# Patient Record
Sex: MALE | Race: WHITE | Employment: OTHER | URBAN - METROPOLITAN AREA
[De-identification: names, ages, dates, MRNs, and addresses within clinical notes are randomized per-mention and may not be internally consistent; named-entity substitution may affect disease eponyms.]

---

## 2017-01-19 PROBLEM — L97.519 DIABETIC ULCER OF RIGHT FOOT ASSOCIATED WITH TYPE 2 DIABETES MELLITUS (HCC): Status: ACTIVE | Noted: 2017-01-19

## 2017-01-19 PROBLEM — E11.42 DIABETIC POLYNEUROPATHY ASSOCIATED WITH TYPE 2 DIABETES MELLITUS (HCC): Status: ACTIVE | Noted: 2017-01-19

## 2017-01-19 PROBLEM — E11.621 DIABETIC ULCER OF RIGHT FOOT ASSOCIATED WITH TYPE 2 DIABETES MELLITUS (HCC): Status: ACTIVE | Noted: 2017-01-19

## 2017-01-19 PROBLEM — M86.471 CHRONIC OSTEOMYELITIS OF RIGHT FOOT WITH DRAINING SINUS (HCC): Status: ACTIVE | Noted: 2017-01-19

## 2017-01-26 RX ORDER — MULTIVITAMIN WITH IRON
1 TABLET ORAL DAILY
COMMUNITY
End: 2021-11-03

## 2017-02-08 ENCOUNTER — APPOINTMENT (OUTPATIENT)
Dept: LAB | Facility: HOSPITAL | Age: 62
End: 2017-02-08
Payer: COMMERCIAL

## 2017-02-08 ENCOUNTER — LAB ENCOUNTER (OUTPATIENT)
Dept: LAB | Facility: HOSPITAL | Age: 62
End: 2017-02-08
Payer: COMMERCIAL

## 2017-02-08 DIAGNOSIS — E11.49 TYPE II OR UNSPECIFIED TYPE DIABETES MELLITUS WITH NEUROLOGICAL MANIFESTATIONS, UNCONTROLLED(250.62): ICD-10-CM

## 2017-02-08 DIAGNOSIS — H26.9 CATARACT: ICD-10-CM

## 2017-02-08 DIAGNOSIS — Z12.5 SCREENING FOR PROSTATE CANCER: ICD-10-CM

## 2017-02-08 DIAGNOSIS — I10 ESSENTIAL HYPERTENSION: ICD-10-CM

## 2017-02-08 DIAGNOSIS — E78.5 HYPERLIPIDEMIA WITH TARGET LDL LESS THAN 100: ICD-10-CM

## 2017-02-08 LAB
ALBUMIN SERPL-MCNC: 4 G/DL (ref 3.5–4.8)
ALP LIVER SERPL-CCNC: 50 U/L (ref 45–117)
ALT SERPL-CCNC: 19 U/L (ref 17–63)
AST SERPL-CCNC: 15 U/L (ref 15–41)
ATRIAL RATE: 62 BPM
BASOPHILS # BLD AUTO: 0.06 X10(3) UL (ref 0–0.1)
BASOPHILS NFR BLD AUTO: 0.7 %
BILIRUB SERPL-MCNC: 0.5 MG/DL (ref 0.1–2)
BUN BLD-MCNC: 34 MG/DL (ref 8–20)
CALCIUM BLD-MCNC: 8.7 MG/DL (ref 8.3–10.3)
CHLORIDE: 104 MMOL/L (ref 101–111)
CHOLEST SMN-MCNC: 210 MG/DL (ref ?–200)
CO2: 27 MMOL/L (ref 22–32)
CREAT BLD-MCNC: 1.84 MG/DL (ref 0.7–1.3)
CREAT UR-SCNC: 88.8 MG/DL
EOSINOPHIL # BLD AUTO: 0.1 X10(3) UL (ref 0–0.3)
EOSINOPHIL NFR BLD AUTO: 1.2 %
ERYTHROCYTE [DISTWIDTH] IN BLOOD BY AUTOMATED COUNT: 14.6 % (ref 11.5–16)
EST. AVERAGE GLUCOSE BLD GHB EST-MCNC: 229 MG/DL (ref 68–126)
GLUCOSE BLD-MCNC: 90 MG/DL (ref 70–99)
HBA1C MFR BLD HPLC: 9.6 % (ref ?–5.7)
HCT VFR BLD AUTO: 41.5 % (ref 37–53)
HDLC SERPL-MCNC: 36 MG/DL (ref 45–?)
HDLC SERPL: 5.83 {RATIO} (ref ?–4.97)
HGB BLD-MCNC: 13.5 G/DL (ref 13–17)
IMMATURE GRANULOCYTE COUNT: 0.03 X10(3) UL (ref 0–1)
IMMATURE GRANULOCYTE RATIO %: 0.4 %
LDLC SERPL CALC-MCNC: 131 MG/DL (ref ?–130)
LYMPHOCYTES # BLD AUTO: 2.42 X10(3) UL (ref 0.9–4)
LYMPHOCYTES NFR BLD AUTO: 30.1 %
M PROTEIN MFR SERPL ELPH: 8.1 G/DL (ref 6.1–8.3)
MCH RBC QN AUTO: 26.2 PG (ref 27–33.2)
MCHC RBC AUTO-ENTMCNC: 32.5 G/DL (ref 31–37)
MCV RBC AUTO: 80.4 FL (ref 80–99)
MICROALBUMIN UR-MCNC: 2.26 MG/DL
MICROALBUMIN/CREAT 24H UR-RTO: 25.5 UG/MG (ref ?–30)
MONOCYTES # BLD AUTO: 0.55 X10(3) UL (ref 0.1–0.6)
MONOCYTES NFR BLD AUTO: 6.8 %
NEUTROPHIL ABS PRELIM: 4.87 X10 (3) UL (ref 1.3–6.7)
NEUTROPHILS # BLD AUTO: 4.87 X10(3) UL (ref 1.3–6.7)
NEUTROPHILS NFR BLD AUTO: 60.8 %
NONHDLC SERPL-MCNC: 174 MG/DL (ref ?–130)
P AXIS: 75 DEGREES
P-R INTERVAL: 234 MS
PLATELET # BLD AUTO: 294 10(3)UL (ref 150–450)
POTASSIUM SERPL-SCNC: 3.5 MMOL/L (ref 3.6–5.1)
PSA SERPL-MCNC: 0.85 NG/ML (ref 0.01–4)
Q-T INTERVAL: 452 MS
QRS DURATION: 126 MS
QTC CALCULATION (BEZET): 458 MS
R AXIS: -34 DEGREES
RBC # BLD AUTO: 5.16 X10(6)UL (ref 4.3–5.7)
RED CELL DISTRIBUTION WIDTH-SD: 42.5 FL (ref 35.1–46.3)
SODIUM SERPL-SCNC: 140 MMOL/L (ref 136–144)
T AXIS: 43 DEGREES
TRIGLYCERIDES: 213 MG/DL (ref ?–150)
VENTRICULAR RATE: 62 BPM
VLDL: 43 MG/DL (ref 5–40)
WBC # BLD AUTO: 8 X10(3) UL (ref 4–13)

## 2017-02-08 PROCEDURE — 83036 HEMOGLOBIN GLYCOSYLATED A1C: CPT

## 2017-02-08 PROCEDURE — 93010 ELECTROCARDIOGRAM REPORT: CPT | Performed by: INTERNAL MEDICINE

## 2017-02-08 PROCEDURE — 82043 UR ALBUMIN QUANTITATIVE: CPT

## 2017-02-08 PROCEDURE — 82570 ASSAY OF URINE CREATININE: CPT

## 2017-02-08 PROCEDURE — 36415 COLL VENOUS BLD VENIPUNCTURE: CPT

## 2017-02-08 PROCEDURE — 80053 COMPREHEN METABOLIC PANEL: CPT

## 2017-02-08 PROCEDURE — 85025 COMPLETE CBC W/AUTO DIFF WBC: CPT

## 2017-02-08 PROCEDURE — 80061 LIPID PANEL: CPT

## 2017-02-08 PROCEDURE — 84153 ASSAY OF PSA TOTAL: CPT

## 2017-02-08 PROCEDURE — 93005 ELECTROCARDIOGRAM TRACING: CPT

## 2017-02-10 ENCOUNTER — ANESTHESIA EVENT (OUTPATIENT)
Dept: SURGERY | Facility: HOSPITAL | Age: 62
End: 2017-02-10
Payer: COMMERCIAL

## 2017-02-21 ENCOUNTER — SURGERY (OUTPATIENT)
Age: 62
End: 2017-02-21

## 2017-02-21 ENCOUNTER — ANESTHESIA (OUTPATIENT)
Dept: SURGERY | Facility: HOSPITAL | Age: 62
End: 2017-02-21
Payer: COMMERCIAL

## 2017-02-21 ENCOUNTER — HOSPITAL ENCOUNTER (OUTPATIENT)
Facility: HOSPITAL | Age: 62
Setting detail: HOSPITAL OUTPATIENT SURGERY
Discharge: HOME | End: 2017-02-21
Attending: OPHTHALMOLOGY | Admitting: OPHTHALMOLOGY
Payer: COMMERCIAL

## 2017-02-21 VITALS
RESPIRATION RATE: 18 BRPM | OXYGEN SATURATION: 96 % | SYSTOLIC BLOOD PRESSURE: 124 MMHG | TEMPERATURE: 98 F | BODY MASS INDEX: 33.32 KG/M2 | DIASTOLIC BLOOD PRESSURE: 85 MMHG | HEART RATE: 58 BPM | HEIGHT: 75 IN | WEIGHT: 268 LBS

## 2017-02-21 DIAGNOSIS — H26.9 CATARACT: Primary | ICD-10-CM

## 2017-02-21 LAB
GLUCOSE BLD-MCNC: 135 MG/DL (ref 65–99)
GLUCOSE BLD-MCNC: 156 MG/DL (ref 65–99)

## 2017-02-21 PROCEDURE — 08RJ3JZ REPLACEMENT OF RIGHT LENS WITH SYNTHETIC SUBSTITUTE, PERCUTANEOUS APPROACH: ICD-10-PCS | Performed by: OPHTHALMOLOGY

## 2017-02-21 PROCEDURE — 82962 GLUCOSE BLOOD TEST: CPT

## 2017-02-21 RX ORDER — SODIUM CHLORIDE, SODIUM LACTATE, POTASSIUM CHLORIDE, CALCIUM CHLORIDE 600; 310; 30; 20 MG/100ML; MG/100ML; MG/100ML; MG/100ML
INJECTION, SOLUTION INTRAVENOUS CONTINUOUS
Status: DISCONTINUED | OUTPATIENT
Start: 2017-02-21 | End: 2017-02-21

## 2017-02-21 RX ORDER — BALANCED SALT SOLUTION 6.4; .75; .48; .3; 3.9; 1.7 MG/ML; MG/ML; MG/ML; MG/ML; MG/ML; MG/ML
SOLUTION OPHTHALMIC AS NEEDED
Status: DISCONTINUED | OUTPATIENT
Start: 2017-02-21 | End: 2017-02-21 | Stop reason: HOSPADM

## 2017-02-21 RX ORDER — LIDOCAINE HYDROCHLORIDE 10 MG/ML
INJECTION, SOLUTION EPIDURAL; INFILTRATION; INTRACAUDAL; PERINEURAL AS NEEDED
Status: DISCONTINUED | OUTPATIENT
Start: 2017-02-21 | End: 2017-02-21 | Stop reason: HOSPADM

## 2017-02-21 RX ORDER — NALOXONE HYDROCHLORIDE 0.4 MG/ML
80 INJECTION, SOLUTION INTRAMUSCULAR; INTRAVENOUS; SUBCUTANEOUS AS NEEDED
Status: DISCONTINUED | OUTPATIENT
Start: 2017-02-21 | End: 2017-02-21

## 2017-02-21 RX ORDER — CYCLOPENTOLATE HYDROCHLORIDE 10 MG/ML
1 SOLUTION/ DROPS OPHTHALMIC SEE ADMIN INSTRUCTIONS
Status: DISCONTINUED | OUTPATIENT
Start: 2017-02-21 | End: 2017-02-21 | Stop reason: HOSPADM

## 2017-02-21 RX ORDER — PHENYLEPHRINE HCL 2.5 %
DROPS OPHTHALMIC (EYE)
Status: COMPLETED
Start: 2017-02-21 | End: 2017-02-21

## 2017-02-21 RX ORDER — TROPICAMIDE 10 MG/ML
1 SOLUTION/ DROPS OPHTHALMIC SEE ADMIN INSTRUCTIONS
Status: DISCONTINUED | OUTPATIENT
Start: 2017-02-21 | End: 2017-02-21 | Stop reason: HOSPADM

## 2017-02-21 RX ORDER — DEXTROSE MONOHYDRATE 25 G/50ML
50 INJECTION, SOLUTION INTRAVENOUS
Status: DISCONTINUED | OUTPATIENT
Start: 2017-02-21 | End: 2017-02-21

## 2017-02-21 RX ORDER — TETRACAINE HYDROCHLORIDE 5 MG/ML
1 SOLUTION OPHTHALMIC SEE ADMIN INSTRUCTIONS
Status: DISCONTINUED | OUTPATIENT
Start: 2017-02-21 | End: 2017-02-21 | Stop reason: HOSPADM

## 2017-02-21 RX ORDER — TETRACAINE HYDROCHLORIDE 5 MG/ML
SOLUTION OPHTHALMIC
Status: COMPLETED
Start: 2017-02-21 | End: 2017-02-21

## 2017-02-21 RX ORDER — CYCLOPENTOLATE HYDROCHLORIDE 10 MG/ML
SOLUTION/ DROPS OPHTHALMIC
Status: COMPLETED
Start: 2017-02-21 | End: 2017-02-21

## 2017-02-21 RX ORDER — PHENYLEPHRINE HCL 2.5 %
1 DROPS OPHTHALMIC (EYE) SEE ADMIN INSTRUCTIONS
Status: DISCONTINUED | OUTPATIENT
Start: 2017-02-21 | End: 2017-02-21 | Stop reason: HOSPADM

## 2017-02-21 RX ORDER — TROPICAMIDE 10 MG/ML
SOLUTION/ DROPS OPHTHALMIC
Status: COMPLETED
Start: 2017-02-21 | End: 2017-02-21

## 2017-02-21 RX ORDER — DEXTROSE MONOHYDRATE 25 G/50ML
50 INJECTION, SOLUTION INTRAVENOUS
Status: DISCONTINUED | OUTPATIENT
Start: 2017-02-21 | End: 2017-02-21 | Stop reason: HOSPADM

## 2017-02-21 RX ORDER — TETRACAINE HYDROCHLORIDE 5 MG/ML
SOLUTION OPHTHALMIC AS NEEDED
Status: DISCONTINUED | OUTPATIENT
Start: 2017-02-21 | End: 2017-02-21 | Stop reason: HOSPADM

## 2017-02-21 RX ORDER — HYDROMORPHONE HYDROCHLORIDE 1 MG/ML
0.4 INJECTION, SOLUTION INTRAMUSCULAR; INTRAVENOUS; SUBCUTANEOUS EVERY 5 MIN PRN
Status: DISCONTINUED | OUTPATIENT
Start: 2017-02-21 | End: 2017-02-21

## 2017-02-21 NOTE — OPERATIVE REPORT
OPERATIVE REPORT    PREOPERATIVE/POSTOPERATIVE DIAGNOSIS: COMBINED SENILE CATARACT FORMATION, RIGHT EYE, DENSE LENS, FLOPPY IRIS    OPERATION PERFORMED:   PHACOEMULSIFICATION WITH POSTERIOR CHAMBER       INTRAOCULAR LENS, RIGHT EYE    SURGEON:      ESTHER DIAS The same instrument was also used to perform hydroelineation. Next, the phacoemulsification unit hand piece was used with a second instrument to perform removal of the cataract.   All remaining cortical material as well as accessible lens epithelial cells

## 2017-02-21 NOTE — ANESTHESIA POSTPROCEDURE EVALUATION
[address] Patient Status:  Hospital Outpatient Surgery   Age/Gender 57year old male MRN [id number]   [facility] SURGERY Attending Mya Muir MD   Hosp Day # 0 PCP Thelma Tapia DO       Anesthesia Post-op Note    Pr

## 2017-02-21 NOTE — ANESTHESIA PREPROCEDURE EVALUATION
PRE-OP EVALUATION    Patient Name: Shelbi Mccauley    Pre-op Diagnosis: CATARACT    Procedure(s):  PHACOEMULSIFICATION OF CATARACT WITH PLACEMENT OF INTRAOCULAR LENS IMPLANT RIGHT EYE    Surgeon(s) and Role:     Deidre Watson MD - Primary    Pre-op vitals Rfl: 1   Insulin Syringe-Needle U-100 (BD INSULIN SYRINGE ULTRAFINE) 30G X 1/2\" 0.3 ML Does not apply Misc Uses 5 times daily Disp: 200 each Rfl: 2   Metoprolol Tartrate 100 MG Oral Tab Take 1 tablet (100 mg total) by mouth 2 (two) times daily.  Disp: 180 520 [facility] FOOT/TOE TENDON  11/9/09    Comment Performed by Aubrie Austin at [address] Left 12/22/2014    Comment Procedure: GASTROC RECESSION FOOT;  Surgeon: Yamileth Peters DPM;

## 2017-02-21 NOTE — BRIEF OP NOTE
[address] SURGERY  Brief Op Note     Cheral Room Location: OR   [facility] [id number] MRN [id number]   Admission Date 2/21/2017 Operation Date 2/21/2017   Attending Physician Dorcas Thomas MD Operating Physician Marquis Lewis MD       Pre-Operative Diagnosis: R

## 2017-02-23 RX ORDER — SODIUM CHLORIDE, SODIUM LACTATE, POTASSIUM CHLORIDE, CALCIUM CHLORIDE 600; 310; 30; 20 MG/100ML; MG/100ML; MG/100ML; MG/100ML
INJECTION, SOLUTION INTRAVENOUS CONTINUOUS
Status: CANCELLED | OUTPATIENT
Start: 2017-02-23

## 2017-02-24 ENCOUNTER — APPOINTMENT (OUTPATIENT)
Dept: CT IMAGING | Facility: HOSPITAL | Age: 62
End: 2017-02-24
Attending: EMERGENCY MEDICINE
Payer: COMMERCIAL

## 2017-02-24 ENCOUNTER — HOSPITAL ENCOUNTER (EMERGENCY)
Facility: HOSPITAL | Age: 62
Discharge: HOME | End: 2017-02-24
Attending: EMERGENCY MEDICINE
Payer: COMMERCIAL

## 2017-02-24 VITALS
OXYGEN SATURATION: 98 % | TEMPERATURE: 97 F | HEART RATE: 106 BPM | SYSTOLIC BLOOD PRESSURE: 151 MMHG | BODY MASS INDEX: 34.19 KG/M2 | WEIGHT: 275 LBS | RESPIRATION RATE: 18 BRPM | DIASTOLIC BLOOD PRESSURE: 97 MMHG | HEIGHT: 75 IN

## 2017-02-24 DIAGNOSIS — G40.909 SEIZURE DISORDER (HCC): Primary | ICD-10-CM

## 2017-02-24 DIAGNOSIS — E16.2 HYPOGLYCEMIA: ICD-10-CM

## 2017-02-24 DIAGNOSIS — S01.512A TONGUE LACERATION, INITIAL ENCOUNTER: ICD-10-CM

## 2017-02-24 LAB
ALBUMIN SERPL-MCNC: 3.6 G/DL (ref 3.5–4.8)
ALP LIVER SERPL-CCNC: 46 U/L (ref 45–117)
ALT SERPL-CCNC: 26 U/L (ref 17–63)
APTT PPP: 29.2 SECONDS (ref 25–34)
AST SERPL-CCNC: 22 U/L (ref 15–41)
ATRIAL RATE: 105 BPM
BASOPHILS # BLD AUTO: 0.05 X10(3) UL (ref 0–0.1)
BASOPHILS NFR BLD AUTO: 0.8 %
BILIRUB SERPL-MCNC: 0.3 MG/DL (ref 0.1–2)
BUN BLD-MCNC: 26 MG/DL (ref 8–20)
CALCIUM BLD-MCNC: 9.1 MG/DL (ref 8.3–10.3)
CHLORIDE: 101 MMOL/L (ref 101–111)
CO2: 26 MMOL/L (ref 22–32)
CREAT BLD-MCNC: 1.72 MG/DL (ref 0.7–1.3)
EOSINOPHIL # BLD AUTO: 0.12 X10(3) UL (ref 0–0.3)
EOSINOPHIL NFR BLD AUTO: 1.9 %
ERYTHROCYTE [DISTWIDTH] IN BLOOD BY AUTOMATED COUNT: 14.6 % (ref 11.5–16)
ETHYL ALCOHOL: <3 MG/DL (ref ?–3)
GLUCOSE BLD-MCNC: 108 MG/DL (ref 70–99)
GLUCOSE BLD-MCNC: 170 MG/DL (ref 65–99)
HCT VFR BLD AUTO: 39 % (ref 37–53)
HGB BLD-MCNC: 12.7 G/DL (ref 13–17)
IMMATURE GRANULOCYTE COUNT: 0.14 X10(3) UL (ref 0–1)
IMMATURE GRANULOCYTE RATIO %: 2.2 %
INR BLD: 1.19 (ref 0.89–1.12)
LYMPHOCYTES # BLD AUTO: 1.88 X10(3) UL (ref 0.9–4)
LYMPHOCYTES NFR BLD AUTO: 29 %
M PROTEIN MFR SERPL ELPH: 7.5 G/DL (ref 6.1–8.3)
MCH RBC QN AUTO: 26.5 PG (ref 27–33.2)
MCHC RBC AUTO-ENTMCNC: 32.6 G/DL (ref 31–37)
MCV RBC AUTO: 81.4 FL (ref 80–99)
MONOCYTES # BLD AUTO: 0.46 X10(3) UL (ref 0.1–0.6)
MONOCYTES NFR BLD AUTO: 7.1 %
NEUTROPHIL ABS PRELIM: 3.83 X10 (3) UL (ref 1.3–6.7)
NEUTROPHILS # BLD AUTO: 3.83 X10(3) UL (ref 1.3–6.7)
NEUTROPHILS NFR BLD AUTO: 59 %
P AXIS: 26 DEGREES
P-R INTERVAL: 196 MS
PLATELET # BLD AUTO: 227 10(3)UL (ref 150–450)
POTASSIUM SERPL-SCNC: 3.4 MMOL/L (ref 3.6–5.1)
PSA SERPL DL<=0.01 NG/ML-MCNC: 15.5 SECONDS (ref 12.3–14.8)
Q-T INTERVAL: 364 MS
QRS DURATION: 120 MS
QTC CALCULATION (BEZET): 481 MS
R AXIS: -55 DEGREES
RBC # BLD AUTO: 4.79 X10(6)UL (ref 4.3–5.7)
RED CELL DISTRIBUTION WIDTH-SD: 42.4 FL (ref 35.1–46.3)
SODIUM SERPL-SCNC: 139 MMOL/L (ref 136–144)
T AXIS: 46 DEGREES
TROPONIN: <0.046 NG/ML (ref ?–0.05)
VENTRICULAR RATE: 105 BPM
WBC # BLD AUTO: 6.5 X10(3) UL (ref 4–13)

## 2017-02-24 PROCEDURE — 85025 COMPLETE CBC W/AUTO DIFF WBC: CPT | Performed by: EMERGENCY MEDICINE

## 2017-02-24 PROCEDURE — 82962 GLUCOSE BLOOD TEST: CPT

## 2017-02-24 PROCEDURE — 93005 ELECTROCARDIOGRAM TRACING: CPT

## 2017-02-24 PROCEDURE — 80053 COMPREHEN METABOLIC PANEL: CPT | Performed by: EMERGENCY MEDICINE

## 2017-02-24 PROCEDURE — 84484 ASSAY OF TROPONIN QUANT: CPT | Performed by: EMERGENCY MEDICINE

## 2017-02-24 PROCEDURE — 96360 HYDRATION IV INFUSION INIT: CPT

## 2017-02-24 PROCEDURE — 85610 PROTHROMBIN TIME: CPT | Performed by: EMERGENCY MEDICINE

## 2017-02-24 PROCEDURE — 93010 ELECTROCARDIOGRAM REPORT: CPT

## 2017-02-24 PROCEDURE — 85730 THROMBOPLASTIN TIME PARTIAL: CPT | Performed by: EMERGENCY MEDICINE

## 2017-02-24 PROCEDURE — 80320 DRUG SCREEN QUANTALCOHOLS: CPT | Performed by: EMERGENCY MEDICINE

## 2017-02-24 PROCEDURE — 70450 CT HEAD/BRAIN W/O DYE: CPT

## 2017-02-24 PROCEDURE — 99284 EMERGENCY DEPT VISIT MOD MDM: CPT

## 2017-02-24 PROCEDURE — 99285 EMERGENCY DEPT VISIT HI MDM: CPT

## 2017-02-24 RX ORDER — AMOXICILLIN AND CLAVULANATE POTASSIUM 875; 125 MG/1; MG/1
1 TABLET, FILM COATED ORAL 2 TIMES DAILY
Qty: 10 TABLET | Refills: 0 | Status: SHIPPED | OUTPATIENT
Start: 2017-02-24 | End: 2017-03-01

## 2017-02-24 RX ORDER — SODIUM CHLORIDE 9 MG/ML
1000 INJECTION, SOLUTION INTRAVENOUS ONCE
Status: COMPLETED | OUTPATIENT
Start: 2017-02-24 | End: 2017-02-24

## 2017-02-24 NOTE — ED INITIAL ASSESSMENT (HPI)
Witnessed seizure by wife, 3 minutes long. Patient was post-ictal when EMS arrived. No medication in the field. Pt has had two seizures in the past. BS in field was 96. Pt did not fall or hit head, he was sitting in chair.

## 2017-03-21 ENCOUNTER — ANESTHESIA (OUTPATIENT)
Dept: SURGERY | Facility: HOSPITAL | Age: 62
End: 2017-03-21
Payer: COMMERCIAL

## 2017-03-21 ENCOUNTER — ANESTHESIA EVENT (OUTPATIENT)
Dept: SURGERY | Facility: HOSPITAL | Age: 62
End: 2017-03-21
Payer: COMMERCIAL

## 2017-03-21 ENCOUNTER — SURGERY (OUTPATIENT)
Age: 62
End: 2017-03-21

## 2017-03-21 ENCOUNTER — HOSPITAL ENCOUNTER (OUTPATIENT)
Facility: HOSPITAL | Age: 62
Setting detail: HOSPITAL OUTPATIENT SURGERY
Discharge: HOME | End: 2017-03-21
Attending: OPHTHALMOLOGY | Admitting: OPHTHALMOLOGY
Payer: COMMERCIAL

## 2017-03-21 VITALS
DIASTOLIC BLOOD PRESSURE: 74 MMHG | HEART RATE: 61 BPM | SYSTOLIC BLOOD PRESSURE: 112 MMHG | HEIGHT: 75 IN | BODY MASS INDEX: 37.3 KG/M2 | OXYGEN SATURATION: 99 % | TEMPERATURE: 98 F | WEIGHT: 300 LBS | RESPIRATION RATE: 16 BRPM

## 2017-03-21 PROBLEM — H25.812 COMBINED FORM OF AGE-RELATED CATARACT, LEFT EYE: Status: ACTIVE | Noted: 2017-03-21

## 2017-03-21 LAB
GLUCOSE BLD-MCNC: 141 MG/DL (ref 65–99)
GLUCOSE BLD-MCNC: 202 MG/DL (ref 65–99)

## 2017-03-21 PROCEDURE — 08RK3JZ REPLACEMENT OF LEFT LENS WITH SYNTHETIC SUBSTITUTE, PERCUTANEOUS APPROACH: ICD-10-PCS | Performed by: OPHTHALMOLOGY

## 2017-03-21 PROCEDURE — 82962 GLUCOSE BLOOD TEST: CPT

## 2017-03-21 RX ORDER — INSULIN ASPART 100 [IU]/ML
INJECTION, SOLUTION INTRAVENOUS; SUBCUTANEOUS ONCE
Status: DISCONTINUED | OUTPATIENT
Start: 2017-03-21 | End: 2017-03-21

## 2017-03-21 RX ORDER — CYCLOPENTOLATE HYDROCHLORIDE 10 MG/ML
SOLUTION/ DROPS OPHTHALMIC
Status: COMPLETED
Start: 2017-03-21 | End: 2017-03-21

## 2017-03-21 RX ORDER — DEXTROSE MONOHYDRATE 25 G/50ML
50 INJECTION, SOLUTION INTRAVENOUS
Status: DISCONTINUED | OUTPATIENT
Start: 2017-03-21 | End: 2017-03-21

## 2017-03-21 RX ORDER — CYCLOPENTOLATE HYDROCHLORIDE 10 MG/ML
1 SOLUTION/ DROPS OPHTHALMIC SEE ADMIN INSTRUCTIONS
Status: COMPLETED | OUTPATIENT
Start: 2017-03-21 | End: 2017-03-21

## 2017-03-21 RX ORDER — METOCLOPRAMIDE HYDROCHLORIDE 5 MG/ML
10 INJECTION INTRAMUSCULAR; INTRAVENOUS AS NEEDED
Status: DISCONTINUED | OUTPATIENT
Start: 2017-03-21 | End: 2017-03-21

## 2017-03-21 RX ORDER — TROPICAMIDE 10 MG/ML
SOLUTION/ DROPS OPHTHALMIC
Status: COMPLETED
Start: 2017-03-21 | End: 2017-03-21

## 2017-03-21 RX ORDER — LIDOCAINE HYDROCHLORIDE 10 MG/ML
INJECTION, SOLUTION EPIDURAL; INFILTRATION; INTRACAUDAL; PERINEURAL AS NEEDED
Status: DISCONTINUED | OUTPATIENT
Start: 2017-03-21 | End: 2017-03-21 | Stop reason: HOSPADM

## 2017-03-21 RX ORDER — ONDANSETRON 2 MG/ML
4 INJECTION INTRAMUSCULAR; INTRAVENOUS AS NEEDED
Status: DISCONTINUED | OUTPATIENT
Start: 2017-03-21 | End: 2017-03-21

## 2017-03-21 RX ORDER — BALANCED SALT SOLUTION 6.4; .75; .48; .3; 3.9; 1.7 MG/ML; MG/ML; MG/ML; MG/ML; MG/ML; MG/ML
SOLUTION OPHTHALMIC AS NEEDED
Status: DISCONTINUED | OUTPATIENT
Start: 2017-03-21 | End: 2017-03-21 | Stop reason: HOSPADM

## 2017-03-21 RX ORDER — PHENYLEPHRINE HCL 2.5 %
1 DROPS OPHTHALMIC (EYE) SEE ADMIN INSTRUCTIONS
Status: COMPLETED | OUTPATIENT
Start: 2017-03-21 | End: 2017-03-21

## 2017-03-21 RX ORDER — TETRACAINE HYDROCHLORIDE 5 MG/ML
SOLUTION OPHTHALMIC
Status: COMPLETED
Start: 2017-03-21 | End: 2017-03-21

## 2017-03-21 RX ORDER — TETRACAINE HYDROCHLORIDE 5 MG/ML
SOLUTION OPHTHALMIC AS NEEDED
Status: DISCONTINUED | OUTPATIENT
Start: 2017-03-21 | End: 2017-03-21 | Stop reason: HOSPADM

## 2017-03-21 RX ORDER — TETRACAINE HYDROCHLORIDE 5 MG/ML
1 SOLUTION OPHTHALMIC SEE ADMIN INSTRUCTIONS
Status: DISCONTINUED | OUTPATIENT
Start: 2017-03-21 | End: 2017-03-21 | Stop reason: HOSPADM

## 2017-03-21 RX ORDER — ACETAMINOPHEN 500 MG
500 TABLET ORAL ONCE AS NEEDED
Status: DISCONTINUED | OUTPATIENT
Start: 2017-03-21 | End: 2017-03-21

## 2017-03-21 RX ORDER — SODIUM CHLORIDE, SODIUM LACTATE, POTASSIUM CHLORIDE, CALCIUM CHLORIDE 600; 310; 30; 20 MG/100ML; MG/100ML; MG/100ML; MG/100ML
INJECTION, SOLUTION INTRAVENOUS CONTINUOUS
Status: DISCONTINUED | OUTPATIENT
Start: 2017-03-21 | End: 2017-03-21

## 2017-03-21 RX ORDER — NALOXONE HYDROCHLORIDE 0.4 MG/ML
80 INJECTION, SOLUTION INTRAMUSCULAR; INTRAVENOUS; SUBCUTANEOUS AS NEEDED
Status: DISCONTINUED | OUTPATIENT
Start: 2017-03-21 | End: 2017-03-21

## 2017-03-21 RX ORDER — PHENYLEPHRINE HCL 2.5 %
DROPS OPHTHALMIC (EYE)
Status: COMPLETED
Start: 2017-03-21 | End: 2017-03-21

## 2017-03-21 RX ORDER — HYDROMORPHONE HYDROCHLORIDE 1 MG/ML
0.4 INJECTION, SOLUTION INTRAMUSCULAR; INTRAVENOUS; SUBCUTANEOUS EVERY 5 MIN PRN
Status: DISCONTINUED | OUTPATIENT
Start: 2017-03-21 | End: 2017-03-21

## 2017-03-21 RX ORDER — TROPICAMIDE 10 MG/ML
1 SOLUTION/ DROPS OPHTHALMIC SEE ADMIN INSTRUCTIONS
Status: COMPLETED | OUTPATIENT
Start: 2017-03-21 | End: 2017-03-21

## 2017-03-21 NOTE — ANESTHESIA POSTPROCEDURE EVALUATION
[address] Patient Status:  Hospital Outpatient Surgery   Age/Gender 57year old male MRN [id number]   [facility] SURGERY Attending Mati Grady MD   Hosp Day # 0 PCP Grey Stinson DO       Anesthesia Post-op Note    Pr

## 2017-03-21 NOTE — OPERATIVE REPORT
OPERATIVE REPORT    PREOPERATIVE/POSTOPERATIVE DIAGNOSIS: COMBINED SENILE CATARACT FORMATION, LEFT EYE     OPERATION PERFORMED:   PHACOEMULSIFICATION WITH POSTERIOR CHAMBER       INTRAOCULAR LENS, LEFT EYE    SURGEON:      CHRIS Kirkland instrument to perform removal of the cataract. All remaining cortical material as well as accessible lens epithelial cells underneath the anterior capsule were removed with irrigation and aspiration using polish mode for the lens epithelial cells.   Next,

## 2017-03-21 NOTE — ANESTHESIA PREPROCEDURE EVALUATION
PRE-OP EVALUATION    Patient Name: Donaldo Pena    Pre-op Diagnosis: CATARACT     Procedure(s):  PHACOEMULSIFICATION OF CATARACT WITH PLACEMENT OF INTRAOCULAR LENS IMPLANT LEFT EYE    Surgeon(s) and Role:     Poppy Benavidez MD - Primary    Pre-op vitals prednisoLONE acetate 1 % Ophthalmic Suspension Start one drop,3 to 4 times a day to the eye having cataract surgery. Start after the surgery.  Disp: 5 mL Rfl: 1   ketorolac tromethamine 0.5 % Ophthalmic Solution Start after cataract surgery: use one drop 11/9/09    Comment Performed by Kong Lopez at [address] Left 12/22/2014    Comment Procedure: GASTROC RECESSION FOOT;  Surgeon: Brayan Conn DPM;  Location: [address] [place] [address]    LENGTH/SHORT LEG/A

## 2017-03-24 NOTE — H&P
[address] Patient Status:  Hospital Outpatient Surgery    1955 MRN [id number]   Location [address] [place] [address] Attending No att. providers found   Hosp Day # 0 PCP Roldan Shaw, Education:                 Number of children: 3             Occupational History  Occupation          Employer            Comment               Management consult*                         Social History Main Topics    Smoking Status: Never Smoker SYRINGE ULTRAFINE 30G X 1/2\" 1 ML Does not apply Misc    BD INSULIN SYRINGE ULTRAFINE 30G X 1/2\" 0.3 ML Does not apply Misc    Glucose Blood (JOSÉ ANTONIO CONTOUR NEXT TEST) In Vitro Strip Check blood sugars 4 times per day   FREESTYLE LANCETS Does not apply Mi

## 2017-05-11 PROCEDURE — 87070 CULTURE OTHR SPECIMN AEROBIC: CPT | Performed by: PODIATRIST

## 2017-05-11 PROCEDURE — 87205 SMEAR GRAM STAIN: CPT | Performed by: PODIATRIST

## 2017-05-11 PROCEDURE — 87147 CULTURE TYPE IMMUNOLOGIC: CPT | Performed by: PODIATRIST

## 2017-05-11 PROCEDURE — 87186 SC STD MICRODIL/AGAR DIL: CPT | Performed by: PODIATRIST

## 2017-05-12 ENCOUNTER — APPOINTMENT (OUTPATIENT)
Dept: MRI IMAGING | Facility: HOSPITAL | Age: 62
DRG: 629 | End: 2017-05-12
Attending: PODIATRIST
Payer: COMMERCIAL

## 2017-05-12 ENCOUNTER — APPOINTMENT (OUTPATIENT)
Dept: GENERAL RADIOLOGY | Facility: HOSPITAL | Age: 62
DRG: 629 | End: 2017-05-12
Attending: PODIATRIST
Payer: COMMERCIAL

## 2017-05-12 ENCOUNTER — HOSPITAL ENCOUNTER (INPATIENT)
Facility: HOSPITAL | Age: 62
LOS: 3 days | Discharge: HOME HEALTH | DRG: 629 | End: 2017-05-15
Attending: PODIATRIST | Admitting: PODIATRIST
Payer: COMMERCIAL

## 2017-05-12 ENCOUNTER — ANESTHESIA EVENT (OUTPATIENT)
Dept: SURGERY | Facility: HOSPITAL | Age: 62
DRG: 629 | End: 2017-05-12
Payer: COMMERCIAL

## 2017-05-12 DIAGNOSIS — E11.29 TYPE II OR UNSPECIFIED TYPE DIABETES MELLITUS WITH RENAL MANIFESTATIONS, UNCONTROLLED(250.42): ICD-10-CM

## 2017-05-12 DIAGNOSIS — E11.65 TYPE II OR UNSPECIFIED TYPE DIABETES MELLITUS WITH RENAL MANIFESTATIONS, UNCONTROLLED(250.42): ICD-10-CM

## 2017-05-12 DIAGNOSIS — E11.49 TYPE II OR UNSPECIFIED TYPE DIABETES MELLITUS WITH NEUROLOGICAL MANIFESTATIONS, UNCONTROLLED(250.62): Primary | ICD-10-CM

## 2017-05-12 PROCEDURE — 71010 XR CHEST AP PORTABLE  (CPT=71010): CPT | Performed by: PODIATRIST

## 2017-05-12 PROCEDURE — 82962 GLUCOSE BLOOD TEST: CPT

## 2017-05-12 PROCEDURE — 85652 RBC SED RATE AUTOMATED: CPT | Performed by: PODIATRIST

## 2017-05-12 PROCEDURE — 93005 ELECTROCARDIOGRAM TRACING: CPT

## 2017-05-12 PROCEDURE — 85025 COMPLETE CBC W/AUTO DIFF WBC: CPT | Performed by: PODIATRIST

## 2017-05-12 PROCEDURE — 83036 HEMOGLOBIN GLYCOSYLATED A1C: CPT | Performed by: HOSPITALIST

## 2017-05-12 PROCEDURE — 80053 COMPREHEN METABOLIC PANEL: CPT | Performed by: PODIATRIST

## 2017-05-12 PROCEDURE — 93010 ELECTROCARDIOGRAM REPORT: CPT | Performed by: INTERNAL MEDICINE

## 2017-05-12 PROCEDURE — 86140 C-REACTIVE PROTEIN: CPT | Performed by: PODIATRIST

## 2017-05-12 PROCEDURE — A9575 INJ GADOTERATE MEGLUMI 0.1ML: HCPCS

## 2017-05-12 PROCEDURE — 87040 BLOOD CULTURE FOR BACTERIA: CPT | Performed by: PODIATRIST

## 2017-05-12 PROCEDURE — 73720 MRI LWR EXTREMITY W/O&W/DYE: CPT | Performed by: PODIATRIST

## 2017-05-12 RX ORDER — ATORVASTATIN CALCIUM 20 MG/1
20 TABLET, FILM COATED ORAL NIGHTLY
Status: DISCONTINUED | OUTPATIENT
Start: 2017-05-12 | End: 2017-05-15

## 2017-05-12 RX ORDER — SODIUM CHLORIDE 9 MG/ML
INJECTION, SOLUTION INTRAVENOUS CONTINUOUS
Status: DISCONTINUED | OUTPATIENT
Start: 2017-05-12 | End: 2017-05-15

## 2017-05-12 RX ORDER — DEXTROSE MONOHYDRATE 25 G/50ML
50 INJECTION, SOLUTION INTRAVENOUS
Status: DISCONTINUED | OUTPATIENT
Start: 2017-05-12 | End: 2017-05-12

## 2017-05-12 RX ORDER — ONDANSETRON 2 MG/ML
4 INJECTION INTRAMUSCULAR; INTRAVENOUS EVERY 6 HOURS PRN
Status: DISCONTINUED | OUTPATIENT
Start: 2017-05-12 | End: 2017-05-15

## 2017-05-12 RX ORDER — HEPARIN SODIUM 5000 [USP'U]/ML
5000 INJECTION, SOLUTION INTRAVENOUS; SUBCUTANEOUS EVERY 12 HOURS
Status: ACTIVE | OUTPATIENT
Start: 2017-05-12 | End: 2017-05-13

## 2017-05-12 RX ORDER — METOPROLOL TARTRATE 100 MG/1
100 TABLET ORAL 2 TIMES DAILY
Status: DISCONTINUED | OUTPATIENT
Start: 2017-05-12 | End: 2017-05-15

## 2017-05-12 RX ORDER — FENOFIBRATE 134 MG/1
134 CAPSULE ORAL
Status: DISCONTINUED | OUTPATIENT
Start: 2017-05-12 | End: 2017-05-15

## 2017-05-12 RX ORDER — HYDROMORPHONE HYDROCHLORIDE 1 MG/ML
0.5 INJECTION, SOLUTION INTRAMUSCULAR; INTRAVENOUS; SUBCUTANEOUS
Status: DISCONTINUED | OUTPATIENT
Start: 2017-05-12 | End: 2017-05-15

## 2017-05-12 RX ORDER — LISINOPRIL 40 MG/1
40 TABLET ORAL DAILY
Status: DISCONTINUED | OUTPATIENT
Start: 2017-05-13 | End: 2017-05-15

## 2017-05-12 RX ORDER — DEXTROSE MONOHYDRATE 25 G/50ML
50 INJECTION, SOLUTION INTRAVENOUS
Status: DISCONTINUED | OUTPATIENT
Start: 2017-05-12 | End: 2017-05-15

## 2017-05-12 NOTE — PROGRESS NOTES
NURSING ADMISSION NOTE      Patient received from mds office, direct admit. Oriented to room. Safety precautions initiated. Bed in low position. Call light in reach.     L foot wound small amount of serosanguinous with some yellow drainage on 5th me

## 2017-05-12 NOTE — CONSULTS
[facility]      Endocrinology Consultation    Suyapa Coronado Patient Status:  Inpatient    1955 MRN [id number]   [facility] 3SW-A Attending Francisca Parks DPM   Hosp Day # 0 PCP Mayra Lockett DO     Reason for Consultation:  Un TIMES DAILY Disp: 200 each Rfl: 2 Taking   Atorvastatin Calcium 20 MG Oral Tab Take 1 tablet (20 mg total) by mouth daily. Disp: 30 tablet Rfl: 12 Taking   Multiple Vitamins Oral Tab Take 1 tablet by mouth daily.  Disp:  Rfl:  Taking   lisinopril 40 MG Oral INCISION EXTEN FOOT/TOE TENDON  11/9/09    Comment Performed by Allison Jerry at [address] Left 12/22/2014    Comment Procedure: GASTROC RECESSION FOOT;  Surgeon: Alisa Coleman DPM;  Location: [facility] SURGICAL C tablet, 8 tablet, Oral, Q15 Min PRN  •  insulin detemir (LEVEMIR) 100 UNIT/ML flextouch 40 Units, 40 Units, Subcutaneous, Nightly  •  Heparin Sodium (Porcine) 5000 UNIT/ML injection 5,000 Units, 5,000 Units, Subcutaneous, Q12H  •  insulin aspart (NOVOLOG) 65-99 mg/dL 156 (H) 170 (H) 202 (H) 141 (H) 303 (H)     Results for Valerie Rocha (MRN [id number]) as of 5/12/2017 14:30   Ref.  Range 2/8/2017 08:27 2/24/2017 15:24 5/12/2017 11:37   HEMOGLOBIN A1c Latest Ref Range: <5.7 % 9.6 (H)  9.9 (H)     Recent Lab

## 2017-05-12 NOTE — ANESTHESIA PREPROCEDURE EVALUATION
PRE-OP EVALUATION    Patient Name: Shelbi Mccauley    Pre-op Diagnosis: ACUTE OSTEOMYELITIS, SEPTIC ARTHRITIS, DIABETIC ULCER WITH NECROSIS OF BONE, ONCYOMYOSIS, CELLULITIS AND ABSCESS OF TOE      Procedure(s):  IRRIGATION AND DEBRIDEMENT 5 TH METATARSAL L Oral Q15 Min PRN   Or      dextrose injection 50 mL 50 mL Intravenous Q15 Min PRN   Or      glucose (DEX4) oral liquid 30 g 30 g Oral Q15 Min PRN   Or      Glucose-Vitamin C (DEX-4) 4-0.006 g chewable tab 8 tablet 8 tablet Oral Q15 Min PRN   [DISCONTINUED] unilateral, complicated ([address]     Cardiomyopathy ([facility]     Obesity     Hyperlipidemia with target LDL less than 100     Mild renal insufficiency     Hypertension     Peripheral neuropathy (HCC)     Non-healing wound of amputation stump (HCC)     Type II or Use: No       Drug Use: No     Available pre-op labs reviewed.     Lab Results  Component Value Date   WBC 6.6 05/12/2017   RBC 5.34 05/12/2017   HGB 13.3 05/12/2017   HCT 41.4 05/12/2017   MCV 77.5* 05/12/2017   MCH 24.9* 05/12/2017   MCHC 32.1 05/12/2017

## 2017-05-12 NOTE — H&P
[facility] hospitalist H+P    PCP;Tirso Rojas DO  CC admitted at request of Podiatry    HPI 56 yo male with hx of DM2, HTN, HL, hx of right foot surgery, renal insufficiency, neuropathy admitted for surgical procedure on infected left foot.  Currently no pa [place], [facility]     Family History   Problem Relation Age of Onset   • [other] [OTHER] Father    • Heart Disorder Father      CAD   • Diabetes Neg    • Thyroid Disorder Neg        Social History   Marital Status:   Spouse Name: Ursula White of [place] insulin glargine (LANTUS) 100 UNIT/ML Subcutaneous Solution Take 60 Units at bedtime Disp:  Rfl:    FREESTYLE LANCETS Does not apply Misc USE FOUR TIMES A DAY Disp: 100 Each Rfl: PRN   ASPIRIN 81 MG OR TABS 1 TABLET DAILY Disp:  Rfl:      ROS 10 systems possible podiatry procedure will decrease long acting insulin down to 40 Units for now  Sliding scale insulin ordered  Consult Endocrinology (patient was lost to follow up)    HTN, HL; medication ordered, hold diuretic for now due to elevated creatinine

## 2017-05-12 NOTE — PROGRESS NOTES
NEW ORDERS RECEIVED FORM PODIATRY SURGERY, SEE ORDERS. MRI FORM FAXED TO MRI, PT SIGNED CONSENT FOR SURG GONZÁLEZ AT 930AM, VERBALIZED UNDERSTANDING. DRESSING TO BLE CHANGED TO WET TO DRY PER ORDERS, PICTURES OF WOUNDS TAKEN IN CHART.  AFO & SURG SHOE IN PLACE T

## 2017-05-12 NOTE — CONSULTS
INFECTIOUS DISEASE CONSULTATION    Freddie Duran Patient Status:  Inpatient    1955 MRN [id number]   [facility] 3SW-A Attending Libra Galvan DPM   Hosp Day # 0 PCP Deniz Dixon, OTHR TARSAL/METATARSAL Right 12/22/2014    Comment Procedure: EXOSTECTOMY FOOT/TOE;   Surgeon: Judeth Dakins, DPM;  Location: [address] [place] [address]     Family History   Problem Relation Age of Onset   • [other] [OTHER] Father    • Heart Disorder Father Traumatic amputation of foot (complete) (partial), unilateral, complicated ([address]     Cardiomyopathy ([address]     Obesity     Hyperlipidemia with target LDL less than 100     Mild renal insufficiency     Hypertension     Peripheral neuropathy ([address]     Non-hea

## 2017-05-13 ENCOUNTER — APPOINTMENT (OUTPATIENT)
Dept: GENERAL RADIOLOGY | Facility: HOSPITAL | Age: 62
DRG: 629 | End: 2017-05-13
Attending: PODIATRIST
Payer: COMMERCIAL

## 2017-05-13 ENCOUNTER — ANESTHESIA (OUTPATIENT)
Dept: SURGERY | Facility: HOSPITAL | Age: 62
DRG: 629 | End: 2017-05-13
Payer: COMMERCIAL

## 2017-05-13 ENCOUNTER — SURGERY (OUTPATIENT)
Age: 62
End: 2017-05-13

## 2017-05-13 PROBLEM — E11.49 TYPE II OR UNSPECIFIED TYPE DIABETES MELLITUS WITH NEUROLOGICAL MANIFESTATIONS, UNCONTROLLED(250.62): Status: ACTIVE | Noted: 2017-05-13

## 2017-05-13 PROCEDURE — 87070 CULTURE OTHR SPECIMN AEROBIC: CPT | Performed by: PODIATRIST

## 2017-05-13 PROCEDURE — 87075 CULTR BACTERIA EXCEPT BLOOD: CPT | Performed by: PODIATRIST

## 2017-05-13 PROCEDURE — 82962 GLUCOSE BLOOD TEST: CPT

## 2017-05-13 PROCEDURE — 0QBR0ZZ EXCISION OF LEFT TOE PHALANX, OPEN APPROACH: ICD-10-PCS | Performed by: PODIATRIST

## 2017-05-13 PROCEDURE — 0QBP0ZZ EXCISION OF LEFT METATARSAL, OPEN APPROACH: ICD-10-PCS | Performed by: PODIATRIST

## 2017-05-13 PROCEDURE — 88304 TISSUE EXAM BY PATHOLOGIST: CPT | Performed by: PODIATRIST

## 2017-05-13 PROCEDURE — 80048 BASIC METABOLIC PNL TOTAL CA: CPT | Performed by: HOSPITALIST

## 2017-05-13 PROCEDURE — 73630 X-RAY EXAM OF FOOT: CPT | Performed by: PODIATRIST

## 2017-05-13 PROCEDURE — 0L8P0ZZ DIVISION OF LEFT LOWER LEG TENDON, OPEN APPROACH: ICD-10-PCS | Performed by: PODIATRIST

## 2017-05-13 PROCEDURE — 87205 SMEAR GRAM STAIN: CPT | Performed by: PODIATRIST

## 2017-05-13 PROCEDURE — 85025 COMPLETE CBC W/AUTO DIFF WBC: CPT | Performed by: HOSPITALIST

## 2017-05-13 RX ORDER — BUPIVACAINE HYDROCHLORIDE 5 MG/ML
INJECTION, SOLUTION EPIDURAL; INTRACAUDAL AS NEEDED
Status: DISCONTINUED | OUTPATIENT
Start: 2017-05-13 | End: 2017-05-13 | Stop reason: HOSPADM

## 2017-05-13 RX ORDER — HEPARIN SODIUM 5000 [USP'U]/ML
5000 INJECTION, SOLUTION INTRAVENOUS; SUBCUTANEOUS EVERY 12 HOURS
Status: DISCONTINUED | OUTPATIENT
Start: 2017-05-13 | End: 2017-05-15

## 2017-05-13 RX ORDER — FUROSEMIDE 40 MG/1
40 TABLET ORAL DAILY
Status: DISCONTINUED | OUTPATIENT
Start: 2017-05-13 | End: 2017-05-15

## 2017-05-13 RX ORDER — INSULIN ASPART 100 [IU]/ML
INJECTION, SOLUTION INTRAVENOUS; SUBCUTANEOUS ONCE
Status: COMPLETED | OUTPATIENT
Start: 2017-05-13 | End: 2017-05-13

## 2017-05-13 RX ORDER — NALOXONE HYDROCHLORIDE 0.4 MG/ML
80 INJECTION, SOLUTION INTRAMUSCULAR; INTRAVENOUS; SUBCUTANEOUS AS NEEDED
Status: DISCONTINUED | OUTPATIENT
Start: 2017-05-13 | End: 2017-05-13 | Stop reason: HOSPADM

## 2017-05-13 RX ORDER — LABETALOL HYDROCHLORIDE 5 MG/ML
5 INJECTION, SOLUTION INTRAVENOUS EVERY 5 MIN PRN
Status: DISCONTINUED | OUTPATIENT
Start: 2017-05-13 | End: 2017-05-13 | Stop reason: HOSPADM

## 2017-05-13 RX ORDER — DEXTROSE MONOHYDRATE 25 G/50ML
50 INJECTION, SOLUTION INTRAVENOUS
Status: DISCONTINUED | OUTPATIENT
Start: 2017-05-13 | End: 2017-05-13 | Stop reason: HOSPADM

## 2017-05-13 RX ORDER — HYDROMORPHONE HYDROCHLORIDE 1 MG/ML
0.4 INJECTION, SOLUTION INTRAMUSCULAR; INTRAVENOUS; SUBCUTANEOUS EVERY 5 MIN PRN
Status: DISCONTINUED | OUTPATIENT
Start: 2017-05-13 | End: 2017-05-13 | Stop reason: HOSPADM

## 2017-05-13 RX ORDER — ONDANSETRON 2 MG/ML
4 INJECTION INTRAMUSCULAR; INTRAVENOUS AS NEEDED
Status: DISCONTINUED | OUTPATIENT
Start: 2017-05-13 | End: 2017-05-13 | Stop reason: HOSPADM

## 2017-05-13 RX ORDER — INSULIN ASPART 100 [IU]/ML
INJECTION, SOLUTION INTRAVENOUS; SUBCUTANEOUS
Qty: 60 ML | Refills: 0 | Status: SHIPPED | OUTPATIENT
Start: 2017-05-13 | End: 2017-08-02

## 2017-05-13 RX ORDER — INSULIN ASPART 100 [IU]/ML
INJECTION, SOLUTION INTRAVENOUS; SUBCUTANEOUS
Status: COMPLETED
Start: 2017-05-13 | End: 2017-05-13

## 2017-05-13 RX ORDER — SODIUM CHLORIDE, SODIUM LACTATE, POTASSIUM CHLORIDE, CALCIUM CHLORIDE 600; 310; 30; 20 MG/100ML; MG/100ML; MG/100ML; MG/100ML
INJECTION, SOLUTION INTRAVENOUS CONTINUOUS
Status: DISCONTINUED | OUTPATIENT
Start: 2017-05-13 | End: 2017-05-15

## 2017-05-13 RX ORDER — BACITRACIN 50000 [USP'U]/1
INJECTION, POWDER, LYOPHILIZED, FOR SOLUTION INTRAMUSCULAR AS NEEDED
Status: DISCONTINUED | OUTPATIENT
Start: 2017-05-13 | End: 2017-05-13 | Stop reason: HOSPADM

## 2017-05-13 NOTE — PROGRESS NOTES
[facility] Hospitalist Progress Note     PCP: Benny Smith DO    CC:  Follow up    SUBJECTIVE:  Pt sitting up in bed, on laptop. Says pain controlled. No n/v/f/c.   No cp/sob    OBJECTIVE:  Temp:  [97.5 °F (36.4 °C)-98.7 °F (37.1 °C)] 97.7 °F (36.5 °C)  Pulse insulin aspart  1-40 Units Subcutaneous TID CC   • insulin aspart  1-30 Units Subcutaneous TID CC and HS   • vancomycin  1,500 mg Intravenous Q24H     • lactated ringers     • sodium chloride 83 mL/hr at 05/13/17 1129     glucose **OR** Glucose-Vitamin C *

## 2017-05-13 NOTE — PROGRESS NOTES
[facility]  Endocrinology Progress Note    Delano Mcelroy Patient Status:  Inpatient    1955 MRN [id number]   [facility] 3SW-A Attending Heather Sagastume DPM   Hosp Day # 1 PCP Lorna Arguelles,      Subjective:  Feels ok overall. - Continue novolog to 1:10 > 140   - Will follow and adjust     If pt is DC'd home later today, I recommend:  Levemir/ Lantus 50 units QHS  Novolog / Humalog 20 units TID with meals +   Correction Novolog / Humalog  Pre-meal sugar  Less than 80 - hold no

## 2017-05-13 NOTE — CM/SW NOTE
Pt off the floor for procedure. sw to follow up to complete assessment. sharon contacted Ernestina Donaldson at [facility] who states that the insurance is still pending and they will not have auth until Monday at the earliest. [facility] will coordinate home healthcare for pt.  Monday sharon

## 2017-05-13 NOTE — ADDENDUM NOTE
Addendum  created 05/13/17 1102 by Taniya Castillo MD    Modules edited: Orders, PRL Based Order Sets

## 2017-05-13 NOTE — ANESTHESIA POSTPROCEDURE EVALUATION
[address] Patient Status:  Inpatient   Age/Gender 57year old male MRN [id number]   [facility] SURGERY Attending Sola Cook, [address] Day # 1 PCP Jim Hogg DO Abdiel       Anesthesia Post-op Note    Procedure(s):

## 2017-05-13 NOTE — BRIEF OP NOTE
Pre-Operative Diagnosis: ACUTE OSTEOMYELITIS LEFT 5TH METATARSAL, SEPTIC ARTHRITIS LEFT 5TH MTP, DIABETIC ULCER WITH NECROSIS OF BONE, TIBIALIS ANTERIOR TENDON CONTRACTURE LEFT ANKLE     Post-Operative Diagnosis: SAME     Procedure Performed:   TIBIALIS

## 2017-05-13 NOTE — PLAN OF CARE
Patient received from PACU accompanied by spouse. Doing well. Denies any pain, decreased sensation to LLE. Per report, no anticoagulation or dressing changes to LLE. Will work with PT/OT tomorrow, NWB. Tolerating diet well. Will continue to monitor.

## 2017-05-13 NOTE — PAYOR COMM NOTE
Attending Physician: Den Hutchins DPM    5/12    DIRECT FOR OR    ADMITTED FOR SURGERY ON INFECTED LEFT FOOT        PREOPERATIVE DIAGNOSIS:     1.      Acute osteomyelitis, left fifth ray.   2.      Septic arthritis, left fifth metatarsophalangeal joint

## 2017-05-13 NOTE — PLAN OF CARE
PAIN - ADULT    • Verbalizes/displays adequate comfort level or patient's stated pain goal Progressing        Patient/Family Goals    • Patient/Family Short Term Goal Progressing        SAFETY ADULT - FALL    • Free from fall injury Progressing        MRI

## 2017-05-13 NOTE — OPERATIVE REPORT
[facility]    PATIENT'S NAME: Padma [place]   ATTENDING PHYSICIAN: Alba Puentes D.P.M. OPERATING PHYSICIAN: Alba Puentes D.P.M.    PATIENT ACCOUNT#:   [de-identified]    LOCATION:  [facility] #:   [id number]       DATE The incision was deepened down through the superficial fascia with care noted to protect the neurovascular structures. Blunt dissection was performed down to the tendon sheath.   The tendon sheath was then incised longitudinally revealing the tibialis ante the incision site in the area of the resected bone. The capsule was then reapproximated with 3-0 Vicryl, the superficial fascia was closed with 3-0 Vicryl, and the skin was closed with 3-0 nylon suture.   Betadine ointment and Johnathan's gauze was placed over

## 2017-05-14 PROCEDURE — 97166 OT EVAL MOD COMPLEX 45 MIN: CPT

## 2017-05-14 PROCEDURE — 97162 PT EVAL MOD COMPLEX 30 MIN: CPT

## 2017-05-14 PROCEDURE — 97535 SELF CARE MNGMENT TRAINING: CPT

## 2017-05-14 PROCEDURE — 82962 GLUCOSE BLOOD TEST: CPT

## 2017-05-14 PROCEDURE — 97116 GAIT TRAINING THERAPY: CPT

## 2017-05-14 NOTE — OCCUPATIONAL THERAPY NOTE
OCCUPATIONAL THERAPY EVALUATION - INPATIENT     Room Number: 384/384-A  Evaluation Date: 5/14/2017  Type of Evaluation: Initial  Presenting Problem: s/p irrigation and debridement of 5th metatarsal , left foot resections, bone biopsy and tibials anterior t [address] OF SKIN/TISSUE Right 12/22/2014    Comment Procedure: EXOSTECTOMY FOOT/TOE;   Surgeon: Jack Tate DPM;  Location: [address] [place] [address] TARSAL/METATARSAL Right 12/22/2014    Comment Procedure: EXO available for education.      RANGE OF MOTION AND STRENGTH ASSESSMENT  Upper extremity ROM is within functional limits     Upper extremity strength is within functional limits     COORDINATION  Gross Motor    WFL    Fine Motor    WFL      ADDITIONAL TESTS insight in to deficits. Patient End of Session: Up in chair;Needs met;Call light within reach;RN aware of session/findings; All patient questions and concerns addressed    ASSESSMENT     Patient is a 57year old male admitted 5/12/2017 and is s/p irrigat judgement/safety and while maintaining weight bearing status  Patient will transfer to toilet:  with supervision, with good judgement/safety and while maintaining weight bearing status    Additional Goals:  Pt will demonstrated good safety awareness during

## 2017-05-14 NOTE — PHYSICAL THERAPY NOTE
PHYSICAL THERAPY EVALUATION - INPATIENT     Room Number: 384/384-A  Evaluation Date: 5/14/2017  Type of Evaluation: Initial  Physician Order: PT Eval and Treat    Presenting Problem: s/p partial 5th metatarsal resection, 5th MTP resection, bone biopsy, Procedure: REPAIR  PERONEUS LONGUS TENDON;  Surgeon: Alisa Coleman DPM;  Location: [address] [place] [address] OF SKIN/TISSUE Right 12/22/2014    Comment Procedure: EXOSTECTOMY FOOT/TOE;   Surgeon: Alisa Coleman DPM;  Location: [facility] functional limits except for the following:   previous R forefoot amputation and L 5th met resection; ankle ROM/MMT not tested post-op     BALANCE  Static Sitting: Normal  Dynamic Sitting: Good  Static Standing: Poor +  Dynamic Standing: Poor    ADDITIONAL AFO), but fair stability and MOD A required at times to maintain balance, especially with turns and through doorways. Pt continues to report that he is fine and able to do it on his own.  [facility], but pt again impulsive and sits without UE assist, p training;Stoop training;Stair training;Transfer training  Rehab Potential : Good  Frequency (Obs): Daily  Number of Visits to Meet Established Goals: 3      CURRENT GOALS    Goal #1 Patient is able to demonstrate sit<>stand transfer: supervision     Goal #

## 2017-05-14 NOTE — CM/SW NOTE
05/14/17 1100   CM/SW Referral Data   Referral Source Physician   Reason for Referral Discharge planning   Informant Patient   Pertinent Medical Hx   Primary Care Physician Name dr gambino   Social History   Recreational Drug/Alcohol Use n   Major Change

## 2017-05-14 NOTE — PROGRESS NOTES
[facility] Podiatry, Foot and Ankle Surgery    POD#1 s/p partial 5th metatarsal resection, 5th MTP resection, bone biopsy, TA lengthening left foot/ankle  Dressing CDI left foot. Pain controlled. Denies n/v/f/c.    AFVSS  Sutures intact.   Incision clean, dry le

## 2017-05-14 NOTE — PROGRESS NOTES
[address] [place] [address]  TEL: [phone]  FAX: [phone]    Juliusgrecia Melia Patient Status:  Inpatient    1955 MRN [id number]   [facility] 3SW-A Attending Bro Montana, [address] Day # 2 PCP Tipmamie Arshad, Blood from Blood,peripheral       Blood Culture Result No Growth 2 Days      Blood Culture FREQ X 2 [[id number]] Collected: 05/12/17 1137     Order Status: Completed Lab Status: Preliminary result Updated: 05/14/17 1200     Specimen Information: Blood from are mild degenerative changes at the first MTP joint. Mild to moderate degenerative changes in the interphalangeal joints. Severe dorsal and moderate plantar calcaneal spurring.  Mild soft tissue swelling.      5/13/2017  CONCLUSION:  There has been interva region of bony abnormality of the fifth metatarsal. There is mild soft tissue edema along the dorsum of the foot. Microangiopathic changes are seen in the plantar musculature.  There is susceptibility artifact or a foreign body along the second and third t

## 2017-05-14 NOTE — PLAN OF CARE
PAIN - ADULT    • Verbalizes/displays adequate comfort level or patient's stated pain goal Progressing        Patient/Family Goals    • Patient/Family Short Term Goal Progressing        SAFETY ADULT - FALL    • Free from fall injury Progressing        Karen

## 2017-05-14 NOTE — PROGRESS NOTES
[facility]  Endocrinology Progress Note    Kimmy Lozano Patient Status:  Inpatient    1955 MRN [id number]   [facility] 3SW-A Attending Sola Cook DPM   Hosp Day # 2 PCP Gabriel Meadows DO     Subjective:  Pain is minimal in Group

## 2017-05-14 NOTE — PROGRESS NOTES
[facility] Hospitalist Progress Note     PCP: Wang Corral DO    CC:  Follow up    SUBJECTIVE:  Pt sitting up in chair. Says pain controlled. No n/v/f/c.   No cp/sob    OBJECTIVE:  Temp:  [97.6 °F (36.4 °C)-98.7 °F (37.1 °C)] 98 °F (36.7 °C)  Pulse:  [39-39 TID CC   • insulin aspart  1-30 Units Subcutaneous TID CC and HS     • lactated ringers     • sodium chloride 83 mL/hr at 05/13/17 1129     glucose **OR** Glucose-Vitamin C **OR** dextrose **OR** glucose **OR** Glucose-Vitamin C, HYDROmorphone HCl ELINOR, dominic

## 2017-05-15 VITALS
SYSTOLIC BLOOD PRESSURE: 122 MMHG | DIASTOLIC BLOOD PRESSURE: 64 MMHG | TEMPERATURE: 98 F | RESPIRATION RATE: 18 BRPM | OXYGEN SATURATION: 99 % | HEART RATE: 68 BPM

## 2017-05-15 PROCEDURE — 82962 GLUCOSE BLOOD TEST: CPT

## 2017-05-15 PROCEDURE — 97535 SELF CARE MNGMENT TRAINING: CPT

## 2017-05-15 PROCEDURE — 36569 INSJ PICC 5 YR+ W/O IMAGING: CPT

## 2017-05-15 PROCEDURE — 05H533Z INSERTION OF INFUSION DEVICE INTO RIGHT SUBCLAVIAN VEIN, PERCUTANEOUS APPROACH: ICD-10-PCS | Performed by: PODIATRIST

## 2017-05-15 PROCEDURE — 76937 US GUIDE VASCULAR ACCESS: CPT

## 2017-05-15 PROCEDURE — 97530 THERAPEUTIC ACTIVITIES: CPT

## 2017-05-15 PROCEDURE — B54MZZA ULTRASONOGRAPHY OF RIGHT UPPER EXTREMITY VEINS, GUIDANCE: ICD-10-PCS | Performed by: PODIATRIST

## 2017-05-15 RX ORDER — SODIUM CHLORIDE 0.9 % (FLUSH) 0.9 %
10 SYRINGE (ML) INJECTION EVERY 12 HOURS
Status: DISCONTINUED | OUTPATIENT
Start: 2017-05-15 | End: 2017-05-15

## 2017-05-15 NOTE — OCCUPATIONAL THERAPY NOTE
OCCUPATIONAL THERAPY TREATMENT NOTE - INPATIENT     Room Number: 384/384-A  Session: 1   Number of Visits to Meet Established Goals: 2    Presenting Problem: s/p irrigation and debridement of 5th metatarsal , left foot resections, bone biopsy and tibials a Surgeon: Dory Scales DPM;  Location: [address] [place] [address] TARSAL/METATARSAL Right 12/22/2014    Comment Procedure: EXOSTECTOMY FOOT/TOE;   Surgeon: Dory Scales DPM;  Location: [address]  \"I ma and that he dealt with his other leg being NWB in the past.    Patient End of Session: Up in chair;Needs met;Call light within reach;RN aware of session/findings; All patient questions and concerns addressed    ASSESSMENT   Pt met OT goals.   Pt is able to p

## 2017-05-15 NOTE — PROGRESS NOTES
[facility]  Endocrinology Progress Note    Rorosilas Hudson Patient Status:  Inpatient    1955 MRN [id number]   [facility] 3SW-A Attending Joselyn Singh, [address] Day # 3 PCP Edna White DO     Subjective:  Glucose is 171 thi daily with meals PLUS   Correction Novolog / Humalog   Pre-meal sugar   Less than 80 - hold novolog / humalog    - give base dose 20 units   141-160 - add 2 units   161-180 - add 3 units   181-200 - add 4 units   201-220 - add 5 units   221-240 - add

## 2017-05-15 NOTE — PLAN OF CARE
Patient/Family Goals    • Patient/Family Short Term Goal Completed          DISCHARGE PLANNING    • Discharge to home or other facility with appropriate resources Progressing        PAIN - ADULT    • Verbalizes/displays adequate comfort level or patient's

## 2017-05-15 NOTE — CM/SW NOTE
Informed by RN during discharge rounds that pt will have midline placed and can discharge today after 2nd dose of IVAB ( Ancef q 12hrs). Spoke with Gwen at [facility] [phone] re: above.   She notes that they will submit for auth to [facility] since final orde

## 2017-05-15 NOTE — PROGRESS NOTES
[facility]                INFECTIOUS DISEASE PROGRESS NOTE    Ruth Nicole Patient Status:  Inpatient    1955 MRN [id number]   [facility] 3SW-A Attending Natalya Rodarte, [address] Day # 3 PCP Juli Stephens DO     Antibio class=\"[id number]\" style=\"padding-right: 1em;\">>=0.5  Resistant        Trimethoprim/Sulfa <=10  Sensitive        Vancomycin <=0.5  Sensitive                  Tissue Aerobic Culture [[id number]] Collected: 05/13/17 1045      Order Status: Completed Lab Status eye     Type II or unspecified type diabetes mellitus with neurological manifestations, uncontrolled(250.62) ([facility])      ASSESSMENT/PLAN:  1.  Osteomyelitis left 5th ray/septic arthritis left 5th MTP  SP partial fifth ray amputation/5th toe base resection/ivan

## 2017-05-15 NOTE — PLAN OF CARE
DISCHARGE PLANNING    • Discharge to home or other facility with appropriate resources Progressing        PAIN - ADULT    • Verbalizes/displays adequate comfort level or patient's stated pain goal Progressing        Patient denies pain to surgical site.   D

## 2017-05-15 NOTE — CM/SW NOTE
05/15/17 1357   Discharge disposition   Discharged to: Home-Health   Name of Facillity/Home Care/Hospice Residential   HME provider ([facility] for IV abx and supplies)   Discharge transportation Private car

## 2017-05-15 NOTE — PROGRESS NOTES
[facility] Hospitalist Progress Note     PCP: Grey Stinson DO    CC:  Follow up    SUBJECTIVE:  Pt sitting up in chair. Minimal pain. No n/v/f/c. No cp/sob.  Awaiting midline    OBJECTIVE:  Temp:  [97.7 °F (36.5 °C)-98.6 °F (37 °C)] 97.8 °F (36.6 °C)  Pu Daily   • lisinopril  40 mg Oral Daily   • Metoprolol Tartrate  100 mg Oral BID   • insulin aspart  1-40 Units Subcutaneous TID CC   • insulin aspart  1-30 Units Subcutaneous TID CC and HS     • lactated ringers     • sodium chloride 83 mL/hr at 05/13/17 1

## 2017-05-15 NOTE — PHYSICAL THERAPY NOTE
PHYSICAL THERAPY TREATMENT NOTE - INPATIENT    Room Number: 384/384-A     Session:1   Number of Visits to Meet Established Goals: 2    Presenting Problem: S/p partial 5th metatarsal resection, 5th MTP resection, bone biopsy, TA lengthening left foot/ankle use crutches. \" Patient was participatory though kept on mentioning that he knows how to use crutches. Patient’s self-stated goal is to go home.     OBJECTIVE  Precautions:  (Right LE WBAT with Right AFO, Left NWB)    WEIGHT BEARING RESTRICTION  Weight B FIM definations    Skilled Therapy Provided: Patient was instructed in transfers & gait safety + educated in NWB on left LE + use of cam boot. Patient was independent with bed mobility & functional transfers with use of axillary crutches. Patient ambulated

## 2017-05-16 NOTE — DISCHARGE SUMMARY
[facility]  Discharge Summary    Freddie Duran Patient Status:  Inpatient    1955 MRN [id number]   [facility] 3SW-A Attending No att. providers found   Hosp Day # 3 PCP Deniz Dixon DO     Date of Admission: 2017    Date o neuropathy admitted for surgical procedure on infected left foot.      Hospital Course: Surgery on 5/13/17:  TIBIALIS ANTERIOR TENDON LENGTHENING LEFT ANKLE  PARTIAL 5TH METATARSAL RESECTION LEFT FOOT,   5TH DIGIT PHALANX BASE RESECTION LEFT FOOT,   BONE BI ULTRAFINE 30G X 1/2\" 0.3 ML Does not apply Misc  Historical, R-3, IVETTE    furosemide 40 MG Oral Tab  TAKE ONE TABLET BY MOUTH EVERY MORNING, Normal, Disp-30 tablet, R-0    Glucose Blood (JOSÉ ANTONIO CONTOUR NEXT TEST) In Vitro Strip  Check blood sugars 4 times p

## 2017-05-17 PROBLEM — Z47.89 AFTERCARE FOLLOWING SURGERY OF THE MUSCULOSKELETAL SYSTEM: Status: ACTIVE | Noted: 2017-05-17

## 2017-05-22 ENCOUNTER — LAB REQUISITION (OUTPATIENT)
Dept: LAB | Facility: HOSPITAL | Age: 62
End: 2017-05-22
Attending: INTERNAL MEDICINE
Payer: COMMERCIAL

## 2017-05-22 DIAGNOSIS — I10 ESSENTIAL (PRIMARY) HYPERTENSION: ICD-10-CM

## 2017-05-22 DIAGNOSIS — E11.9 TYPE 2 DIABETES MELLITUS WITHOUT COMPLICATIONS (HCC): ICD-10-CM

## 2017-05-22 DIAGNOSIS — Z48.89 ENCOUNTER FOR OTHER SPECIFIED SURGICAL AFTERCARE: ICD-10-CM

## 2017-05-22 PROCEDURE — 85025 COMPLETE CBC W/AUTO DIFF WBC: CPT | Performed by: INTERNAL MEDICINE

## 2017-05-22 PROCEDURE — 80048 BASIC METABOLIC PNL TOTAL CA: CPT | Performed by: INTERNAL MEDICINE

## 2017-05-30 PROBLEM — Z47.89 AFTERCARE FOLLOWING SURGERY OF THE MUSCULOSKELETAL SYSTEM: Status: ACTIVE | Noted: 2017-05-30

## 2017-05-30 PROBLEM — Z47.89 AFTERCARE FOLLOWING SURGERY OF THE MUSCULOSKELETAL SYSTEM: Status: RESOLVED | Noted: 2017-05-17 | Resolved: 2017-05-30

## 2017-05-31 ENCOUNTER — LAB REQUISITION (OUTPATIENT)
Dept: LAB | Facility: HOSPITAL | Age: 62
End: 2017-05-31
Attending: INTERNAL MEDICINE
Payer: COMMERCIAL

## 2017-05-31 DIAGNOSIS — Z79.2 LONG TERM CURRENT USE OF ANTIBIOTICS: ICD-10-CM

## 2017-05-31 DIAGNOSIS — E11.22 TYPE 2 DIABETES MELLITUS WITH DIABETIC CHRONIC KIDNEY DISEASE (HCC): ICD-10-CM

## 2017-05-31 DIAGNOSIS — M86.172 OTHER ACUTE OSTEOMYELITIS, LEFT ANKLE AND FOOT (HCC): ICD-10-CM

## 2017-05-31 DIAGNOSIS — E11.65 TYPE 2 DIABETES MELLITUS WITH HYPERGLYCEMIA (HCC): ICD-10-CM

## 2017-05-31 PROCEDURE — 80048 BASIC METABOLIC PNL TOTAL CA: CPT | Performed by: INTERNAL MEDICINE

## 2017-05-31 PROCEDURE — 85025 COMPLETE CBC W/AUTO DIFF WBC: CPT | Performed by: INTERNAL MEDICINE

## 2017-07-31 DIAGNOSIS — E11.29 TYPE II OR UNSPECIFIED TYPE DIABETES MELLITUS WITH RENAL MANIFESTATIONS, UNCONTROLLED(250.42): ICD-10-CM

## 2017-07-31 DIAGNOSIS — E11.65 TYPE II OR UNSPECIFIED TYPE DIABETES MELLITUS WITH RENAL MANIFESTATIONS, UNCONTROLLED(250.42): ICD-10-CM

## 2017-07-31 DIAGNOSIS — E11.49 TYPE II OR UNSPECIFIED TYPE DIABETES MELLITUS WITH NEUROLOGICAL MANIFESTATIONS, UNCONTROLLED(250.62): ICD-10-CM

## 2017-08-01 DIAGNOSIS — E11.49 TYPE II OR UNSPECIFIED TYPE DIABETES MELLITUS WITH NEUROLOGICAL MANIFESTATIONS, UNCONTROLLED(250.62): ICD-10-CM

## 2017-08-01 DIAGNOSIS — E11.29 TYPE II OR UNSPECIFIED TYPE DIABETES MELLITUS WITH RENAL MANIFESTATIONS, UNCONTROLLED(250.42): ICD-10-CM

## 2017-08-01 DIAGNOSIS — E11.65 TYPE II OR UNSPECIFIED TYPE DIABETES MELLITUS WITH RENAL MANIFESTATIONS, UNCONTROLLED(250.42): ICD-10-CM

## 2017-08-01 RX ORDER — INSULIN ASPART 100 [IU]/ML
INJECTION, SOLUTION INTRAVENOUS; SUBCUTANEOUS
Qty: 60 ML | Refills: 0
Start: 2017-08-01

## 2017-08-01 NOTE — TELEPHONE ENCOUNTER
From: Angelito Atkinson  Sent: 7/31/2017 9:48 PM CDT  Subject: Medication Renewal Request    Angelito Atkinson would like a refill of the following medications:  insulin aspart (NOVOLOG) 100 UNIT/ML Subcutaneous Solution [Néstor Toscano MD]  insulin glargine (LA

## 2017-08-01 NOTE — TELEPHONE ENCOUNTER
From: Noel Collet  Sent: 8/1/2017 4:45 PM CDT  Subject: Medication Renewal Request    Noel Collet would like a refill of the following medications:  insulin aspart (NOVOLOG) 100 UNIT/ML Subcutaneous Solution [Néstor Toscano MD]  insulin glargine (CARLOTA

## 2017-08-08 NOTE — TELEPHONE ENCOUNTER
Refills cannot be granted as we have not seen him back in the office. I met him in the hospital in May 2017, which was 3 months ago. He should have seen Roshan Elias in June. Then, he canceled with me in July. Please decline refills.     Thanks,  [facility]

## 2017-11-16 PROBLEM — Z47.89 AFTERCARE FOLLOWING SURGERY OF THE MUSCULOSKELETAL SYSTEM: Status: RESOLVED | Noted: 2017-05-30 | Resolved: 2017-11-16

## 2017-12-16 ENCOUNTER — HOSPITAL ENCOUNTER (INPATIENT)
Facility: HOSPITAL | Age: 62
LOS: 7 days | Discharge: HOME HEALTH | DRG: 617 | End: 2017-12-23
Attending: EMERGENCY MEDICINE | Admitting: INTERNAL MEDICINE
Payer: COMMERCIAL

## 2017-12-16 ENCOUNTER — APPOINTMENT (OUTPATIENT)
Dept: GENERAL RADIOLOGY | Facility: HOSPITAL | Age: 62
DRG: 617 | End: 2017-12-16
Attending: EMERGENCY MEDICINE
Payer: COMMERCIAL

## 2017-12-16 DIAGNOSIS — L02.619 CELLULITIS AND ABSCESS OF FOOT: ICD-10-CM

## 2017-12-16 DIAGNOSIS — L03.119 CELLULITIS AND ABSCESS OF FOOT: ICD-10-CM

## 2017-12-16 DIAGNOSIS — L03.119 CELLULITIS OF FOOT: Primary | ICD-10-CM

## 2017-12-16 PROBLEM — D64.9 ANEMIA: Status: ACTIVE | Noted: 2017-12-16

## 2017-12-16 PROBLEM — R73.9 HYPERGLYCEMIA: Status: ACTIVE | Noted: 2017-12-16

## 2017-12-16 PROBLEM — E87.1 HYPONATREMIA: Status: ACTIVE | Noted: 2017-12-16

## 2017-12-16 PROCEDURE — 87040 BLOOD CULTURE FOR BACTERIA: CPT | Performed by: EMERGENCY MEDICINE

## 2017-12-16 PROCEDURE — 87184 SC STD DISK METHOD PER PLATE: CPT | Performed by: EMERGENCY MEDICINE

## 2017-12-16 PROCEDURE — 87070 CULTURE OTHR SPECIMN AEROBIC: CPT | Performed by: EMERGENCY MEDICINE

## 2017-12-16 PROCEDURE — 87205 SMEAR GRAM STAIN: CPT | Performed by: EMERGENCY MEDICINE

## 2017-12-16 PROCEDURE — 73630 X-RAY EXAM OF FOOT: CPT | Performed by: EMERGENCY MEDICINE

## 2017-12-16 PROCEDURE — 80053 COMPREHEN METABOLIC PANEL: CPT | Performed by: EMERGENCY MEDICINE

## 2017-12-16 PROCEDURE — 87186 SC STD MICRODIL/AGAR DIL: CPT | Performed by: EMERGENCY MEDICINE

## 2017-12-16 PROCEDURE — 96375 TX/PRO/DX INJ NEW DRUG ADDON: CPT

## 2017-12-16 PROCEDURE — 99285 EMERGENCY DEPT VISIT HI MDM: CPT

## 2017-12-16 PROCEDURE — 36415 COLL VENOUS BLD VENIPUNCTURE: CPT

## 2017-12-16 PROCEDURE — 83036 HEMOGLOBIN GLYCOSYLATED A1C: CPT | Performed by: HOSPITALIST

## 2017-12-16 PROCEDURE — 87077 CULTURE AEROBIC IDENTIFY: CPT | Performed by: EMERGENCY MEDICINE

## 2017-12-16 PROCEDURE — 96365 THER/PROPH/DIAG IV INF INIT: CPT

## 2017-12-16 PROCEDURE — 85025 COMPLETE CBC W/AUTO DIFF WBC: CPT | Performed by: EMERGENCY MEDICINE

## 2017-12-16 PROCEDURE — 82962 GLUCOSE BLOOD TEST: CPT

## 2017-12-16 RX ORDER — METOPROLOL TARTRATE 100 MG/1
100 TABLET ORAL 2 TIMES DAILY
Status: DISCONTINUED | OUTPATIENT
Start: 2017-12-16 | End: 2017-12-23

## 2017-12-16 RX ORDER — ACETAMINOPHEN 325 MG/1
650 TABLET ORAL EVERY 6 HOURS PRN
Status: DISCONTINUED | OUTPATIENT
Start: 2017-12-16 | End: 2017-12-23

## 2017-12-16 RX ORDER — ASPIRIN 81 MG/1
81 TABLET ORAL
Status: DISCONTINUED | OUTPATIENT
Start: 2017-12-16 | End: 2017-12-23

## 2017-12-16 RX ORDER — FENOFIBRATE 134 MG/1
134 CAPSULE ORAL
Status: DISCONTINUED | OUTPATIENT
Start: 2017-12-17 | End: 2017-12-23

## 2017-12-16 RX ORDER — ONDANSETRON 2 MG/ML
4 INJECTION INTRAMUSCULAR; INTRAVENOUS EVERY 6 HOURS PRN
Status: DISCONTINUED | OUTPATIENT
Start: 2017-12-16 | End: 2017-12-23

## 2017-12-16 RX ORDER — FUROSEMIDE 40 MG/1
40 TABLET ORAL DAILY
Status: DISCONTINUED | OUTPATIENT
Start: 2017-12-16 | End: 2017-12-23

## 2017-12-16 RX ORDER — ATORVASTATIN CALCIUM 40 MG/1
40 TABLET, FILM COATED ORAL DAILY
Status: DISCONTINUED | OUTPATIENT
Start: 2017-12-16 | End: 2017-12-23

## 2017-12-16 RX ORDER — INSULIN ASPART 100 [IU]/ML
20 INJECTION, SOLUTION INTRAVENOUS; SUBCUTANEOUS
Status: DISCONTINUED | OUTPATIENT
Start: 2017-12-16 | End: 2017-12-18

## 2017-12-16 RX ORDER — DEXTROSE MONOHYDRATE 25 G/50ML
50 INJECTION, SOLUTION INTRAVENOUS
Status: DISCONTINUED | OUTPATIENT
Start: 2017-12-16 | End: 2017-12-23

## 2017-12-16 RX ORDER — HEPARIN SODIUM 5000 [USP'U]/ML
5000 INJECTION, SOLUTION INTRAVENOUS; SUBCUTANEOUS EVERY 8 HOURS SCHEDULED
Status: DISCONTINUED | OUTPATIENT
Start: 2017-12-16 | End: 2017-12-18

## 2017-12-16 RX ORDER — LISINOPRIL 40 MG/1
40 TABLET ORAL
Status: DISCONTINUED | OUTPATIENT
Start: 2017-12-16 | End: 2017-12-23

## 2017-12-16 NOTE — PROGRESS NOTES
[facility] Pharmacy Note: Antimicrobial Weight Dose Adjustment for: Vancocin (vancomycin)    Lacy Antonio is a 60year old male who has been prescribed Vancocin (vancomycin) 1500 mg IV x 1 dose in ED.   CrCl is CrCl cannot be calculated (Patient's most recent l

## 2017-12-16 NOTE — H&P
.  CC: Patient presents with:  Cellulitis (integumentary, infectious)       PCP: Gracie Bolanos DO    History of Present Illness: Patient is a 60year old male with PMH sig for DM, CKD, HTN/HL who presented with b/l LE infections.  He has h/o partial R foot FOOT/TOE TENDON      Comment: Performed by Courtney French at [address]  12/22/2014: LENGTH/SHORT LEG/ANKL TENDON,SINGLE Left      Comment: Procedure: REPAIR  PERONEUS LONGUS TENDON;                 Surgeon: Mika Fernandez, INSULIN SYRINGE ULTRAFINE) 30G X 1/2\" 0.3 ML Does not apply Misc Use as directed Disp: 200 each Rfl: 2   insulin aspart (NOVOLOG) 100 UNIT/ML Subcutaneous Solution 20 to 30 units three times daily with meals Disp: 60 mL Rfl: 1   Multiple Vitamins Oral Tab TROP in the last 72 hours.     Additional Diagnostics: personally reviewed EKG- none    CXR: image personally reviewed- none    Radiology: Xr Foot, Complete (min 3 Views), Left (cpt=73630)    Result Date: 12/16/2017  PROCEDURE:  XR FOOT, COMPLETE (MIN 3 VIE transcribed by Technologist)  The patient shares that he has an infection and pain to his right foot, he has diabetes, and a history of right foot surgery.     FINDINGS:   Sequelae of amputation at the right midfoot distal to the talus and the calcaneus is

## 2017-12-16 NOTE — PROGRESS NOTES
NURSING ADMISSION NOTE      Patient admitted via Cart  Oriented to room. Safety precautions initiated. Bed in low position. Call light in reach. Admission navigator completed. Pt updated on plan of care and verbalizes understanding.  Pt resting

## 2017-12-16 NOTE — ED INITIAL ASSESSMENT (HPI)
Bilateral foot wounds, feels that infection may be starting due to elevated blood sugars and increased pain

## 2017-12-16 NOTE — ED PROVIDER NOTES
Patient Seen in: [facility] Emergency Department    History   Patient presents with:  Cellulitis (integumentary, infectious)    Stated Complaint: concern regarding infection to both feet    HPI    Patient is a 79-year-old diabetic male history of os [place]  11/9/09: INCISION EXTEN FOOT/TOE TENDON      Comment: Performed by Vivian Allison at / [address]  12/22/2014: LENGTH/SHORT LEG/ANKL TENDON,SINGLE Left      Comment: Procedure: REPAIR  PERONEUS LONGUS TENDON wheezing, no rales, no rhonchi. CARDIOVASCULAR: Regular rate and rhythm. Normal S1S2. No S3S4 or murmur. ABDOMEN: Bowel sounds are present. Soft. nondistended, no pulsatile masses. nontender    MUSCULOSKELETAL: No calf tenderness.   Dorsalis and Posteri DIFFERENTIAL WITH PLATELET    Narrative: The following orders were created for panel order CBC WITH DIFFERENTIAL WITH PLATELET.   Procedure                               Abnormality         Status                     --------- metatarsal head is mildly osteopenic, however this is stable.     Dictated by: Dorcas Malik MD on 12/16/2017 at 11:52     Approved by: Dorcas Malik MD            Xr Foot, Complete (min 3 Views), Right (cpt=73630)    Result Date: 12/16/2017  [address] [place] [address] given Unasyn and vancomycin. Case discussed with [address] group hospitalist.  Workup and results were discussed with patient. Patient has no other questions, complaints or concerns. Patient will be admitted to the hospital for further workup.       A

## 2017-12-17 ENCOUNTER — APPOINTMENT (OUTPATIENT)
Dept: MRI IMAGING | Facility: HOSPITAL | Age: 62
DRG: 617 | End: 2017-12-17
Attending: PODIATRIST
Payer: COMMERCIAL

## 2017-12-17 ENCOUNTER — ANESTHESIA EVENT (OUTPATIENT)
Dept: SURGERY | Facility: HOSPITAL | Age: 62
DRG: 617 | End: 2017-12-17
Payer: COMMERCIAL

## 2017-12-17 PROCEDURE — 80048 BASIC METABOLIC PNL TOTAL CA: CPT | Performed by: HOSPITALIST

## 2017-12-17 PROCEDURE — 73721 MRI JNT OF LWR EXTRE W/O DYE: CPT | Performed by: PODIATRIST

## 2017-12-17 PROCEDURE — 73718 MRI LOWER EXTREMITY W/O DYE: CPT | Performed by: PODIATRIST

## 2017-12-17 PROCEDURE — 82962 GLUCOSE BLOOD TEST: CPT

## 2017-12-17 RX ORDER — POTASSIUM CHLORIDE 20 MEQ/1
40 TABLET, EXTENDED RELEASE ORAL ONCE
Status: COMPLETED | OUTPATIENT
Start: 2017-12-17 | End: 2017-12-17

## 2017-12-17 NOTE — PROGRESS NOTES
[facility]    Full consult dictated. A/P:  1. Infected DM ulcer left foot  2. Cellulitis left foot  3. 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   Uncontrolled DM neuropathy    - Plan for open 5th digit amp

## 2017-12-17 NOTE — PROGRESS NOTES
[facility] Hospitalist note    PCP: Gabriel Meadows DO    Chief Complaint:  F/u diabetic foot infections    SUBJECTIVE:  Just came back from MRI. No fevers, n/v, sob.      OBJECTIVE:  Temp:  [97.9 °F (36.6 °C)-98.9 °F (37.2 °C)] 97.9 °F (36.6 °C)  Pulse:  [69-10 134 mg Oral Daily with breakfast   • furosemide  40 mg Oral Daily   • insulin aspart  20 Units Subcutaneous TID CC   • insulin detemir  70 Units Subcutaneous Nightly   • lisinopril  40 mg Oral Daily   • Metoprolol Tartrate  100 mg Oral BID   • Heparin Sodi

## 2017-12-17 NOTE — PHYSICAL THERAPY NOTE
Attempted to see pt for PT Eval this PM, however, pt off floor for MRI. Additionally per Podiatry pt NWB at this time and scheduled for surgery tomorrow. Pt will require new orders after surgery. Will hold PT eval at this time until updated WB status.   RN

## 2017-12-17 NOTE — CONSULTS
[facility] dosing service    Initial Pharmacokinetic Consult for Vancomycin Dosing     [facility] Room is a 60year old male admitted on 12/16 who is being treated for cellulitis, r/o osteomyelitis.   Pharmacy has been asked to dose Vancomycin by Dr. Cheryl Navarrete and clusters N/A       Radiology:  X-ray rt foot: \"Possibility of osteomyelitis   involving the navicular bone is of consideration. This can also be due to avascular necrosis. MRI may be helpful for further evaluation. \"       Based on the above:    1.

## 2017-12-17 NOTE — DIETARY NOTE
Dietitian Short Note    RD received consult for non-healing wounds. RD ordered Pro Stat Max sugar free for pt 2 times per day which will provide 34 grams of protein.  Recommend adding Multivitamin with mineral, 200 mg Vitamin C, and 220 mg Zinc sulfate x 14

## 2017-12-17 NOTE — CONSULTS
[facility]    PATIENT'S NAME: Memory Kocher   ATTENDING PHYSICIAN: CHRIS Chery Reddish: Malina Sampson D.P.M.    PATIENT ACCOUNT#:   [de-identified]    LOCATION:  [address] [place] [address]  MEDICAL RECORD #:   [id number]       DATE OF BI diabetic neuropathy, type 2 diabetes that is uncontrolled, obesity. PAST SURGICAL HISTORY:  Reviewed in chart. MEDICATIONS:  Reviewed in chart. ALLERGIES:  Reviewed in chart. FAMILY HISTORY:  Reviewed in chart.     SOCIAL HISTORY:  The patient evaluation. ASSESSMENT:    1. Infected necrotic left fifth digit. 2.   Chronic ulcer of left fourth metatarsal head. 3.   Noninfected chronic ulceration of distal stump of right foot. 4.   Type 2 diabetes with peripheral neuropathy.     PLAN:  Too

## 2017-12-17 NOTE — CONSULTS
INFECTIOUS DISEASE CONSULT NOTE    Becki Hardy Patient Status:  Inpatient    1955 MRN [id number]   [facility] 5NW-A Attending Khloe Woody MD   [facility] Day # 1 PCP Indira Madsen • Type II or unspecified type diabetes mellitus without mention of complication, not stated as uncontrolled Dx at age 44s    Type 2 DM   • Visual impairment     glasses     Past Surgical History:  2016: CATARACT      Comment: bilateral  12/22/2014: Cindy Lemons Daily  •  atorvastatin (LIPITOR) tab 40 mg, 40 mg, Oral, Daily  •  fenofibrate micronized (LOFIBRA) cap 134 mg, 134 mg, Oral, Daily with breakfast  •  furosemide (LASIX) tab 40 mg, 40 mg, Oral, Daily  •  insulin aspart (NOVOLOG) 100 UNIT/ML vial 20 Units, stiffness. HEMATOLOGIC:  No anemia, bleeding or bruising. ALLERGIES:  No history of asthma, hives, eczema or rhinitis. Physical Exam:  Vital signs: Blood pressure 119/68, pulse 76, temperature 98.8 °F (37.1 °C), temperature source Oral, resp.  rate 16, seen by Dr. Sissy Herring in May 2017 and discharged and IV cefazolin. Now admitted with bilateral feet wounds with hyperglycemia and increased pain. States increased pain, swelling, drainage in L foot w/o new trauma, injury. No abx PTA.   On admission afebrile w

## 2017-12-18 ENCOUNTER — SURGERY (OUTPATIENT)
Age: 62
End: 2017-12-18

## 2017-12-18 ENCOUNTER — ANESTHESIA (OUTPATIENT)
Dept: SURGERY | Facility: HOSPITAL | Age: 62
DRG: 617 | End: 2017-12-18
Payer: COMMERCIAL

## 2017-12-18 PROCEDURE — 84520 ASSAY OF UREA NITROGEN: CPT | Performed by: INTERNAL MEDICINE

## 2017-12-18 PROCEDURE — 93010 ELECTROCARDIOGRAM REPORT: CPT | Performed by: INTERNAL MEDICINE

## 2017-12-18 PROCEDURE — 82962 GLUCOSE BLOOD TEST: CPT

## 2017-12-18 PROCEDURE — 93005 ELECTROCARDIOGRAM TRACING: CPT

## 2017-12-18 PROCEDURE — 0Y6Y0Z0 DETACHMENT AT LEFT 5TH TOE, COMPLETE, OPEN APPROACH: ICD-10-PCS | Performed by: PODIATRIST

## 2017-12-18 PROCEDURE — 84132 ASSAY OF SERUM POTASSIUM: CPT | Performed by: HOSPITALIST

## 2017-12-18 PROCEDURE — 88312 SPECIAL STAINS GROUP 1: CPT | Performed by: PODIATRIST

## 2017-12-18 PROCEDURE — 82565 ASSAY OF CREATININE: CPT | Performed by: INTERNAL MEDICINE

## 2017-12-18 PROCEDURE — 87205 SMEAR GRAM STAIN: CPT | Performed by: PODIATRIST

## 2017-12-18 PROCEDURE — 87070 CULTURE OTHR SPECIMN AEROBIC: CPT | Performed by: PODIATRIST

## 2017-12-18 PROCEDURE — 87176 TISSUE HOMOGENIZATION CULTR: CPT | Performed by: PODIATRIST

## 2017-12-18 PROCEDURE — 88305 TISSUE EXAM BY PATHOLOGIST: CPT | Performed by: PODIATRIST

## 2017-12-18 PROCEDURE — 87075 CULTR BACTERIA EXCEPT BLOOD: CPT | Performed by: PODIATRIST

## 2017-12-18 PROCEDURE — 80202 ASSAY OF VANCOMYCIN: CPT | Performed by: INTERNAL MEDICINE

## 2017-12-18 PROCEDURE — 87147 CULTURE TYPE IMMUNOLOGIC: CPT | Performed by: PODIATRIST

## 2017-12-18 PROCEDURE — 88311 DECALCIFY TISSUE: CPT | Performed by: PODIATRIST

## 2017-12-18 PROCEDURE — 87076 CULTURE ANAEROBE IDENT EACH: CPT | Performed by: PODIATRIST

## 2017-12-18 PROCEDURE — 87186 SC STD MICRODIL/AGAR DIL: CPT | Performed by: PODIATRIST

## 2017-12-18 PROCEDURE — 0QBP0ZZ EXCISION OF LEFT METATARSAL, OPEN APPROACH: ICD-10-PCS | Performed by: PODIATRIST

## 2017-12-18 PROCEDURE — 0QBL0ZZ EXCISION OF RIGHT TARSAL, OPEN APPROACH: ICD-10-PCS | Performed by: PODIATRIST

## 2017-12-18 RX ORDER — MEPERIDINE HYDROCHLORIDE 25 MG/ML
12.5 INJECTION INTRAMUSCULAR; INTRAVENOUS; SUBCUTANEOUS AS NEEDED
Status: DISCONTINUED | OUTPATIENT
Start: 2017-12-18 | End: 2017-12-18 | Stop reason: HOSPADM

## 2017-12-18 RX ORDER — METRONIDAZOLE 500 MG/100ML
500 INJECTION, SOLUTION INTRAVENOUS EVERY 8 HOURS
Status: DISCONTINUED | OUTPATIENT
Start: 2017-12-18 | End: 2017-12-22

## 2017-12-18 RX ORDER — HYDROMORPHONE HYDROCHLORIDE 1 MG/ML
0.4 INJECTION, SOLUTION INTRAMUSCULAR; INTRAVENOUS; SUBCUTANEOUS EVERY 5 MIN PRN
Status: DISCONTINUED | OUTPATIENT
Start: 2017-12-18 | End: 2017-12-18 | Stop reason: HOSPADM

## 2017-12-18 RX ORDER — HYDROCODONE BITARTRATE AND ACETAMINOPHEN 10; 325 MG/1; MG/1
1 TABLET ORAL AS NEEDED
Status: DISCONTINUED | OUTPATIENT
Start: 2017-12-18 | End: 2017-12-18 | Stop reason: HOSPADM

## 2017-12-18 RX ORDER — HEPARIN SODIUM 5000 [USP'U]/ML
5000 INJECTION, SOLUTION INTRAVENOUS; SUBCUTANEOUS EVERY 8 HOURS SCHEDULED
Status: DISCONTINUED | OUTPATIENT
Start: 2017-12-19 | End: 2017-12-23

## 2017-12-18 RX ORDER — HYDROCODONE BITARTRATE AND ACETAMINOPHEN 10; 325 MG/1; MG/1
2 TABLET ORAL AS NEEDED
Status: DISCONTINUED | OUTPATIENT
Start: 2017-12-18 | End: 2017-12-18 | Stop reason: HOSPADM

## 2017-12-18 RX ORDER — INSULIN ASPART 100 [IU]/ML
10 INJECTION, SOLUTION INTRAVENOUS; SUBCUTANEOUS
Status: DISCONTINUED | OUTPATIENT
Start: 2017-12-19 | End: 2017-12-20

## 2017-12-18 RX ORDER — ONDANSETRON 2 MG/ML
4 INJECTION INTRAMUSCULAR; INTRAVENOUS AS NEEDED
Status: DISCONTINUED | OUTPATIENT
Start: 2017-12-18 | End: 2017-12-18 | Stop reason: HOSPADM

## 2017-12-18 RX ORDER — SODIUM CHLORIDE, SODIUM LACTATE, POTASSIUM CHLORIDE, CALCIUM CHLORIDE 600; 310; 30; 20 MG/100ML; MG/100ML; MG/100ML; MG/100ML
INJECTION, SOLUTION INTRAVENOUS CONTINUOUS
Status: DISCONTINUED | OUTPATIENT
Start: 2017-12-18 | End: 2017-12-18 | Stop reason: HOSPADM

## 2017-12-18 RX ORDER — DEXTROSE MONOHYDRATE 25 G/50ML
50 INJECTION, SOLUTION INTRAVENOUS
Status: DISCONTINUED | OUTPATIENT
Start: 2017-12-18 | End: 2017-12-18 | Stop reason: HOSPADM

## 2017-12-18 RX ORDER — METOCLOPRAMIDE HYDROCHLORIDE 5 MG/ML
10 INJECTION INTRAMUSCULAR; INTRAVENOUS AS NEEDED
Status: DISCONTINUED | OUTPATIENT
Start: 2017-12-18 | End: 2017-12-18 | Stop reason: HOSPADM

## 2017-12-18 RX ORDER — NALOXONE HYDROCHLORIDE 0.4 MG/ML
80 INJECTION, SOLUTION INTRAMUSCULAR; INTRAVENOUS; SUBCUTANEOUS AS NEEDED
Status: DISCONTINUED | OUTPATIENT
Start: 2017-12-18 | End: 2017-12-18 | Stop reason: HOSPADM

## 2017-12-18 RX ORDER — MIDAZOLAM HYDROCHLORIDE 1 MG/ML
1 INJECTION INTRAMUSCULAR; INTRAVENOUS EVERY 5 MIN PRN
Status: DISCONTINUED | OUTPATIENT
Start: 2017-12-18 | End: 2017-12-18 | Stop reason: HOSPADM

## 2017-12-18 NOTE — PROGRESS NOTES
[facility]  TEL: [phone]  FAX: [phone]    Alexus Vanegas Patient Status:  Inpatient    1955 MRN [id number]   [facility] 5NW-A Attending Sal Arellano MD   Hosp Day # 2 PCP Arin Hensley oblique, and lateral views were obtained. COMPARISON:  [facility] , XR FOOT, COMPLETE (MIN 3 VIEWS), LEFT (CPT=73630), 5/13/2017, 12:20.   INDICATIONS:  concern regarding infection to both feet  PATIENT STATED HISTORY: (As transcribed by Technologist)  The pat this amputation site is noted. Previously noted navicular bone demonstrates interval volume loss. Cut  Moderate tibiotalar osteoarthritic changes are suggested. Moderate plantar and dorsal calcaneal osteophytes are noted.       CONCLUSION:  Sequelae of p Abscess is not excluded, with no significant surrounding edema at the would favor abscess.     Dictated by: Meera Martino MD on 12/17/2017 at 13:58     Approved by: Meera Martino MD            Mri Ankle, Right (pbf=[id number])    Result Date: 12/17/2017  PROCED may be chronically torn. The posterior talofibular ligament is heterogeneous, however is likely intact. CONCLUSION:   High T2 signal with low T1 signal centrally in the navicular bone that extends towards the cortex is noted.   There is no cortical dest

## 2017-12-18 NOTE — PROGRESS NOTES
76.  [facility] Hospitalist note    PCP: Beth Pleitez DO    Chief Complaint:  F/u diabetic foot infections    SUBJECTIVE:  Pt laying in bed, feels ok. Reports BG better today.      OBJECTIVE:  Temp:  [98.1 °F (36.7 °C)-98.9 °F (37.2 °C)] 98.1 °F (36.7 °C)  Pulse Units Subcutaneous Nightly   • aspirin  81 mg Oral Daily   • atorvastatin  40 mg Oral Daily   • fenofibrate micronized  134 mg Oral Daily with breakfast   • furosemide  40 mg Oral Daily   • insulin aspart  20 Units Subcutaneous TID CC   • lisinopril  40 mg Hospitalist  [phone]  12/18/2017  2:55 PM

## 2017-12-18 NOTE — PAYOR COMM NOTE
--------------  ADMISSION REVIEW     Payor: Severiano CALDERÓN/KELSEY  Subscriber #:  [id number]  Authorization Number: N/A    Admit date: 12/16/17  Admit time: 5       Admitting Physician: Shawna Hines MD  Attending Physician:  Teagan Arrington Neuropathy    • Type II or unspecified type diabetes mellitus without mention of complication, not stated as uncontrolled Dx at age 44s    Type 2 DM   • Visual impairment     glasses     Past Surgical History:  2016: CATARACT      Comment: bilateral  12/22 36.48 kg/m²[MH.2]     Physical Exam  GENERAL: No acute distress, well appearing and non-toxic, Alert and oriented X 3   HEENT: Normocephalic, atraumatic. Moist mucous membranes.   Pupils equal round reactive to light accommodation, extraocular motion is in components within normal limits   POCT GLUCOSE - Abnormal; Notable for the following:     POC Glucose 446 (*)     All other components within normal limits   CBC W/ DIFFERENTIAL - Abnormal; Notable for the following:     HGB 11.7 (*)     HCT 36.5 (*)     M Moderate plantar and dorsal calcaneal osteophytes are noted. CONCLUSION:  Sequelae of prior amputation with only the hindfoot remaining is again identified. Increased gas at the amputation site is noted.   There is marked interval sclerosis and volume l (integumentary, infectious)     PCP: Dillon Kraus DO    History of Present Illness: Patient is a 60year old male with PMH sig for[RJ.1] DM, CKD, HTN/HL who presented with b/l LE infections.  He has h/o partial R foot amputation and has had nonhealing pia total) by mouth 2 (two) times daily.  Disp: 180 tablet Rfl: 0   LISINOPRIL 40 MG Oral Tab TAKE ONE TABLET BY MOUTH ONE TIME DAILY  Disp: 90 tablet Rfl: 0   Glucose Blood (JOSÉ ANTONIO CONTOUR NEXT TEST) In Vitro Strip Check blood sugars 4 times per day Disp: 400 s 2.9*     No results for input(s): TROP in the last 72 hours. Additional Diagnostics: personally reviewed EKG-[RJ.1] none[RJ.2]  CXR: image personally reviewed-[RJ.1] none[RJ.2]  Radiology: Xr Foot,  CONCLUSION:   Postoperative changes as described above. DAY:  Ampicillin-Sulbactam Sodium (UNASYN) 3 g in sodium chloride 0.9 % 100 mL IVPB-minibag/add-vantage     Date Action Dose Route User    12/18/2017 0002 New Bag 3 g Intravenous Sherri Jarrett RN    12/17/2017 1809 New Bag 3 g Intravenous Selin Camargo 2108 Given 40 Units Subcutaneous (Left Upper Arm) Lamont Pagan RN      lisinopril (PRINIVIL,ZESTRIL) tab 40 mg     Date Action Dose Route User    12/18/2017 0931 Given 40 mg Oral Josiah Barry RN      Metoprolol Tartrate (LOPRESSOR) tab 100 mg

## 2017-12-18 NOTE — PAYOR COMM NOTE
--------------  CONTINUED STAY REVIEW    Payor: [address] [place] [address] STEPHANE/KELSEY  Subscriber #:  [id number]  Authorization Number: [id number]    Admit date: 12/16/17  Admit time: 5    Admitting Physician: Palmira Brady MD  Attending Physician:  Xander Hickman n.p.o. after breakfast.  We will hold anticoagulants. Consent will be obtained. We will plan on surgery for tomorrow evening, 12/18/17. I will continue to follow the patient.   Tyson Larose D.P.M.  12/17/2017 11:37:31      MEDICATIONS ADMINISTERED IN (Left Lower Abdomen) Radha Platt RN    12/17/2017 1813 Given 12 Units Subcutaneous (Right Upper Abdomen) Angus Bryan RN    12/17/2017 1444 Given 4 Units Subcutaneous (Left Upper Abdomen) Silvino Camargo, RN      insulin detemir (LEVEMIR) 100 UNIT/

## 2017-12-18 NOTE — CM/SW NOTE
SW received call from [place] at [facility] with request for clinical for anticipated IVABX upon discharge. Clinical sent via ECIN. QD completed. / to remain available for support and/or discharge planning.

## 2017-12-18 NOTE — PHYSICAL THERAPY NOTE
Order received via 6 clicks, chart reviewed. Pt to have L foot debridement this date. Noted to have previous R partial amputation and L with current orders for NWB. Will follow up on 12/19 to see pending new activity and weight bearing orders.   RN aware

## 2017-12-18 NOTE — CM/SW NOTE
JERRY notified by RN that patient concerned about having procedure without his insurance company being notified. JERRY spoke to UR who alerted pre-cert of patient's concerns.  Pre-cert sent copy of insurance acknowledgement of admission to JERRY who forwarded a copy

## 2017-12-18 NOTE — CM/SW NOTE
Patient has had home IV ABX twice before and is familiar with infusion protocol.       12/18/17 1400   CM/SW Referral Data   Referral Source    Reason for Referral Discharge planning   Informant Patient   Pertinent Medical Hx   Primary Care Phys

## 2017-12-18 NOTE — PLAN OF CARE
Dressing changed on RLE. Flushed with saline, neosporin applied, wrapped with 4x4 and kerlix. Will continue to monitor.

## 2017-12-19 PROCEDURE — 84520 ASSAY OF UREA NITROGEN: CPT | Performed by: INTERNAL MEDICINE

## 2017-12-19 PROCEDURE — 82565 ASSAY OF CREATININE: CPT | Performed by: INTERNAL MEDICINE

## 2017-12-19 PROCEDURE — 80202 ASSAY OF VANCOMYCIN: CPT | Performed by: INTERNAL MEDICINE

## 2017-12-19 PROCEDURE — 82962 GLUCOSE BLOOD TEST: CPT

## 2017-12-19 NOTE — PLAN OF CARE
DISCHARGE PLANNING    • Discharge to home or other facility with appropriate resources Progressing        PAIN - ADULT    • Verbalizes/displays adequate comfort level or patient's stated pain goal Progressing        Patient/Family Goals    • Patient/Family stump ([facility]     Type II or unspecified type diabetes mellitus with renal manifestations, uncontrolled(250.42) (HCC)     Obesity (BMI 30-39. 9)     Encounter for long-term (current) use of insulin (HCC)     Gastrocnemius recession and Peroneus longus tendon

## 2017-12-19 NOTE — PROGRESS NOTES
[facility]    POD# 1 s/p open 5th ray amputation left foot, debridement of ulcer b/l feet  Dressings CDI  Denies pain.     Denies n/v/f/c.    AFVSS  Palpable pedal pulses b/l  Sensation diminished b/l  Large open, clean, granular w

## 2017-12-19 NOTE — OPERATIVE REPORT
[facility]    PATIENT'S NAME: Memory Kocher   ATTENDING PHYSICIAN: Lisseth Olsen. Kristal Mayers M.D. OPERATING PHYSICIAN: Malina Sampson D.P.M.    PATIENT ACCOUNT#:   [de-identified]    LOCATION:  5NW-A [address] [facility]  MEDICAL RECORD #:   [id number]       DATE OF B debridement of the ulcer was performed down to good healthy bleeding tissue, 5 sq cm was debrided. Post debridement measurements of the wound were 2.5 x 2.5 x 0.5 cm. The wound central portion does probe to bone.   No gross purulence or necrotic tissue wa was done, all outer gloves and new gown sheets were utilized. Hemostasis to the left foot was obtained via electrocautery.   Post debridement measurements of the open wound on the left foot were approximately 8 cm in length by 5 cm in width by 2.5 cm in de

## 2017-12-19 NOTE — PHYSICAL THERAPY NOTE
Attempted x2 this date. Order received with recs to use CAM boot on R foot for transfers, otherwise NWB. Pt is NWB on LLE. Pt's spouse to bring in CAM boot either later this date or 12/20. Will f/u on 12/20.

## 2017-12-19 NOTE — PROGRESS NOTES
[facility]  TEL: [phone]  FAX: [phone]    Patel Marie Patient Status:  Inpatient    1955 MRN [id number]   [facility] 5NW-A Attending Ruth Briceño MD   Hosp Day # 3 PCP Sissy [place] transcribed by Technologist)  The patient shares that his infection is worse around his lateral left foot, and he had his fifth metatarsal removed, and he has diabetes.     FINDINGS:   Sequelae of resection of the distal 5th metatarsal bone along with the p noted.      CONCLUSION:  Sequelae of prior amputation with only the hindfoot remaining is again identified. Increased gas at the amputation site is noted. There is marked interval sclerosis and volume loss of the navicular bone.   Possibility of osteomyel (cpt=73721)    Result Date: 12/17/2017  PROCEDURE:  MRI ANKLE, RIGHT (CPT=73721)  COMPARISON:  [facility] , XR FOOT, COMPLETE (MIN 3 VIEWS), RIGHT (CPT=73630), 12/16/2017, 11:38.   INDICATIONS:  diabetic ulcer, osteomyelitis, cellulitis  TECHNIQUE:  Multiplanar the cortex is noted. There is no cortical destruction. There is minimal periosteal edema suggested. This may represent early osteomyelitis in the navicular bone.   High T2 along with minimal low T1 signal in the lateral cortex of the lateral malleolus wi

## 2017-12-19 NOTE — PAYOR COMM NOTE
--------------  CONTINUED STAY REVIEW    Payor: [address] [place] [address] STEPHANE/KELSEY  Subscriber #:  [id number]  Authorization Number: [id number]    Admit date: 12/16/17  Admit time: 5    Admitting Physician: Marthenia Goodell, MD  Attending Physician:  Makayla Hoffmann and full-thickness subcutaneous excisional debridement of the ulcer was performed down to good healthy bleeding tissue, 5 sq cm was debrided. Post debridement measurements of the wound were 2.5 x 2.5 x 0.5 cm.   The wound central portion does probe to bone units of bacitracin within it. Once this was done, all outer gloves and new gown sheets were utilized. Hemostasis to the left foot was obtained via electrocautery.   Post debridement measurements of the open wound on the left foot were approximately 8 cm Irrigation (Left Foot) Pilar Gutting, DPM      CefTRIAXone Sodium (ROCEPHIN) 2 g in sodium chloride 0.9 % 100 mL IVPB     Date Action Dose Route User    12/18/2017 1530 Given 2 g Intravenous Graciela Domenica, RN      dextrose 50% injection 50 mL     Date (FLAGYL) 5 mg/ml IVPB premix 500 mg     Date Action Dose Route User    12/19/2017 0621 New Bag 500 mg Intravenous Giovanny Chaney RN    12/18/2017 2210 New Bag 500 mg Intravenous Giovanny Chaney RN    12/18/2017 1608 New Bag 500 mg Intravenous Latonya Cotto

## 2017-12-19 NOTE — PLAN OF CARE
DISCHARGE PLANNING    • Discharge to home or other facility with appropriate resources Progressing        PAIN - ADULT    • Verbalizes/displays adequate comfort level or patient's stated pain goal Progressing        Patient/Family Goals    • Patient/Family type diabetes mellitus with renal manifestations, uncontrolled(250.42) ([facility])     Obesity (BMI 30-39. 9)     Encounter for long-term (current) use of insulin ([facility])     Gastrocnemius recession and Peroneus longus tendon lengthening, L lower extremity.  Sx 12/2

## 2017-12-19 NOTE — PROGRESS NOTES
[facility] dosing service    Follow-up Pharmacokinetic Consult for Vancomycin Dosing     Maru Barragan is a 60year old male who is being treated for Cellulitis.  Patient has bilateral LE non healing diabetic ulcers and a history of osteomyelitis per ph Aerobic Culture Result 2+ growth Morganella morganii (A) N/A   Aerobic Culture Result Staphylococcus aureus (A) N/A   Aerobic Smear No WBCs seen N/A   Aerobic Smear 1+ Gram Negative Rods N/A   Aerobic Smear 2+ Gram Positive Rods N/A   Aerobic Smear 4+ Gr

## 2017-12-19 NOTE — ANESTHESIA POSTPROCEDURE EVALUATION
[address] Patient Status:  Inpatient   Age/Gender 60year old male MRN [id number]   [facility] SURGERY Attending Hilary Glass MD   [address] Day # 2 PCP Grey Stinson,        Anesthesia Post-op Note    Procedure(s

## 2017-12-19 NOTE — BRIEF OP NOTE
Pre-Operative Diagnosis: Cellulitis and abscess of foot [L03.119, L02.619]     Post-Operative Diagnosis: Cellulitis and abscess of foot [L03.119, L02.619]     Procedure Performed:   Open left fifth digit amputation left foot  Excisional debridement of u

## 2017-12-20 PROCEDURE — 76937 US GUIDE VASCULAR ACCESS: CPT

## 2017-12-20 PROCEDURE — 82962 GLUCOSE BLOOD TEST: CPT

## 2017-12-20 PROCEDURE — 82565 ASSAY OF CREATININE: CPT | Performed by: INTERNAL MEDICINE

## 2017-12-20 PROCEDURE — 36569 INSJ PICC 5 YR+ W/O IMAGING: CPT

## 2017-12-20 PROCEDURE — 97162 PT EVAL MOD COMPLEX 30 MIN: CPT

## 2017-12-20 PROCEDURE — 02HV33Z INSERTION OF INFUSION DEVICE INTO SUPERIOR VENA CAVA, PERCUTANEOUS APPROACH: ICD-10-PCS | Performed by: INTERNAL MEDICINE

## 2017-12-20 PROCEDURE — 97530 THERAPEUTIC ACTIVITIES: CPT

## 2017-12-20 PROCEDURE — 97165 OT EVAL LOW COMPLEX 30 MIN: CPT

## 2017-12-20 PROCEDURE — 84520 ASSAY OF UREA NITROGEN: CPT | Performed by: INTERNAL MEDICINE

## 2017-12-20 RX ORDER — SODIUM CHLORIDE 0.9 % (FLUSH) 0.9 %
10 SYRINGE (ML) INJECTION AS NEEDED
Status: DISCONTINUED | OUTPATIENT
Start: 2017-12-20 | End: 2017-12-23

## 2017-12-20 RX ORDER — INSULIN ASPART 100 [IU]/ML
15 INJECTION, SOLUTION INTRAVENOUS; SUBCUTANEOUS
Status: DISCONTINUED | OUTPATIENT
Start: 2017-12-20 | End: 2017-12-23

## 2017-12-20 NOTE — PROGRESS NOTES
[facility]    POD# 2 s/p open 5th ray amputation left foot, debridement of ulcer b/l feet  Dressings CDI  PICC line placed this morning  Denies pain.     Denies n/v/f/c.    AFVSS  Palpable pedal pulses b/l  Sensation diminished b/l

## 2017-12-20 NOTE — PROGRESS NOTES
76.  [facility] Hospitalist note    PCP: Wang Corral DO    Chief Complaint:  F/u diabetic foot infections    SUBJECTIVE:  Pt laying in bed, shaving. Feels ok, no f/c.      OBJECTIVE:  Temp:  [97.7 °F (36.5 °C)-98 °F (36.7 °C)] 98 °F (36.7 °C)  Pulse:  [64-76] 7 TID CC   • Heparin Sodium (Porcine)  5,000 Units Subcutaneous [facility]   • vancomycin  1,500 mg Intravenous Q12H   • aspirin  81 mg Oral Daily   • atorvastatin  40 mg Oral Daily   • fenofibrate micronized  134 mg Oral Daily with breakfast   • furosemide  40

## 2017-12-20 NOTE — PROGRESS NOTES
[address] [facility]  TEL: [phone]  FAX: [phone]    Roro Hudson Patient Status:  Inpatient    1955 MRN [id number]   [facility] 5NW-A Attending Tati Chaudhary MD   Hosp Day # 4 PCP Nelson Neville lateral left foot, and he had his fifth metatarsal removed, and he has diabetes. FINDINGS:   Sequelae of resection of the distal 5th metatarsal bone along with the proximal aspect of the proximal phalanx of the 5th digit is again noted.   Interval healin again identified. Increased gas at the amputation site is noted. There is marked interval sclerosis and volume loss of the navicular bone. Possibility of osteomyelitis involving the navicular bone is of consideration.   This can also be due to avascular JOHANNA , XR FOOT, COMPLETE (MIN 3 VIEWS), RIGHT (CPT=73630), 12/16/2017, 11:38. INDICATIONS:  diabetic ulcer, osteomyelitis, cellulitis  TECHNIQUE:  Multiplanar imaging of the ankle is acquired including axial, sagittal and coronal imaging.  Proton density suggested. This may represent early osteomyelitis in the navicular bone. High T2 along with minimal low T1 signal in the lateral cortex of the lateral malleolus with significant skin thickening adjacent to this region is identified.   Possibility of very

## 2017-12-20 NOTE — PHYSICAL THERAPY NOTE
PHYSICAL THERAPY QUICK EVALUATION - INPATIENT    Room Number: 963/496-R  Evaluation Date: 12/20/2017  Presenting Problem: L foot cellulitis  Physician Order: PT Eval and Treat    Problem List  Principal Problem:    Cellulitis of foot  Active Problems: OTHR TARSAL/METATARSAL      Comment: Performed by Nereyda Mota at [address]  12/22/2014: PART REMV OTHR TARSAL/METATARSAL Right      Comment: Procedure: EXOSTECTOMY FOOT/TOE;   Surgeon:                Lucy Wiseman DPM; Degree of Impairment Score: 54.16%   Standardized Score (AM-PAC Scale): 40.78   CMS Modifier (G-Code): CK      FUNCTIONAL ABILITY STATUS  Gait Assessment  Gait Assistance: Not tested                   Skilled Therapy Provided: Pt received supine in bed, ag

## 2017-12-20 NOTE — PROGRESS NOTES
76.  [facility] Hospitalist note    PCP: Lev Workman DO    Chief Complaint:  F/u diabetic foot infections    SUBJECTIVE:  Pt laying in bed, feels ok. No f/c/pain. BG reviewed c pt.      OBJECTIVE:  Temp:  [97.5 °F (36.4 °C)-98.8 °F (37.1 °C)] 97.9 °F (36.6 °C) Nightly   • cefTRIAXone  2 g Intravenous Q24H   • metRONIDAZOLE  500 mg Intravenous Q8H   • insulin aspart  10 Units Subcutaneous TID CC   • Heparin Sodium (Porcine)  5,000 Units Subcutaneous Q8H [address]   • vancomycin  1,500 mg Intravenous Q12H   • aspirin  8 Fallon Ackerman MD  Carrillo Danni  [phone]  12/19/2017  10:32 PM

## 2017-12-20 NOTE — PROGRESS NOTES
Multidisciplinary Discharge Rounds held 12/20/2017. Treatment team members present today include , , Charge Nurse,  Nurse, RT, PT and Pharmacy caring for [employer].      Other care providers present:    Patient Active Probl

## 2017-12-21 ENCOUNTER — ANESTHESIA (OUTPATIENT)
Dept: SURGERY | Facility: HOSPITAL | Age: 62
End: 2017-12-21

## 2017-12-21 ENCOUNTER — SURGERY (OUTPATIENT)
Age: 62
End: 2017-12-21

## 2017-12-21 ENCOUNTER — ANESTHESIA EVENT (OUTPATIENT)
Dept: SURGERY | Facility: HOSPITAL | Age: 62
End: 2017-12-21

## 2017-12-21 PROCEDURE — 0QDP0ZZ EXTRACTION OF LEFT METATARSAL, OPEN APPROACH: ICD-10-PCS | Performed by: PODIATRIST

## 2017-12-21 PROCEDURE — 85049 AUTOMATED PLATELET COUNT: CPT | Performed by: INTERNAL MEDICINE

## 2017-12-21 PROCEDURE — 84520 ASSAY OF UREA NITROGEN: CPT | Performed by: INTERNAL MEDICINE

## 2017-12-21 PROCEDURE — 82565 ASSAY OF CREATININE: CPT | Performed by: INTERNAL MEDICINE

## 2017-12-21 PROCEDURE — 0QTP0ZZ RESECTION OF LEFT METATARSAL, OPEN APPROACH: ICD-10-PCS | Performed by: PODIATRIST

## 2017-12-21 PROCEDURE — 82962 GLUCOSE BLOOD TEST: CPT

## 2017-12-21 RX ORDER — NALOXONE HYDROCHLORIDE 0.4 MG/ML
80 INJECTION, SOLUTION INTRAMUSCULAR; INTRAVENOUS; SUBCUTANEOUS AS NEEDED
Status: DISCONTINUED | OUTPATIENT
Start: 2017-12-21 | End: 2017-12-21 | Stop reason: HOSPADM

## 2017-12-21 RX ORDER — DEXTROSE MONOHYDRATE 25 G/50ML
50 INJECTION, SOLUTION INTRAVENOUS
Status: DISCONTINUED | OUTPATIENT
Start: 2017-12-21 | End: 2017-12-21 | Stop reason: HOSPADM

## 2017-12-21 RX ORDER — SODIUM CHLORIDE, SODIUM LACTATE, POTASSIUM CHLORIDE, CALCIUM CHLORIDE 600; 310; 30; 20 MG/100ML; MG/100ML; MG/100ML; MG/100ML
INJECTION, SOLUTION INTRAVENOUS CONTINUOUS
Status: DISCONTINUED | OUTPATIENT
Start: 2017-12-21 | End: 2017-12-21 | Stop reason: HOSPADM

## 2017-12-21 RX ORDER — CEFAZOLIN SODIUM/WATER 2 G/20 ML
2 SYRINGE (ML) INTRAVENOUS EVERY 8 HOURS
Status: DISCONTINUED | OUTPATIENT
Start: 2017-12-21 | End: 2017-12-23

## 2017-12-21 RX ORDER — HYDROMORPHONE HYDROCHLORIDE 1 MG/ML
0.4 INJECTION, SOLUTION INTRAMUSCULAR; INTRAVENOUS; SUBCUTANEOUS EVERY 5 MIN PRN
Status: DISCONTINUED | OUTPATIENT
Start: 2017-12-21 | End: 2017-12-21 | Stop reason: HOSPADM

## 2017-12-21 RX ORDER — METOCLOPRAMIDE HYDROCHLORIDE 5 MG/ML
10 INJECTION INTRAMUSCULAR; INTRAVENOUS AS NEEDED
Status: DISCONTINUED | OUTPATIENT
Start: 2017-12-21 | End: 2017-12-21 | Stop reason: HOSPADM

## 2017-12-21 RX ORDER — ONDANSETRON 2 MG/ML
4 INJECTION INTRAMUSCULAR; INTRAVENOUS AS NEEDED
Status: DISCONTINUED | OUTPATIENT
Start: 2017-12-21 | End: 2017-12-21 | Stop reason: HOSPADM

## 2017-12-21 RX ORDER — BACITRACIN 50000 [USP'U]/1
INJECTION, POWDER, LYOPHILIZED, FOR SOLUTION INTRAMUSCULAR AS NEEDED
Status: DISCONTINUED | OUTPATIENT
Start: 2017-12-21 | End: 2017-12-21 | Stop reason: HOSPADM

## 2017-12-21 NOTE — ANESTHESIA PREPROCEDURE EVALUATION
PRE-OP EVALUATION    Patient Name: Cl Mcqueen    Pre-op Diagnosis: INPT    Procedure(s):  DEBRIDEMENT OF OPEN WOUND LEFT FOOT AND LEFT 4TH METATARSAL HEAD RESECTION, WOUND VAC APPLICATION    Surgeon(s) and Role:     Stephon Rojas DPM - Primary outpatient prescriptions have been marked as taking for the 12/21/17 encounter (Anesthesia Event) with Madi Call MD.    Allergies: Patient has no known allergies. Anesthesia Evaluation    Patient summary reviewed.     Anesthetic Complications Ken Jordan DPM;  Location: [address] [place] [address],[address]  12/22/2014: GASTROCNEMIUS RECESSION Left      Comment: Procedure: GASTROC RECESSION FOOT;  Surgeon:                Ken Jordan DPM;  [facility] [facility] 10/16/2017   CA 8.8 12/17/2017   CA 9.4 10/16/2017            Airway      Mallampati: II  Mouth opening: >3 FB  TM distance: > 6 cm  Neck ROM: full Cardiovascular    Cardiovascular exam normal.         Dental    No notable dental history.   Dental appliance

## 2017-12-21 NOTE — PROGRESS NOTES
Multidisciplinary Discharge Rounds held 12/21/2017. Treatment team members present today include , , Charge Nurse,  Nurse, RT, PT and Pharmacy caring for [employer].      Other care providers present:    Patient Active Probl

## 2017-12-21 NOTE — OCCUPATIONAL THERAPY NOTE
OCCUPATIONAL THERAPY QUICK EVALUATION - INPATIENT    Room Number: 434/541-B  Evaluation Date: 12/20/2017     Type of Evaluation: Quick Eval  Presenting Problem: Open fifth ray amputation, left foot, Plantar condylectomy, left fourth metatarsal, Resection o [place]  11/9/09: INCISION EXTEN FOOT/TOE TENDON      Comment: Performed by Paras Alvarez at [address]  12/22/2014: LENGTH/SHORT LEG/ANKL TENDON,SINGLE Left      Comment: Procedure: REPAIR  PERONEUS Abimbola Tracy deficits   Unwilling to follow therapy recommendations   Poor safety awareness     RANGE OF MOTION AND STRENGTH ASSESSMENT  Upper extremity ROM is within functional limits     Upper extremity strength is within functional limits     NEUROLOGICAL FINDINGS diabetic ulcer, right foot on 12/18/17. Complete medical history and occupational profile noted above. Functional outcome measures completed include AM-PAC.  In this OT evaluation patient presents with the following performance deficits: BADL/IADL dysfuncti

## 2017-12-21 NOTE — OCCUPATIONAL THERAPY NOTE
OCCUPATIONAL THERAPY QUICK EVALUATION - INPATIENT    Room Number: 059/237-U  Evaluation Date: 12/20/2017     Type of Evaluation: Quick Eval  Presenting Problem: Open fifth ray amputation, left foot, Plantar condylectomy, left fourth metatarsal, Resection o [address]  11/9/09: INCISION EXTEN FOOT/TOE TENDON      Comment: Performed by Maryjane Mcclelland at [address] [place] [address],[address]  12/22/2014: LENGTH/SHORT LEG/ANKL TENDON,SINGLE Left      Comment: Procedure: REPAIR  P into deficits   Poor safety awareness   Unwilling to follow therapy recommendations     RANGE OF MOTION AND STRENGTH ASSESSMENT  Upper extremity ROM is within functional limits     Upper extremity strength is within functional limits     NEUROLOGICAL FINDI AM-PAC. In this OT evaluation patient presents with the following performance deficits:  BADL/IADL dysfunction, decreased endurance, balance, strength, ROM and functional mobility.  These deficits impact the patient’s ability to participate in dressing, bat

## 2017-12-21 NOTE — PROGRESS NOTES
[facility]  TEL: [phone]  FAX: [phone]    Freddie Duran Patient Status:  Inpatient    1955 MRN [id number]   [facility] 5NW-A Attending Manny Ballesteros MD   Hosp Day # 5 PCP [facility]. abx adjusted. Awaiting final approval. D/w . Will follow.      Taye Cardona

## 2017-12-22 PROCEDURE — 97605 NEG PRS WND THER DME<=50SQCM: CPT

## 2017-12-22 PROCEDURE — 99215 OFFICE O/P EST HI 40 MIN: CPT

## 2017-12-22 PROCEDURE — 82962 GLUCOSE BLOOD TEST: CPT

## 2017-12-22 RX ORDER — CEFAZOLIN SODIUM/WATER 2 G/20 ML
2 SYRINGE (ML) INTRAVENOUS EVERY 8 HOURS
Qty: 42 ML | Refills: 0 | Status: SHIPPED | OUTPATIENT
Start: 2017-12-22 | End: 2018-02-02

## 2017-12-22 NOTE — OPERATIVE REPORT
[facility]    PATIENT'S NAME: Linda Leandro   ATTENDING PHYSICIAN: Jatinder Shah. Adwoa Barfield M.D. OPERATING PHYSICIAN: Vicki Oliva D.P.M.    PATIENT ACCOUNT#:   [de-identified]    LOCATION:  5NW-A [facility]  MEDICAL RECORD #:   [id number]       DATE OF B A large curette was then utilized, and the wound was debrided. The wound was then pulse lavage irrigated with bacitracin irrigation. Gown and gloves at this point were changed.   Post excisional debridement measurements of the wound were 6.5 cm in length

## 2017-12-22 NOTE — CM/SW NOTE
CM contacted Dr Cassy Vogel office and RN Belen Short will request Dr Josue Rose amend his note to include face to face discussion re DME. Pend response.

## 2017-12-22 NOTE — PROGRESS NOTES
76.  [facility] Hospitalist note    PCP: Lucy Navarro DO    Chief Complaint:  F/u diabetic foot infections    SUBJECTIVE:  Attempted to see pt earlier, was at procedure. Pt laying in bed, doing ok, says procedure went well.  BG ok.   135/70    OBJECTIVE:  Te Sodium (Porcine)  5,000 Units Subcutaneous Q8H [address]   • aspirin  81 mg Oral Daily   • atorvastatin  40 mg Oral Daily   • fenofibrate micronized  134 mg Oral Daily with breakfast   • furosemide  40 mg Oral Daily   • lisinopril  40 mg Oral Daily   • Metoprolo

## 2017-12-22 NOTE — PROGRESS NOTES
[address] [facility]  TEL: [phone]  FAX: [phone]    [facility] Patient Status:  Inpatient    1955 MRN [id number]   [facility] 5NW-A Attending Isabel Haas MD   Hosp Day # 6 PCP [facility] follow up with me in 2 weeks.      [address]

## 2017-12-22 NOTE — ANESTHESIA POSTPROCEDURE EVALUATION
[address] Patient Status:  Inpatient   Age/Gender 60year old male MRN [id number]   [facility] SURGERY Attending Giovanny Cat MD   [facility] Day # 5 PCP Thierry Ansari DO       Anesthesia Post-op Note    Procedure(s

## 2017-12-22 NOTE — BRIEF OP NOTE
Pre-Operative Diagnosis: Open complicated wound left foot, osteomyelitis left foot, diabetes     Post-Operative Diagnosis: Same     Procedure Performed:   DEBRIDEMENT OF OPEN WOUND LEFT FOOT (26 sq cm)  \LEFT 4TH METATARSAL HEAD RESECTION    Surgeon(s)

## 2017-12-22 NOTE — CM/SW NOTE
Wound care RN ha placed wound vac on patient. [facility] has delivered hospital bed and wheelchair to patient's home. CM notified [facility] of plan for d/c tomorrow. They will contact [facility] and arrange for start of IV ABX infusion with [facility] for tomorrow.

## 2017-12-22 NOTE — PROGRESS NOTES
Hospital wound vac applied to left foot. Dressings changed per wound nurse. Will continue to monitor.

## 2017-12-22 NOTE — ANESTHESIA PREPROCEDURE EVALUATION
PRE-OP EVALUATION    Patient Name: Emil Herron    Pre-op Diagnosis: Diabetic foot ulcer     Procedure(s):  DEBRIDEMENT OF OPEN WOUND LEFT FOOT AND LEFT 4TH METATARSAL HEAD RESECTION    Surgeon(s) and Role:     Shaunna Camacho DPM - Primary    Pre-op M20) CR tab 40 mEq 40 mEq Oral Once   [COMPLETED] Silver Nitrate-Pot Nitrate (ARZOL SILVER NITRATE) 29-29 % applicator 1 each 1 each Topical Once   [COMPLETED] Vancomycin HCl (VANCOCIN) 2,000 mg in sodium chloride 0.9 % 500 mL IVPB 2,000 mg Intravenous Onc starter dose. After 2 weeks, increase to 1.5 mg per week.  Disp: 2 pen Rfl: 0   FUROSEMIDE 40 MG Oral Tab TAKE 1 TABLET BY MOUTH EVERY MORNING Disp: 30 tablet Rfl: 1   insulin glargine (LANTUS) 100 UNIT/ML Subcutaneous Solution Take 70 Units at bedtime Disp Neuro/Psych    Negative neuro/psych ROS.                           Patient Active Problem List:     Traumatic amputation of foot (complete) (partial), unilateral, complicated ([address]     Cardiomyopathy ([facility] [address]     Obesity     Hyperlipidemia with target LDL l Performed by Aubrie Austin at [address]  12/22/2014: PART REMV OTHR TARSAL/METATARSAL Right      Comment: Procedure: EXOSTECTOMY FOOT/TOE;   Surgeon:                Yamileth Peters DPM;  Location: [facility] SURGICAL

## 2017-12-22 NOTE — HOME CARE LIAISON
DIAGNOSES AND PERTINENT MEDICAL HISTORY: DMII, NON HEALING ULCERS TO R FOOT, 5TH L TOE AMPUTATION 12/15, S/P I&D 12/21/17:  EXCISIONAL DEBRIDEMENT OF SOFT TISSUE, TENDON, AND BONE, LEFT FOOT (26 SQ CM.)  2. FOURTH METATARSAL HEAD RESECTION, LEFT FOOT.     Linette Farnsworth

## 2017-12-22 NOTE — PLAN OF CARE
DISCHARGE PLANNING    • Discharge to home or other facility with appropriate resources Progressing        Impaired Activities of Daily Living    • Achieve highest/safest level of independence in self care Progressing        Impaired Functional Mobility diabetes mellitus with renal manifestations, uncontrolled(250.42) ([facility])     Obesity (BMI 30-39. 9)     Encounter for long-term (current) use of insulin ([facility])     Gastrocnemius recession and Peroneus longus tendon lengthening, L lower extremity.  Sx 12/22/14

## 2017-12-22 NOTE — PAYOR COMM NOTE
--------------  CONTINUED STAY REVIEW    Payor: [address] [place] [address] STEPHANE/KELSEY  Subscriber #:  [id number]  Authorization Number: [id number]    Admit date: 12/16/17  Admit time: 5    Admitting Physician: Dong José MD  Attending Physician:  Catherine Singletary n/v/f/c.     AFVSS  Palpable pedal pulses b/l  Sensation diminished b/l  Large open, clean, granular wound left lateral foot. Exposed 4th metatarsal bone left foot. No purulence left foot.   Cellulitis and edema improving left foot  Open wound is granul Osteomyelitis, left foot. 3.       Diabetic neuropathy. POSTOPERATIVE DIAGNOSIS:    1. Open complicated wound, left foot. 2.       Osteomyelitis, left foot. 3.       Diabetic neuropathy. PROCEDURE PERFORMED:    1.        Excisional debridement

## 2017-12-22 NOTE — CM/SW NOTE
[facility] states they need documention of face-to-face discussion regarding patient's need for hospital bed and wheelchair. CM attempting to contact Dr Kylee Corcoran.

## 2017-12-22 NOTE — PROGRESS NOTES
[facility] Hospitalist note    PCP: Robin Rouse DO    Chief Complaint:  F/u diabetic foot infections    SUBJECTIVE:  Pt laying in bed, napping, awoke. No f/c. No pain. Feels tired. BG ok 170s. Wound vac ok.      OBJECTIVE:  Temp:  [97.7 °F (36.5 °C)-98.4 °F ( Oral Daily   • fenofibrate micronized  134 mg Oral Daily with breakfast   • furosemide  40 mg Oral Daily   • lisinopril  40 mg Oral Daily   • Metoprolol Tartrate  100 mg Oral BID   • Insulin Aspart Pen  4-20 Units Subcutaneous TID CC and HS       Normal Sa

## 2017-12-23 VITALS
SYSTOLIC BLOOD PRESSURE: 117 MMHG | OXYGEN SATURATION: 95 % | BODY MASS INDEX: 36.29 KG/M2 | TEMPERATURE: 98 F | HEART RATE: 61 BPM | RESPIRATION RATE: 18 BRPM | DIASTOLIC BLOOD PRESSURE: 73 MMHG | HEIGHT: 75 IN | WEIGHT: 291.88 LBS

## 2017-12-23 PROCEDURE — 82962 GLUCOSE BLOOD TEST: CPT

## 2017-12-23 NOTE — CONSULTS
[facility]  Report of Inpatient Wound Care Consultation    Lc Jennings Patient Status:  Inpatient    1955 MRN [id number]   [facility] 5NW-A Attending Natalee Warner MD   [facility] Day # 6 PCP Gracie Bolanos DO     [facility]  12/22/2014: LENGTH/SHORT LEG/ANKL TENDON,SINGLE Left      Comment: Procedure: REPAIR  PERONEUS LONGUS TENDON;                 Surgeon: Natalya Rodarte DPM;  Location: [address] [place] [address]  2009: OTHER SURGICAL HISTORY      Comment: and machine and supplies at bedside. I did instruct pt and staff nurse on use of home machine and how to transition to home machine tomorrow upon discharge. Pt to f/u with Dr. Tona Burns and home health for dressing changes M=W=F.        Thank you for this co

## 2017-12-23 NOTE — PROGRESS NOTES
NURSING DISCHARGE NOTE    Discharged Home via Wheelchair. Accompanied by Family member  Belongings Taken by patient/family. Pt given discharge instructions and prescription. PICC remained in place- longterm antibiotics. Home wound vac applied.  [facility]

## 2017-12-23 NOTE — DISCHARGE SUMMARY
[facility] Internal Medicine Discharge Summary   Patient ID:  Delano Mcelroy  [id number]  60year old  6/6/1955    Admit date: 12/16/2017    Discharge date and time: 12/23/2017     Attending Physician: Norma Reyes    Primary Care Physician: Lorna Arguelles, however.     Hospital Course:   B/l LE diabetic nonhealing ulcers  - MRI shows possible early OM in navicular bone and lateral malleolus on R, no OM although fluid collection seen on L.   - s/p open 5th digit amputation left foot and ulcer debridement b/l f U-100     * BD INSULIN SYRINGE ULTRAFINE 30G X 1/2\" 0.3 ML Misc  Generic drug:  Insulin Syringe-Needle U-100     * Insulin Syringe-Needle U-100 30G X 1/2\" 0.3 ML Misc  Commonly known as:  BD INSULIN SYRINGE ULTRAFINE  Use as directed     * Dulaglutide 1. JOHANNA , XR FOOT, COMPLETE (MIN 3 VIEWS), LEFT (CPT=73630), 5/13/2017, 12:20.   INDICATIONS:  concern regarding infection to both feet  PATIENT STATED HISTORY: (As transcribed by Technologist)  The patient shares that his infection is worse around his later navicular bone demonstrates interval volume loss. Cut  Moderate tibiotalar osteoarthritic changes are suggested. Moderate plantar and dorsal calcaneal osteophytes are noted.       CONCLUSION:  Sequelae of prior amputation with only the hindfoot remaining surrounding edema at the would favor abscess.     Dictated by: Mary Martel MD on 12/17/2017 at 13:58     Approved by: Mary Martel MD            Mri Ankle, Right (jkc=[id number])    Result Date: 12/17/2017  PROCEDURE:  MRI ANKLE, RIGHT (CPT=73721)  COMPARISON talofibular ligament is heterogeneous, however is likely intact. CONCLUSION:   High T2 signal with low T1 signal centrally in the navicular bone that extends towards the cortex is noted. There is no cortical destruction.   There is minimal periosteal ed

## 2017-12-27 ENCOUNTER — LAB REQUISITION (OUTPATIENT)
Dept: LAB | Facility: HOSPITAL | Age: 62
End: 2017-12-27
Payer: COMMERCIAL

## 2017-12-27 ENCOUNTER — LAB REQUISITION (OUTPATIENT)
Dept: LAB | Facility: HOSPITAL | Age: 62
End: 2017-12-27
Attending: INTERNAL MEDICINE
Payer: COMMERCIAL

## 2017-12-27 DIAGNOSIS — Z79.2 LONG TERM CURRENT USE OF ANTIBIOTICS: ICD-10-CM

## 2017-12-27 DIAGNOSIS — Z47.81 ENCOUNTER FOR ORTHOPEDIC AFTERCARE FOLLOWING SURGICAL AMPUTATION: ICD-10-CM

## 2017-12-27 DIAGNOSIS — L03.119 CELLULITIS OF EXTREMITY: ICD-10-CM

## 2017-12-27 PROCEDURE — 85025 COMPLETE CBC W/AUTO DIFF WBC: CPT | Performed by: INTERNAL MEDICINE

## 2017-12-27 PROCEDURE — 85652 RBC SED RATE AUTOMATED: CPT | Performed by: INTERNAL MEDICINE

## 2017-12-27 PROCEDURE — 86140 C-REACTIVE PROTEIN: CPT | Performed by: INTERNAL MEDICINE

## 2017-12-27 PROCEDURE — 80048 BASIC METABOLIC PNL TOTAL CA: CPT | Performed by: INTERNAL MEDICINE

## 2018-01-02 NOTE — PROGRESS NOTES
Bone and soft tissue, left foot fifth digit, excision:  -Gangrenous necrosis with extensive suppurative inflammation.  -Osteomyelitis.

## 2018-01-02 NOTE — PAYOR COMM NOTE
--------------  DISCHARGE REVIEW    Payor: [address] [place] [address] STEPHANE/KELSEY  Subscriber #:  [id number]  Authorization Number: [id number]    Admit date: 12/16/17  Admit time:  7505  Discharge Date: 12/23/2017  9:11 AM     Admitting Physician: Min Mcguire MD  Pr - MRI shows possible early OM in navicular bone and lateral malleolus on R, no OM although fluid collection seen on L.   - s/p open 5th digit amputation left foot and ulcer debridement b/l feet 12/18  -s/p DEBRIDEMENT OF OPEN WOUND LEFT FOOT   LEFT 4TH MET Generic drug:  Insulin Syringe-Needle U-100     * Insulin Syringe-Needle U-100 30G X 1/2\" 0.3 ML Misc  Commonly known as:  BD INSULIN SYRINGE ULTRAFINE  Use as directed     * Dulaglutide 1.5 MG/0.5ML Sopn  Commonly known as:  TRULICITY  Inject 1.5 mg into CONCLUSION:  Sequelae of prior amputation with only the hindfoot remaining is again identified. Increased gas at the amputation site is noted. There is marked interval sclerosis and volume loss of the navicular bone.   Possibility of osteomyelitis involvi 5NW-A [id number]   Kristopher Ford RN Registered Nurse Signed Nursing  Progress Notes Date of Service: 12/23/2017  8:22 AM         []Manual[]Template  []Copied                                                      NURSING DISCHARGE NOTE     Discharged [address]

## 2018-01-03 ENCOUNTER — LAB REQUISITION (OUTPATIENT)
Dept: LAB | Facility: HOSPITAL | Age: 63
End: 2018-01-03
Attending: INTERNAL MEDICINE
Payer: COMMERCIAL

## 2018-01-03 DIAGNOSIS — Z47.81 ENCOUNTER FOR ORTHOPEDIC AFTERCARE FOLLOWING SURGICAL AMPUTATION: ICD-10-CM

## 2018-01-03 LAB
BASOPHILS # BLD AUTO: 0.11 X10(3) UL (ref 0–0.1)
BASOPHILS NFR BLD AUTO: 1.7 %
BUN BLD-MCNC: 35 MG/DL (ref 8–20)
C-REACTIVE PROTEIN: 0.81 MG/DL (ref ?–1)
CALCIUM BLD-MCNC: 9.7 MG/DL (ref 8.3–10.3)
CHLORIDE: 101 MMOL/L (ref 101–111)
CO2: 30 MMOL/L (ref 22–32)
CREAT BLD-MCNC: 1.74 MG/DL (ref 0.7–1.3)
EOSINOPHIL # BLD AUTO: 0.16 X10(3) UL (ref 0–0.3)
EOSINOPHIL NFR BLD AUTO: 2.5 %
ERYTHROCYTE [DISTWIDTH] IN BLOOD BY AUTOMATED COUNT: 16.2 % (ref 11.5–16)
GLUCOSE BLD-MCNC: 139 MG/DL (ref 70–99)
HCT VFR BLD AUTO: 39 % (ref 37–53)
HGB BLD-MCNC: 12.5 G/DL (ref 13–17)
IMMATURE GRANULOCYTE COUNT: 0.03 X10(3) UL (ref 0–1)
IMMATURE GRANULOCYTE RATIO %: 0.5 %
LYMPHOCYTES # BLD AUTO: 1.96 X10(3) UL (ref 0.9–4)
LYMPHOCYTES NFR BLD AUTO: 30.1 %
MCH RBC QN AUTO: 25.1 PG (ref 27–33.2)
MCHC RBC AUTO-ENTMCNC: 32.1 G/DL (ref 31–37)
MCV RBC AUTO: 78.3 FL (ref 80–99)
MONOCYTES # BLD AUTO: 0.52 X10(3) UL (ref 0.1–0.6)
MONOCYTES NFR BLD AUTO: 8 %
NEUTROPHIL ABS PRELIM: 3.74 X10 (3) UL (ref 1.3–6.7)
NEUTROPHILS # BLD AUTO: 3.74 X10(3) UL (ref 1.3–6.7)
NEUTROPHILS NFR BLD AUTO: 57.2 %
PLATELET # BLD AUTO: 335 10(3)UL (ref 150–450)
POTASSIUM SERPL-SCNC: 4.1 MMOL/L (ref 3.6–5.1)
RBC # BLD AUTO: 4.98 X10(6)UL (ref 4.3–5.7)
RED CELL DISTRIBUTION WIDTH-SD: 45.9 FL (ref 35.1–46.3)
SED RATE-ML: 37 MM/HR (ref 0–12)
SODIUM SERPL-SCNC: 138 MMOL/L (ref 136–144)
WBC # BLD AUTO: 6.5 X10(3) UL (ref 4–13)

## 2018-01-03 PROCEDURE — 85025 COMPLETE CBC W/AUTO DIFF WBC: CPT | Performed by: INTERNAL MEDICINE

## 2018-01-03 PROCEDURE — 80048 BASIC METABOLIC PNL TOTAL CA: CPT | Performed by: INTERNAL MEDICINE

## 2018-01-03 PROCEDURE — 86140 C-REACTIVE PROTEIN: CPT | Performed by: INTERNAL MEDICINE

## 2018-01-03 PROCEDURE — 85652 RBC SED RATE AUTOMATED: CPT | Performed by: INTERNAL MEDICINE

## 2018-01-10 ENCOUNTER — LAB REQUISITION (OUTPATIENT)
Dept: LAB | Facility: HOSPITAL | Age: 63
End: 2018-01-10
Attending: INTERNAL MEDICINE
Payer: COMMERCIAL

## 2018-01-10 DIAGNOSIS — L97.414 NON-PRESSURE CHRONIC ULCER OF RIGHT HEEL AND MIDFOOT WITH NECROSIS OF BONE (HCC): ICD-10-CM

## 2018-01-10 DIAGNOSIS — Z47.81 ENCOUNTER FOR ORTHOPEDIC AFTERCARE FOLLOWING SURGICAL AMPUTATION: ICD-10-CM

## 2018-01-10 DIAGNOSIS — E11.22 TYPE 2 DIABETES MELLITUS WITH DIABETIC CHRONIC KIDNEY DISEASE (HCC): ICD-10-CM

## 2018-01-10 DIAGNOSIS — N18.9 CHRONIC KIDNEY DISEASE: ICD-10-CM

## 2018-01-10 LAB
BASOPHILS # BLD AUTO: 0.05 X10(3) UL (ref 0–0.1)
BASOPHILS NFR BLD AUTO: 1 %
BUN BLD-MCNC: 38 MG/DL (ref 8–20)
C-REACTIVE PROTEIN: 0.43 MG/DL (ref ?–1)
CALCIUM BLD-MCNC: 8.7 MG/DL (ref 8.3–10.3)
CHLORIDE: 104 MMOL/L (ref 101–111)
CO2: 28 MMOL/L (ref 22–32)
CREAT BLD-MCNC: 1.38 MG/DL (ref 0.7–1.3)
EOSINOPHIL # BLD AUTO: 0.17 X10(3) UL (ref 0–0.3)
EOSINOPHIL NFR BLD AUTO: 3.4 %
ERYTHROCYTE [DISTWIDTH] IN BLOOD BY AUTOMATED COUNT: 16.6 % (ref 11.5–16)
GLUCOSE BLD-MCNC: 120 MG/DL (ref 70–99)
HCT VFR BLD AUTO: 36.9 % (ref 37–53)
HGB BLD-MCNC: 11.4 G/DL (ref 13–17)
IMMATURE GRANULOCYTE COUNT: 0.02 X10(3) UL (ref 0–1)
IMMATURE GRANULOCYTE RATIO %: 0.4 %
LYMPHOCYTES # BLD AUTO: 1.47 X10(3) UL (ref 0.9–4)
LYMPHOCYTES NFR BLD AUTO: 29.5 %
MCH RBC QN AUTO: 25.1 PG (ref 27–33.2)
MCHC RBC AUTO-ENTMCNC: 30.9 G/DL (ref 31–37)
MCV RBC AUTO: 81.3 FL (ref 80–99)
MONOCYTES # BLD AUTO: 0.39 X10(3) UL (ref 0.1–0.6)
MONOCYTES NFR BLD AUTO: 7.8 %
NEUTROPHIL ABS PRELIM: 2.88 X10 (3) UL (ref 1.3–6.7)
NEUTROPHILS # BLD AUTO: 2.88 X10(3) UL (ref 1.3–6.7)
NEUTROPHILS NFR BLD AUTO: 57.9 %
PLATELET # BLD AUTO: 188 10(3)UL (ref 150–450)
POTASSIUM SERPL-SCNC: 3.8 MMOL/L (ref 3.6–5.1)
RBC # BLD AUTO: 4.54 X10(6)UL (ref 4.3–5.7)
RED CELL DISTRIBUTION WIDTH-SD: 48.6 FL (ref 35.1–46.3)
SED RATE-ML: 25 MM/HR (ref 0–12)
SODIUM SERPL-SCNC: 140 MMOL/L (ref 136–144)
WBC # BLD AUTO: 5 X10(3) UL (ref 4–13)

## 2018-01-10 PROCEDURE — 86140 C-REACTIVE PROTEIN: CPT | Performed by: INTERNAL MEDICINE

## 2018-01-10 PROCEDURE — 85652 RBC SED RATE AUTOMATED: CPT | Performed by: INTERNAL MEDICINE

## 2018-01-10 PROCEDURE — 85025 COMPLETE CBC W/AUTO DIFF WBC: CPT | Performed by: INTERNAL MEDICINE

## 2018-01-10 PROCEDURE — 80048 BASIC METABOLIC PNL TOTAL CA: CPT | Performed by: INTERNAL MEDICINE

## 2018-01-17 ENCOUNTER — LAB REQUISITION (OUTPATIENT)
Dept: LAB | Facility: HOSPITAL | Age: 63
End: 2018-01-17
Payer: COMMERCIAL

## 2018-01-17 DIAGNOSIS — L97.414 NON-PRESSURE CHRONIC ULCER OF RIGHT HEEL AND MIDFOOT WITH NECROSIS OF BONE (HCC): ICD-10-CM

## 2018-01-17 DIAGNOSIS — Z47.81 ENCOUNTER FOR ORTHOPEDIC AFTERCARE FOLLOWING SURGICAL AMPUTATION: ICD-10-CM

## 2018-01-17 DIAGNOSIS — E11.621 TYPE 2 DIABETES MELLITUS WITH FOOT ULCER (CODE) (HCC): ICD-10-CM

## 2018-01-17 LAB
BUN BLD-MCNC: 37 MG/DL (ref 8–20)
C-REACTIVE PROTEIN: 0.63 MG/DL (ref ?–1)
CALCIUM BLD-MCNC: 9.6 MG/DL (ref 8.3–10.3)
CHLORIDE: 102 MMOL/L (ref 101–111)
CO2: 27 MMOL/L (ref 22–32)
CREAT BLD-MCNC: 1.39 MG/DL (ref 0.7–1.3)
GLUCOSE BLD-MCNC: 147 MG/DL (ref 70–99)
POTASSIUM SERPL-SCNC: 3.9 MMOL/L (ref 3.6–5.1)
SODIUM SERPL-SCNC: 139 MMOL/L (ref 136–144)

## 2018-01-17 PROCEDURE — 80048 BASIC METABOLIC PNL TOTAL CA: CPT | Performed by: INTERNAL MEDICINE

## 2018-01-17 PROCEDURE — 86140 C-REACTIVE PROTEIN: CPT | Performed by: INTERNAL MEDICINE

## 2018-01-19 ENCOUNTER — LAB REQUISITION (OUTPATIENT)
Dept: LAB | Facility: HOSPITAL | Age: 63
End: 2018-01-19
Payer: COMMERCIAL

## 2018-01-19 DIAGNOSIS — E11.621 TYPE 2 DIABETES MELLITUS WITH FOOT ULCER (CODE) (HCC): ICD-10-CM

## 2018-01-19 DIAGNOSIS — Z47.81 ENCOUNTER FOR ORTHOPEDIC AFTERCARE FOLLOWING SURGICAL AMPUTATION: ICD-10-CM

## 2018-01-19 DIAGNOSIS — L97.414 NON-PRESSURE CHRONIC ULCER OF RIGHT HEEL AND MIDFOOT WITH NECROSIS OF BONE (HCC): ICD-10-CM

## 2018-01-19 LAB
BASOPHILS # BLD AUTO: 0.04 X10(3) UL (ref 0–0.1)
BASOPHILS NFR BLD AUTO: 0.7 %
EOSINOPHIL # BLD AUTO: 0.15 X10(3) UL (ref 0–0.3)
EOSINOPHIL NFR BLD AUTO: 2.7 %
ERYTHROCYTE [DISTWIDTH] IN BLOOD BY AUTOMATED COUNT: 16.4 % (ref 11.5–16)
HCT VFR BLD AUTO: 37.5 % (ref 37–53)
HGB BLD-MCNC: 12 G/DL (ref 13–17)
IMMATURE GRANULOCYTE COUNT: 0.02 X10(3) UL (ref 0–1)
IMMATURE GRANULOCYTE RATIO %: 0.4 %
LYMPHOCYTES # BLD AUTO: 1.48 X10(3) UL (ref 0.9–4)
LYMPHOCYTES NFR BLD AUTO: 26.8 %
MCH RBC QN AUTO: 25.8 PG (ref 27–33.2)
MCHC RBC AUTO-ENTMCNC: 32 G/DL (ref 31–37)
MCV RBC AUTO: 80.5 FL (ref 80–99)
MONOCYTES # BLD AUTO: 0.32 X10(3) UL (ref 0.1–0.6)
MONOCYTES NFR BLD AUTO: 5.8 %
NEUTROPHIL ABS PRELIM: 3.51 X10 (3) UL (ref 1.3–6.7)
NEUTROPHILS # BLD AUTO: 3.51 X10(3) UL (ref 1.3–6.7)
NEUTROPHILS NFR BLD AUTO: 63.6 %
PLATELET # BLD AUTO: 204 10(3)UL (ref 150–450)
RBC # BLD AUTO: 4.66 X10(6)UL (ref 4.3–5.7)
RED CELL DISTRIBUTION WIDTH-SD: 47.3 FL (ref 35.1–46.3)
WBC # BLD AUTO: 5.5 X10(3) UL (ref 4–13)

## 2018-01-19 PROCEDURE — 85025 COMPLETE CBC W/AUTO DIFF WBC: CPT | Performed by: INTERNAL MEDICINE

## 2018-01-22 ENCOUNTER — LAB REQUISITION (OUTPATIENT)
Dept: LAB | Facility: HOSPITAL | Age: 63
End: 2018-01-22
Attending: INTERNAL MEDICINE
Payer: COMMERCIAL

## 2018-01-22 DIAGNOSIS — Z47.81 ENCOUNTER FOR ORTHOPEDIC AFTERCARE FOLLOWING SURGICAL AMPUTATION: ICD-10-CM

## 2018-01-22 LAB
BASOPHILS # BLD AUTO: 0.07 X10(3) UL (ref 0–0.1)
BASOPHILS NFR BLD AUTO: 1.3 %
BUN BLD-MCNC: 38 MG/DL (ref 8–20)
C-REACTIVE PROTEIN: 0.7 MG/DL (ref ?–1)
CALCIUM BLD-MCNC: 8.6 MG/DL (ref 8.3–10.3)
CHLORIDE: 103 MMOL/L (ref 101–111)
CO2: 29 MMOL/L (ref 22–32)
CREAT BLD-MCNC: 1.47 MG/DL (ref 0.7–1.3)
EOSINOPHIL # BLD AUTO: 0.11 X10(3) UL (ref 0–0.3)
EOSINOPHIL NFR BLD AUTO: 2 %
ERYTHROCYTE [DISTWIDTH] IN BLOOD BY AUTOMATED COUNT: 16 % (ref 11.5–16)
GLUCOSE BLD-MCNC: 159 MG/DL (ref 70–99)
HCT VFR BLD AUTO: 34.2 % (ref 37–53)
HGB BLD-MCNC: 11.2 G/DL (ref 13–17)
IMMATURE GRANULOCYTE COUNT: 0.02 X10(3) UL (ref 0–1)
IMMATURE GRANULOCYTE RATIO %: 0.4 %
LYMPHOCYTES # BLD AUTO: 1.47 X10(3) UL (ref 0.9–4)
LYMPHOCYTES NFR BLD AUTO: 27.2 %
MCH RBC QN AUTO: 26.2 PG (ref 27–33.2)
MCHC RBC AUTO-ENTMCNC: 32.7 G/DL (ref 31–37)
MCV RBC AUTO: 79.9 FL (ref 80–99)
MONOCYTES # BLD AUTO: 0.47 X10(3) UL (ref 0.1–0.6)
MONOCYTES NFR BLD AUTO: 8.7 %
NEUTROPHIL ABS PRELIM: 3.26 X10 (3) UL (ref 1.3–6.7)
NEUTROPHILS # BLD AUTO: 3.26 X10(3) UL (ref 1.3–6.7)
NEUTROPHILS NFR BLD AUTO: 60.4 %
PLATELET # BLD AUTO: 189 10(3)UL (ref 150–450)
POTASSIUM SERPL-SCNC: 3.8 MMOL/L (ref 3.6–5.1)
RBC # BLD AUTO: 4.28 X10(6)UL (ref 4.3–5.7)
RED CELL DISTRIBUTION WIDTH-SD: 46.1 FL (ref 35.1–46.3)
SED RATE-ML: 15 MM/HR (ref 0–12)
SODIUM SERPL-SCNC: 140 MMOL/L (ref 136–144)
WBC # BLD AUTO: 5.4 X10(3) UL (ref 4–13)

## 2018-01-22 PROCEDURE — 85652 RBC SED RATE AUTOMATED: CPT | Performed by: INTERNAL MEDICINE

## 2018-01-22 PROCEDURE — 80048 BASIC METABOLIC PNL TOTAL CA: CPT | Performed by: INTERNAL MEDICINE

## 2018-01-22 PROCEDURE — 86140 C-REACTIVE PROTEIN: CPT | Performed by: INTERNAL MEDICINE

## 2018-01-22 PROCEDURE — 85025 COMPLETE CBC W/AUTO DIFF WBC: CPT | Performed by: INTERNAL MEDICINE

## 2018-01-25 PROBLEM — Z48.817 AFTERCARE FOLLOWING SURGERY OF THE SKIN OR SUBCUTANEOUS TISSUE: Status: ACTIVE | Noted: 2018-01-25

## 2018-01-29 ENCOUNTER — LAB REQUISITION (OUTPATIENT)
Dept: LAB | Facility: HOSPITAL | Age: 63
End: 2018-01-29
Attending: INTERNAL MEDICINE
Payer: COMMERCIAL

## 2018-01-29 DIAGNOSIS — R69 ILLNESS: ICD-10-CM

## 2018-01-29 LAB
BASOPHILS # BLD AUTO: 0.04 X10(3) UL (ref 0–0.1)
BASOPHILS NFR BLD AUTO: 0.8 %
BUN BLD-MCNC: 45 MG/DL (ref 8–20)
C-REACTIVE PROTEIN: 0.82 MG/DL (ref ?–1)
CALCIUM BLD-MCNC: 8.6 MG/DL (ref 8.3–10.3)
CHLORIDE: 102 MMOL/L (ref 101–111)
CO2: 26 MMOL/L (ref 22–32)
CREAT BLD-MCNC: 1.54 MG/DL (ref 0.7–1.3)
EOSINOPHIL # BLD AUTO: 0.11 X10(3) UL (ref 0–0.3)
EOSINOPHIL NFR BLD AUTO: 2.1 %
ERYTHROCYTE [DISTWIDTH] IN BLOOD BY AUTOMATED COUNT: 16.1 % (ref 11.5–16)
GLUCOSE BLD-MCNC: 169 MG/DL (ref 70–99)
HCT VFR BLD AUTO: 36.8 % (ref 37–53)
HGB BLD-MCNC: 11.6 G/DL (ref 13–17)
IMMATURE GRANULOCYTE COUNT: 0.02 X10(3) UL (ref 0–1)
IMMATURE GRANULOCYTE RATIO %: 0.4 %
LYMPHOCYTES # BLD AUTO: 1.24 X10(3) UL (ref 0.9–4)
LYMPHOCYTES NFR BLD AUTO: 23.8 %
MCH RBC QN AUTO: 25.4 PG (ref 27–33.2)
MCHC RBC AUTO-ENTMCNC: 31.5 G/DL (ref 31–37)
MCV RBC AUTO: 80.7 FL (ref 80–99)
MONOCYTES # BLD AUTO: 0.27 X10(3) UL (ref 0.1–0.6)
MONOCYTES NFR BLD AUTO: 5.2 %
NEUTROPHIL ABS PRELIM: 3.53 X10 (3) UL (ref 1.3–6.7)
NEUTROPHILS # BLD AUTO: 3.53 X10(3) UL (ref 1.3–6.7)
NEUTROPHILS NFR BLD AUTO: 67.7 %
PLATELET # BLD AUTO: 222 10(3)UL (ref 150–450)
POTASSIUM SERPL-SCNC: 3.5 MMOL/L (ref 3.6–5.1)
RBC # BLD AUTO: 4.56 X10(6)UL (ref 4.3–5.7)
RED CELL DISTRIBUTION WIDTH-SD: 47.6 FL (ref 35.1–46.3)
SED RATE-ML: 15 MM/HR (ref 0–12)
SODIUM SERPL-SCNC: 139 MMOL/L (ref 136–144)
WBC # BLD AUTO: 5.2 X10(3) UL (ref 4–13)

## 2018-01-29 PROCEDURE — 86140 C-REACTIVE PROTEIN: CPT | Performed by: INTERNAL MEDICINE

## 2018-01-29 PROCEDURE — 85652 RBC SED RATE AUTOMATED: CPT | Performed by: INTERNAL MEDICINE

## 2018-01-29 PROCEDURE — 80048 BASIC METABOLIC PNL TOTAL CA: CPT | Performed by: INTERNAL MEDICINE

## 2018-01-29 PROCEDURE — 85025 COMPLETE CBC W/AUTO DIFF WBC: CPT | Performed by: INTERNAL MEDICINE

## 2018-03-09 PROCEDURE — 82570 ASSAY OF URINE CREATININE: CPT | Performed by: INTERNAL MEDICINE

## 2018-03-09 PROCEDURE — 82043 UR ALBUMIN QUANTITATIVE: CPT | Performed by: INTERNAL MEDICINE

## 2018-04-23 ENCOUNTER — APPOINTMENT (OUTPATIENT)
Dept: WOUND CARE | Facility: HOSPITAL | Age: 63
End: 2018-04-23
Attending: NURSE PRACTITIONER
Payer: COMMERCIAL

## 2018-04-26 ENCOUNTER — OFFICE VISIT (OUTPATIENT)
Dept: WOUND CARE | Facility: HOSPITAL | Age: 63
End: 2018-04-26
Attending: NURSE PRACTITIONER
Payer: COMMERCIAL

## 2018-04-26 DIAGNOSIS — E11.621 DIABETIC FOOT ULCERS (HCC): Primary | ICD-10-CM

## 2018-04-26 DIAGNOSIS — E11.40 DIABETIC NEUROPATHY (HCC): ICD-10-CM

## 2018-04-26 DIAGNOSIS — M86.30 CHRONIC MULTIFOCAL OSTEOMYELITIS, UNSPECIFIED SITE (HCC): ICD-10-CM

## 2018-04-26 DIAGNOSIS — L97.509 DIABETIC FOOT ULCERS (HCC): Primary | ICD-10-CM

## 2018-04-26 PROCEDURE — 99215 OFFICE O/P EST HI 40 MIN: CPT

## 2018-04-26 PROCEDURE — 11042 DBRDMT SUBQ TIS 1ST 20SQCM/<: CPT

## 2018-05-03 ENCOUNTER — OFFICE VISIT (OUTPATIENT)
Dept: WOUND CARE | Facility: HOSPITAL | Age: 63
End: 2018-05-03
Attending: NURSE PRACTITIONER
Payer: COMMERCIAL

## 2018-05-03 DIAGNOSIS — E11.621 DIABETIC FOOT ULCERS (HCC): Primary | ICD-10-CM

## 2018-05-03 DIAGNOSIS — E11.40 DIABETIC NEUROPATHY (HCC): ICD-10-CM

## 2018-05-03 DIAGNOSIS — L97.509 DIABETIC FOOT ULCERS (HCC): Primary | ICD-10-CM

## 2018-05-03 PROCEDURE — 99214 OFFICE O/P EST MOD 30 MIN: CPT

## 2018-05-03 NOTE — PROGRESS NOTES
Subjective    Chief Complaint  This information was obtained from the patient  Patient is here for a wound care follow up. He denies any current pain.     Allergies  NKDA    HPI  This information was obtained from the patient  The following HPI elements wer Integumentary (Hair/Skin/Nails): Dryness, Calluses/Corns, Ulcers  Neurological: Loss of Protective Sensation (Pt stated has neuropathy), Numbness, Tingling    Patient denies complaints or symptoms related to:   Constitutional Symptoms (General Health):  Fat Wound #1 Left, Plantar Foot is a chronic Mckeon Grade 3 Diabetic Ulcer and has received a status of Not Healed. Subsequent wound encounter measurements are 0.3cm length x 0.4cm width x 0.4cm depth, with an area of 0.12 sq cm and a volume of 0.048 cubic cm. Calf Measurement 35 cm with left measurement of 41 cm  Ankle Measurement 10 cm with left measurement of 24 cm  Right Extremity: Edema is present  Compression Device In Use: Yes  Device Used Correctly: No  Calf Measurement 35 cm with right measurement of 37 Wound #1 (Diabetic Ulcer) is located on the left, plantar foot. A None debridement was performed by RAUL Hernandez. to remove devitalized tissue: callus. The following instrument(s) were used: blade.  No anesthetic was required due to loss of sens Kerlix  Paper tape  Change Dressing Daily and PRN    Compression Therapy:  Tubigrip - Size E    Off-Loading  Partial weight bearing - Minimal weight bearing, continue to use wheelchair  Pressure relief shoe / inserts / foams - Continue to use your current

## 2018-05-07 ENCOUNTER — OFFICE VISIT (OUTPATIENT)
Dept: WOUND CARE | Facility: HOSPITAL | Age: 63
End: 2018-05-07
Attending: NURSE PRACTITIONER
Payer: COMMERCIAL

## 2018-05-07 DIAGNOSIS — E11.40 DIABETIC NEUROPATHY (HCC): ICD-10-CM

## 2018-05-07 DIAGNOSIS — M86.30 CHRONIC MULTIFOCAL OSTEOMYELITIS, UNSPECIFIED SITE (HCC): Primary | ICD-10-CM

## 2018-05-07 PROCEDURE — 99213 OFFICE O/P EST LOW 20 MIN: CPT

## 2018-05-07 NOTE — PROGRESS NOTES
Subjective    Chief Complaint  This information was obtained from the patient  Patient is here for a wound care follow up for Left foot and right foot. He denies any current pain.     Allergies  NKDA    HPI  This information was obtained from the patient  T Cardiovascular (Central/Peripheral):  Lower extremity (leg) swelling (Bilateral LE edema minimal)  Musculoskeletal: Assistive Devices (wheelchair), Deformities (Right Chopart and left 5th ray amputations)  Integumentary (Hair/Skin/Nails): Dryness, Calluses/ Wound #1 Left, Plantar Foot is a chronic Mckeon Grade 3 Diabetic Ulcer and has received a status of Not Healed. Subsequent wound encounter measurements are 0.3cm length x 0.4cm width x 0.4cm depth, with an area of 0.12 sq cm and a volume of 0.048 cubic cm. Device Used Correctly: No   Calf Measurement 35 cm with left measurement of 41.4 cm   Ankle Measurement 10 cm with left measurement of 24 cm  Right Extremity: Edema is present   Compression Device In Use: Yes   Device Used Correctly: No   Calf Measurement Initial Anesthetic Order: Apply lidocaine to wound bed on all future wound center visits during preparation for physician exam if wound bed contains fibrin or eschar.   4% Topical Lidocaine    Wound Cleansing & Dressings  Cleanse with saline or wound cleans Discussed with patient we were unable to coordinate care with [place]. We will reach out to one of the orthotists when he returns from vacation and try again.  Pt would prefer this so the orthotist can work with our PT to review patient's foot without dressi

## 2018-05-14 ENCOUNTER — OFFICE VISIT (OUTPATIENT)
Dept: WOUND CARE | Facility: HOSPITAL | Age: 63
End: 2018-05-14
Attending: NURSE PRACTITIONER
Payer: COMMERCIAL

## 2018-05-14 DIAGNOSIS — E11.40 DIABETIC NEUROPATHY (HCC): ICD-10-CM

## 2018-05-14 DIAGNOSIS — M86.30 CHRONIC MULTIFOCAL OSTEOMYELITIS, UNSPECIFIED SITE (HCC): Primary | ICD-10-CM

## 2018-05-14 PROCEDURE — 99214 OFFICE O/P EST MOD 30 MIN: CPT

## 2018-05-14 NOTE — PROGRESS NOTES
Subjective    Chief Complaint  This information was obtained from the patient  Patient is here for a wound care follow up for Left foot and right foot. He denies any current pain.     Allergies  NKDA    HPI  This information was obtained from the patient  T 5/14/18: Pt returns for follow up. States he may not have used as much padding on foot this week and drainage to right foot has increased again. Pt will be traveling out of town tomorrow through Thursday for business but his wife will accompany him.     Com Loyd +2 pedal pulses. No LE pain or edema, Capillary refill < 3 seconds. Digits are warm. + hairgrowth on legs and feet. .     Integumentary (Hair, Skin)  Warm, dry, well perfused. Firm wound bed, no subcutaneous nodules, induration or crepitus.      Wound #1 The periwound skin exhibited: Edema, Callus, Moist, Maceration. The periwound skin did not exhibit: Erythema. The temperature of the periwound skin is WNL. Periwound skin does not exhibit signs or symptoms of infection.  Local Pulse is Normal.    Psychiatri Plan    Wound Orders:  Wound #1 Left, Plantar Foot     Topicals:  Initial Anesthetic Order: Apply lidocaine to wound bed on all future wound center visits during preparation for physician exam if wound bed contains fibrin or eschar.   4% Topical Lidocaine Osteomyelitis confirmed by: - 12/18/17: Bone Biopsy left foot 5th digit  History of osteo in right foot in 2010        If unable to coordinate with [facility] at next visit will discuss with jacinto and lena.  Pt understands we are waiting to coordinate footwe

## 2018-05-21 ENCOUNTER — OFFICE VISIT (OUTPATIENT)
Dept: WOUND CARE | Facility: HOSPITAL | Age: 63
End: 2018-05-21
Attending: NURSE PRACTITIONER
Payer: COMMERCIAL

## 2018-05-21 DIAGNOSIS — E11.40 DIABETIC NEUROPATHY (HCC): ICD-10-CM

## 2018-05-21 DIAGNOSIS — M86.30 CHRONIC MULTIFOCAL OSTEOMYELITIS, UNSPECIFIED SITE (HCC): Primary | ICD-10-CM

## 2018-05-21 PROCEDURE — 99214 OFFICE O/P EST MOD 30 MIN: CPT

## 2018-05-31 ENCOUNTER — OFFICE VISIT (OUTPATIENT)
Dept: WOUND CARE | Facility: HOSPITAL | Age: 63
End: 2018-05-31
Attending: NURSE PRACTITIONER
Payer: COMMERCIAL

## 2018-05-31 DIAGNOSIS — E11.40 DIABETIC NEUROPATHY (HCC): ICD-10-CM

## 2018-05-31 DIAGNOSIS — M86.30 CHRONIC MULTIFOCAL OSTEOMYELITIS, UNSPECIFIED SITE (HCC): Primary | ICD-10-CM

## 2018-05-31 PROCEDURE — 99213 OFFICE O/P EST LOW 20 MIN: CPT

## 2018-06-07 ENCOUNTER — OFFICE VISIT (OUTPATIENT)
Dept: WOUND CARE | Facility: HOSPITAL | Age: 63
End: 2018-06-07
Attending: NURSE PRACTITIONER
Payer: COMMERCIAL

## 2018-06-07 DIAGNOSIS — E11.621 DIABETES MELLITUS WITH ULCER OF FOOT (HCC): Primary | ICD-10-CM

## 2018-06-07 DIAGNOSIS — L97.509 DIABETES MELLITUS WITH ULCER OF FOOT (HCC): Primary | ICD-10-CM

## 2018-06-07 DIAGNOSIS — L97.518 NON-PRESSURE CHRONIC ULCER OF OTHER PART OF RIGHT FOOT WITH OTHER SPECIFIED SEVERITY (HCC): ICD-10-CM

## 2018-06-07 PROCEDURE — 99214 OFFICE O/P EST MOD 30 MIN: CPT

## 2018-06-07 PROCEDURE — 87070 CULTURE OTHR SPECIMN AEROBIC: CPT

## 2018-06-07 PROCEDURE — 87077 CULTURE AEROBIC IDENTIFY: CPT

## 2018-06-07 PROCEDURE — 87205 SMEAR GRAM STAIN: CPT

## 2018-06-07 PROCEDURE — 87186 SC STD MICRODIL/AGAR DIL: CPT

## 2018-06-11 NOTE — PROGRESS NOTES
Subjective    Chief Complaint  This information was obtained from the patient  Patient is here for a wound care follow up of bilateral foot wounds.  Traveled this week noticed more swelling on left and thinks wound is getting worse    Allergies  NKDA    HPI 5/14/18: Pt returns for follow up. States he may not have used as much padding on foot this week and drainage to right foot has increased again.  Pt will be traveling out of town tomorrow through Thursday for business but his wife will accompany him.     5/ BP Elevated, on antihypertensive meds. . Pulse RRR. RR within normal limits. Afebrile. Within Ideal body weight range. Alert, calm, well developed, in no apparent distress.  Height/Length: 75 in (190.5 cm), Weight: 264 lbs (120 kgs), BMI: 33, Temperature: 96 Wound #2 Right Foot is a chronic Mckeon Grade 3 Diabetic Ulcer and has received a status of Not Healed. Subsequent wound encounter measurements are 1.4cm length x 1.8cm width x 1.3cm depth, with an area of 2.52 sq cm and a volume of 3.276 cubic cm.  No tunn Temperature of Extremity: Warm   Erythema: No   Dependent Rubor: No   Hyperpigmentation: No   Lipodermatosclerosis: No      Additional Information  Left Posterior Tibial Pulse: Biphasic  Right Posterior Tibial Pulse: Biphasic  Left Dorsalis Pedis Pulse:  Bi Other: - Custom felted foam offloading piece applied and extra given for patient to use    Additional Orders: Follow-Up Appointments  Return Appointment in 1 week.    Other: - Podiatry tomorrow 6/7/18    Misc/Additional Orders  Increase dietary protein -

## 2018-06-14 ENCOUNTER — OFFICE VISIT (OUTPATIENT)
Dept: WOUND CARE | Facility: HOSPITAL | Age: 63
End: 2018-06-14
Attending: NURSE PRACTITIONER
Payer: COMMERCIAL

## 2018-06-14 DIAGNOSIS — L97.509 DIABETES MELLITUS WITH ULCER OF FOOT (HCC): Primary | ICD-10-CM

## 2018-06-14 DIAGNOSIS — E11.621 DIABETES MELLITUS WITH ULCER OF FOOT (HCC): Primary | ICD-10-CM

## 2018-06-14 DIAGNOSIS — L97.518 NON-PRESSURE CHRONIC ULCER OF OTHER PART OF RIGHT FOOT WITH OTHER SPECIFIED SEVERITY (HCC): ICD-10-CM

## 2018-06-14 PROCEDURE — 99214 OFFICE O/P EST MOD 30 MIN: CPT

## 2018-06-14 NOTE — PROGRESS NOTES
Subjective    Chief Complaint  This information was obtained from the patient  Patient is here for a wound care follow up. He denies any current pain to the wounds.     Allergies  NKDA    HPI  This information was obtained from the patient  The following HP 5/14/18: Pt returns for follow up. States he may not have used as much padding on foot this week and drainage to right foot has increased again.  Pt will be traveling out of town tomorrow through Thursday for business but his wife will accompany him.     5/ Gastrointestinal (GI): Nausea / Vomiting / Diarrhea (N/V/D), Constipation, Loss of Appetite, Difficulty Swallowing, Stomach/abdominal pain  Neurological: Memory Loss  Psychiatric: Anxiety, Depression    Additional Information  Medication reconciliation com The periwound skin color is normal. The periwound skin exhibited: Callus, Moist, Maceration. The temperature of the periwound skin is WNL. Periwound skin does not exhibit signs or symptoms of infection.  Local Pulse is Normal.    Wound #2 Right Foot is a ch (Encounter Diagnosis) E11.40 - Type 2 diabetes mellitus with diabetic neuropathy, unspecified  (Encounter Diagnosis) M86.30 - Chronic multifocal osteomyelitis, unspecified site  (Encounter Diagnosis) L97.528 - Non-pressure chronic ulcer of other part of le WAQAS walker boot right foot  New custom orthotics and shoes for left foot  Other: - Custom felted foam offloading piece applied and extra given for patient to use    Additional Orders: Follow-Up Appointments  Return Appointment in 1 week.  - Monday    Mi

## 2018-06-18 ENCOUNTER — OFFICE VISIT (OUTPATIENT)
Dept: WOUND CARE | Facility: HOSPITAL | Age: 63
End: 2018-06-18
Attending: NURSE PRACTITIONER
Payer: COMMERCIAL

## 2018-06-18 DIAGNOSIS — E11.621 DIABETES MELLITUS WITH ULCER OF FOOT (HCC): ICD-10-CM

## 2018-06-18 DIAGNOSIS — M86.30 CHRONIC MULTIFOCAL OSTEOMYELITIS, UNSPECIFIED SITE (HCC): ICD-10-CM

## 2018-06-18 DIAGNOSIS — L97.518 NON-PRESSURE CHRONIC ULCER OF OTHER PART OF RIGHT FOOT WITH OTHER SPECIFIED SEVERITY (HCC): Primary | ICD-10-CM

## 2018-06-18 DIAGNOSIS — L97.509 DIABETES MELLITUS WITH ULCER OF FOOT (HCC): ICD-10-CM

## 2018-06-18 PROCEDURE — 99214 OFFICE O/P EST MOD 30 MIN: CPT

## 2018-06-18 NOTE — PROGRESS NOTES
Subjective    Chief Complaint  This information was obtained from the patient  Patient is here for a wound care follow up.     Allergies  NKDA    HPI  This information was obtained from the patient  The following HPI elements were documented for the patient 5/14/18: Pt returns for follow up. States he may not have used as much padding on foot this week and drainage to right foot has increased again.  Pt will be traveling out of town tomorrow through Thursday for business but his wife will accompany him.     5/ Constitutional Symptoms (General Health):  Fatigue, Fever, Marked Weight Change, Chills  Eyes: Glasses / Contacts  Ear/Nose/Mouth/Throat: Hearing Loss / Aid  Respiratory: Cough, Shortness of Breath, Wheezing, Oxygen Use  Cardiovascular (Central/Peripheral): Wound #1 Left, Plantar Foot is a chronic Mckeon Grade 3 Diabetic Ulcer and has received a status of Bridged. Subsequent wound encounter measurements are 0.5cm length x 0.5cm width x 1cm depth, with an area of 0.25 sq cm and a volume of 0.25 cubic cm.  Bone Ankle Measurement 10 cm from heel with left measurement of 24.9 cm  Right Extremity: Edema is present   Compression Device In Use: No   Device Used Correctly: Yes   Compression Device Used: Tubigrip or Tubifast   Calf Measurement 35 cm from heel with right Pressure relief shoe / inserts / foams - Pt's insurance did not approve Sumi and Erick, starting over with obtaining off-loading shoes. Will fax order to [facility]. Additional Orders: Follow-Up Appointments  Return Appointment in 1 week.  - Monday

## 2018-06-25 ENCOUNTER — OFFICE VISIT (OUTPATIENT)
Dept: WOUND CARE | Facility: HOSPITAL | Age: 63
End: 2018-06-25
Attending: NURSE PRACTITIONER
Payer: COMMERCIAL

## 2018-06-25 DIAGNOSIS — L97.509 DIABETES MELLITUS WITH ULCER OF FOOT (HCC): ICD-10-CM

## 2018-06-25 DIAGNOSIS — L97.518 NON-PRESSURE CHRONIC ULCER OF OTHER PART OF RIGHT FOOT WITH OTHER SPECIFIED SEVERITY (HCC): Primary | ICD-10-CM

## 2018-06-25 DIAGNOSIS — E11.621 DIABETES MELLITUS WITH ULCER OF FOOT (HCC): ICD-10-CM

## 2018-06-25 PROCEDURE — 99214 OFFICE O/P EST MOD 30 MIN: CPT

## 2018-06-25 NOTE — PROGRESS NOTES
Subjective    Chief Complaint  This information was obtained from the patient  Patient is here for a wound care follow up for bilateral feet wounds.     Allergies  NKDA    HPI  This information was obtained from the patient  The following HPI elements were 5/14/18: Pt returns for follow up. States he may not have used as much padding on foot this week and drainage to right foot has increased again.  Pt will be traveling out of town tomorrow through Thursday for business but his wife will accompany him.     5/ Integumentary (Hair/Skin/Nails): Dryness, Calluses/Corns, Ulcers  Neurological: Loss of Protective Sensation (Pt stated has neuropathy), Numbness, Tingling    Patient denies complaints or symptoms related to:   Constitutional Symptoms (General Health):  Fat Wound #1 Left, Plantar Foot is a chronic Mckeon Grade 3 Diabetic Ulcer and has received a status of Bridged. Subsequent wound encounter measurements are 0.6cm length x 0.6cm width x 1cm depth, with an area of 0.36 sq cm and a volume of 0.36 cubic cm.  Bone Ankle Measurement 10 cm from heel with left measurement of 26 cm  Right Extremity: Edema is present   Compression Device In Use: No   Device Used Correctly: Yes   Compression Device Used: Tubigrip or Tubifast   Calf Measurement 35 cm from heel with right m Return Appointment in 1 week. - Monday  Other: - Dr. Del Cid Breeding within the next 2 weeks    Misc/Additional Orders  Increase dietary protein - Continue Premier protein    Care summary  Reviewed and evaluated labs.   Discussed the Plan of Care at bedside with pa

## 2018-07-02 ENCOUNTER — OFFICE VISIT (OUTPATIENT)
Dept: WOUND CARE | Facility: HOSPITAL | Age: 63
End: 2018-07-02
Attending: NURSE PRACTITIONER
Payer: COMMERCIAL

## 2018-07-02 DIAGNOSIS — L97.509 DIABETES MELLITUS WITH ULCER OF FOOT (HCC): ICD-10-CM

## 2018-07-02 DIAGNOSIS — E11.621 DIABETES MELLITUS WITH ULCER OF FOOT (HCC): ICD-10-CM

## 2018-07-02 DIAGNOSIS — L97.518 NON-PRESSURE CHRONIC ULCER OF OTHER PART OF RIGHT FOOT WITH OTHER SPECIFIED SEVERITY (HCC): Primary | ICD-10-CM

## 2018-07-02 PROCEDURE — 99214 OFFICE O/P EST MOD 30 MIN: CPT

## 2018-07-02 NOTE — PROGRESS NOTES
Subjective    Chief Complaint  This information was obtained from the patient  Patient is here for a wound care follow up for bilateral feet wounds.     Allergies  NKDA    HPI  This information was obtained from the patient  The following HPI elements were 5/14/18: Pt returns for follow up. States he may not have used as much padding on foot this week and drainage to right foot has increased again.  Pt will be traveling out of town tomorrow through Thursday for business but his wife will accompany him.     5/ Cardiovascular (Central/Peripheral):  Lower extremity (leg) swelling (Bilateral LE edema minimal)  Musculoskeletal: Deformities (Right Chopart and left 5th ray amputations), Assistive Devices (crutches/wheelchair)  Integumentary (Hair/Skin/Nails): Dryness, Wound #1 Left, Plantar Foot is a chronic Mckeon Grade 3 Diabetic Ulcer and has received a status of Bridged. Subsequent wound encounter measurements are 0.5cm length x 0.4cm width x 0.5cm depth, with an area of 0.2 sq cm and a volume of 0.1 cubic cm.  Bone Ankle Measurement 10 cm from heel with left measurement of 25.1 cm  Right Extremity: Edema is present   Compression Device In Use: No   Device Used Correctly: Yes   Compression Device Used: Tubigrip or Tubifast   Calf Measurement 35 cm from heel with right Misc/Additional Orders  Increase dietary protein - Continue Premier protein    Care summary  Reviewed and evaluated labs. Discussed the Plan of Care at bedside with patient.  The patient verbally acknowledges understanding of all instructions and all quest

## 2018-07-09 ENCOUNTER — OFFICE VISIT (OUTPATIENT)
Dept: WOUND CARE | Facility: HOSPITAL | Age: 63
End: 2018-07-09
Attending: NURSE PRACTITIONER
Payer: COMMERCIAL

## 2018-07-09 DIAGNOSIS — L97.518 NON-PRESSURE CHRONIC ULCER OF OTHER PART OF RIGHT FOOT WITH OTHER SPECIFIED SEVERITY (HCC): Primary | ICD-10-CM

## 2018-07-09 DIAGNOSIS — E11.621 DIABETES MELLITUS WITH ULCER OF FOOT (HCC): ICD-10-CM

## 2018-07-09 DIAGNOSIS — L97.509 DIABETES MELLITUS WITH ULCER OF FOOT (HCC): ICD-10-CM

## 2018-07-09 DIAGNOSIS — M86.30 CHRONIC MULTIFOCAL OSTEOMYELITIS, UNSPECIFIED SITE (HCC): ICD-10-CM

## 2018-07-09 PROCEDURE — 99214 OFFICE O/P EST MOD 30 MIN: CPT

## 2018-07-09 NOTE — PROGRESS NOTES
Subjective    Chief Complaint  This information was obtained from the patient  Patient is here for a wound care follow up for bilateral feet wounds. He denies any current pain or new concerns regarding the wounds.     Allergies  NKDA    HPI  This informatio 5/14/18: Pt returns for follow up. States he may not have used as much padding on foot this week and drainage to right foot has increased again.  Pt will be traveling out of town tomorrow through Thursday for business but his wife will accompany him.     5/ 7/9/18: Pt is here for follow up, states he has appointment with [facility] tomorrow for his orthotics. He also had an appointment with Dr. Cornell Krabbe the same day but had to cancel as they were overlapping. Pt states he will see Dr. Shi Membreno next week.     Complaint Loyd +2 pedal pulses. No LE pain or edema, Capillary refill < 3 seconds. Digits are warm. + hairgrowth on legs and feet. .     Neurological:  Insensate. Psychiatric:  Intact short and long term memory . Intact judgement and insight.  Oriented to time, romaine The periwound skin color is normal. The periwound skin exhibited: Edema, Callus, Dry/Scaly. The temperature of the periwound skin is WNL. Periwound skin does not exhibit signs or symptoms of infection.  Local Pulse is Normal.    Lower Extremity Assessment Kerlix  Medipore tape (no substitution)   Change Dressing Daily and PRN    Compression Therapy:  Tubigrip - Size E    Off-Loading  No Weight Bearing  Crutches  Wheelchair - when traveling  Pressure relief shoe / inserts / foams - [facility] apt 7/9/18    Addit

## 2018-07-16 ENCOUNTER — OFFICE VISIT (OUTPATIENT)
Dept: WOUND CARE | Facility: HOSPITAL | Age: 63
End: 2018-07-16
Attending: NURSE PRACTITIONER
Payer: COMMERCIAL

## 2018-07-16 DIAGNOSIS — E11.621 DIABETES MELLITUS WITH ULCER OF FOOT (HCC): ICD-10-CM

## 2018-07-16 DIAGNOSIS — L97.509 DIABETES MELLITUS WITH ULCER OF FOOT (HCC): ICD-10-CM

## 2018-07-16 DIAGNOSIS — L97.518 NON-PRESSURE CHRONIC ULCER OF OTHER PART OF RIGHT FOOT WITH OTHER SPECIFIED SEVERITY (HCC): Primary | ICD-10-CM

## 2018-07-16 PROCEDURE — 11042 DBRDMT SUBQ TIS 1ST 20SQCM/<: CPT

## 2018-07-16 NOTE — PROGRESS NOTES
Progress Note Details  Patient Name: Gurvinder Maria.   Patient Number: [id number]  Patient YOB: 1955  Date: 7/16/2018  Physician / Delroy Villalobos: Sherie Johnson 19: Gonzalez 43    Chief Complaint  This information was obtained from the pa he was not having any pain. Has been putting extra padding on feet, especially the right. 5/14/18: Pt returns for follow up. States he may not have used as much padding on foot this week and drainage to right foot has increased again.  Pt will be travel orthotics. He also had an appointment with Dr. Nayana Ayala the same day but had to cancel as they were overlapping. Pt states he will see Dr. Teresa Hunt next week. 7/16 Pt presents for b/l foot wounds with chronic OM.  Pt states he had been travelling and left f volume of 0.15 cubic cm. Bone and adipose are exposed. No tunneling has been noted. No sinus tract has been noted. Undermining has been noted at 12:00 and ends at 12:00 with a maximum distance of 0.7cm.  There is a moderate amount of serous drainage noted w 10 cm from heel      Additional Information  Left Posterior Tibial Pulse: Biphasic  Right Posterior Tibial Pulse: Biphasic  Left Dorsalis Pedis Pulse: Biphasic  Right Dorsalis Pedis Pulse: Biphasic        Assessment    Active Problems    ICD-10  (Encounter start of the procedure. A moderate amount of bleeding was controlled with pressure. The procedure was tolerated well with a pain level of 0 throughout and a pain level of 0 following the procedure.  Post Debridement Measurements: 1.5cm length x 1.7cm width Biphasic signals  Last A1C date and value: - 3/9/18: 6.2  Last albumin date and value: - 12/16/17: 2.9  Osteomyelitis confirmed by: - 12/18/17: Bone Biopsy left foot 5th digit  History of osteo in right foot in 2010          Electronic Signature(s)  Signed

## 2018-09-10 ENCOUNTER — APPOINTMENT (OUTPATIENT)
Dept: WOUND CARE | Facility: HOSPITAL | Age: 63
End: 2018-09-10
Attending: NURSE PRACTITIONER
Payer: COMMERCIAL

## 2018-09-24 ENCOUNTER — OFFICE VISIT (OUTPATIENT)
Dept: WOUND CARE | Facility: HOSPITAL | Age: 63
End: 2018-09-24
Attending: NURSE PRACTITIONER
Payer: COMMERCIAL

## 2018-09-24 DIAGNOSIS — L97.518 NON-PRESSURE CHRONIC ULCER OF OTHER PART OF RIGHT FOOT WITH OTHER SPECIFIED SEVERITY (HCC): Primary | ICD-10-CM

## 2018-09-24 DIAGNOSIS — E11.621 DIABETES MELLITUS WITH ULCER OF FOOT (HCC): ICD-10-CM

## 2018-09-24 DIAGNOSIS — L97.509 DIABETES MELLITUS WITH ULCER OF FOOT (HCC): ICD-10-CM

## 2018-09-24 PROCEDURE — 99214 OFFICE O/P EST MOD 30 MIN: CPT

## 2018-09-24 NOTE — PROGRESS NOTES
Subjective    Chief Complaint  This information was obtained from the patient  Patient is here for a wound care follow up for bilateral left and right feet wounds. He was last seen July 20th, states he has been traveling a lot for work.  He denies any curre Cancer - No History, Diabetes - No History, Heart Disease - Father, Hypertension - No History, Kidney Disease - No History, Lung Disease - No History, Mental Illness - No History, Stroke - No History, Thyroid Problems - No History, Tuberculosis - No Histor Cardiovascular (Central/Peripheral): Dyspnea on Exertion, Intermittent Claudication, Lower extremity (leg) resting pain, Orthopnea  Gastrointestinal (GI): Nausea / Vomiting / Diarrhea (N/V/D), Loss of Appetite, Difficulty Swallowing, Stomach/abdominal pain Wound #3 Right Foot is a chronic Mckeon Grade 3 Diabetic Ulcer and has received a status of Not Healed. Initial wound encounter measurements are 2cm length x 1.6cm width x 1cm depth, with an area of 3.2 sq cm and a volume of 3.2 cubic cm.  No tunneling has Dorsalis Pedis: Doppler  Left Extremity colors, hair growth, and conditions:   Extremity Color: WNL   Hair Growth on Extremity: Yes   Temperature of Extremity: Warm   Capillary Refill: > 3 seconds   Erythema: No   Dependent Rubor: No   Hyperpigmentation: N Wound #4 (Diabetic Ulcer) is located on the left foot. A None debridement was performed by RAUL Miller. to remove devitalized tissue: callus. The following instrument(s) were used: blade and forceps. A minimal amount of bleeding was controlled Pt was last seen 2 months ago for feet wounds. He states due to scheduling and then re-sizing he only got his final off-loading shoes 2 weeks ago. He saw Dr. Roxi Morris 1 month ago and does not have a follow up scheduled per pt.  Pt told Dr. Roxi Morris he wanted

## 2018-10-01 ENCOUNTER — OFFICE VISIT (OUTPATIENT)
Dept: WOUND CARE | Facility: HOSPITAL | Age: 63
End: 2018-10-01
Attending: NURSE PRACTITIONER
Payer: COMMERCIAL

## 2018-10-01 DIAGNOSIS — L97.518 NON-PRESSURE CHRONIC ULCER OF OTHER PART OF RIGHT FOOT WITH OTHER SPECIFIED SEVERITY (HCC): Primary | ICD-10-CM

## 2018-10-01 PROCEDURE — 11042 DBRDMT SUBQ TIS 1ST 20SQCM/<: CPT

## 2018-10-01 NOTE — PROGRESS NOTES
Subjective    Chief Complaint  This information was obtained from the patient  Patient is here for a wound care follow up for bilateral feet wounds. Patient stated no pain.      Allergies  NKDA    HPI  This information was obtained from the patient  The fol Cardiovascular (Central/Peripheral):  Lower extremity (leg) swelling (Minimal)  Musculoskeletal: Deformities (Right Chopart and left 5th ray amputations), Assistive Devices (AFO to right foot and Diabetic shoe left foot)  Integumentary (Hair/Skin/Nails):  Wound #3 Right Foot is a chronic Mckeon Grade 3 Diabetic Ulcer and has received a status of Not Healed. Initial wound encounter measurements are 2cm length x 1.9cm width x 1.1cm depth, with an area of 3.8 sq cm and a volume of 4.18 cubic cm.  No tunneling h Wound #3 (Diabetic Ulcer) is located on the right foot. A skin/subcutaneous tissue level excisional/surgical debridement with a total area debrided of 3.8 sq cm was performed by RAUL Zaidi.  Subcutaneous was removed along with devitalized tiss Discussed the Plan of Care at bedside with patient. The patient verbally acknowledges understanding of all instructions and all questions were answered. Workflow: Diabetes  Last A1C date and value: - 9/18: 7.3  Last albumin date and value: - 12/16/17: 2.

## 2018-10-08 ENCOUNTER — OFFICE VISIT (OUTPATIENT)
Dept: WOUND CARE | Facility: HOSPITAL | Age: 63
End: 2018-10-08
Attending: NURSE PRACTITIONER
Payer: COMMERCIAL

## 2018-10-08 DIAGNOSIS — E11.621 DIABETES MELLITUS WITH ULCER OF FOOT (HCC): ICD-10-CM

## 2018-10-08 DIAGNOSIS — L97.509 DIABETES MELLITUS WITH ULCER OF FOOT (HCC): ICD-10-CM

## 2018-10-08 DIAGNOSIS — L97.518 NON-PRESSURE CHRONIC ULCER OF OTHER PART OF RIGHT FOOT WITH OTHER SPECIFIED SEVERITY (HCC): Primary | ICD-10-CM

## 2018-10-08 PROCEDURE — 11042 DBRDMT SUBQ TIS 1ST 20SQCM/<: CPT

## 2018-10-08 NOTE — PROGRESS NOTES
Subjective    Chief Complaint  This information was obtained from the patient  Patient is here for follow for bilateral feet. The sorbact where the skin substitute was applied kept peeling off. They tried to keep applying tape to hold it place.     Allergie 10/8/18: Pt is here for follow up after 1st application of Graffix. Pt had traveled to [place] to see family and was sweating a lot due to [place] heat. He states with sweating had a hard time keeping the sorbact in place.  He did place zinc around the woun Normal respiratory effort, without use of accessory muscles. Cardiovascular:  Loyd +2 pedal pulses. No LE pain, minimal edema, Capillary refill < 3 seconds. Digits are warm. Toenails are thick and discolored. + hairgrowth on legs and feet. Tico Barraza      Psychia The periwound skin color is normal. The periwound skin exhibited: Callus, Moist, Maceration. The temperature of the periwound skin is WNL. Periwound skin does not exhibit signs or symptoms of infection.  Local Pulse is Normal.   General Notes:  Wound bed vi Wound Cleansing & Dressings  May shower with protection.   Cleanse with saline or wound cleanser  Betadine  Dry Gauze  Change Dressing Daily and PRN    Off-Loading  Partial weight bearing - Still stay off your feet as much as possible  Pressure relief shoe

## 2018-10-15 ENCOUNTER — OFFICE VISIT (OUTPATIENT)
Dept: WOUND CARE | Facility: HOSPITAL | Age: 63
End: 2018-10-15
Attending: NURSE PRACTITIONER
Payer: COMMERCIAL

## 2018-10-15 DIAGNOSIS — L97.509 DIABETES MELLITUS WITH ULCER OF FOOT (HCC): ICD-10-CM

## 2018-10-15 DIAGNOSIS — L97.518 NON-PRESSURE CHRONIC ULCER OF OTHER PART OF RIGHT FOOT WITH OTHER SPECIFIED SEVERITY (HCC): Primary | ICD-10-CM

## 2018-10-15 DIAGNOSIS — E11.621 DIABETES MELLITUS WITH ULCER OF FOOT (HCC): ICD-10-CM

## 2018-10-15 DIAGNOSIS — M86.30 CHRONIC MULTIFOCAL OSTEOMYELITIS, UNSPECIFIED SITE (HCC): ICD-10-CM

## 2018-10-15 PROCEDURE — 11042 DBRDMT SUBQ TIS 1ST 20SQCM/<: CPT

## 2018-10-15 NOTE — PROGRESS NOTES
Subjective    Chief Complaint  This information was obtained from the patient  Patient is here for follow for bilateral feet. Patient denies any pain.     Allergies  NKDA    HPI  This information was obtained from the patient  The following HPI elements wer 10/8/18: Pt is here for follow up after 1st application of Graffix. Pt had traveled to [place] to see family and was sweating a lot due to [place] heat. He states with sweating had a hard time keeping the sorbact in place.  He did place zinc around the woun BP Elevated, on antihypertensive meds. Follow up with PCP as not controlled. . Pulse RRR. RR within normal limits. Afebrile. Obesity BMI >30. Alert, calm, well developed, in no apparent distress.  Height/Length: 75 in (190.5 cm), Weight: 273 lbs (124.09 kgs Wound #4 Left Foot is a chronic Mckeon Grade 3 Diabetic Ulcer and has received a status of Not Healed. Subsequent wound encounter measurements are 0.5cm length x 0.5cm width x 0.2cm depth, with an area of 0.25 sq cm and a volume of 0.05 cubic cm.  No tunnel Kerlix  Paper tape  Change Dressing Daily and PRN - Change gauze and outer dressing    Off-Loading  Partial weight bearing - Still stay off your feet as much as possible  Wheelchair - While traveling use wheelchair to avoid any prolonged walking  Pressure Right foot wound without significant change this week after second application of Graffix. Pt admits to no toff-loading when he is at home. Discussed once again the importance of off-loading.  Even when pt gets up at night to use the bathroom he is to keep

## 2018-10-22 ENCOUNTER — OFFICE VISIT (OUTPATIENT)
Dept: WOUND CARE | Facility: HOSPITAL | Age: 63
End: 2018-10-22
Attending: NURSE PRACTITIONER
Payer: COMMERCIAL

## 2018-10-22 DIAGNOSIS — L97.518 NON-PRESSURE CHRONIC ULCER OF OTHER PART OF RIGHT FOOT WITH OTHER SPECIFIED SEVERITY (HCC): Primary | ICD-10-CM

## 2018-10-22 DIAGNOSIS — E11.621 DIABETES MELLITUS WITH ULCER OF FOOT (HCC): ICD-10-CM

## 2018-10-22 DIAGNOSIS — L97.509 DIABETES MELLITUS WITH ULCER OF FOOT (HCC): ICD-10-CM

## 2018-10-22 PROCEDURE — 99213 OFFICE O/P EST LOW 20 MIN: CPT

## 2018-10-22 NOTE — PROGRESS NOTES
Subjective    Chief Complaint  This information was obtained from the patient  Patient is here for follow up of bilateral feet wounds.     Allergies  NKDA    HPI  This information was obtained from the patient  The following HPI elements were documented for 10/8/18: Pt is here for follow up after 1st application of Graffix. Pt had traveled to [place] to see family and was sweating a lot due to [place] heat. He states with sweating had a hard time keeping the sorbact in place.  He did place zinc around the woun BP WNL. Pulse RRR. RR within normal limits. Afebrile. Obesity BMI >30. Alert, calm, well developed, in no apparent distress.  Height/Length: 75 in (190.5 cm), Weight: 273 lbs (124.09 kgs), BMI: 34.1, Temperature: 96.3 °F (35.72 °C), Pulse: 96 bpm, Respirato Wound #4 Left Foot is a chronic Mckeon Grade 3 Diabetic Ulcer and has received a status of Not Healed. Subsequent wound encounter measurements are 0.5cm length x 0.5cm width x 0.6cm depth, with an area of 0.25 sq cm and a volume of 0.15 cubic cm.  No tunnel Wheelchair - While traveling use wheelchair to avoid any prolonged walking  Pressure relief shoe / inserts / foams - Wear shoe at all times even when in the house    Follow-Up Appointments  Return Appointment in 1 week.      Additional Orders:    Misc/Addit

## 2018-10-26 DIAGNOSIS — L97.529 DIABETIC ULCER OF LEFT FOOT ASSOCIATED WITH DIABETES MELLITUS DUE TO UNDERLYING CONDITION, UNSPECIFIED PART OF FOOT, UNSPECIFIED ULCER STAGE (HCC): Primary | ICD-10-CM

## 2018-10-26 DIAGNOSIS — E08.621 DIABETIC ULCER OF LEFT FOOT ASSOCIATED WITH DIABETES MELLITUS DUE TO UNDERLYING CONDITION, UNSPECIFIED PART OF FOOT, UNSPECIFIED ULCER STAGE (HCC): Primary | ICD-10-CM

## 2018-10-29 ENCOUNTER — OFFICE VISIT (OUTPATIENT)
Dept: WOUND CARE | Facility: HOSPITAL | Age: 63
End: 2018-10-29
Attending: NURSE PRACTITIONER
Payer: COMMERCIAL

## 2018-10-29 DIAGNOSIS — E11.40 DIABETIC NEUROPATHY, TYPE II DIABETES MELLITUS (HCC): Primary | ICD-10-CM

## 2018-10-29 DIAGNOSIS — L97.518 NON-PRESSURE CHRONIC ULCER OF OTHER PART OF RIGHT FOOT WITH OTHER SPECIFIED SEVERITY (HCC): ICD-10-CM

## 2018-10-29 PROCEDURE — 87077 CULTURE AEROBIC IDENTIFY: CPT

## 2018-10-29 PROCEDURE — 97597 DBRDMT OPN WND 1ST 20 CM/<: CPT

## 2018-10-29 PROCEDURE — 87205 SMEAR GRAM STAIN: CPT

## 2018-10-29 PROCEDURE — 87070 CULTURE OTHR SPECIMN AEROBIC: CPT

## 2018-10-29 PROCEDURE — 87186 SC STD MICRODIL/AGAR DIL: CPT

## 2018-10-31 NOTE — PROGRESS NOTES
Subjective    Chief Complaint  This information was obtained from the patient  Patient is here for follow up of bilateral feet wounds.     Allergies  NKDA    HPI  This information was obtained from the patient  The following HPI elements were documented for 10/8/18: Pt is here for follow up after 1st application of Graffix. Pt had traveled to [place] to see family and was sweating a lot due to [place] heat. He states with sweating had a hard time keeping the sorbact in place.  He did place zinc around the woun BP Elevated, on antihypertensive meds. BP improved at end of visit but still high. Pt to f/u with PCP. . Pulse RRR. RR within normal limits. Afebrile. Obesity BMI >30. Alert, calm, well developed, in no apparent distress.  Height/Length: 75 in (190.5 cm), W Wound #4 Left Foot is a chronic Mckeon Grade 3 Diabetic Ulcer and has received a status of Not Healed. Subsequent wound encounter measurements are 0.4cm length x 0.3cm width x 0.2cm depth, with an area of 0.12 sq cm and a volume of 0.024 cubic cm.  Kalyn cruz Kerlix  Paper tape  Change Dressing Daily and PRN - Change gauze and outer dressing    Off-Loading  No Weight Bearing - No pressure on right foot  Crutches - Use crutches with your shoe  Wheelchair - While traveling use wheelchair to avoid any prolonged wa Wound measurements with minimal change however the undermining in the base of the right foot wound continues to improve and there is no palpable bone. Will check Culture today to assess for any bacterial overgrowth slowing progression of healing.  Pt did no

## 2018-11-05 ENCOUNTER — OFFICE VISIT (OUTPATIENT)
Dept: WOUND CARE | Facility: HOSPITAL | Age: 63
End: 2018-11-05
Attending: NURSE PRACTITIONER
Payer: COMMERCIAL

## 2018-11-05 DIAGNOSIS — E11.40 DIABETIC NEUROPATHY, TYPE II DIABETES MELLITUS (HCC): Primary | ICD-10-CM

## 2018-11-05 DIAGNOSIS — L97.518 NON-PRESSURE CHRONIC ULCER OF OTHER PART OF RIGHT FOOT WITH OTHER SPECIFIED SEVERITY (HCC): ICD-10-CM

## 2018-11-05 PROCEDURE — 99213 OFFICE O/P EST LOW 20 MIN: CPT

## 2018-11-05 NOTE — PROGRESS NOTES
Subjective    Chief Complaint  This information was obtained from the patient  Patient is here for follow up of bilateral feet wounds. His wife could not accompany him so TCC could be applied due to family emergency.      Allergies  NKDA    HPI  This inform 10/8/18: Pt is here for follow up after 1st application of Graffix. Pt had traveled to [place] to see family and was sweating a lot due to [place] heat. He states with sweating had a hard time keeping the sorbact in place.  He did place zinc around the woun BP Elevated, on antihypertensive meds. . Pulse RRR. RR within normal limits. Afebrile. Obesity BMI >30. Alert, calm, well developed, in no apparent distress. Height/Length: 75 in (190.5 cm), Weight: 273 lbs (124.09 kgs), BMI: 34.1, Temperature: 97.9 °F (36. Wound #4 Left Foot is a chronic Mckeon Grade 3 Diabetic Ulcer and has received a status of Not Healed. Subsequent wound encounter measurements are 0.4cm length x 0.5cm width x 0.4cm depth, with an area of 0.2 sq cm and a volume of 0.08 cubic cm.  No tunneli Callus paired with #15 blade. Minimal bleeding, controlled with pressure. Patient denied pain with procedure. Wound #4:  Callus paired with #15 blade. No bleeding. Patient denied pain with procedure. Applied Grafix 3x4 [trial 6th application].   Lot Perfusion assessed by doppler. - Biphasic signals  Perfusion assessed by palpation of pulses.    Last A1C date and value: - 9/18: 7.3  Last albumin date and value: - 12/16/17: 2.9  Osteomyelitis confirmed by: - MRI Right foot and bone biopsy 12/18/17 of lef

## 2018-11-12 ENCOUNTER — OCC HEALTH (OUTPATIENT)
Dept: OTHER | Facility: HOSPITAL | Age: 63
End: 2018-11-12
Attending: PREVENTIVE MEDICINE

## 2018-11-12 ENCOUNTER — OFFICE VISIT (OUTPATIENT)
Dept: WOUND CARE | Facility: HOSPITAL | Age: 63
End: 2018-11-12
Attending: NURSE PRACTITIONER
Payer: COMMERCIAL

## 2018-11-12 DIAGNOSIS — L97.518 NON-PRESSURE CHRONIC ULCER OF OTHER PART OF RIGHT FOOT WITH OTHER SPECIFIED SEVERITY (HCC): ICD-10-CM

## 2018-11-12 DIAGNOSIS — E11.40 DIABETIC NEUROPATHY, TYPE II DIABETES MELLITUS (HCC): Primary | ICD-10-CM

## 2018-11-12 DIAGNOSIS — Z77.21 EXPOSURE TO BLOOD OR BODY FLUID: Primary | ICD-10-CM

## 2018-11-12 PROCEDURE — 86803 HEPATITIS C AB TEST: CPT

## 2018-11-12 PROCEDURE — 99213 OFFICE O/P EST LOW 20 MIN: CPT

## 2018-11-12 PROCEDURE — 86701 HIV-1ANTIBODY: CPT

## 2018-11-12 PROCEDURE — 87340 HEPATITIS B SURFACE AG IA: CPT

## 2018-11-12 NOTE — PROGRESS NOTES
Subjective    Chief Complaint  This information was obtained from the patient  Patient is here for a wound care follow up. He denies any new wound concerns.      Allergies  NKDA    HPI  This information was obtained from the patient  The following HPI eleme 10/8/18: Pt is here for follow up after 1st application of Graffix. Pt had traveled to [place] to see family and was sweating a lot due to [place] heat. He states with sweating had a hard time keeping the sorbact in place.  He did place zinc around the woun Objective    Constitutional  BP elevated, on hypertensive meds, follow up with PCP. Pulse RRR. RR within normal limits. Afebrile. Obesity BMI >30. Alert, calm, well developed, in no apparent distress.  Height/Length: 75 in (190.5 cm), Weight: 273 lbs (124.0 Wound #4 Left Foot is a chronic Mckeon Grade 3 Diabetic Ulcer and has received a status of Not Healed. Subsequent wound encounter measurements are 0.1cm length x 0.1cm width x 0.4cm depth, with an area of 0.01 sq cm and a volume of 0.004 cubic cm.  Kalyn cruz (Encounter Diagnosis) L97.518 - Non-pressure chronic ulcer of other part of right foot with other specified severity        Procedures    Applied Passworks 3x4 [trial 7th application]. Lot no. [id number]  Expiration 27 April 2021  Procedure tolerated well by zach Last A1C date and value: - 9/18: 7.3  Last albumin date and value: - 12/16/17: 2.9  Osteomyelitis confirmed by: - MRI Right foot and bone biopsy 12/18/17 of left 5th toe  Treatment completed  Osteomyelitis suspected due to: - Left foot x-ray questionable f

## 2018-11-19 ENCOUNTER — OFFICE VISIT (OUTPATIENT)
Dept: WOUND CARE | Facility: HOSPITAL | Age: 63
End: 2018-11-19
Attending: NURSE PRACTITIONER
Payer: COMMERCIAL

## 2018-11-19 DIAGNOSIS — E11.621 DIABETES MELLITUS WITH ULCER OF FOOT (HCC): ICD-10-CM

## 2018-11-19 DIAGNOSIS — L97.509 DIABETES MELLITUS WITH ULCER OF FOOT (HCC): ICD-10-CM

## 2018-11-19 DIAGNOSIS — E11.40 DIABETIC NEUROPATHY, TYPE II DIABETES MELLITUS (HCC): Primary | ICD-10-CM

## 2018-11-19 DIAGNOSIS — L97.518 NON-PRESSURE CHRONIC ULCER OF OTHER PART OF RIGHT FOOT WITH OTHER SPECIFIED SEVERITY (HCC): ICD-10-CM

## 2018-11-19 PROCEDURE — 11042 DBRDMT SUBQ TIS 1ST 20SQCM/<: CPT

## 2018-11-19 NOTE — PROGRESS NOTES
Subjective    Chief Complaint  This information was obtained from the patient  Patient is here for a follow up visit for wounds to both feet.     Allergies  NKDA    HPI  This information was obtained from the patient  The following HPI elements were documen 10/8/18: Pt is here for follow up after 1st application of Graffix. Pt had traveled to [place] to see family and was sweating a lot due to [place] heat. He states with sweating had a hard time keeping the sorbact in place.  He did place zinc around the woun Neurological: Memory Loss  Psychiatric: Anxiety, Depression    Additional Information  Medication reconciliation completed at today's visit. : Yes        Objective    Constitutional  BP Elevated, on antihypertensive meds. . Pulse RRR.  RR within normal limit Wound #4 Left Foot is a chronic Mckeon Grade 3 Diabetic Ulcer and has received a status of Not Healed. Subsequent wound encounter measurements are 0.3cm length x 0.3cm width x 0.3cm depth, with an area of 0.09 sq cm and a volume of 0.027 cubic cm.  Kalyn cruz Change Dressing Daily and PRN - Change gauze and outer dressing    Off-Loading  No Weight Bearing - No pressure on right foot  Crutches - Use crutches with your shoe  Wheelchair - While traveling use wheelchair to avoid any prolonged walking  Pressure reli Pt returns for follow up. Despite no significant change in wound measurement to right foot there is visible improvement. There is increased granulation tissue to the sides of the wound bed. There is no probing to bone.  The tissue in the base of wound remai

## 2018-11-26 ENCOUNTER — OFFICE VISIT (OUTPATIENT)
Dept: WOUND CARE | Facility: HOSPITAL | Age: 63
End: 2018-11-26
Attending: NURSE PRACTITIONER
Payer: COMMERCIAL

## 2018-11-26 DIAGNOSIS — E11.40 DIABETIC NEUROPATHY, TYPE II DIABETES MELLITUS (HCC): Primary | ICD-10-CM

## 2018-11-26 DIAGNOSIS — L97.518 NON-PRESSURE CHRONIC ULCER OF OTHER PART OF RIGHT FOOT WITH OTHER SPECIFIED SEVERITY (HCC): ICD-10-CM

## 2018-11-26 DIAGNOSIS — L97.509 DIABETES MELLITUS WITH ULCER OF FOOT (HCC): ICD-10-CM

## 2018-11-26 DIAGNOSIS — E11.621 DIABETES MELLITUS WITH ULCER OF FOOT (HCC): ICD-10-CM

## 2018-11-26 PROCEDURE — 99213 OFFICE O/P EST LOW 20 MIN: CPT

## 2018-12-03 ENCOUNTER — OFFICE VISIT (OUTPATIENT)
Dept: WOUND CARE | Facility: HOSPITAL | Age: 63
End: 2018-12-03
Attending: NURSE PRACTITIONER
Payer: COMMERCIAL

## 2018-12-03 DIAGNOSIS — L97.518 NON-PRESSURE CHRONIC ULCER OF OTHER PART OF RIGHT FOOT WITH OTHER SPECIFIED SEVERITY (HCC): ICD-10-CM

## 2018-12-03 DIAGNOSIS — E11.40 DIABETIC NEUROPATHY, TYPE II DIABETES MELLITUS (HCC): Primary | ICD-10-CM

## 2018-12-03 PROCEDURE — 99213 OFFICE O/P EST LOW 20 MIN: CPT

## 2018-12-03 NOTE — PROGRESS NOTES
Subjective    Chief Complaint  This information was obtained from the patient  Patient is here for a follow up visit for wounds to both feet. Patients denies pain.     Allergies  NKDA    HPI  This information was obtained from the patient  The following HPI 10/8/18: Pt is here for follow up after 1st application of Graffix. Pt had traveled to [place] to see family and was sweating a lot due to [place] heat. He states with sweating had a hard time keeping the sorbact in place.  He did place zinc around the woun Gastrointestinal (GI): Nausea / Vomiting / Diarrhea (N/V/D), Loss of Appetite, Difficulty Swallowing, Stomach/abdominal pain  Neurological: Memory Loss  Psychiatric: Anxiety, Depression        Objective    Constitutional  BP elevated, on hypertensive meds Wound #4 Left Foot is a chronic Mckeon Grade 3 Diabetic Ulcer and has received a status of Not Healed. Subsequent wound encounter measurements are 0.1cm length x 0.2cm width x 0.2cm depth, with an area of 0.02 sq cm and a volume of 0.004 cubic cm.  Kalyn cruz Increase dietary protein  S/S of Infection  Non-adherence    Care summary  Reviewed and evaluated labs. - GFR 47: 1/29/18  Discussed the Plan of Care at bedside with patient.  The patient verbally acknowledges understanding of all instructions and all quest

## 2018-12-04 DIAGNOSIS — S91.332S PENETRATING FOOT WOUND, LEFT, SEQUELA: Primary | ICD-10-CM

## 2018-12-10 ENCOUNTER — OFFICE VISIT (OUTPATIENT)
Dept: WOUND CARE | Facility: HOSPITAL | Age: 63
End: 2018-12-10
Attending: NURSE PRACTITIONER
Payer: COMMERCIAL

## 2018-12-10 DIAGNOSIS — E11.621 DIABETES MELLITUS WITH ULCER OF FOOT (HCC): ICD-10-CM

## 2018-12-10 DIAGNOSIS — L97.509 DIABETES MELLITUS WITH ULCER OF FOOT (HCC): ICD-10-CM

## 2018-12-10 DIAGNOSIS — E11.40 TYPE 2 DIABETES MELLITUS WITH DIABETIC NEUROPATHY, WITHOUT LONG-TERM CURRENT USE OF INSULIN (HCC): Primary | ICD-10-CM

## 2018-12-10 DIAGNOSIS — L97.518 NON-PRESSURE CHRONIC ULCER OF OTHER PART OF RIGHT FOOT WITH OTHER SPECIFIED SEVERITY (HCC): ICD-10-CM

## 2018-12-10 PROCEDURE — 99213 OFFICE O/P EST LOW 20 MIN: CPT

## 2018-12-10 NOTE — PROGRESS NOTES
Subjective    Chief Complaint  This information was obtained from the patient  Patient is here for a follow up visit for wounds to both feet. Patients denies pain or new concerns.     Allergies  NKDA    HPI  This information was obtained from the patient  T 10/8/18: Pt is here for follow up after 1st application of Graffix. Pt had traveled to [place] to see family and was sweating a lot due to [place] heat. He states with sweating had a hard time keeping the sorbact in place.  He did place zinc around the woun Cardiovascular (Central/Peripheral): Dyspnea on Exertion, Intermittent Claudication, Lower extremity (leg) resting pain, Orthopnea  Gastrointestinal (GI): Nausea / Vomiting / Diarrhea (N/V/D), Loss of Appetite, Difficulty Swallowing, Stomach/abdominal pain The periwound skin exhibited: Edema, Callus, Dry/Scaly. The temperature of the periwound skin is Warm. Periwound skin does not exhibit signs or symptoms of infection.  Local Pulse is Normal.   General Notes:  Probing to bone    Wound #4 Left Foot is a chron May shower with protection.   Cleanse with saline or wound cleanser  Other skin sub: - Graffix  Collagen - Camille  Sorbact  Kerlix  Paper tape  Change Dressing Daily and PRN    Off-Loading  No Weight Bearing - No pressure on right foot  Crutches - Use crutc No signs and symptoms of osteomyelitis present. - 9/28: Right foot Xray negative        Pt was seen by Dr. Melissa Brain and right foot wound base debrided. Thick callus tissue was removed and now wound probes to bone.  Discussed with pt will apply Graffix again

## 2018-12-17 ENCOUNTER — OFFICE VISIT (OUTPATIENT)
Dept: WOUND CARE | Facility: HOSPITAL | Age: 63
End: 2018-12-17
Attending: NURSE PRACTITIONER
Payer: COMMERCIAL

## 2018-12-17 DIAGNOSIS — L97.518 NON-PRESSURE CHRONIC ULCER OF OTHER PART OF RIGHT FOOT WITH OTHER SPECIFIED SEVERITY (HCC): ICD-10-CM

## 2018-12-17 DIAGNOSIS — L97.509 DIABETES MELLITUS WITH ULCER OF FOOT (HCC): ICD-10-CM

## 2018-12-17 DIAGNOSIS — E11.621 DIABETES MELLITUS WITH ULCER OF FOOT (HCC): ICD-10-CM

## 2018-12-17 DIAGNOSIS — E11.40 TYPE 2 DIABETES MELLITUS WITH DIABETIC NEUROPATHY, WITHOUT LONG-TERM CURRENT USE OF INSULIN (HCC): Primary | ICD-10-CM

## 2018-12-17 PROCEDURE — 11042 DBRDMT SUBQ TIS 1ST 20SQCM/<: CPT

## 2018-12-17 NOTE — PROGRESS NOTES
Subjective    Chief Complaint  This information was obtained from the patient  Patient is here for a follow up visit for wounds to both feet. Patients stated that his right foot is tender. Patient right foot sock is wet from taking a shower.  However, no ne 10/8/18: Pt is here for follow up after 1st application of Graffix. Pt had traveled to [place] to see family and was sweating a lot due to [place] heat. He states with sweating had a hard time keeping the sorbact in place.  He did place zinc around the woun Patient denies complaints or symptoms related to:   Constitutional Symptoms (General Health):  Fatigue, Fever, Marked Weight Change, Chills  Eyes: Glasses / Contacts  Ear/Nose/Mouth/Throat: Hearing Loss / Aid  Respiratory: Cough, Shortness of Breath, [employer] Wound #3 Right Foot is a chronic Mckeon Grade 3 Diabetic Ulcer and has received a status of Not Healed. Subsequent wound encounter measurements are 2.1cm length x 1.5cm width x 1.5cm depth, with an area of 3.15 sq cm and a volume of 4.725 cubic cm.  Bone is Device Used Correctly: Yes   Compression Device Used:  Ace Wrap   Calf Measurement 40 cm from heel with right measurement of 39.2 cm   Ankle Measurement 11 cm from heel with right measurement of 24.5 cm   Foot Measurement 40 cm  Vascular Assessment:  Left E Cleanse with saline or wound cleanser  Dry Gauze  Change dressing every: - Change inner packing every 3 days and outer gauze as needed when moisture noted.     Off-Loading  Partial weight bearing - Still stay off your feet as much as possible  Wheelchair -

## 2018-12-24 ENCOUNTER — OFFICE VISIT (OUTPATIENT)
Dept: WOUND CARE | Facility: HOSPITAL | Age: 63
End: 2018-12-24
Attending: NURSE PRACTITIONER
Payer: COMMERCIAL

## 2018-12-24 DIAGNOSIS — E11.621 DIABETES MELLITUS WITH ULCER OF FOOT (HCC): ICD-10-CM

## 2018-12-24 DIAGNOSIS — L97.509 DIABETES MELLITUS WITH ULCER OF FOOT (HCC): ICD-10-CM

## 2018-12-24 DIAGNOSIS — L97.518 NON-PRESSURE CHRONIC ULCER OF OTHER PART OF RIGHT FOOT WITH OTHER SPECIFIED SEVERITY (HCC): ICD-10-CM

## 2018-12-24 DIAGNOSIS — E11.40 TYPE 2 DIABETES MELLITUS WITH DIABETIC NEUROPATHY, WITHOUT LONG-TERM CURRENT USE OF INSULIN (HCC): Primary | ICD-10-CM

## 2018-12-24 PROCEDURE — 11042 DBRDMT SUBQ TIS 1ST 20SQCM/<: CPT

## 2018-12-24 NOTE — PROGRESS NOTES
Subjective    Chief Complaint  This information was obtained from the patient  Patient is here for a follow up visit for wounds to both feet. Patient stated he had more discomfort this week after debridement and has been using crutches.      Allergies  NKDA 10/8/18: Pt is here for follow up after 1st application of Graffix. Pt had traveled to [place] to see family and was sweating a lot due to [place] heat. He states with sweating had a hard time keeping the sorbact in place.  He did place zinc around the woun Eyes: Glasses / Contacts  Ear/Nose/Mouth/Throat: Hearing Loss / Aid  Respiratory: Cough, Shortness of Breath, Wheezing, Oxygen Use  Cardiovascular (Central/Peripheral): Dyspnea on Exertion, Intermittent Claudication, Lower extremity (leg) resting pain, Ort The periwound skin exhibited: Edema, Callus, Dry/Scaly. The periwound skin did not exhibit: Erythema. The temperature of the periwound skin is Warm. Periwound skin does not exhibit signs or symptoms of infection.  Local Pulse is Normal.   General Notes:  un Wound #3 Right Foot     Wound Cleansing & Dressings  May shower with protection.   Cleanse with saline or wound cleanser  Other skin sub: - Graffix  Collagen - Camille  Sorbact  Kerlix  Paper tape  Change Dressing Daily and PRN    Off-Loading  No Weight Bear Perfusion assessed by JOHANNA/TBI - 11/5/18: Bedside L 1.21, R 1.22  Perfusion assessed by doppler. - Biphasic signals  Perfusion assessed by palpation of pulses.    Last A1C date and value: - 9/18: 7.3  Last albumin date and value: - 12/16/17: 2.9  Osteomyelit

## 2018-12-31 ENCOUNTER — OFFICE VISIT (OUTPATIENT)
Dept: WOUND CARE | Facility: HOSPITAL | Age: 63
End: 2018-12-31
Attending: NURSE PRACTITIONER
Payer: COMMERCIAL

## 2018-12-31 DIAGNOSIS — E11.40 TYPE 2 DIABETES MELLITUS WITH DIABETIC NEUROPATHY, WITHOUT LONG-TERM CURRENT USE OF INSULIN (HCC): Primary | ICD-10-CM

## 2018-12-31 DIAGNOSIS — L97.518 NON-PRESSURE CHRONIC ULCER OF OTHER PART OF RIGHT FOOT WITH OTHER SPECIFIED SEVERITY (HCC): ICD-10-CM

## 2018-12-31 PROCEDURE — 99213 OFFICE O/P EST LOW 20 MIN: CPT

## 2019-01-07 ENCOUNTER — OFFICE VISIT (OUTPATIENT)
Dept: WOUND CARE | Facility: HOSPITAL | Age: 64
End: 2019-01-07
Attending: NURSE PRACTITIONER
Payer: COMMERCIAL

## 2019-01-07 DIAGNOSIS — L97.518 NON-PRESSURE CHRONIC ULCER OF OTHER PART OF RIGHT FOOT WITH OTHER SPECIFIED SEVERITY (HCC): ICD-10-CM

## 2019-01-07 DIAGNOSIS — E11.40 TYPE 2 DIABETES MELLITUS WITH DIABETIC NEUROPATHY, WITHOUT LONG-TERM CURRENT USE OF INSULIN (HCC): Primary | ICD-10-CM

## 2019-01-07 PROCEDURE — 99213 OFFICE O/P EST LOW 20 MIN: CPT

## 2019-01-07 NOTE — PROGRESS NOTES
Subjective    Chief Complaint  This information was obtained from the patient  Patient is here for wound visit f/u. He complains of some soreness to the wounds.      Allergies  NKDA    HPI  This information was obtained from the patient  The following HPI e 10/8/18: Pt is here for follow up after 1st application of Graffix. Pt had traveled to [place] to see family and was sweating a lot due to [place] heat. He states with sweating had a hard time keeping the sorbact in place.  He did place zinc around the woun Musculoskeletal: Deformities (Right Chopart and left 5th ray amputations), Assistive Devices (AFO to right foot and Diabetic shoe left foot)  Integumentary (Hair/Skin/Nails): Dryness, Calluses/Corns, Ulcers  Neurological: Loss of Protective Sensation (Pt s Wound #3 Right Foot is a chronic Mckeon Grade 3 Diabetic Ulcer and has received a status of Not Healed. Subsequent wound encounter measurements are 0.7cm length x 0.7cm width x 0.9cm depth, with an area of 0.49 sq cm and a volume of 0.441 cubic cm.  No tunn Procedures  Right foot wound callus was trimmed with #15 blade. Minimal bleeding controlled with pressure. Patient tolerated well. General Notes:  Grafix 3x4 [trial 38UQ application]. Lot no. [id number]  Expiration 9 AUG 2021   Procedure tolerated well Wound to left foot remains closed. Discussed moisturizing with amlactin daily to help reduce callus. The right foot continues to heal very well. Graffix #13 applied. Pt to continue with off-loading with crutches.  Has developed some edema to dorsal ankle wh

## 2019-01-14 ENCOUNTER — APPOINTMENT (OUTPATIENT)
Dept: MRI IMAGING | Facility: HOSPITAL | Age: 64
DRG: 857 | End: 2019-01-14
Attending: INTERNAL MEDICINE
Payer: COMMERCIAL

## 2019-01-14 ENCOUNTER — APPOINTMENT (OUTPATIENT)
Dept: GENERAL RADIOLOGY | Facility: HOSPITAL | Age: 64
DRG: 857 | End: 2019-01-14
Attending: PODIATRIST
Payer: COMMERCIAL

## 2019-01-14 ENCOUNTER — OFFICE VISIT (OUTPATIENT)
Dept: WOUND CARE | Facility: HOSPITAL | Age: 64
End: 2019-01-14
Attending: NURSE PRACTITIONER
Payer: COMMERCIAL

## 2019-01-14 ENCOUNTER — HOSPITAL ENCOUNTER (INPATIENT)
Facility: HOSPITAL | Age: 64
LOS: 3 days | Discharge: HOME HEALTH | DRG: 857 | End: 2019-01-17
Attending: INTERNAL MEDICINE | Admitting: INTERNAL MEDICINE
Payer: COMMERCIAL

## 2019-01-14 DIAGNOSIS — E11.40 TYPE 2 DIABETES MELLITUS WITH DIABETIC NEUROPATHY, WITHOUT LONG-TERM CURRENT USE OF INSULIN (HCC): Primary | ICD-10-CM

## 2019-01-14 DIAGNOSIS — L97.518 NON-PRESSURE CHRONIC ULCER OF OTHER PART OF RIGHT FOOT WITH OTHER SPECIFIED SEVERITY (HCC): ICD-10-CM

## 2019-01-14 DIAGNOSIS — L03.90 CELLULITIS: ICD-10-CM

## 2019-01-14 PROBLEM — M86.9 OSTEOMYELITIS (HCC): Status: ACTIVE | Noted: 2019-01-14

## 2019-01-14 LAB
GLUCOSE BLD-MCNC: 165 MG/DL (ref 65–99)
GLUCOSE BLD-MCNC: 282 MG/DL (ref 65–99)
GLUCOSE BLD-MCNC: 303 MG/DL (ref 65–99)

## 2019-01-14 PROCEDURE — 87077 CULTURE AEROBIC IDENTIFY: CPT | Performed by: PODIATRIST

## 2019-01-14 PROCEDURE — 82962 GLUCOSE BLOOD TEST: CPT

## 2019-01-14 PROCEDURE — 87075 CULTR BACTERIA EXCEPT BLOOD: CPT | Performed by: PODIATRIST

## 2019-01-14 PROCEDURE — 87205 SMEAR GRAM STAIN: CPT | Performed by: PODIATRIST

## 2019-01-14 PROCEDURE — 87070 CULTURE OTHR SPECIMN AEROBIC: CPT | Performed by: PODIATRIST

## 2019-01-14 PROCEDURE — 73630 X-RAY EXAM OF FOOT: CPT | Performed by: PODIATRIST

## 2019-01-14 PROCEDURE — 99213 OFFICE O/P EST LOW 20 MIN: CPT

## 2019-01-14 PROCEDURE — 87040 BLOOD CULTURE FOR BACTERIA: CPT | Performed by: INTERNAL MEDICINE

## 2019-01-14 PROCEDURE — 73720 MRI LWR EXTREMITY W/O&W/DYE: CPT | Performed by: INTERNAL MEDICINE

## 2019-01-14 PROCEDURE — 87186 SC STD MICRODIL/AGAR DIL: CPT | Performed by: PODIATRIST

## 2019-01-14 RX ORDER — HYDROCODONE BITARTRATE AND ACETAMINOPHEN 5; 325 MG/1; MG/1
2 TABLET ORAL EVERY 4 HOURS PRN
Status: DISCONTINUED | OUTPATIENT
Start: 2019-01-14 | End: 2019-01-15

## 2019-01-14 RX ORDER — HYDROCODONE BITARTRATE AND ACETAMINOPHEN 5; 325 MG/1; MG/1
1 TABLET ORAL EVERY 4 HOURS PRN
Status: DISCONTINUED | OUTPATIENT
Start: 2019-01-14 | End: 2019-01-15

## 2019-01-14 RX ORDER — LISINOPRIL 40 MG/1
40 TABLET ORAL
Status: DISCONTINUED | OUTPATIENT
Start: 2019-01-15 | End: 2019-01-17

## 2019-01-14 RX ORDER — INSULIN ASPART 100 [IU]/ML
30 INJECTION, SOLUTION INTRAVENOUS; SUBCUTANEOUS
Status: DISCONTINUED | OUTPATIENT
Start: 2019-01-14 | End: 2019-01-14

## 2019-01-14 RX ORDER — FUROSEMIDE 40 MG/1
40 TABLET ORAL DAILY
Status: DISCONTINUED | OUTPATIENT
Start: 2019-01-15 | End: 2019-01-17

## 2019-01-14 RX ORDER — CEFAZOLIN SODIUM/WATER 2 G/20 ML
2 SYRINGE (ML) INTRAVENOUS EVERY 8 HOURS
Status: DISCONTINUED | OUTPATIENT
Start: 2019-01-14 | End: 2019-01-17

## 2019-01-14 RX ORDER — METOCLOPRAMIDE HYDROCHLORIDE 5 MG/ML
10 INJECTION INTRAMUSCULAR; INTRAVENOUS EVERY 8 HOURS PRN
Status: DISCONTINUED | OUTPATIENT
Start: 2019-01-14 | End: 2019-01-17

## 2019-01-14 RX ORDER — DEXTROSE MONOHYDRATE 25 G/50ML
50 INJECTION, SOLUTION INTRAVENOUS
Status: DISCONTINUED | OUTPATIENT
Start: 2019-01-14 | End: 2019-01-17

## 2019-01-14 RX ORDER — ONDANSETRON 2 MG/ML
4 INJECTION INTRAMUSCULAR; INTRAVENOUS EVERY 6 HOURS PRN
Status: DISCONTINUED | OUTPATIENT
Start: 2019-01-14 | End: 2019-01-17

## 2019-01-14 RX ORDER — ATORVASTATIN CALCIUM 40 MG/1
40 TABLET, FILM COATED ORAL DAILY
Status: DISCONTINUED | OUTPATIENT
Start: 2019-01-15 | End: 2019-01-17

## 2019-01-14 RX ORDER — FENOFIBRATE 134 MG/1
134 CAPSULE ORAL
Status: DISCONTINUED | OUTPATIENT
Start: 2019-01-15 | End: 2019-01-17

## 2019-01-14 RX ORDER — METOPROLOL TARTRATE 100 MG/1
100 TABLET ORAL
Status: DISCONTINUED | OUTPATIENT
Start: 2019-01-14 | End: 2019-01-17

## 2019-01-14 RX ORDER — ACETAMINOPHEN 325 MG/1
650 TABLET ORAL EVERY 4 HOURS PRN
Status: DISCONTINUED | OUTPATIENT
Start: 2019-01-14 | End: 2019-01-17

## 2019-01-14 NOTE — CM/SW NOTE
Call from [facility] from Dr. Xander Arrieta office [facility]). She states MD has indicated pt will need IV abx post discharge and she will need clinical information to get insurance authorization. Available info sent via [employer] at this time.   Per [facility] she believes pt

## 2019-01-14 NOTE — PROGRESS NOTES
NURSING ADMISSION NOTE      Patient admitted via wheelchair  Oriented to room. Safety precautions initiated. Bed in low position. Call light in reach.   Dr. Maira Wells made aware of the admission

## 2019-01-14 NOTE — PAYOR COMM NOTE
--------------  ADMISSION REVIEW     Payor: [address] [place] [address] STEPHANE/KELSEY  Subscriber #:  [id number]  Authorization Number: [id number]    Admit date: 1/14/19  Admit time: [address]       Admitting Physician: Felicity Klein DO  Attending Physician:  Felicity Klein DO DEBRIDEMENT Left 12/21/2017    Performed by Brant Evans DPM at [facility] MAIN OR   • Theresa Left 5/13/2017    Performed by Brant Evans DPM at [facility] MAIN OR   • Theresa Right 5/3/2014    Per 98%   General:  Alert, no distress, appears stated age. Head:  Normocephalic, without obvious abnormality, atraumatic. Eyes:  Sclera anicteric, No conjunctival pallor, EOMs intact.     Nose: Nares normal,  Mucosa normal    Throat: Lips, mucosa, and to (Right Upper Abdomen) Shireen Byers, RN          REVIEWER COMMENTS:     FOR REVIEW/APPROVAL OF INPT ADMISSION     PLEASE FAX ALL INPT DAYS AS AUTHORIZED ALONG W/NRD

## 2019-01-14 NOTE — H&P
[facility] Hospitalist History and Physical      No chief complaint on file.        PCP: Mayra Lockett DO      History of Present Illness: Patient is a 58year old male with PMH sig for DM2, CKD, HL, and HTN who presents to [facility] with infection of chronic foot wound at [facility] MAIN OR   • FOOT OSTEOTOMY Left 5/13/2017    Performed by Judeth Dakins, DPM at [facility] MAIN OR   • [address] [facility] [employer] Left 12/22/2014    Performed by Judeth Dakins, DPM at [address] [place] [address]   • OTHER SURGICAL HISTORY  2009    amputation R rhythm, S1, S2 normal, no murmur, rub or gallop appreciated   Abdomen:   Soft, non-tender. Bowel sounds normal. No masses,  No organomegaly. Non distended   Extremities: Extremities normal, atraumatic, no cyanosis or edema.  R foot s/p amputation wrapped in

## 2019-01-14 NOTE — PROGRESS NOTES
Subjective    Chief Complaint  This information was obtained from the patient  Patient is here for wound visit f/u. States foot is \"a little sore,\" pain 1-2/10. No other complaints.     Allergies  NKDA    HPI  This information was obtained from the patien 10/8/18: Pt is here for follow up after 1st application of Graffix. Pt had traveled to [place] to see family and was sweating a lot due to [place] heat. He states with sweating had a hard time keeping the sorbact in place.  He did place zinc around the woun Cardiovascular (Central/Peripheral):  Lower extremity (leg) swelling (Minimal)  Musculoskeletal: Deformities (Right Chopart and left 5th ray amputations), Assistive Devices (AFO to right foot and Diabetic shoe left foot)  Integumentary (Hair/Skin/Nails):  Wound #3 Right Foot is a chronic Mckeon Grade 3 Diabetic Ulcer and has received a status of Not Healed. Subsequent wound encounter measurements are 0.3cm length x 0.5cm width x 1.8cm depth, with an area of 0.15 sq cm and a volume of 0.27 cubic cm.  Josefina Archer (Encounter Diagnosis) E11.40 - Type 2 diabetes mellitus with diabetic neuropathy, unspecified  (Encounter Diagnosis) M86.30 - Chronic multifocal osteomyelitis, unspecified site  (Encounter Diagnosis) L97.528 - Non-pressure chronic ulcer of other part of le 11/21/18: Left foot x-ray questionable for osteolysis, MRI + chronic osteomyelitis, ID and Podiatry consulted both agree no osteomyelitis  No signs and symptoms of osteomyelitis present.  - 9/28: Right foot Xray negative        Wound to right foot now probi

## 2019-01-14 NOTE — CONSULTS
INFECTIOUS DISEASE CONSULTATION    Lacy Antonio Patient Status:  Inpatient    1955 MRN [id number]   [facility] 3SW-A Attending Huong Sosa, DO   Hosp Day # 0 PCP Carlos Cho, Performed by Liban Figueroa MD at [address]   • [place] Right 2/21/2017    Performed by Liban Figueroa MD at [address] [facility] Bilateral 12/18/2017    Performed by Brayan Conn DPM at [facility] MAIN OR   • FOOT OSTEOTOMY Radha Cabrera •  [START ON 1/15/2019] fenofibrate micronized (LOFIBRA) cap 134 mg, 134 mg, Oral, Daily with breakfast  •  [START ON 1/15/2019] furosemide (LASIX) tab 40 mg, 40 mg, Oral, Daily  •  [START ON 1/15/2019] lisinopril (PRINIVIL,ZESTRIL) tab 40 mg, 40 mg, Oral, Continuous Blood Gluc Transmit (DEXCOM G6 TRANSMITTER) Does not apply Misc Please provide 1 transmitter every 3 months Disp: 1 each Rfl: 3   Continuous Blood Gluc Sensor (DEXCOM G6 SENSOR) Does not apply Misc Change sensor every 10 days Disp: 3 each Rfl: 1 Collected:  10/29/2018 12:26 Status:  Final result Dx:  Diabetic neuropathy, type II diabetes. ..    Specimen Information: Foot, right; Other        AEROBIC CULTURE RESULT Mixture of organisms suggestive of normal skin jarett       1+ growth Staphylococcus au Debridement of open wound, 4th MT head resection, wound VAC application left foot- Sx 12/21/17  GLENN 03/21/18     Osteomyelitis ([facility] [address]      ASSESSMENT/PLAN:  1.  Right foot stump wound - sinus tract to bone  Presents with swelling, redness, boggines prox a

## 2019-01-15 ENCOUNTER — ANESTHESIA (OUTPATIENT)
Dept: SURGERY | Facility: HOSPITAL | Age: 64
DRG: 857 | End: 2019-01-15
Payer: COMMERCIAL

## 2019-01-15 ENCOUNTER — ANESTHESIA EVENT (OUTPATIENT)
Dept: SURGERY | Facility: HOSPITAL | Age: 64
DRG: 857 | End: 2019-01-15
Payer: COMMERCIAL

## 2019-01-15 LAB
ANION GAP SERPL CALC-SCNC: 7 MMOL/L (ref 0–18)
BASOPHILS # BLD AUTO: 0.04 X10(3) UL (ref 0–0.1)
BASOPHILS NFR BLD AUTO: 0.4 %
BUN BLD-MCNC: 25 MG/DL (ref 8–20)
BUN/CREAT SERPL: 16.3 (ref 10–20)
CALCIUM BLD-MCNC: 9 MG/DL (ref 8.3–10.3)
CHLORIDE SERPL-SCNC: 104 MMOL/L (ref 101–111)
CO2 SERPL-SCNC: 28 MMOL/L (ref 22–32)
CREAT BLD-MCNC: 1.53 MG/DL (ref 0.7–1.3)
EOSINOPHIL # BLD AUTO: 0.06 X10(3) UL (ref 0–0.3)
EOSINOPHIL NFR BLD AUTO: 0.7 %
ERYTHROCYTE [DISTWIDTH] IN BLOOD BY AUTOMATED COUNT: 16.3 % (ref 11.5–16)
GLUCOSE BLD-MCNC: 146 MG/DL (ref 65–99)
GLUCOSE BLD-MCNC: 181 MG/DL (ref 65–99)
GLUCOSE BLD-MCNC: 186 MG/DL (ref 70–99)
GLUCOSE BLD-MCNC: 187 MG/DL (ref 65–99)
GLUCOSE BLD-MCNC: 200 MG/DL (ref 65–99)
GLUCOSE BLD-MCNC: 227 MG/DL (ref 65–99)
HCT VFR BLD AUTO: 28.1 % (ref 37–53)
HGB BLD-MCNC: 8.8 G/DL (ref 13–17)
IMMATURE GRANULOCYTE COUNT: 0.04 X10(3) UL (ref 0–1)
IMMATURE GRANULOCYTE RATIO %: 0.4 %
LYMPHOCYTES # BLD AUTO: 1.18 X10(3) UL (ref 0.9–4)
LYMPHOCYTES NFR BLD AUTO: 12.9 %
MCH RBC QN AUTO: 24.3 PG (ref 27–33.2)
MCHC RBC AUTO-ENTMCNC: 31.3 G/DL (ref 31–37)
MCV RBC AUTO: 77.6 FL (ref 80–99)
MONOCYTES # BLD AUTO: 0.89 X10(3) UL (ref 0.1–1)
MONOCYTES NFR BLD AUTO: 9.7 %
NEUTROPHIL ABS PRELIM: 6.94 X10 (3) UL (ref 1.3–6.7)
NEUTROPHILS # BLD AUTO: 6.94 X10(3) UL (ref 1.3–6.7)
NEUTROPHILS NFR BLD AUTO: 75.9 %
OSMOLALITY SERPL CALC.SUM OF ELEC: 297 MOSM/KG (ref 275–295)
PLATELET # BLD AUTO: 397 10(3)UL (ref 150–450)
POTASSIUM SERPL-SCNC: 3.7 MMOL/L (ref 3.6–5.1)
RBC # BLD AUTO: 3.62 X10(6)UL (ref 4.3–5.7)
RED CELL DISTRIBUTION WIDTH-SD: 46.5 FL (ref 35.1–46.3)
SODIUM SERPL-SCNC: 139 MMOL/L (ref 136–144)
WBC # BLD AUTO: 9.2 X10(3) UL (ref 4–13)

## 2019-01-15 PROCEDURE — 82962 GLUCOSE BLOOD TEST: CPT

## 2019-01-15 PROCEDURE — 87186 SC STD MICRODIL/AGAR DIL: CPT | Performed by: ORTHOPAEDIC SURGERY

## 2019-01-15 PROCEDURE — 87075 CULTR BACTERIA EXCEPT BLOOD: CPT | Performed by: ORTHOPAEDIC SURGERY

## 2019-01-15 PROCEDURE — 87070 CULTURE OTHR SPECIMN AEROBIC: CPT | Performed by: ORTHOPAEDIC SURGERY

## 2019-01-15 PROCEDURE — 87205 SMEAR GRAM STAIN: CPT | Performed by: ORTHOPAEDIC SURGERY

## 2019-01-15 PROCEDURE — 0QBL0ZZ EXCISION OF RIGHT TARSAL, OPEN APPROACH: ICD-10-PCS | Performed by: ORTHOPAEDIC SURGERY

## 2019-01-15 PROCEDURE — 85025 COMPLETE CBC W/AUTO DIFF WBC: CPT | Performed by: INTERNAL MEDICINE

## 2019-01-15 PROCEDURE — 80048 BASIC METABOLIC PNL TOTAL CA: CPT | Performed by: INTERNAL MEDICINE

## 2019-01-15 PROCEDURE — 87077 CULTURE AEROBIC IDENTIFY: CPT | Performed by: ORTHOPAEDIC SURGERY

## 2019-01-15 PROCEDURE — 0J9Q3ZZ DRAINAGE OF RIGHT FOOT SUBCUTANEOUS TISSUE AND FASCIA, PERCUTANEOUS APPROACH: ICD-10-PCS | Performed by: ORTHOPAEDIC SURGERY

## 2019-01-15 RX ORDER — POTASSIUM CHLORIDE 20 MEQ/1
40 TABLET, EXTENDED RELEASE ORAL ONCE
Status: COMPLETED | OUTPATIENT
Start: 2019-01-15 | End: 2019-01-15

## 2019-01-15 RX ORDER — HYDROCODONE BITARTRATE AND ACETAMINOPHEN 5; 325 MG/1; MG/1
2 TABLET ORAL AS NEEDED
Status: DISCONTINUED | OUTPATIENT
Start: 2019-01-15 | End: 2019-01-15 | Stop reason: HOSPADM

## 2019-01-15 RX ORDER — HYDROMORPHONE HYDROCHLORIDE 1 MG/ML
0.4 INJECTION, SOLUTION INTRAMUSCULAR; INTRAVENOUS; SUBCUTANEOUS EVERY 5 MIN PRN
Status: DISCONTINUED | OUTPATIENT
Start: 2019-01-15 | End: 2019-01-15 | Stop reason: HOSPADM

## 2019-01-15 RX ORDER — SODIUM CHLORIDE 9 MG/ML
INJECTION, SOLUTION INTRAVENOUS CONTINUOUS
Status: DISCONTINUED | OUTPATIENT
Start: 2019-01-15 | End: 2019-01-17

## 2019-01-15 RX ORDER — NALOXONE HYDROCHLORIDE 0.4 MG/ML
80 INJECTION, SOLUTION INTRAMUSCULAR; INTRAVENOUS; SUBCUTANEOUS AS NEEDED
Status: DISCONTINUED | OUTPATIENT
Start: 2019-01-15 | End: 2019-01-15 | Stop reason: HOSPADM

## 2019-01-15 RX ORDER — DEXTROSE MONOHYDRATE 25 G/50ML
50 INJECTION, SOLUTION INTRAVENOUS
Status: DISCONTINUED | OUTPATIENT
Start: 2019-01-15 | End: 2019-01-15 | Stop reason: HOSPADM

## 2019-01-15 RX ORDER — SODIUM CHLORIDE, SODIUM LACTATE, POTASSIUM CHLORIDE, CALCIUM CHLORIDE 600; 310; 30; 20 MG/100ML; MG/100ML; MG/100ML; MG/100ML
INJECTION, SOLUTION INTRAVENOUS CONTINUOUS
Status: DISCONTINUED | OUTPATIENT
Start: 2019-01-15 | End: 2019-01-15 | Stop reason: HOSPADM

## 2019-01-15 RX ORDER — HYDROCODONE BITARTRATE AND ACETAMINOPHEN 5; 325 MG/1; MG/1
1 TABLET ORAL AS NEEDED
Status: DISCONTINUED | OUTPATIENT
Start: 2019-01-15 | End: 2019-01-15 | Stop reason: HOSPADM

## 2019-01-15 RX ORDER — ONDANSETRON 2 MG/ML
4 INJECTION INTRAMUSCULAR; INTRAVENOUS AS NEEDED
Status: DISCONTINUED | OUTPATIENT
Start: 2019-01-15 | End: 2019-01-15 | Stop reason: HOSPADM

## 2019-01-15 RX ORDER — HYDROCODONE BITARTRATE AND ACETAMINOPHEN 10; 325 MG/1; MG/1
1 TABLET ORAL EVERY 4 HOURS PRN
Status: DISCONTINUED | OUTPATIENT
Start: 2019-01-15 | End: 2019-01-17

## 2019-01-15 RX ORDER — HYDROCODONE BITARTRATE AND ACETAMINOPHEN 10; 325 MG/1; MG/1
2 TABLET ORAL EVERY 4 HOURS PRN
Status: DISCONTINUED | OUTPATIENT
Start: 2019-01-15 | End: 2019-01-17

## 2019-01-15 NOTE — CONSULTS
[facility]  Inpatient Wound Care Contact Note    Critical access hospital Patient Status:  Inpatient    1955 MRN [id number]   [facility] SURGERY Attending Robert Carbone, [address] Day # 1 PCP Louis Rivas DO     Attempted to see stephanie

## 2019-01-15 NOTE — ANESTHESIA PREPROCEDURE EVALUATION
PRE-OP EVALUATION    Patient Name: Harry Gregg    Pre-op Diagnosis: Cellulitis [T12.09]    Procedure(s):  INCISION AND DRAINAGE RIGHT FOOT STUMP    Surgeon(s) and Role:     Bradley Biggs MD - Primary    Pre-op vitals reviewed. Temp: 98.1 °F (36. [COMPLETED] Gadoterate Meglumine (DOTAREM) 10 MMOL/20ML injection 20 mL 20 mL Intravenous ONCE PRN       Outpatient Medications:    LISINOPRIL 40 MG Oral Tab TAKE ONE TABLET BY MOUTH DAILY Disp: 90 tablet Rfl: 0   Dulaglutide (TRULICITY) 1.5 SY/4.9US Sub Patient Active Problem List:     Traumatic amputation of foot (complete) (partial), unilateral, complicated ([facility]     Cardiomyopathy ([facility]     Obesity     Hyperlipidemia with target LDL less than 100     Mild renal insufficiency     Hypertension Ty Fabian DPM at [address] [facility]   • FOOT OSTEOTOMY Left 5/13/2017    Performed by Ty Fabian DPM at [facility] MAIN OR   • [address] [facility] [place] Left 12/22/2014    Performed by Ty Fabian DPM at [address] [place] [address]

## 2019-01-15 NOTE — PROGRESS NOTES
[facility]                INFECTIOUS DISEASE PROGRESS NOTE    Emil Herron Patient Status:  Inpatient    1955 MRN [id number]   [facility] 3SW-A Attending Lus Bloch, [address] [facility] Day # 1 PCP DO Naveed Ayers Result No Growth 1 Day N/A         Problem list reviewed:  Patient Active Problem List:     Traumatic amputation of foot (complete) (partial), unilateral, complicated ([address]     Cardiomyopathy ([facility] [address]     Obesity     Hyperlipidemia with target LDL less than 1

## 2019-01-15 NOTE — CM/SW NOTE
[facility] liaison did see pt re: home VN if IVAB needed at home. He declined, stating that he has done this previously and does not feel that [facility] is needed. RONEN updated Sun with [facility]/I [phone] re: above.   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

## 2019-01-15 NOTE — PROGRESS NOTES
[facility] Hospitalist Progress Note                                                                   [address] 63  6/6/1955    SUBJECTIVE: pt doing well. No cp, sob, n/v or dizziness. Problems:    Osteomyelitis ([facility]    #Chronic R foot wound infection  -sinus tract to bone  -blood and wound cultures pending  -ID and podiatry on consult  -MRI with abscess, plan I&D per podiatry  -iv abx with fefazolin    #DM2  -reviewed endo notes, seems

## 2019-01-15 NOTE — HOME CARE LIAISON
[facility]NL REC'D VERBAL ORDER TO SEE PTNT AND OFFER HH ON DC.   PTNT DECLINED STATING HE AND HIS WIFE ARE ABLE TO MANAGE THE WOUND CARE AND IVAB ON DC. HE STATES THAT THIS IS NOTHING NEW TO HIM AND HE WILL BE ABLE TO MANAGE THE IVAB JUST SILVINO Lu

## 2019-01-15 NOTE — ANESTHESIA POSTPROCEDURE EVALUATION
[address] Patient Status:  Inpatient   Age/Gender 58year old male MRN [id number]   [facility] SURGERY Attending Theora Master, [address] [facility] Day # 1 PCP Wang Corral DO       Anesthesia Post-op Note    Procedure(s):

## 2019-01-15 NOTE — CONSULTS
Patient seen at bedside. Sitting up in chair. Consult for chronic ulcer to right foot. Had ulcer for years. Currently followed in wound clinic. Patient states wound was almost closed, then recently opened back up.       Past Medical History:   Diagnosis D • REPAIR  PERONEUS LONGUS TENDON Left 12/22/2014    Performed by Kenny Ha DPM at [address] [facility]        No Known Allergies      No current facility-administered medications on file prior to encounter.    Current Outpatient Medications on FUROSEMIDE 40 MG Oral Tab TAKE 1 TABLET BY MOUTH EVERY MORNING Disp: 90 tablet Rfl: 0   SODIUM CHLORIDE 0.9 % Irrigation Solution IRRIGATE WITH 1,000 ML AS DIRECTED ONCE WITH DRESSING CHANGE Disp: 1000 mL Rfl: 11   FREESTYLE LANCETS Does not apply Misc U

## 2019-01-16 LAB
GLUCOSE BLD-MCNC: 112 MG/DL (ref 65–99)
GLUCOSE BLD-MCNC: 161 MG/DL (ref 65–99)
GLUCOSE BLD-MCNC: 197 MG/DL (ref 65–99)
GLUCOSE BLD-MCNC: 218 MG/DL (ref 65–99)
GLUCOSE BLD-MCNC: 84 MG/DL (ref 65–99)
POTASSIUM SERPL-SCNC: 4.3 MMOL/L (ref 3.6–5.1)

## 2019-01-16 PROCEDURE — 82962 GLUCOSE BLOOD TEST: CPT

## 2019-01-16 PROCEDURE — 36569 INSJ PICC 5 YR+ W/O IMAGING: CPT

## 2019-01-16 PROCEDURE — 76937 US GUIDE VASCULAR ACCESS: CPT

## 2019-01-16 PROCEDURE — 02HV33Z INSERTION OF INFUSION DEVICE INTO SUPERIOR VENA CAVA, PERCUTANEOUS APPROACH: ICD-10-PCS | Performed by: HOSPITALIST

## 2019-01-16 PROCEDURE — B548ZZA ULTRASONOGRAPHY OF SUPERIOR VENA CAVA, GUIDANCE: ICD-10-PCS | Performed by: HOSPITALIST

## 2019-01-16 PROCEDURE — 84132 ASSAY OF SERUM POTASSIUM: CPT | Performed by: INTERNAL MEDICINE

## 2019-01-16 RX ORDER — SODIUM CHLORIDE 0.9 % (FLUSH) 0.9 %
10 SYRINGE (ML) INJECTION AS NEEDED
Status: DISCONTINUED | OUTPATIENT
Start: 2019-01-16 | End: 2019-01-17

## 2019-01-16 NOTE — PROGRESS NOTES
[facility] Hospitalist Progress Note                                                                   [address] 63  6/6/1955    CC: follow up    SUBJECTIVE:    Pt sitting up in bed, just rec chloride       PRN: HYDROcodone-acetaminophen **OR** HYDROcodone-acetaminophen, glucose **OR** Glucose-Vitamin C **OR** dextrose **OR** glucose **OR** Glucose-Vitamin C, acetaminophen **OR** [DISCONTINUED] HYDROcodone-acetaminophen **OR** [DISCONTINUED] HY

## 2019-01-16 NOTE — PROGRESS NOTES
[facility]  Progress Note    Mary Lou Webster Patient Status:  Inpatient    1955 MRN [id number]   [facility] 3SW-A Attending Hardik Ortega DO   Hosp Day # 2 PCP Jefferson Wylie DO     SUBJECTIVE:  INTERVAL HISTORY: S/P  2  Proced

## 2019-01-16 NOTE — PROGRESS NOTES
[facility]                INFECTIOUS DISEASE PROGRESS NOTE    Mayela Horta Patient Status:  Inpatient    1955 MRN [id number]   [facility] 3SW-A Attending Katie Brar, [address] [facility] Day # 2 PCP Lucy Navarro, DO     Antibio Collection Time: 01/14/19 12:06 PM   Result Value Ref Range    Blood Culture Result No Growth 1 Day N/A       Aerobic Bacterial Culture Once [[id number]] (Abnormal) Collected: 01/14/19 1228   Order Status: Completed Lab Status: Preliminary result Updated: 0 MT head resection, wound VAC application left foot- Sx 12/21/17  GLENN 03/21/18     Osteomyelitis (HCC)      ASSESSMENT/PLAN:  1.  Right foot stump, chronic fistula tract  Abscess foot stump, osteomyelitis  SP I/D 1/15    cx from sinus tract - Staph aureus s

## 2019-01-16 NOTE — CONSULTS
[facility]    PATIENT'S NAME: Gurvinder Maria   ATTENDING PHYSICIAN: Juvencio Callahan. DO Franny   CONSULTING PHYSICIAN: Doris Celaya M.D.    PATIENT ACCOUNT#:   [de-identified]    LOCATION:  [address] [place] [address]  MEDICAL RECORD #:   [id number]       DATE OF BIRTH: Chopart amputation that looks like it tracks down to the fourth metatarsal.  No fluid is expressible from the joint. He has a pocket of fluid anteriorly and a pocket of fluid in the posteromedial aspect of his ankle with mild redness surrounding it.   He h

## 2019-01-16 NOTE — OPERATIVE REPORT
[facility]    PATIENT'S NAME: Juni Perez   ATTENDING PHYSICIAN: [place] Paola Silva DO   OPERATING PHYSICIAN: Kady Gifford M.D.    PATIENT ACCOUNT#:   [de-identified]    LOCATION:  [facility]  MEDICAL RECORD #:   [id number]       DATE OF BIRTH:

## 2019-01-16 NOTE — PLAN OF CARE
RECHECK AQ RESULTS AFTER  PT STATES\" I WANT TO TAKE ONLY 5 UNITS INS ,PT INFORMED WILL GET ORDER.  Chen Dasilva MD

## 2019-01-16 NOTE — PROGRESS NOTES
Patient back from PACU post INCISION AND DRAINAGE RIGHT FOOT STUMP. Patient awake, alert and oriented x 4. Right foot with ace wrap dressing clean, dry and intact.   Verbalized has some tenderness to surgical site but does not need to take pain medicine a

## 2019-01-16 NOTE — PAYOR COMM NOTE
--------------  CONTINUED STAY REVIEW    Payor: Joon CALDERÓN/KELSEY  Subscriber #:  [id number]  Authorization Number: [id number]    Admit date: 1/14/19  [facility]    Admitting Physician: Stone Crawford DO  Attending Physician:  Brandi Nuñez Chopart amputation. POSTOPERATIVE DIAGNOSIS:  Abscess and osteomyelitis, right distal Chopart amputation. PROCEDURE:  Debridement, irrigation in 2 separate spots of abscesses and debridement of bone.          1/16/19 -     ORTHO PROGRESS NOTE    INTERVAL updates, dc planning  ----per micro prelim is ox sensitive          MEDICATIONS ADMINISTERED IN LAST 1 DAY:  ceFAZolin sodium (ANCEF/KEFZOL) 2 GM/20ML premix IV syringe 2 g     Date Action Dose Route User    1/16/2019 0639 Given 2 g Intravenous Lukas Sherman

## 2019-01-16 NOTE — CM/SW NOTE
Call from Mathieu with [facility] that Dr. Jos Duarte has sent them final abx orders. Progress notes today indicate waiting cultures. Pt to get PICC line today. Updates sent to [facility] via [employer] at this time. Will send PICC line info when available.  At this time pt dec

## 2019-01-17 VITALS
RESPIRATION RATE: 20 BRPM | OXYGEN SATURATION: 95 % | DIASTOLIC BLOOD PRESSURE: 59 MMHG | TEMPERATURE: 98 F | SYSTOLIC BLOOD PRESSURE: 104 MMHG | WEIGHT: 274 LBS | BODY MASS INDEX: 34 KG/M2 | HEART RATE: 62 BPM

## 2019-01-17 LAB
BASOPHILS # BLD AUTO: 0.04 X10(3) UL (ref 0–0.1)
BASOPHILS NFR BLD AUTO: 0.6 %
EOSINOPHIL # BLD AUTO: 0.2 X10(3) UL (ref 0–0.3)
EOSINOPHIL NFR BLD AUTO: 3 %
ERYTHROCYTE [DISTWIDTH] IN BLOOD BY AUTOMATED COUNT: 16.7 % (ref 11.5–16)
GLUCOSE BLD-MCNC: 120 MG/DL (ref 65–99)
GLUCOSE BLD-MCNC: 143 MG/DL (ref 65–99)
HCT VFR BLD AUTO: 27.6 % (ref 37–53)
HGB BLD-MCNC: 8.5 G/DL (ref 13–17)
IMMATURE GRANULOCYTE COUNT: 0.03 X10(3) UL (ref 0–1)
IMMATURE GRANULOCYTE RATIO %: 0.5 %
LYMPHOCYTES # BLD AUTO: 1.29 X10(3) UL (ref 0.9–4)
LYMPHOCYTES NFR BLD AUTO: 19.6 %
MCH RBC QN AUTO: 24.4 PG (ref 27–33.2)
MCHC RBC AUTO-ENTMCNC: 30.8 G/DL (ref 31–37)
MCV RBC AUTO: 79.1 FL (ref 80–99)
MONOCYTES # BLD AUTO: 0.59 X10(3) UL (ref 0.1–1)
MONOCYTES NFR BLD AUTO: 9 %
NEUTROPHIL ABS PRELIM: 4.44 X10 (3) UL (ref 1.3–6.7)
NEUTROPHILS # BLD AUTO: 4.44 X10(3) UL (ref 1.3–6.7)
NEUTROPHILS NFR BLD AUTO: 67.3 %
PLATELET # BLD AUTO: 394 10(3)UL (ref 150–450)
RBC # BLD AUTO: 3.49 X10(6)UL (ref 4.3–5.7)
RED CELL DISTRIBUTION WIDTH-SD: 47.5 FL (ref 35.1–46.3)
WBC # BLD AUTO: 6.6 X10(3) UL (ref 4–13)

## 2019-01-17 PROCEDURE — 82962 GLUCOSE BLOOD TEST: CPT

## 2019-01-17 PROCEDURE — 85025 COMPLETE CBC W/AUTO DIFF WBC: CPT | Performed by: HOSPITALIST

## 2019-01-17 RX ORDER — POLYETHYLENE GLYCOL 3350 17 G/17G
17 POWDER, FOR SOLUTION ORAL DAILY PRN
Status: DISCONTINUED | OUTPATIENT
Start: 2019-01-17 | End: 2019-01-17

## 2019-01-17 RX ORDER — HYDROCODONE BITARTRATE AND ACETAMINOPHEN 10; 325 MG/1; MG/1
1-2 TABLET ORAL EVERY 4 HOURS PRN
Qty: 20 TABLET | Refills: 0 | Status: SHIPPED | OUTPATIENT
Start: 2019-01-17 | End: 2021-05-28 | Stop reason: ALTCHOICE

## 2019-01-17 NOTE — CM/SW NOTE
Call from [address] with [facility]- insurance approval obtained- will coordinate delivery of IVAB with pt to his home and with [facility].

## 2019-01-17 NOTE — PLAN OF CARE
NURSING DISCHARGE NOTE    Discharged Home via Wheelchair. Accompanied by Spouse and Support staff  Belongings Taken by patient/family. Reviewed all discharge instructions with patient and spouse, both verbalized understanding.  Script for Kassy Gale and

## 2019-01-17 NOTE — DISCHARGE SUMMARY
General Medicine Discharge Summary     Patient ID:  Donaldo Pena  58year old  [id number]  6/6/1955    Admit date: 1/14/2019    Discharge date and time: 1/17/19    Attending Physician: Tenzin Juarez, *     Primary Care Physician: Marshal Cartwright WORK    Radiology:  Xr Foot, Complete (min 3 Views), Right (cpt=73630)    Result Date: 1/14/2019  PROCEDURE:  XR FOOT, COMPLETE (MIN 3 VIEWS), RIGHT (CPT=73630)  TECHNIQUE:  AP, oblique, and lateral views were obtained.   COMPARISON:  EDWARD , XR FOOT, COMP hypointense signal within the central and anterior aspect of the talus. There is confluent T1 hypointense signal within the residual medial cuneiform. There are drainable fluid collections with peripheral enhancement within the hindfoot.   There is a curv were read back.    Dictated by: Sabas Guadalupe MD on 1/15/2019 at 9:27     Approved by: Sabas Guadalupe MD              Operative Procedures: Procedure(s) (LRB):  EXTREMITY LOWER IRRIGATION & DEBRIDEMENT (Right)     Disposition: alive    Patient Instru dependant. E11.65    SODIUM CHLORIDE 0.9 % Irrigation Solution  IRRIGATE WITH 1,000 ML AS DIRECTED ONCE WITH DRESSING CHANGE    FREESTYLE LANCETS Does not apply Misc  USE FOUR TIMES A DAY          Home Medication Changes:      Activity: as directed  Diet: a

## 2019-01-17 NOTE — PROGRESS NOTES
[facility] Hospitalist Progress Note                                                                   [place] 63  6/6/1955    CC: follow up    SUBJECTIVE:    Pt sitting up in bed, no cp/ 3350, Normal Saline Flush, HYDROcodone-acetaminophen **OR** HYDROcodone-acetaminophen, glucose **OR** Glucose-Vitamin C **OR** dextrose **OR** glucose **OR** Glucose-Vitamin C, acetaminophen **OR** [DISCONTINUED] HYDROcodone-acetaminophen **OR** [DISCONTIN

## 2019-01-17 NOTE — PAYOR COMM NOTE
--------------  CONTINUED STAY REVIEW    Payor: David CALDERÓN/KELSEY  Subscriber #:  [id number]  Authorization Number: [id number]    Admit date: 1/14/19  [facility]    Admitting Physician: Clarke Altamirano DO  Attending Physician:  Alessia Andino -iv abx with cefazolin--> d/w Dr. Mario Alberto Mcelroy- to d/c on IV ceftriaxone for 6 weeks  -SW on consult for assistance, appreciate        MEDICATIONS ADMINISTERED IN LAST 1 DAY:  ceFAZolin sodium (ANCEF/KEFZOL) 2 GM/20ML premix IV syringe 2 g     Date Action Dose R

## 2019-01-17 NOTE — HOME CARE LIAISON
MET WITH PTNT TO DISCUSS HOME HEALTH SERVICES AND COVERAGE CRITERIA. PTNT AGREEABLE TO Gerardo Soni. PTNT GIVEN RESIDENTIAL BROCHURE. RESIDENTIAL WITH PROVIDE SN ON DISCHARGE.     Thank you for this referral,   Brice Whitmore

## 2019-01-17 NOTE — PROGRESS NOTES
[facility]                INFECTIOUS DISEASE PROGRESS NOTE    Mayela Lynch Patient Status:  Inpatient    1955 MRN [id number]   [facility] 3SW-A Attending Juanjose House, [address] [facility] Day # 3 PCP DO Pari Silvaio Aerobic Smear No organisms seen N/A   3.  BLOOD CULTURE     Status: None (Preliminary result)    Collection Time: 01/14/19 12:06 PM   Result Value Ref Range    Blood Culture Result No Growth 2 Days N/A     Aerobic Bacterial Culture Once [[id number]] (Abnorm Hyponatremia     Anemia     Hyperglycemia     Cellulitis of foot     Debridement of open wound, 4th MT head resection, wound VAC application left foot- Sx 12/21/17  GLENN 03/21/18     Osteomyelitis (HCC)     Cellulitis      ASSESSMENT/PLAN:  1.  Right foot

## 2019-01-17 NOTE — CM/SW NOTE
Order received to coordinate outpt IVAB with [facility] [phone]  Update sent via [facility]. 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     ADDENDUM:   Spoke with - pt agrees with VN now for teaching

## 2019-01-18 NOTE — CM/SW NOTE
01/18/19 0800   Discharge disposition   Expected discharge disposition Home-Health   Name of Facillity/Home Care/Hospice Residential   HME provider [facility] AT [facility] for IV abx and supplies)   Discharge transportation Private car

## 2019-01-18 NOTE — PAYOR COMM NOTE
--------------  DISCHARGE REVIEW    Payor: Jed CALDERÓN/KELSEY  Subscriber #:  [id number]  Authorization Number: [id number]    Admit date: 1/14/19  Admit time:  1005  Discharge Date: 1/17/2019  5:00 PM     Admitting Physician: DO Mark Braswell

## 2019-01-21 ENCOUNTER — APPOINTMENT (OUTPATIENT)
Dept: WOUND CARE | Facility: HOSPITAL | Age: 64
End: 2019-01-21
Attending: NURSE PRACTITIONER
Payer: COMMERCIAL

## 2019-01-23 ENCOUNTER — LAB REQUISITION (OUTPATIENT)
Dept: LAB | Facility: HOSPITAL | Age: 64
End: 2019-01-23
Payer: COMMERCIAL

## 2019-01-23 DIAGNOSIS — L03.115 CELLULITIS OF RIGHT LOWER EXTREMITY: ICD-10-CM

## 2019-01-23 DIAGNOSIS — E11.69 TYPE 2 DIABETES MELLITUS WITH OTHER SPECIFIED COMPLICATION (HCC): ICD-10-CM

## 2019-01-23 DIAGNOSIS — M86.9 OSTEOMYELITIS (HCC): ICD-10-CM

## 2019-01-23 LAB
ALBUMIN SERPL-MCNC: 2.6 G/DL (ref 3.1–4.5)
ALBUMIN/GLOB SERPL: 0.4 {RATIO} (ref 1–2)
ALP LIVER SERPL-CCNC: 49 U/L (ref 45–117)
ALT SERPL-CCNC: 20 U/L (ref 17–63)
ANION GAP SERPL CALC-SCNC: 5 MMOL/L (ref 0–18)
AST SERPL-CCNC: 27 U/L (ref 15–41)
BASOPHILS # BLD AUTO: 0.06 X10(3) UL (ref 0–0.1)
BASOPHILS NFR BLD AUTO: 0.7 %
BILIRUB SERPL-MCNC: 0.2 MG/DL (ref 0.1–2)
BUN BLD-MCNC: 29 MG/DL (ref 8–20)
BUN/CREAT SERPL: 19.7 (ref 10–20)
CALCIUM BLD-MCNC: 9.1 MG/DL (ref 8.3–10.3)
CHLORIDE SERPL-SCNC: 101 MMOL/L (ref 101–111)
CO2 SERPL-SCNC: 31 MMOL/L (ref 22–32)
CREAT BLD-MCNC: 1.47 MG/DL (ref 0.7–1.3)
CRP SERPL-MCNC: 8.56 MG/DL (ref ?–1)
EOSINOPHIL # BLD AUTO: 0.13 X10(3) UL (ref 0–0.3)
EOSINOPHIL NFR BLD AUTO: 1.6 %
ERYTHROCYTE [DISTWIDTH] IN BLOOD BY AUTOMATED COUNT: 16.6 % (ref 11.5–16)
GLOBULIN PLAS-MCNC: 5.8 G/DL (ref 2.8–4.4)
GLUCOSE BLD-MCNC: 140 MG/DL (ref 70–99)
HCT VFR BLD AUTO: 32 % (ref 37–53)
HGB BLD-MCNC: 9.6 G/DL (ref 13–17)
IMMATURE GRANULOCYTE COUNT: 0.12 X10(3) UL (ref 0–1)
IMMATURE GRANULOCYTE RATIO %: 1.5 %
LYMPHOCYTES # BLD AUTO: 1.89 X10(3) UL (ref 0.9–4)
LYMPHOCYTES NFR BLD AUTO: 23.3 %
M PROTEIN MFR SERPL ELPH: 8.4 G/DL (ref 6.4–8.2)
MCH RBC QN AUTO: 23.4 PG (ref 27–33.2)
MCHC RBC AUTO-ENTMCNC: 30 G/DL (ref 31–37)
MCV RBC AUTO: 77.9 FL (ref 80–99)
MONOCYTES # BLD AUTO: 0.49 X10(3) UL (ref 0.1–1)
MONOCYTES NFR BLD AUTO: 6 %
NEUTROPHIL ABS PRELIM: 5.43 X10 (3) UL (ref 1.3–6.7)
NEUTROPHILS # BLD AUTO: 5.43 X10(3) UL (ref 1.3–6.7)
NEUTROPHILS NFR BLD AUTO: 66.9 %
OSMOLALITY SERPL CALC.SUM OF ELEC: 292 MOSM/KG (ref 275–295)
PLATELET # BLD AUTO: 514 10(3)UL (ref 150–450)
POTASSIUM SERPL-SCNC: 3.4 MMOL/L (ref 3.6–5.1)
RBC # BLD AUTO: 4.11 X10(6)UL (ref 4.3–5.7)
RED CELL DISTRIBUTION WIDTH-SD: 46.9 FL (ref 35.1–46.3)
SODIUM SERPL-SCNC: 137 MMOL/L (ref 136–144)
WBC # BLD AUTO: 8.1 X10(3) UL (ref 4–13)

## 2019-01-23 PROCEDURE — 80053 COMPREHEN METABOLIC PANEL: CPT | Performed by: ORTHOPAEDIC SURGERY

## 2019-01-23 PROCEDURE — 85025 COMPLETE CBC W/AUTO DIFF WBC: CPT | Performed by: ORTHOPAEDIC SURGERY

## 2019-01-23 PROCEDURE — 86140 C-REACTIVE PROTEIN: CPT | Performed by: ORTHOPAEDIC SURGERY

## 2019-01-30 ENCOUNTER — LAB REQUISITION (OUTPATIENT)
Dept: LAB | Facility: HOSPITAL | Age: 64
End: 2019-01-30
Payer: COMMERCIAL

## 2019-01-30 DIAGNOSIS — E11.69 TYPE 2 DIABETES MELLITUS WITH OTHER SPECIFIED COMPLICATION (HCC): ICD-10-CM

## 2019-01-30 DIAGNOSIS — Z47.81 ENCOUNTER FOR ORTHOPEDIC AFTERCARE FOLLOWING SURGICAL AMPUTATION: ICD-10-CM

## 2019-01-30 DIAGNOSIS — L03.115 CELLULITIS OF RIGHT LOWER EXTREMITY: ICD-10-CM

## 2019-01-30 DIAGNOSIS — I12.9 HYPERTENSIVE CHRONIC KIDNEY DISEASE WITH STAGE 1 THROUGH STAGE 4 CHRONIC KIDNEY DISEASE, OR UNSPECIFIED CHRONIC KIDNEY DISEASE: ICD-10-CM

## 2019-01-30 LAB
ALBUMIN SERPL-MCNC: 2.6 G/DL (ref 3.1–4.5)
ALBUMIN/GLOB SERPL: 0.5 {RATIO} (ref 1–2)
ALP LIVER SERPL-CCNC: 49 U/L (ref 45–117)
ALT SERPL-CCNC: 23 U/L (ref 17–63)
ANION GAP SERPL CALC-SCNC: 5 MMOL/L (ref 0–18)
AST SERPL-CCNC: 28 U/L (ref 15–41)
BASOPHILS # BLD AUTO: 0.06 X10(3) UL (ref 0–0.2)
BASOPHILS NFR BLD AUTO: 1 %
BILIRUB SERPL-MCNC: 0.2 MG/DL (ref 0.1–2)
BUN BLD-MCNC: 26 MG/DL (ref 8–20)
BUN/CREAT SERPL: 19.4 (ref 10–20)
CALCIUM BLD-MCNC: 8.3 MG/DL (ref 8.3–10.3)
CHLORIDE SERPL-SCNC: 107 MMOL/L (ref 101–111)
CO2 SERPL-SCNC: 28 MMOL/L (ref 22–32)
CREAT BLD-MCNC: 1.34 MG/DL (ref 0.7–1.3)
CRP SERPL-MCNC: 4.94 MG/DL (ref ?–1)
DEPRECATED RDW RBC AUTO: 48.5 FL (ref 35.1–46.3)
EOSINOPHIL # BLD AUTO: 0.1 X10(3) UL (ref 0–0.7)
EOSINOPHIL NFR BLD AUTO: 1.6 %
ERYTHROCYTE [DISTWIDTH] IN BLOOD BY AUTOMATED COUNT: 17.1 % (ref 11–15)
GLOBULIN PLAS-MCNC: 5 G/DL (ref 2.8–4.4)
GLUCOSE BLD-MCNC: 133 MG/DL (ref 70–99)
HCT VFR BLD AUTO: 26.7 % (ref 39–53)
HGB BLD-MCNC: 7.9 G/DL (ref 13–17.5)
IMM GRANULOCYTES # BLD AUTO: 0.05 X10(3) UL (ref 0–1)
IMM GRANULOCYTES NFR BLD: 0.8 %
LYMPHOCYTES # BLD AUTO: 1.47 X10(3) UL (ref 1–4)
LYMPHOCYTES NFR BLD AUTO: 24 %
M PROTEIN MFR SERPL ELPH: 7.6 G/DL (ref 6.4–8.2)
MCH RBC QN AUTO: 23.3 PG (ref 26–34)
MCHC RBC AUTO-ENTMCNC: 29.6 G/DL (ref 31–37)
MCV RBC AUTO: 78.8 FL (ref 80–100)
MONOCYTES # BLD AUTO: 0.5 X10(3) UL (ref 0.1–1)
MONOCYTES NFR BLD AUTO: 8.2 %
NEUTROPHILS # BLD AUTO: 3.95 X10 (3) UL (ref 1.5–7.7)
NEUTROPHILS # BLD AUTO: 3.95 X10(3) UL (ref 1.5–7.7)
NEUTROPHILS NFR BLD AUTO: 64.4 %
OSMOLALITY SERPL CALC.SUM OF ELEC: 297 MOSM/KG (ref 275–295)
PLATELET # BLD AUTO: 371 10(3)UL (ref 150–450)
POTASSIUM SERPL-SCNC: 3.6 MMOL/L (ref 3.6–5.1)
RBC # BLD AUTO: 3.39 X10(6)UL (ref 4.3–5.7)
SODIUM SERPL-SCNC: 140 MMOL/L (ref 136–144)
WBC # BLD AUTO: 6.1 X10(3) UL (ref 4–11)

## 2019-01-30 PROCEDURE — 80053 COMPREHEN METABOLIC PANEL: CPT | Performed by: ORTHOPAEDIC SURGERY

## 2019-01-30 PROCEDURE — 86140 C-REACTIVE PROTEIN: CPT | Performed by: ORTHOPAEDIC SURGERY

## 2019-01-30 PROCEDURE — 85025 COMPLETE CBC W/AUTO DIFF WBC: CPT | Performed by: ORTHOPAEDIC SURGERY

## 2019-02-06 ENCOUNTER — LAB REQUISITION (OUTPATIENT)
Dept: LAB | Facility: HOSPITAL | Age: 64
End: 2019-02-06
Payer: COMMERCIAL

## 2019-02-06 DIAGNOSIS — I12.9 HYPERTENSIVE CHRONIC KIDNEY DISEASE WITH STAGE 1 THROUGH STAGE 4 CHRONIC KIDNEY DISEASE, OR UNSPECIFIED CHRONIC KIDNEY DISEASE: ICD-10-CM

## 2019-02-06 DIAGNOSIS — L03.115 CELLULITIS OF RIGHT LOWER EXTREMITY: ICD-10-CM

## 2019-02-06 DIAGNOSIS — E11.69 TYPE 2 DIABETES MELLITUS WITH OTHER SPECIFIED COMPLICATION (HCC): ICD-10-CM

## 2019-02-06 DIAGNOSIS — Z47.81 ENCOUNTER FOR ORTHOPEDIC AFTERCARE FOLLOWING SURGICAL AMPUTATION: ICD-10-CM

## 2019-02-06 LAB
ALBUMIN SERPL-MCNC: 3 G/DL (ref 3.1–4.5)
ALBUMIN/GLOB SERPL: 0.6 {RATIO} (ref 1–2)
ALP LIVER SERPL-CCNC: 55 U/L (ref 45–117)
ALT SERPL-CCNC: 23 U/L (ref 17–63)
ANION GAP SERPL CALC-SCNC: 7 MMOL/L (ref 0–18)
AST SERPL-CCNC: 23 U/L (ref 15–41)
BASOPHILS # BLD AUTO: 0.08 X10(3) UL (ref 0–0.2)
BASOPHILS NFR BLD AUTO: 1.2 %
BILIRUB SERPL-MCNC: 0.2 MG/DL (ref 0.1–2)
BUN BLD-MCNC: 24 MG/DL (ref 8–20)
BUN/CREAT SERPL: 18.2 (ref 10–20)
CALCIUM BLD-MCNC: 9 MG/DL (ref 8.3–10.3)
CHLORIDE SERPL-SCNC: 106 MMOL/L (ref 101–111)
CO2 SERPL-SCNC: 27 MMOL/L (ref 22–32)
CREAT BLD-MCNC: 1.32 MG/DL (ref 0.7–1.3)
CRP SERPL-MCNC: 2.34 MG/DL (ref ?–1)
DEPRECATED RDW RBC AUTO: 49.8 FL (ref 35.1–46.3)
EOSINOPHIL # BLD AUTO: 0.14 X10(3) UL (ref 0–0.7)
EOSINOPHIL NFR BLD AUTO: 2.2 %
ERYTHROCYTE [DISTWIDTH] IN BLOOD BY AUTOMATED COUNT: 17.8 % (ref 11–15)
GLOBULIN PLAS-MCNC: 5.2 G/DL (ref 2.8–4.4)
GLUCOSE BLD-MCNC: 117 MG/DL (ref 70–99)
HCT VFR BLD AUTO: 32.8 % (ref 39–53)
HGB BLD-MCNC: 9.8 G/DL (ref 13–17.5)
IMM GRANULOCYTES # BLD AUTO: 0.04 X10(3) UL (ref 0–1)
IMM GRANULOCYTES NFR BLD: 0.6 %
LYMPHOCYTES # BLD AUTO: 1.87 X10(3) UL (ref 1–4)
LYMPHOCYTES NFR BLD AUTO: 29 %
M PROTEIN MFR SERPL ELPH: 8.2 G/DL (ref 6.4–8.2)
MCH RBC QN AUTO: 23.2 PG (ref 26–34)
MCHC RBC AUTO-ENTMCNC: 29.9 G/DL (ref 31–37)
MCV RBC AUTO: 77.7 FL (ref 80–100)
MONOCYTES # BLD AUTO: 0.51 X10(3) UL (ref 0.1–1)
MONOCYTES NFR BLD AUTO: 7.9 %
NEUTROPHILS # BLD AUTO: 3.81 X10 (3) UL (ref 1.5–7.7)
NEUTROPHILS # BLD AUTO: 3.81 X10(3) UL (ref 1.5–7.7)
NEUTROPHILS NFR BLD AUTO: 59.1 %
OSMOLALITY SERPL CALC.SUM OF ELEC: 295 MOSM/KG (ref 275–295)
PLATELET # BLD AUTO: 358 10(3)UL (ref 150–450)
POTASSIUM SERPL-SCNC: 3.5 MMOL/L (ref 3.6–5.1)
RBC # BLD AUTO: 4.22 X10(6)UL (ref 4.3–5.7)
SODIUM SERPL-SCNC: 140 MMOL/L (ref 136–144)
WBC # BLD AUTO: 6.5 X10(3) UL (ref 4–11)

## 2019-02-06 PROCEDURE — 80053 COMPREHEN METABOLIC PANEL: CPT | Performed by: ORTHOPAEDIC SURGERY

## 2019-02-06 PROCEDURE — 86140 C-REACTIVE PROTEIN: CPT | Performed by: ORTHOPAEDIC SURGERY

## 2019-02-06 PROCEDURE — 85025 COMPLETE CBC W/AUTO DIFF WBC: CPT | Performed by: ORTHOPAEDIC SURGERY

## 2019-02-13 ENCOUNTER — LAB REQUISITION (OUTPATIENT)
Dept: LAB | Facility: HOSPITAL | Age: 64
End: 2019-02-13
Payer: COMMERCIAL

## 2019-02-13 DIAGNOSIS — Z47.81 ENCOUNTER FOR ORTHOPEDIC AFTERCARE FOLLOWING SURGICAL AMPUTATION: ICD-10-CM

## 2019-02-13 DIAGNOSIS — M86.9 OSTEOMYELITIS (HCC): ICD-10-CM

## 2019-02-13 DIAGNOSIS — E11.69 TYPE 2 DIABETES MELLITUS WITH OTHER SPECIFIED COMPLICATION (HCC): ICD-10-CM

## 2019-02-13 LAB
ALBUMIN SERPL-MCNC: 3.3 G/DL (ref 3.4–5)
ALBUMIN/GLOB SERPL: 0.7 {RATIO} (ref 1–2)
ALP LIVER SERPL-CCNC: 52 U/L (ref 45–117)
ALT SERPL-CCNC: 24 U/L (ref 16–61)
ANION GAP SERPL CALC-SCNC: 10 MMOL/L (ref 0–18)
AST SERPL-CCNC: 23 U/L (ref 15–37)
BASOPHILS # BLD AUTO: 0.06 X10(3) UL (ref 0–0.2)
BASOPHILS NFR BLD AUTO: 1.1 %
BILIRUB SERPL-MCNC: 0.3 MG/DL (ref 0.1–2)
BUN BLD-MCNC: 36 MG/DL (ref 7–18)
BUN/CREAT SERPL: 24.2 (ref 10–20)
CALCIUM BLD-MCNC: 8.9 MG/DL (ref 8.5–10.1)
CHLORIDE SERPL-SCNC: 105 MMOL/L (ref 98–107)
CO2 SERPL-SCNC: 27 MMOL/L (ref 21–32)
CREAT BLD-MCNC: 1.49 MG/DL (ref 0.7–1.3)
CRP SERPL-MCNC: 0.84 MG/DL (ref ?–0.3)
DEPRECATED RDW RBC AUTO: 53.3 FL (ref 35.1–46.3)
EOSINOPHIL # BLD AUTO: 0.17 X10(3) UL (ref 0–0.7)
EOSINOPHIL NFR BLD AUTO: 3 %
ERYTHROCYTE [DISTWIDTH] IN BLOOD BY AUTOMATED COUNT: 18.9 % (ref 11–15)
GLOBULIN PLAS-MCNC: 4.5 G/DL (ref 2.8–4.4)
GLUCOSE BLD-MCNC: 131 MG/DL (ref 70–99)
HCT VFR BLD AUTO: 35.3 % (ref 39–53)
HGB BLD-MCNC: 10.6 G/DL (ref 13–17.5)
IMM GRANULOCYTES # BLD AUTO: 0.02 X10(3) UL (ref 0–1)
IMM GRANULOCYTES NFR BLD: 0.4 %
LYMPHOCYTES # BLD AUTO: 1.54 X10(3) UL (ref 1–4)
LYMPHOCYTES NFR BLD AUTO: 27.5 %
M PROTEIN MFR SERPL ELPH: 7.8 G/DL (ref 6.4–8.2)
MCH RBC QN AUTO: 23.5 PG (ref 26–34)
MCHC RBC AUTO-ENTMCNC: 30 G/DL (ref 31–37)
MCV RBC AUTO: 78.1 FL (ref 80–100)
MONOCYTES # BLD AUTO: 0.39 X10(3) UL (ref 0.1–1)
MONOCYTES NFR BLD AUTO: 7 %
NEUTROPHILS # BLD AUTO: 3.42 X10 (3) UL (ref 1.5–7.7)
NEUTROPHILS # BLD AUTO: 3.42 X10(3) UL (ref 1.5–7.7)
NEUTROPHILS NFR BLD AUTO: 61 %
OSMOLALITY SERPL CALC.SUM OF ELEC: 304 MOSM/KG (ref 275–295)
PLATELET # BLD AUTO: 295 10(3)UL (ref 150–450)
POTASSIUM SERPL-SCNC: 3.6 MMOL/L (ref 3.5–5.1)
RBC # BLD AUTO: 4.52 X10(6)UL (ref 4.3–5.7)
SODIUM SERPL-SCNC: 142 MMOL/L (ref 136–145)
WBC # BLD AUTO: 5.6 X10(3) UL (ref 4–11)

## 2019-02-13 PROCEDURE — 36415 COLL VENOUS BLD VENIPUNCTURE: CPT | Performed by: ORTHOPAEDIC SURGERY

## 2019-02-13 PROCEDURE — 86140 C-REACTIVE PROTEIN: CPT | Performed by: ORTHOPAEDIC SURGERY

## 2019-02-13 PROCEDURE — 85025 COMPLETE CBC W/AUTO DIFF WBC: CPT | Performed by: ORTHOPAEDIC SURGERY

## 2019-02-13 PROCEDURE — 80053 COMPREHEN METABOLIC PANEL: CPT | Performed by: ORTHOPAEDIC SURGERY

## 2019-02-15 ENCOUNTER — OFFICE VISIT (OUTPATIENT)
Dept: WOUND CARE | Facility: HOSPITAL | Age: 64
End: 2019-02-15
Attending: NURSE PRACTITIONER
Payer: COMMERCIAL

## 2019-02-15 DIAGNOSIS — L97.518 NON-PRESSURE CHRONIC ULCER OF OTHER PART OF RIGHT FOOT WITH OTHER SPECIFIED SEVERITY (HCC): ICD-10-CM

## 2019-02-15 DIAGNOSIS — E11.40 TYPE 2 DIABETES MELLITUS WITH DIABETIC NEUROPATHY, WITHOUT LONG-TERM CURRENT USE OF INSULIN (HCC): Primary | ICD-10-CM

## 2019-02-15 PROCEDURE — 99214 OFFICE O/P EST MOD 30 MIN: CPT

## 2019-02-15 NOTE — PROGRESS NOTES
Subjective    Chief Complaint  This information was obtained from the patient  Patient is here for an initial consult for his right plantar foot. He denies pain to the wound.     Allergies  NKDA    HPI  This information was obtained from the patient  The fo procedure was done by Dr. Wyatt Mckenzie. patient reports he has had this wound on and off for 10 years with various courses of abx. He states that specialists continue to tell him he needs an amputation and he does not want to do this.   Op report reviewed it brandon Edema  Musculoskeletal: Deformities (Right Chopart and left 5th ray amputations), Assistive Devices (AFO to right foot and Diabetic shoe left foot)  Integumentary (Hair/Skin/Nails): Dryness, Calluses/Corns, Ulcers  Neurological: Loss of Protective Sensatio breath sounds are clear and equal w/ no wheezes, rales or rhonchi. Cardiovascular:  Heart rhythm and rate regular, without murmur or gallop.  dp/pt palpable bilaterally, patient with pulsatile signals at the dp/pt/distal hallux on the left, distal later No undermining has been noted. There was no drainage noted. The patient reports a wound pain of level 0/10. The wound margin is flat and intact. Wound bed has % epithelialization. The periwound skin exhibited: Edema.  The periwound skin did not exhi Orders:  Wound #3 Right Foot     Wound Cleansing & Dressings  May shower with protection.   Cleanse with saline or wound cleanser  Other: - pack lightly with antimicrobial packing strip  Other: - ONCE YOU RECEIVE THE REGRANEX, APPLY THE REGRANEX TO THE CHILDREN'S [facility] AT Critical access hospital ([facility]) 2019 bun 24, creat 1.32, gfr 57  GFR 47: 1/29/18  Wound type: - DFU/CHRONIC OSTEO  Start antibiotics as prescribed: - on picc with dr Jiménez Silence:  CPR:  2-6-19 2.34  1-30-19 4.94  1-23-19 8.56    Workflow: Diabetes  Perfusion assessed by JOHANNA/TBI - 11/5/18: Bed of the microcirculation   2) discussed wound bed optimization with necrosis and callus removal, moist wound healing.   3) discussed pressure and friction and the importance of offloading   4) discussed nutrition and bg control  5) I discussed his chronic os

## 2019-02-18 ENCOUNTER — OFFICE VISIT (OUTPATIENT)
Dept: WOUND CARE | Facility: HOSPITAL | Age: 64
End: 2019-02-18
Attending: NURSE PRACTITIONER
Payer: COMMERCIAL

## 2019-02-18 DIAGNOSIS — L97.509 DIABETES MELLITUS WITH ULCER OF FOOT (HCC): ICD-10-CM

## 2019-02-18 DIAGNOSIS — E11.621 DIABETES MELLITUS WITH ULCER OF FOOT (HCC): ICD-10-CM

## 2019-02-18 DIAGNOSIS — E11.40 TYPE 2 DIABETES MELLITUS WITH DIABETIC NEUROPATHY, WITHOUT LONG-TERM CURRENT USE OF INSULIN (HCC): ICD-10-CM

## 2019-02-18 DIAGNOSIS — L97.518 NON-PRESSURE CHRONIC ULCER OF OTHER PART OF RIGHT FOOT WITH OTHER SPECIFIED SEVERITY (HCC): ICD-10-CM

## 2019-02-18 DIAGNOSIS — I42.9 CARDIOMYOPATHY, UNSPECIFIED TYPE (HCC): Primary | ICD-10-CM

## 2019-02-18 PROCEDURE — 99213 OFFICE O/P EST LOW 20 MIN: CPT

## 2019-02-18 NOTE — PROGRESS NOTES
Chief Complaint  This information was obtained from the patient  Patient is here for an HBOT consult. [facility] Patient states he has 1 more week of IV antibiotic therapy.     Allergies  NKDA    HPI  This i daily with meals. Total daily dose 105 units. 90 day supply. 1 refill. insulin glargine (LANTUS) 100 UNIT/ML Subcutaneous Solution  Inject 70 Units into the skin nightly.      FENOFIBRATE 145 MG Oral Tab  TAKE ONE TABLET BY MOUTH DAILY     furosemide 40 made to orthotics per pt and just got final pair 2 weeks ago. Per podiatry note pt is waiting to see if wounds will heal with new orthotics to avoid further surgery. Pt has been using betadine and gauze to both wounds daily.      HPI PRIOR TO FEBRUARY 2019 Osteomyelitis of right foot  Cataract of left eye  MSSA Infection  Gastrocnemius recession and Peroneus longus tendon lengthening, L lower extremity  Hyponatremia  Anemia    Surgical History  This information was obtained from the patient, chart  Patient h radiation?: No        Objective    Constitutional  Height/Length: 75 in (190.5 cm), Weight: 273 lbs (124.09 kgs), BMI: 34.1, Temperature: 97.9 °F (36.61 °C), Pulse: 79 bpm, Respiratory Rate: 18 breaths/min, Blood Pressure: 135/92 mmHg, Capillary Blood Gluc .    Brief History of Co-Morbidities That May Affect HBO Treatment:  Previously treated with[de-identified] No chemotherapy or medications which are contracindications to HBO  Pt. reports S/S of[de-identified] No acute infections  No past history of adriamycin, bleomycin, cisplatin OSTEOMYELITIS  HBO Treatment Protocol: 2.5 RODOLFO for 90 Minutes with a Ten Minute Air Break  Total # of Treatments[de-identified] 30  Nasal Spray each nostril 30 minutes prior to HBOT-PRN, difficulty clearing ears.: Yes  Antihistamine 30 minutes prior to HBOT - PRN, diff

## 2019-02-20 ENCOUNTER — LAB REQUISITION (OUTPATIENT)
Dept: LAB | Facility: HOSPITAL | Age: 64
End: 2019-02-20
Payer: COMMERCIAL

## 2019-02-20 DIAGNOSIS — E11.69 TYPE 2 DIABETES MELLITUS WITH OTHER SPECIFIED COMPLICATION (HCC): ICD-10-CM

## 2019-02-20 DIAGNOSIS — Z47.81 ENCOUNTER FOR ORTHOPEDIC AFTERCARE FOLLOWING SURGICAL AMPUTATION: ICD-10-CM

## 2019-02-20 DIAGNOSIS — I12.9 HYPERTENSIVE CHRONIC KIDNEY DISEASE WITH STAGE 1 THROUGH STAGE 4 CHRONIC KIDNEY DISEASE, OR UNSPECIFIED CHRONIC KIDNEY DISEASE: ICD-10-CM

## 2019-02-20 DIAGNOSIS — L03.115 CELLULITIS OF RIGHT LOWER EXTREMITY: ICD-10-CM

## 2019-02-20 LAB
ALBUMIN SERPL-MCNC: 3.5 G/DL (ref 3.4–5)
ALBUMIN/GLOB SERPL: 0.8 {RATIO} (ref 1–2)
ALP LIVER SERPL-CCNC: 52 U/L (ref 45–117)
ALT SERPL-CCNC: 24 U/L (ref 16–61)
ANION GAP SERPL CALC-SCNC: 6 MMOL/L (ref 0–18)
AST SERPL-CCNC: 22 U/L (ref 15–37)
BASOPHILS # BLD AUTO: 0.06 X10(3) UL (ref 0–0.2)
BASOPHILS NFR BLD AUTO: 1.1 %
BILIRUB SERPL-MCNC: 0.3 MG/DL (ref 0.1–2)
BUN BLD-MCNC: 33 MG/DL (ref 7–18)
BUN/CREAT SERPL: 23.7 (ref 10–20)
CALCIUM BLD-MCNC: 8.7 MG/DL (ref 8.5–10.1)
CHLORIDE SERPL-SCNC: 108 MMOL/L (ref 98–107)
CO2 SERPL-SCNC: 29 MMOL/L (ref 21–32)
CREAT BLD-MCNC: 1.39 MG/DL (ref 0.7–1.3)
CRP SERPL-MCNC: 0.47 MG/DL (ref ?–0.3)
DEPRECATED RDW RBC AUTO: 54.2 FL (ref 35.1–46.3)
EOSINOPHIL # BLD AUTO: 0.16 X10(3) UL (ref 0–0.7)
EOSINOPHIL NFR BLD AUTO: 3 %
ERYTHROCYTE [DISTWIDTH] IN BLOOD BY AUTOMATED COUNT: 18.9 % (ref 11–15)
GLOBULIN PLAS-MCNC: 4.3 G/DL (ref 2.8–4.4)
GLUCOSE BLD-MCNC: 151 MG/DL (ref 70–99)
HCT VFR BLD AUTO: 34.6 % (ref 39–53)
HGB BLD-MCNC: 10.3 G/DL (ref 13–17.5)
IMM GRANULOCYTES # BLD AUTO: 0.03 X10(3) UL (ref 0–1)
IMM GRANULOCYTES NFR BLD: 0.6 %
LYMPHOCYTES # BLD AUTO: 1.44 X10(3) UL (ref 1–4)
LYMPHOCYTES NFR BLD AUTO: 26.7 %
M PROTEIN MFR SERPL ELPH: 7.8 G/DL (ref 6.4–8.2)
MCH RBC QN AUTO: 23.5 PG (ref 26–34)
MCHC RBC AUTO-ENTMCNC: 29.8 G/DL (ref 31–37)
MCV RBC AUTO: 79 FL (ref 80–100)
MONOCYTES # BLD AUTO: 0.43 X10(3) UL (ref 0.1–1)
MONOCYTES NFR BLD AUTO: 8 %
NEUTROPHILS # BLD AUTO: 3.27 X10 (3) UL (ref 1.5–7.7)
NEUTROPHILS # BLD AUTO: 3.27 X10(3) UL (ref 1.5–7.7)
NEUTROPHILS NFR BLD AUTO: 60.6 %
OSMOLALITY SERPL CALC.SUM OF ELEC: 306 MOSM/KG (ref 275–295)
PLATELET # BLD AUTO: 253 10(3)UL (ref 150–450)
POTASSIUM SERPL-SCNC: 3.9 MMOL/L (ref 3.5–5.1)
RBC # BLD AUTO: 4.38 X10(6)UL (ref 4.3–5.7)
SODIUM SERPL-SCNC: 143 MMOL/L (ref 136–145)
WBC # BLD AUTO: 5.4 X10(3) UL (ref 4–11)

## 2019-02-20 PROCEDURE — 86140 C-REACTIVE PROTEIN: CPT | Performed by: ORTHOPAEDIC SURGERY

## 2019-02-20 PROCEDURE — 80053 COMPREHEN METABOLIC PANEL: CPT | Performed by: ORTHOPAEDIC SURGERY

## 2019-02-20 PROCEDURE — 85025 COMPLETE CBC W/AUTO DIFF WBC: CPT | Performed by: ORTHOPAEDIC SURGERY

## 2019-02-22 ENCOUNTER — OFFICE VISIT (OUTPATIENT)
Dept: WOUND CARE | Facility: HOSPITAL | Age: 64
End: 2019-02-22
Attending: NURSE PRACTITIONER
Payer: COMMERCIAL

## 2019-02-22 DIAGNOSIS — E11.621 DIABETES MELLITUS WITH ULCER OF FOOT (HCC): ICD-10-CM

## 2019-02-22 DIAGNOSIS — L97.518 NON-PRESSURE CHRONIC ULCER OF OTHER PART OF RIGHT FOOT WITH OTHER SPECIFIED SEVERITY (HCC): Primary | ICD-10-CM

## 2019-02-22 DIAGNOSIS — E11.40 TYPE 2 DIABETES MELLITUS WITH DIABETIC NEUROPATHY, WITHOUT LONG-TERM CURRENT USE OF INSULIN (HCC): ICD-10-CM

## 2019-02-22 DIAGNOSIS — L97.509 DIABETES MELLITUS WITH ULCER OF FOOT (HCC): ICD-10-CM

## 2019-02-22 PROCEDURE — 99214 OFFICE O/P EST MOD 30 MIN: CPT

## 2019-02-22 NOTE — PROGRESS NOTES
Subjective    Chief Complaint  This information was obtained from the patient  Patient is here for an wound care follow-up visit. Pt still taking IV abx.     Allergies  NKDA    HPI  This information was obtained from the patient  The following HPI elements amputation and he does not want to do this. Op report reviewed it does not appear any bone was removed at the time of surgery for pathology or sensitivities. February MRI was + for osteo.   see plan for discussion, no c/o pain and no s/s of acute infectio Constitutional Symptoms (General Health):  Fatigue, Fever, Marked Weight Change, Chills  Respiratory: Cough, Shortness of Breath, Wheezing  Cardiovascular (Central/Peripheral): Chest Pain, Dyspnea on Exertion, Intermittent Claudication, Lower extremity (l exhibit: Edema, Fluctuance, Friable, Erythema. The temperature of the periwound skin is Warm. Periwound skin does not exhibit signs or symptoms of infection. Local Pulse is Weak.     Wound #5 Right, Posterior Heel is a chronic Deep Tissue Pressure Injury Pe reveal a 100% pink wound base    Psychiatric:  Judgment and insight intact. Alert and oriented times 3. No evidence of depression, anxiety, or agitation. Calm, cooperative, and communicative. Appropriate interactions and affect.         Assessment    Active packets/day.  you can order online at [employer] or [address] [place] [address] or some [employer] carry it or can order it for you  AND  FOCUS ON THE FOLLOWING FOODS:  Protein: Meats, beans, eggs, milk and yogurt particularly [place] yogurt), tofu, soy nuts, soy protein products  Vit enhancement which may represent an abscess. 6. There is patchy T1 hypointense signal and T2 hyperintense signal within the medial and lateral malleoli which may represent reactive bone marrow edema with early osteomyelitis not entirely excluded.  Close cli

## 2019-03-01 ENCOUNTER — OFFICE VISIT (OUTPATIENT)
Dept: WOUND CARE | Facility: HOSPITAL | Age: 64
End: 2019-03-01
Attending: NURSE PRACTITIONER
Payer: COMMERCIAL

## 2019-03-01 DIAGNOSIS — L97.518 NON-PRESSURE CHRONIC ULCER OF OTHER PART OF RIGHT FOOT WITH OTHER SPECIFIED SEVERITY (HCC): Primary | ICD-10-CM

## 2019-03-01 DIAGNOSIS — E11.621 DIABETES MELLITUS WITH ULCER OF FOOT (HCC): ICD-10-CM

## 2019-03-01 DIAGNOSIS — L97.509 DIABETES MELLITUS WITH ULCER OF FOOT (HCC): ICD-10-CM

## 2019-03-01 DIAGNOSIS — E11.40 TYPE 2 DIABETES MELLITUS WITH DIABETIC NEUROPATHY, WITHOUT LONG-TERM CURRENT USE OF INSULIN (HCC): ICD-10-CM

## 2019-03-01 PROCEDURE — 11042 DBRDMT SUBQ TIS 1ST 20SQCM/<: CPT

## 2019-03-01 PROCEDURE — 97597 DBRDMT OPN WND 1ST 20 CM/<: CPT

## 2019-03-01 NOTE — PROGRESS NOTES
Subjective    Chief Complaint  This information was obtained from the patient  Patient is here for an wound care follow-up visit. Patients stated no pain on the wound.      Allergies  NKDA    HPI  This information was obtained from the patient  The followin 2-15-19 Patient was last seen in wound center in January. He was direct admitted and is status post right stump wound I&D. Patient's surgery was on 1/15/19 and the procedure was done by Dr. Garcia Mcqueen.   patient reports he has had this wound on and off for 10 y 3-1-19 patient returns today, there is callus noted on the plantar wound, we discussed getting his prosthetic evaluated as it is less than 3year old.  his hbo also got approved and he should be starting next week. his picc line was dc'd and he remains on o bp wnl for patient. Pulse Regular and wnl for patient. Martinez Cliche Respirations easy and unlabored. Temperature wnl. elevated BMI. Appearance neat and clean. Appears in no acute distress. Well nourished and well developed.  Height/Length: 75 in (190.5 cm), Weight: 273 Wound #5 Right, Posterior Heel is a chronic Deep Tissue Pressure Injury Persistent non-blanchable deep red, maroon or purple discoloration Pressure Ulcer and has received a status of Not Healed.  Initial wound encounter measurements are 2.3cm length x 2.6cm (Encounter Diagnosis) M86.471 - Chronic osteomyelitis with draining sinus, right ankle and foot  (Encounter Diagnosis) L89.610 - Pressure ulcer of right heel, unstageable  (Encounter Diagnosis) E11.621 - Type 2 diabetes mellitus with foot ulcer  (Encounter Other: - regranex, cover with adaptic and gauze  Change Dressing Daily and PRN    Off-Loading  No Weight Bearing - No pressure on right foot  Wheelchair - While traveling use wheelchair to avoid any prolonged walking  Pressure relief shoe / inserts / foams Start antibiotics as prescribed: - on picc with dr Era Ann:  CPR:  2-6-19 2.34  1-30-19 4.94  1-23-19 8.56    Workflow: Diabetes  Perfusion assessed by JOHANNA/TBI - 11/5/18: Bedside L 1.21, R 1.22  Perfusion assessed by doppler. - Biphasic signals  Perfusion a d/w patient and spouse that we need to have his offloading and prosthetic evaled to assure he is offloading is appropriate. he is off iv abx and now on po abx.           Electronic Signature(s)  Signed By: Date:  Joey Christopher FNBRUNA-C, CWABIMAEL-AP, CFCN, [facility]

## 2019-03-04 ENCOUNTER — APPOINTMENT (OUTPATIENT)
Dept: LAB | Facility: HOSPITAL | Age: 64
End: 2019-03-04
Attending: INTERNAL MEDICINE
Payer: COMMERCIAL

## 2019-03-04 ENCOUNTER — HOSPITAL ENCOUNTER (OUTPATIENT)
Dept: CV DIAGNOSTICS | Facility: HOSPITAL | Age: 64
Discharge: HOME | End: 2019-03-04
Attending: INTERNAL MEDICINE
Payer: COMMERCIAL

## 2019-03-04 ENCOUNTER — HOSPITAL ENCOUNTER (OUTPATIENT)
Dept: GENERAL RADIOLOGY | Facility: HOSPITAL | Age: 64
Discharge: HOME | End: 2019-03-04
Attending: INTERNAL MEDICINE
Payer: COMMERCIAL

## 2019-03-04 DIAGNOSIS — Z01.818 PREOP TESTING: ICD-10-CM

## 2019-03-04 LAB
ATRIAL RATE: 78 BPM
P-R INTERVAL: 216 MS
Q-T INTERVAL: 406 MS
QRS DURATION: 122 MS
QTC CALCULATION (BEZET): 459 MS
R AXIS: -32 DEGREES
T AXIS: 91 DEGREES
VENTRICULAR RATE: 77 BPM

## 2019-03-04 PROCEDURE — 93010 ELECTROCARDIOGRAM REPORT: CPT | Performed by: INTERNAL MEDICINE

## 2019-03-04 PROCEDURE — 93005 ELECTROCARDIOGRAM TRACING: CPT

## 2019-03-04 PROCEDURE — 93306 TTE W/DOPPLER COMPLETE: CPT | Performed by: INTERNAL MEDICINE

## 2019-03-04 PROCEDURE — 71046 X-RAY EXAM CHEST 2 VIEWS: CPT | Performed by: INTERNAL MEDICINE

## 2019-03-06 ENCOUNTER — OFFICE VISIT (OUTPATIENT)
Dept: WOUND CARE | Facility: HOSPITAL | Age: 64
End: 2019-03-06
Attending: INTERNAL MEDICINE
Payer: COMMERCIAL

## 2019-03-06 DIAGNOSIS — E11.621 DIABETES MELLITUS WITH ULCER OF FOOT (HCC): ICD-10-CM

## 2019-03-06 DIAGNOSIS — L97.509 DIABETES MELLITUS WITH ULCER OF FOOT (HCC): ICD-10-CM

## 2019-03-06 DIAGNOSIS — L97.518 NON-PRESSURE CHRONIC ULCER OF OTHER PART OF RIGHT FOOT WITH OTHER SPECIFIED SEVERITY (HCC): Primary | ICD-10-CM

## 2019-03-06 DIAGNOSIS — E11.40 TYPE 2 DIABETES MELLITUS WITH DIABETIC NEUROPATHY, WITHOUT LONG-TERM CURRENT USE OF INSULIN (HCC): ICD-10-CM

## 2019-03-06 PROCEDURE — 99212 OFFICE O/P EST SF 10 MIN: CPT

## 2019-03-07 ENCOUNTER — OFFICE VISIT (OUTPATIENT)
Dept: WOUND CARE | Facility: HOSPITAL | Age: 64
End: 2019-03-07
Attending: HOSPITALIST
Payer: COMMERCIAL

## 2019-03-07 DIAGNOSIS — E11.40 TYPE 2 DIABETES MELLITUS WITH DIABETIC NEUROPATHY, WITHOUT LONG-TERM CURRENT USE OF INSULIN (HCC): ICD-10-CM

## 2019-03-07 DIAGNOSIS — L97.518 NON-PRESSURE CHRONIC ULCER OF OTHER PART OF RIGHT FOOT WITH OTHER SPECIFIED SEVERITY (HCC): Primary | ICD-10-CM

## 2019-03-07 DIAGNOSIS — E11.621 DIABETES MELLITUS WITH ULCER OF FOOT (HCC): ICD-10-CM

## 2019-03-07 DIAGNOSIS — L97.509 DIABETES MELLITUS WITH ULCER OF FOOT (HCC): ICD-10-CM

## 2019-03-07 NOTE — PROGRESS NOTES
Header Image    CurrentOld Version  HBO Tech Details  Patient Name: Pascual Robles   Patient Number: [id number]  Patient YOB: 1955  Date: 3/7/2019  Physician / Isabell Grad: Harry Caballero, [address]: [id number] [facility] Treatment Course Details  Treat

## 2019-03-08 ENCOUNTER — OFFICE VISIT (OUTPATIENT)
Dept: WOUND CARE | Facility: HOSPITAL | Age: 64
End: 2019-03-08
Attending: INTERNAL MEDICINE
Payer: COMMERCIAL

## 2019-03-08 DIAGNOSIS — E11.40 TYPE 2 DIABETES MELLITUS WITH DIABETIC NEUROPATHY, WITHOUT LONG-TERM CURRENT USE OF INSULIN (HCC): ICD-10-CM

## 2019-03-08 DIAGNOSIS — L97.518 NON-PRESSURE CHRONIC ULCER OF OTHER PART OF RIGHT FOOT WITH OTHER SPECIFIED SEVERITY (HCC): Primary | ICD-10-CM

## 2019-03-08 PROCEDURE — 99213 OFFICE O/P EST LOW 20 MIN: CPT

## 2019-03-08 NOTE — PROGRESS NOTES
HBO Tech Details      Patient Name: Nilda Osborn    Patient Number: [id number]   Patient YOB: 1955      Date: 3/8/2019   Physician / Jessica [place]: Brittney Domínguez, [address]: [facility] Treatment Course Details          Treatment Co patient's wound:  Location: Bilateral feet  Quality: None  Duration: right foot since 2010, left foot December 2017  Context: DFU  Modifying Factors: Wound reopens as soon as he starts weight bearing again  Associated Signs and Symptoms: Custom shoes/inser Jarad Mckee MD:  57 YO CM referred to me for Hyperbaric oxygen therapy. He has a diagnosis of chronic osteomyelitis and DFU stage 4, failing conservative management.  He was recently admited to the hospital for celluliti (Minimal), Other (htn), Edema  Musculoskeletal: Deformities (Right Chopart and left 5th ray amputations), Assistive Devices (AFO to right foot and Diabetic shoe left foot)  Integumentary (Hair/Skin/Nails): Dryness, Calluses/Corns, Ulcers  Neurological: Los x 0.3cm depth, with an area of 0.15 sq cm and a volume of 0.045 cubic cm. No tunneling has been noted. No sinus tract has been noted. No undermining has been noted. There is a scant amount of serous drainage noted which has no odor.  The patient reports a w periwound skin moisture is normal. The periwound skin color is normal. The periwound skin exhibited: Edema. The temperature of the periwound skin is Warm. Periwound skin does not exhibit signs or symptoms of infection.  Local Pulse is Normal.   General Note Orders  Supplement with a daily multivitamin. Increase dietary protein - look for Atul by [employer] (These are essential branch chain amino acids that help with tissue building and wound healing) and take 2 packets/day.  you can order online at [employer] o of the hindfoot with a defect extending superiorly by 2 cm. 5. There is a 4.8 cm fluid collection along the posterior margin of the distal tibia surrounding the flexor hallucis longus tendon with peripheral enhancement which may represent an abscess.   6.

## 2019-03-11 ENCOUNTER — OFFICE VISIT (OUTPATIENT)
Dept: WOUND CARE | Facility: HOSPITAL | Age: 64
End: 2019-03-11
Attending: INTERNAL MEDICINE
Payer: COMMERCIAL

## 2019-03-11 DIAGNOSIS — L97.509 DIABETES MELLITUS WITH ULCER OF FOOT (HCC): ICD-10-CM

## 2019-03-11 DIAGNOSIS — L97.518 NON-PRESSURE CHRONIC ULCER OF OTHER PART OF RIGHT FOOT WITH OTHER SPECIFIED SEVERITY (HCC): Primary | ICD-10-CM

## 2019-03-11 DIAGNOSIS — E11.40 TYPE 2 DIABETES MELLITUS WITH DIABETIC NEUROPATHY, WITHOUT LONG-TERM CURRENT USE OF INSULIN (HCC): ICD-10-CM

## 2019-03-11 DIAGNOSIS — E11.621 DIABETES MELLITUS WITH ULCER OF FOOT (HCC): ICD-10-CM

## 2019-03-11 NOTE — PROGRESS NOTES
HBO Tech Details  Patient Name: Derek Latham   Patient Number: [id number]  Patient YOB: 1955  Date: 3/11/2019  Physician / Dimas Cap: Leonard Iraheta, [address] [id number] [facility] Treatment Course Details  Treatment Course Number: 1  Indication: D

## 2019-03-12 ENCOUNTER — OFFICE VISIT (OUTPATIENT)
Dept: WOUND CARE | Facility: HOSPITAL | Age: 64
End: 2019-03-12
Attending: HOSPITALIST
Payer: COMMERCIAL

## 2019-03-12 DIAGNOSIS — E11.40 TYPE 2 DIABETES MELLITUS WITH DIABETIC NEUROPATHY, WITHOUT LONG-TERM CURRENT USE OF INSULIN (HCC): ICD-10-CM

## 2019-03-12 DIAGNOSIS — L97.518 NON-PRESSURE CHRONIC ULCER OF OTHER PART OF RIGHT FOOT WITH OTHER SPECIFIED SEVERITY (HCC): Primary | ICD-10-CM

## 2019-03-12 PROCEDURE — 97597 DBRDMT OPN WND 1ST 20 CM/<: CPT

## 2019-03-12 NOTE — PROGRESS NOTES
Subjective    Chief Complaint  This information was obtained from the patient  Patient is here for an wound care follow-up visit. Patients stated no pain on the wound.      Allergies  NKDA    HPI  This information was obtained from the patient  The followin 2-15-19 Patient was last seen in wound center in January. He was direct admitted and is status post right stump wound I&D. Patient's surgery was on 1/15/19 and the procedure was done by Dr. Garcia Mcqueen.   patient reports he has had this wound on and off for 10 y 3-1-19 patient returns today, there is callus noted on the plantar wound, we discussed getting his prosthetic evaluated as it is less than 3year old.  his hbo also got approved and he should be starting next week. his picc line was dc'd and he remains on o Constitutional Symptoms (General Health):  Fatigue, Fever, Marked Weight Change, Chills  Respiratory: Cough, Shortness of Breath, Wheezing  Cardiovascular (Central/Peripheral): Chest Pain, Dyspnea on Exertion, Intermittent Claudication, Lower extremity (leg Wound #3 Right Foot is a chronic Mckeon Grade 3 Diabetic Ulcer and has received a status of Not Healed. Subsequent wound encounter measurements are 0.8cm length x 0.3cm width x 0.2cm depth, with an area of 0.24 sq cm and a volume of 0.048 cubic cm.  No tunn Wound #6 Right, Anterior Foot is a Mckeon Grade 1 Diabetic Ulcer and has received a status of Not Healed. Subsequent wound encounter measurements are 0.3cm length x 0.3cm width with no measurable depth, with an area of 0.09 sq cm .  No tunneling has been no (Encounter Diagnosis) L97.521 - Non-pressure chronic ulcer of other part of left foot limited to breakdown of skin  (Encounter Diagnosis) M86.471 - Chronic osteomyelitis with draining sinus, right ankle and foot  (Encounter Diagnosis) L89.610 - Pressure ul Medipore tape (no substitution)   Change dressing every other day and as needed. Wound #7 Left, Plantar Foot     Wound Cleansing & Dressings  Other: - just monitor daily  Change Dressing Daily and PRN    Additional Orders:     Follow-Up Appointments  Ret 2. There is a curvilinear fluid collection with peripheral enhancement along the anterior aspect of the navicular bone measuring up to 5 cm suspicious for an abscess. 3. Postoperative changes from amputation between the midfoot and hindfoot are noted.   4.

## 2019-03-13 ENCOUNTER — OFFICE VISIT (OUTPATIENT)
Dept: WOUND CARE | Facility: HOSPITAL | Age: 64
End: 2019-03-13
Attending: INTERNAL MEDICINE
Payer: COMMERCIAL

## 2019-03-13 DIAGNOSIS — E11.40 TYPE 2 DIABETES MELLITUS WITH DIABETIC NEUROPATHY, WITHOUT LONG-TERM CURRENT USE OF INSULIN (HCC): ICD-10-CM

## 2019-03-13 DIAGNOSIS — L97.518 NON-PRESSURE CHRONIC ULCER OF OTHER PART OF RIGHT FOOT WITH OTHER SPECIFIED SEVERITY (HCC): Primary | ICD-10-CM

## 2019-03-13 NOTE — PROGRESS NOTES
HBO Tech Details  Patient Name: Jose Antonio Palacio   Patient Number: [id number]  Patient YOB: 1955  Date: 3/12/2019  Physician / Weogufka Conquest: Jonathon Jimenez, [address]: [id number] [facility] Treatment Course Details  Treatment Course Number: 1  Indication: Diab

## 2019-03-14 ENCOUNTER — OFFICE VISIT (OUTPATIENT)
Dept: WOUND CARE | Facility: HOSPITAL | Age: 64
End: 2019-03-14
Attending: HOSPITALIST
Payer: COMMERCIAL

## 2019-03-14 DIAGNOSIS — E11.40 TYPE 2 DIABETES MELLITUS WITH DIABETIC NEUROPATHY, WITHOUT LONG-TERM CURRENT USE OF INSULIN (HCC): ICD-10-CM

## 2019-03-14 DIAGNOSIS — L97.518 NON-PRESSURE CHRONIC ULCER OF OTHER PART OF RIGHT FOOT WITH OTHER SPECIFIED SEVERITY (HCC): Primary | ICD-10-CM

## 2019-03-14 DIAGNOSIS — E11.621 DIABETES MELLITUS WITH ULCER OF FOOT (HCC): ICD-10-CM

## 2019-03-14 DIAGNOSIS — L97.509 DIABETES MELLITUS WITH ULCER OF FOOT (HCC): ICD-10-CM

## 2019-03-14 NOTE — PROGRESS NOTES
[facility] Tech Details  Patient Name: Praveena Kincaid   Patient Number: [id number]  Patient YOB: 1955  Date: 3/14/2019  Physician / Ayo Jaimes: Terra [address] 32: [id number] [facility] Treatment Course Details  Treatment Course Number: 1  Indication:

## 2019-03-15 ENCOUNTER — OFFICE VISIT (OUTPATIENT)
Dept: WOUND CARE | Facility: HOSPITAL | Age: 64
End: 2019-03-15
Attending: INTERNAL MEDICINE
Payer: COMMERCIAL

## 2019-03-15 DIAGNOSIS — L97.509 DIABETES MELLITUS WITH ULCER OF FOOT (HCC): ICD-10-CM

## 2019-03-15 DIAGNOSIS — E11.621 DIABETES MELLITUS WITH ULCER OF FOOT (HCC): ICD-10-CM

## 2019-03-15 DIAGNOSIS — L97.518 NON-PRESSURE CHRONIC ULCER OF OTHER PART OF RIGHT FOOT WITH OTHER SPECIFIED SEVERITY (HCC): Primary | ICD-10-CM

## 2019-03-15 DIAGNOSIS — E11.40 TYPE 2 DIABETES MELLITUS WITH DIABETIC NEUROPATHY, WITHOUT LONG-TERM CURRENT USE OF INSULIN (HCC): ICD-10-CM

## 2019-03-15 NOTE — PROGRESS NOTES
HBO Tech Details  Patient Name: Mary Lira   Patient Number: [id number]  Patient YOB: 1955  Date: 3/15/2019  Physician / Jama Romero: [address] [id number] [facility] Treatment Course Details  Treatment Course Number: 1  Indication: D

## 2019-03-18 ENCOUNTER — OFFICE VISIT (OUTPATIENT)
Dept: WOUND CARE | Facility: HOSPITAL | Age: 64
End: 2019-03-18
Attending: INTERNAL MEDICINE
Payer: COMMERCIAL

## 2019-03-18 DIAGNOSIS — L97.509 DIABETES MELLITUS WITH ULCER OF FOOT (HCC): ICD-10-CM

## 2019-03-18 DIAGNOSIS — E11.40 TYPE 2 DIABETES MELLITUS WITH DIABETIC NEUROPATHY, WITHOUT LONG-TERM CURRENT USE OF INSULIN (HCC): ICD-10-CM

## 2019-03-18 DIAGNOSIS — L97.518 NON-PRESSURE CHRONIC ULCER OF OTHER PART OF RIGHT FOOT WITH OTHER SPECIFIED SEVERITY (HCC): Primary | ICD-10-CM

## 2019-03-18 DIAGNOSIS — E11.621 DIABETES MELLITUS WITH ULCER OF FOOT (HCC): ICD-10-CM

## 2019-03-18 NOTE — PROGRESS NOTES
HBO Tech Details  Patient Name: Robert Freeman   Patient Number: [id number]  Patient YOB: 1955  Date: 3/18/2019  Physician / Mihir Course: Missy Slater, [address] [id number] [facility] Treatment Course Details  Treatment Course Number: 1  Indication: D

## 2019-03-19 ENCOUNTER — OFFICE VISIT (OUTPATIENT)
Dept: WOUND CARE | Facility: HOSPITAL | Age: 64
End: 2019-03-19
Attending: HOSPITALIST
Payer: COMMERCIAL

## 2019-03-19 DIAGNOSIS — E11.40 TYPE 2 DIABETES MELLITUS WITH DIABETIC NEUROPATHY, WITHOUT LONG-TERM CURRENT USE OF INSULIN (HCC): ICD-10-CM

## 2019-03-19 DIAGNOSIS — L97.518 NON-PRESSURE CHRONIC ULCER OF OTHER PART OF RIGHT FOOT WITH OTHER SPECIFIED SEVERITY (HCC): Primary | ICD-10-CM

## 2019-03-19 NOTE — PROGRESS NOTES
Header Image    CurrentOld Version  [facility] Tech Details  Patient Name: Evy Bliss   Patient Number: [id number]  Patient YOB: 1955  Date: 3/19/2019  Physician / Iraida Gardnereeters: Terra Rivera 32: [id number] [facility] Treatment Course Details

## 2019-03-20 ENCOUNTER — OFFICE VISIT (OUTPATIENT)
Dept: WOUND CARE | Facility: HOSPITAL | Age: 64
End: 2019-03-20
Attending: INTERNAL MEDICINE
Payer: COMMERCIAL

## 2019-03-20 NOTE — PROGRESS NOTES
HBO Tech Details      Patient Name: Robert Freeman    Patient Number: [id number]   Patient YOB: 1955      Date: 3/20/2019   Physician / Mihir Course: Missy Slater, [address] [place] [address]: [facility] Treatment Course Details          Treatment C

## 2019-03-21 ENCOUNTER — OFFICE VISIT (OUTPATIENT)
Dept: WOUND CARE | Facility: HOSPITAL | Age: 64
End: 2019-03-21
Attending: HOSPITALIST
Payer: COMMERCIAL

## 2019-03-21 DIAGNOSIS — E11.621 DIABETES MELLITUS WITH ULCER OF FOOT (HCC): ICD-10-CM

## 2019-03-21 DIAGNOSIS — L97.509 DIABETES MELLITUS WITH ULCER OF FOOT (HCC): ICD-10-CM

## 2019-03-21 DIAGNOSIS — E11.40 TYPE 2 DIABETES MELLITUS WITH DIABETIC NEUROPATHY, WITHOUT LONG-TERM CURRENT USE OF INSULIN (HCC): ICD-10-CM

## 2019-03-21 DIAGNOSIS — L97.518 NON-PRESSURE CHRONIC ULCER OF OTHER PART OF RIGHT FOOT WITH OTHER SPECIFIED SEVERITY (HCC): Primary | ICD-10-CM

## 2019-03-21 NOTE — PROGRESS NOTES
[facility] Tech Details      Patient Name: Mary Lira Patient Number: [id number]   Patient YOB: 1955      Date: 3/21/2019   Physician / Jama Romero:  Es Malik [facility] 51: [address] [facility]

## 2019-03-22 ENCOUNTER — APPOINTMENT (OUTPATIENT)
Dept: WOUND CARE | Facility: HOSPITAL | Age: 64
End: 2019-03-22
Payer: COMMERCIAL

## 2019-03-25 ENCOUNTER — OFFICE VISIT (OUTPATIENT)
Dept: WOUND CARE | Facility: HOSPITAL | Age: 64
End: 2019-03-25
Attending: INTERNAL MEDICINE
Payer: COMMERCIAL

## 2019-03-25 DIAGNOSIS — E11.40 TYPE 2 DIABETES MELLITUS WITH DIABETIC NEUROPATHY, WITHOUT LONG-TERM CURRENT USE OF INSULIN (HCC): ICD-10-CM

## 2019-03-25 DIAGNOSIS — L97.518 NON-PRESSURE CHRONIC ULCER OF OTHER PART OF RIGHT FOOT WITH OTHER SPECIFIED SEVERITY (HCC): Primary | ICD-10-CM

## 2019-03-25 NOTE — PROGRESS NOTES
HBO Tech Details  Patient Name: Khalida Caldwell   Patient Number: [id number]  Patient YOB: 1955  Date: 3/25/2019  Physician / Yanna Face: Lamberto Jacobsen, [address] [id number] [facility] Treatment Course Details  Treatment Course Number: 1  Total Treatme

## 2019-03-26 ENCOUNTER — OFFICE VISIT (OUTPATIENT)
Dept: WOUND CARE | Facility: HOSPITAL | Age: 64
End: 2019-03-26
Attending: HOSPITALIST
Payer: COMMERCIAL

## 2019-03-26 DIAGNOSIS — L97.518 NON-PRESSURE CHRONIC ULCER OF OTHER PART OF RIGHT FOOT WITH OTHER SPECIFIED SEVERITY (HCC): Primary | ICD-10-CM

## 2019-03-26 DIAGNOSIS — E11.40 TYPE 2 DIABETES MELLITUS WITH DIABETIC NEUROPATHY, WITHOUT LONG-TERM CURRENT USE OF INSULIN (HCC): ICD-10-CM

## 2019-03-26 PROCEDURE — 97597 DBRDMT OPN WND 1ST 20 CM/<: CPT

## 2019-03-26 NOTE — PROGRESS NOTES
HBO Tech Details  Patient Name: Catia Irwin   Patient Number: [id number]  Patient YOB: 1955  Date: 3/26/2019  Physician / April Short: Romi Mcnair, [address] [place] [address]: [id number] [facility] Treatment Course Details  Treatment Course Number: 1  Total Treatments foot December 2017  Context: DFU  Modifying Factors: Wound reopens as soon as he starts weight bearing again  Associated Signs and Symptoms: Custom shoes/inserts for both feet.  Left shoe has not been adjusted since 5th ray amp in December  PREVIOUS WOUND C oxygen therapy. He has a diagnosis of chronic osteomyelitis and DFU stage 4, failing conservative management. He was recently admited to the hospital for cellulitis and abscess of the surgical wound stump, which had I&D and irrigation done.  He was discharg the tegaderm the area became very macerated. we discussed stopping the regranex and trialing a foam dressing to decrease the dressing frequency change and attempt to get a more stable moist wound healing environment.     3-26-19 patient returns today, he wa Information  Medication reconciliation completed at today's visit. : Yes        Objective    Constitutional  bp wnl for patient. Pulse Regular and wnl for patient. Mary Spies Respirations easy and unlabored. Temperature wnl. elevated BMI. Appearance neat and clean. depth, with an area of 1.2 sq cm . No tunneling has been noted. No sinus tract has been noted. No undermining has been noted. There was no drainage noted. The patient reports a wound pain of level 0/10. The wound margin is flat and intact.  Wound bed has 76 agitation. Calm, cooperative, and communicative. Appropriate interactions and affect.         Assessment    Active Problems    ICD-10  (Encounter Diagnosis) L97.514 - Non-pressure chronic ulcer of other part of right foot with necrosis of bone  (Encounter D Measurements: 0cm length x 0cm width x 0cm depth; with an area of 0 sq cm        Plan    Wound Orders:  Wound #3 Right Foot     Wound Cleansing & Dressings  May shower with protection.   Cleanse with saline or wound cleanser  Other: - regranex, silicone con pinsky:  CPR:  2-6-19 2.34  1-30-19 4.94  1-23-19 8.56    Workflow: Diabetes  Perfusion assessed by JOHANNA/TBI - 11/5/18: Bedside L 1.21, R 1.22  Perfusion assessed by doppler. - Biphasic signals  Perfusion assessed by palpation of pulses.    Last A1C date and extremities, and ultimately salvage the limb     In reviewing the patient's medical record, there were other treatments tried which included moist wound healing, skin substitutes, iv abx.    I had a very marya discussion with the patient today that he is no

## 2019-03-27 ENCOUNTER — OFFICE VISIT (OUTPATIENT)
Dept: WOUND CARE | Facility: HOSPITAL | Age: 64
End: 2019-03-27
Attending: HOSPITALIST
Payer: COMMERCIAL

## 2019-03-27 DIAGNOSIS — L97.518 NON-PRESSURE CHRONIC ULCER OF OTHER PART OF RIGHT FOOT WITH OTHER SPECIFIED SEVERITY (HCC): Primary | ICD-10-CM

## 2019-03-27 DIAGNOSIS — E11.40 TYPE 2 DIABETES MELLITUS WITH DIABETIC NEUROPATHY, WITHOUT LONG-TERM CURRENT USE OF INSULIN (HCC): ICD-10-CM

## 2019-03-27 NOTE — PROGRESS NOTES
[facility] Tech Details      Patient Name: Tristan Jose    Patient Number: [id number]   Patient YOB: 1955      Date: 3/27/2019   Physician / Harpal Lockett: [address]: [address] [place] [address]

## 2019-03-28 ENCOUNTER — OFFICE VISIT (OUTPATIENT)
Dept: WOUND CARE | Facility: HOSPITAL | Age: 64
End: 2019-03-28
Attending: HOSPITALIST
Payer: COMMERCIAL

## 2019-03-28 DIAGNOSIS — L97.518 NON-PRESSURE CHRONIC ULCER OF OTHER PART OF RIGHT FOOT WITH OTHER SPECIFIED SEVERITY (HCC): Primary | ICD-10-CM

## 2019-03-28 DIAGNOSIS — E11.621 DIABETES MELLITUS WITH ULCER OF FOOT (HCC): ICD-10-CM

## 2019-03-28 DIAGNOSIS — E11.40 TYPE 2 DIABETES MELLITUS WITH DIABETIC NEUROPATHY, WITHOUT LONG-TERM CURRENT USE OF INSULIN (HCC): ICD-10-CM

## 2019-03-28 DIAGNOSIS — L97.509 DIABETES MELLITUS WITH ULCER OF FOOT (HCC): ICD-10-CM

## 2019-03-28 NOTE — PROGRESS NOTES
HBO Tech Details      Patient Name: Francheska Delarosa    Patient Number: [id number]   Patient YOB: 1955      Date: 3/28/2019   Physician / Tessa Hoyos: Rodger Ojeda 80: [facility] Treatment Course Details          Treatment Course

## 2019-03-29 ENCOUNTER — OFFICE VISIT (OUTPATIENT)
Dept: WOUND CARE | Facility: HOSPITAL | Age: 64
End: 2019-03-29
Attending: HOSPITALIST
Payer: COMMERCIAL

## 2019-03-29 DIAGNOSIS — L97.518 NON-PRESSURE CHRONIC ULCER OF OTHER PART OF RIGHT FOOT WITH OTHER SPECIFIED SEVERITY (HCC): Primary | ICD-10-CM

## 2019-03-29 DIAGNOSIS — E11.40 TYPE 2 DIABETES MELLITUS WITH DIABETIC NEUROPATHY, WITHOUT LONG-TERM CURRENT USE OF INSULIN (HCC): ICD-10-CM

## 2019-03-29 NOTE — PROGRESS NOTES
[facility] Tech Details  Patient Name: Fco Aguilasins   Patient Number: [id number]  Patient YOB: 1955  Date: 3/29/2019  Physician / Manohar Gone: Delia Arana [address]: [id number] [facility] Treatment Course Details  Treatment Course Number: 1  Total Treatments

## 2019-04-01 ENCOUNTER — OFFICE VISIT (OUTPATIENT)
Dept: WOUND CARE | Facility: HOSPITAL | Age: 64
End: 2019-04-01
Attending: INTERNAL MEDICINE
Payer: COMMERCIAL

## 2019-04-01 DIAGNOSIS — E11.40 TYPE 2 DIABETES MELLITUS WITH DIABETIC NEUROPATHY, WITHOUT LONG-TERM CURRENT USE OF INSULIN (HCC): ICD-10-CM

## 2019-04-01 DIAGNOSIS — L97.518 NON-PRESSURE CHRONIC ULCER OF OTHER PART OF RIGHT FOOT WITH OTHER SPECIFIED SEVERITY (HCC): Primary | ICD-10-CM

## 2019-04-01 NOTE — PROGRESS NOTES
[facility] Tech Details  Patient Name: Tavon Esqueda   Patient Number: [id number]  Patient YOB: 1955  Date: 4/1/2019  Physician / Giovana Rojaser: Carlos Perry, [address] [id number] [facility] Treatment Course Details  Treatment Course Number: 1  Total Treatmen

## 2019-04-02 ENCOUNTER — OFFICE VISIT (OUTPATIENT)
Dept: WOUND CARE | Facility: HOSPITAL | Age: 64
End: 2019-04-02
Attending: HOSPITALIST
Payer: COMMERCIAL

## 2019-04-02 DIAGNOSIS — L97.518 NON-PRESSURE CHRONIC ULCER OF OTHER PART OF RIGHT FOOT WITH OTHER SPECIFIED SEVERITY (HCC): Primary | ICD-10-CM

## 2019-04-02 DIAGNOSIS — E11.40 TYPE 2 DIABETES MELLITUS WITH DIABETIC NEUROPATHY, WITHOUT LONG-TERM CURRENT USE OF INSULIN (HCC): ICD-10-CM

## 2019-04-02 DIAGNOSIS — E11.621 DIABETES MELLITUS WITH ULCER OF FOOT (HCC): ICD-10-CM

## 2019-04-02 DIAGNOSIS — L97.509 DIABETES MELLITUS WITH ULCER OF FOOT (HCC): ICD-10-CM

## 2019-04-02 NOTE — PROGRESS NOTES
Header Image    CurrentOld Version  HBO Tech Details  Patient Name: Ny Doe   Patient Number: [id number]  Patient YOB: 1955  Date: 4/2/2019  Physician / Flori Mahmood: Rocio Bucio, [address] [place] [address] Drive: [address] [place] [address] Treatment Course Details  Treatmen

## 2019-04-03 ENCOUNTER — OFFICE VISIT (OUTPATIENT)
Dept: WOUND CARE | Facility: HOSPITAL | Age: 64
End: 2019-04-03
Attending: INTERNAL MEDICINE
Payer: COMMERCIAL

## 2019-04-03 DIAGNOSIS — E11.40 TYPE 2 DIABETES MELLITUS WITH DIABETIC NEUROPATHY, WITHOUT LONG-TERM CURRENT USE OF INSULIN (HCC): ICD-10-CM

## 2019-04-03 DIAGNOSIS — L97.518 NON-PRESSURE CHRONIC ULCER OF OTHER PART OF RIGHT FOOT WITH OTHER SPECIFIED SEVERITY (HCC): Primary | ICD-10-CM

## 2019-04-03 NOTE — PROGRESS NOTES
[facility] Tech Details  Patient Name: Estefania Eng   Patient Number: [id number]  Patient YOB: 1955  Date: 4/3/2019  Physician / Casie London: Rush Ortega, [address] [id number] [facility] Treatment Course Details  Treatment Course Number: 1  Total Treatmen

## 2019-04-04 ENCOUNTER — APPOINTMENT (OUTPATIENT)
Dept: WOUND CARE | Facility: HOSPITAL | Age: 64
End: 2019-04-04
Payer: COMMERCIAL

## 2019-04-05 ENCOUNTER — OFFICE VISIT (OUTPATIENT)
Dept: WOUND CARE | Facility: HOSPITAL | Age: 64
End: 2019-04-05
Attending: INTERNAL MEDICINE
Payer: COMMERCIAL

## 2019-04-05 DIAGNOSIS — L97.518 NON-PRESSURE CHRONIC ULCER OF OTHER PART OF RIGHT FOOT WITH OTHER SPECIFIED SEVERITY (HCC): Primary | ICD-10-CM

## 2019-04-05 DIAGNOSIS — E11.40 TYPE 2 DIABETES MELLITUS WITH DIABETIC NEUROPATHY, WITHOUT LONG-TERM CURRENT USE OF INSULIN (HCC): ICD-10-CM

## 2019-04-05 PROCEDURE — 97597 DBRDMT OPN WND 1ST 20 CM/<: CPT

## 2019-04-05 NOTE — PROGRESS NOTES
Subjective    Chief Complaint  This information was obtained from the patient  Patient is here for an wound care follow-up visit. Patients stated no pain on the wound.      Allergies  NKDA    HPI  This information was obtained from the patient  The followin he needs an amputation and he does not want to do this. Op report reviewed it does not appear any bone was removed at the time of surgery for pathology or sensitivities. February MRI was + for osteo.   see plan for discussion, no c/o pain and no s/s of ac limited callus but the wound appears very dry, will trial utilizing tegaderm. 3-12-19 patient returns today as he requested earlier appointment as his spouse and him saw a blood blister on the plantar aspect of the left foot.   he states he has an appoin complains of:   Cardiovascular (Central/Peripheral):  Lower extremity (leg) swelling (Minimal), Other (htn), Edema  Musculoskeletal: Deformities (Right Chopart and left 5th ray amputations), Assistive Devices (AFO to right foot and Diabetic shoe left foot) documentation. Wound #3 Right Foot is a chronic Mckeon Grade 3 Diabetic Ulcer and has received a status of Not Healed.  Subsequent wound encounter measurements are 1cm length x 0.8cm width x 0.3cm depth, with an area of 0.8 sq cm and a volume of 0.24 cu tract has been noted. No undermining has been noted. There was no drainage noted. The patient reports no wound pain due to the wound being insensate. Wound bed has % epithelialization. The periwound skin exhibited: Callus, Dry/Scaly.  The temperatur Right Foot     Wound Cleansing & Dressings  May shower with protection. Cleanse with saline or wound cleanser  Other: - COLOPLAST TRIAD HYDROPHILLIC PASTE  Change dressing BID    Off-Loading  No Weight Bearing - No pressure on right foot!!! ever!   Janette Jose Bedside L 1.21, R 1.22  Perfusion assessed by doppler. - Biphasic signals  Perfusion assessed by palpation of pulses.    Last A1C date and value: - Feb 2019 albumin 3.0/tp8.2  9/18: 7.3  Last albumin date and value: - Dec 2018 a1c 7.9  12/16/17: 2.9  Osteom

## 2019-04-05 NOTE — PROGRESS NOTES
HBO Tech Details  Patient Name: Lourdes Alvarado   Patient Number: [id number]  Patient YOB: 1955  Date: 4/5/2019  Physician / Neisha Wiggins: Kamila King, [address] [id number] [facility] Treatment Course Details  Treatment Course Number: 1  Total Treatmen

## 2019-04-08 ENCOUNTER — OFFICE VISIT (OUTPATIENT)
Dept: WOUND CARE | Facility: HOSPITAL | Age: 64
End: 2019-04-08
Attending: INTERNAL MEDICINE
Payer: COMMERCIAL

## 2019-04-08 DIAGNOSIS — E11.40 TYPE 2 DIABETES MELLITUS WITH DIABETIC NEUROPATHY, WITHOUT LONG-TERM CURRENT USE OF INSULIN (HCC): ICD-10-CM

## 2019-04-08 DIAGNOSIS — E11.621 DIABETES MELLITUS WITH ULCER OF FOOT (HCC): ICD-10-CM

## 2019-04-08 DIAGNOSIS — L97.518 NON-PRESSURE CHRONIC ULCER OF OTHER PART OF RIGHT FOOT WITH OTHER SPECIFIED SEVERITY (HCC): Primary | ICD-10-CM

## 2019-04-08 DIAGNOSIS — L97.509 DIABETES MELLITUS WITH ULCER OF FOOT (HCC): ICD-10-CM

## 2019-04-08 NOTE — PROGRESS NOTES
[facility] Tech Details  Patient Name: Francheska Delarosa   Patient Number: [id number]  Patient YOB: 1955  Date: 4/8/2019  Physician / Tessa Hoyos: Ernestina Camacho, [address] [address] [facility] Treatment Course Details  Treatment Course Number: 1  Total Treatmen

## 2019-04-09 ENCOUNTER — APPOINTMENT (OUTPATIENT)
Dept: WOUND CARE | Facility: HOSPITAL | Age: 64
End: 2019-04-09
Payer: COMMERCIAL

## 2019-04-10 ENCOUNTER — APPOINTMENT (OUTPATIENT)
Dept: WOUND CARE | Facility: HOSPITAL | Age: 64
End: 2019-04-10
Payer: COMMERCIAL

## 2019-04-11 ENCOUNTER — APPOINTMENT (OUTPATIENT)
Dept: WOUND CARE | Facility: HOSPITAL | Age: 64
End: 2019-04-11
Payer: COMMERCIAL

## 2019-04-12 ENCOUNTER — OFFICE VISIT (OUTPATIENT)
Dept: WOUND CARE | Facility: HOSPITAL | Age: 64
End: 2019-04-12
Attending: INTERNAL MEDICINE
Payer: COMMERCIAL

## 2019-04-12 DIAGNOSIS — E11.40 TYPE 2 DIABETES MELLITUS WITH DIABETIC NEUROPATHY, WITHOUT LONG-TERM CURRENT USE OF INSULIN (HCC): ICD-10-CM

## 2019-04-12 DIAGNOSIS — E11.621 DIABETES MELLITUS WITH ULCER OF FOOT (HCC): ICD-10-CM

## 2019-04-12 DIAGNOSIS — L97.509 DIABETES MELLITUS WITH ULCER OF FOOT (HCC): ICD-10-CM

## 2019-04-12 DIAGNOSIS — L97.518 NON-PRESSURE CHRONIC ULCER OF OTHER PART OF RIGHT FOOT WITH OTHER SPECIFIED SEVERITY (HCC): Primary | ICD-10-CM

## 2019-04-12 PROCEDURE — 15275 SKIN SUB GRAFT FACE/NK/HF/G: CPT

## 2019-04-12 NOTE — DIETARY NOTE
Nutrition Short Note    Dietitian consult received for diabetes education. Noted pt hemoglobin A1C 11. Pt declined education, states he is familiar with CHO counting and trys to stick to about 60 gms CHO per meal at home.   Pro-stat AWC BID ordered for pt, Statement Selected

## 2019-04-12 NOTE — PROGRESS NOTES
[facility] Tech Details  Patient Name: Estefania Eng   Patient Number: [id number]  Patient YOB: 1955  Date: 4/12/2019  Physician / Casie London: Rush Ortega, [address] [address] [facility] Treatment Course Details  Treatment Course Number: 1  Total Treatme again  Associated Signs and Symptoms: Custom shoes/inserts for both feet. Left shoe has not been adjusted since 5th ray amp in December  PREVIOUS WOUND CARE RECORDS:   9/24/18: Initial return consult for bilateral DFU's.  Pt has been followed by [facility] and  management. He was recently admited to the hospital for cellulitis and abscess of the surgical wound stump, which had I&D and irrigation done.  He was discharged home in  a stable condition on IV antibiotics based on the recommendation of Dr. Nell Connelly whom he foam dressing to decrease the dressing frequency change and attempt to get a more stable moist wound healing environment.     3-26-19 patient returns today, he was ill last week so he missed his wound visit with  me.  patient has significant callus build up left 5th ray amputations), Assistive Devices (AFO to right foot and Diabetic shoe left foot)  Integumentary (Hair/Skin/Nails): Dryness, Calluses/Corns, Ulcers  Neurological: Loss of Protective Sensation (Pt stated has neuropathy), Numbness, Tingling  Psych tunneling has been noted. No sinus tract has been noted. No undermining has been noted. There is a scant amount of sero-sanguineous drainage noted which has no odor. The patient reports a wound pain of level 0/10. The wound margin is well defined.  Wound be periwound skin exhibited: Callus, Dry/Scaly. The temperature of the periwound skin is Warm. Periwound skin does not exhibit signs or symptoms of infection. Local Pulse is Normal.    Psychiatric:  Judgment and insight intact. Alert and oriented times 3.  No w/biphasic or triphasic waveforms at the ankle of the affected leg  This patient has not received a course of Epifix treatment in the past year and has not had 2 or more treatments with Epifix fail.  Total treatment time with Epifix has not exceeded the max cereals  Zinc: Fortified cereals, red meats, seafood    S/S of Infection  Non-adherence    Care summary  Reviewed and evaluated labs. - Feb 2019 bun 24, creat 1.32, gfr 57  GFR 47: 1/29/18  Wound type: - DFU/CHRONIC OSTEO  Wound improving.  No s/s of infect

## 2019-04-15 ENCOUNTER — OFFICE VISIT (OUTPATIENT)
Dept: WOUND CARE | Facility: HOSPITAL | Age: 64
End: 2019-04-15
Attending: INTERNAL MEDICINE
Payer: COMMERCIAL

## 2019-04-15 DIAGNOSIS — E11.621 DIABETES MELLITUS WITH ULCER OF FOOT (HCC): ICD-10-CM

## 2019-04-15 DIAGNOSIS — E11.40 TYPE 2 DIABETES MELLITUS WITH DIABETIC NEUROPATHY, WITHOUT LONG-TERM CURRENT USE OF INSULIN (HCC): ICD-10-CM

## 2019-04-15 DIAGNOSIS — L97.509 DIABETES MELLITUS WITH ULCER OF FOOT (HCC): ICD-10-CM

## 2019-04-15 DIAGNOSIS — L97.518 NON-PRESSURE CHRONIC ULCER OF OTHER PART OF RIGHT FOOT WITH OTHER SPECIFIED SEVERITY (HCC): Primary | ICD-10-CM

## 2019-04-15 NOTE — PROGRESS NOTES
HBO Tech Details      Patient Name: Rosy Cortez    Patient Number: [id number]   Patient YOB: 1955      Date: 4/15/2019   Physician / Jose Nichols: Mick Gallegos, [address] [place] [address] Street: [facility] Treatment Course Details          Treatment C

## 2019-04-16 ENCOUNTER — OFFICE VISIT (OUTPATIENT)
Dept: WOUND CARE | Facility: HOSPITAL | Age: 64
End: 2019-04-16
Attending: INTERNAL MEDICINE
Payer: COMMERCIAL

## 2019-04-16 DIAGNOSIS — L97.518 NON-PRESSURE CHRONIC ULCER OF OTHER PART OF RIGHT FOOT WITH OTHER SPECIFIED SEVERITY (HCC): Primary | ICD-10-CM

## 2019-04-16 DIAGNOSIS — E11.40 TYPE 2 DIABETES MELLITUS WITH DIABETIC NEUROPATHY, WITHOUT LONG-TERM CURRENT USE OF INSULIN (HCC): ICD-10-CM

## 2019-04-16 NOTE — PROGRESS NOTES
Header Image    CurrentOld Version  HBO Tech Details  Patient Name: Robert Freeman   Patient Number: [id number]  Patient YOB: 1955  Date: 4/16/2019  Physician / Mihir Course: Arnoldo Bonds, [facility]: [address] [place] [address] Treatment Course Details  Treat

## 2019-04-17 ENCOUNTER — OFFICE VISIT (OUTPATIENT)
Dept: WOUND CARE | Facility: HOSPITAL | Age: 64
End: 2019-04-17
Attending: INTERNAL MEDICINE
Payer: COMMERCIAL

## 2019-04-17 DIAGNOSIS — L97.509 DIABETES MELLITUS WITH ULCER OF FOOT (HCC): ICD-10-CM

## 2019-04-17 DIAGNOSIS — L97.518 NON-PRESSURE CHRONIC ULCER OF OTHER PART OF RIGHT FOOT WITH OTHER SPECIFIED SEVERITY (HCC): Primary | ICD-10-CM

## 2019-04-17 DIAGNOSIS — E11.621 DIABETES MELLITUS WITH ULCER OF FOOT (HCC): ICD-10-CM

## 2019-04-17 DIAGNOSIS — E11.40 TYPE 2 DIABETES MELLITUS WITH DIABETIC NEUROPATHY, WITHOUT LONG-TERM CURRENT USE OF INSULIN (HCC): ICD-10-CM

## 2019-04-17 NOTE — PROGRESS NOTES
[facility] Tech Details  Patient Name: Nilda Osborn   Patient Number: [id number]  Patient YOB: 1955  Date: 4/17/2019  Physician / Jessica [place]: Brittney Domínguez, [address] [address] [facility] Treatment Course Details  Treatment Course Number: 1  Total Treatme

## 2019-04-18 ENCOUNTER — OFFICE VISIT (OUTPATIENT)
Dept: WOUND CARE | Facility: HOSPITAL | Age: 64
End: 2019-04-18
Attending: HOSPITALIST
Payer: COMMERCIAL

## 2019-04-18 DIAGNOSIS — L97.518 NON-PRESSURE CHRONIC ULCER OF OTHER PART OF RIGHT FOOT WITH OTHER SPECIFIED SEVERITY (HCC): Primary | ICD-10-CM

## 2019-04-18 DIAGNOSIS — E11.40 TYPE 2 DIABETES MELLITUS WITH DIABETIC NEUROPATHY, WITHOUT LONG-TERM CURRENT USE OF INSULIN (HCC): ICD-10-CM

## 2019-04-18 NOTE — PROGRESS NOTES
Header Image    CurrentOld Version  HBO Tech Details  Patient Name: Jose Antonio Palacio   Patient Number: [id number]  Patient YOB: 1955  Date: 4/18/2019  Physician / Plummer Conquest: Jonathon Jimenez, [address]: [id number] [facility] Treatment Course Details  Marjorie

## 2019-04-19 ENCOUNTER — OFFICE VISIT (OUTPATIENT)
Dept: WOUND CARE | Facility: HOSPITAL | Age: 64
End: 2019-04-19
Attending: INTERNAL MEDICINE
Payer: COMMERCIAL

## 2019-04-19 DIAGNOSIS — L97.518 NON-PRESSURE CHRONIC ULCER OF OTHER PART OF RIGHT FOOT WITH OTHER SPECIFIED SEVERITY (HCC): Primary | ICD-10-CM

## 2019-04-19 DIAGNOSIS — E11.40 TYPE 2 DIABETES MELLITUS WITH DIABETIC NEUROPATHY, WITHOUT LONG-TERM CURRENT USE OF INSULIN (HCC): ICD-10-CM

## 2019-04-19 DIAGNOSIS — E11.621 DIABETES MELLITUS WITH ULCER OF FOOT (HCC): ICD-10-CM

## 2019-04-19 DIAGNOSIS — L97.509 DIABETES MELLITUS WITH ULCER OF FOOT (HCC): ICD-10-CM

## 2019-04-19 PROCEDURE — 15275 SKIN SUB GRAFT FACE/NK/HF/G: CPT

## 2019-04-19 NOTE — PROGRESS NOTES
[facility] Tech Details  Patient Name: Kerwin Sultana   Patient Number: [id number]  Patient YOB: 1955  Date: 4/19/2019  Physician / Sushil España: Soto Bailey, [address] [address] [facility] Treatment Course Details  Treatment Course Number: 1  Total Treatme

## 2019-04-19 NOTE — PROGRESS NOTES
Subjective    Chief Complaint  This information was obtained from the patient  Patient is here for an wound care follow-up visit.  Patients states no new complaints or pain    Allergies  NKDA    HPI  This information was obtained from the patient  The follo him he needs an amputation and he does not want to do this. Op report reviewed it does not appear any bone was removed at the time of surgery for pathology or sensitivities. February MRI was + for osteo.   see plan for discussion, no c/o pain and no s/s o limited callus but the wound appears very dry, will trial utilizing tegaderm. 3-12-19 patient returns today as he requested earlier appointment as his spouse and him saw a blood blister on the plantar aspect of the left foot.   he states he has an appoin using the darco to get up at noc to use the bathroom. the drainage has slowed significantly since starting hbo which is a good sign for osteomyelitis being addressed. epicord placed today.     4-19-19 patient returns today, today is dive 27/30. outer dres unlabored. Temperature wnl. elevated BMI. Appearance neat and clean. Appears in no acute distress. Well nourished and well developed.  Height/Length: 75 in (190.5 cm), Weight: 273 lbs (124.09 kgs), BMI: 34.1, Temperature: 97.9 °F (36.61 °C), Pulse: 78 bpm, noted. The patient reports a wound pain of level 0/10. The wound margin is flat and intact. Wound bed has % eschar. The periwound skin texture is normal. The periwound skin exhibited: Dry/Scaly, Erythema.  The temperature of the periwound skin is WN performed by Bri Manzo NP. to remove devitalized tissue: biofilm and callus. The following instrument(s) were used: blade and scissors. No anesthetic was required due to loss of sensation.  A time out was not conducted prior to the start of the proced wheelchair to avoid any prolonged walking  Pressure relief shoe / inserts / foams - Wear shoe at all times even when in the house    HBOT  Indication:   - dfu/CHRONIC REFRACTORY OSTEOMYELITIS    Wound #5 Right, Posterior Heel     Wound Cleansing & Dressing value: - Dec 2018 a1c 7.9  12/16/17: 2.9  Osteomyelitis confirmed by: - Feb 2019 mri:  1. There is confluent and patchy T1 hypointense signal within portions of the navicular bone, calcaneus, and residual medial cuneiform bone suspicious for osteomyelitis. By: Date:  Megan STOVER, MARKEL-AP, CF, [address] [place] [address],[address] [facility], [address] [place] [address] 04/19/2019 17:08:53     4/19/2019 3:48:54 PM Version Signed By: Date:  Megan Willams 4/19/2019 3:49:06 PM    Entered By: Megan Willams on 04/19/2019 17:08:21

## 2019-04-22 ENCOUNTER — OFFICE VISIT (OUTPATIENT)
Dept: WOUND CARE | Facility: HOSPITAL | Age: 64
End: 2019-04-22
Attending: INTERNAL MEDICINE
Payer: COMMERCIAL

## 2019-04-22 DIAGNOSIS — L97.518 NON-PRESSURE CHRONIC ULCER OF OTHER PART OF RIGHT FOOT WITH OTHER SPECIFIED SEVERITY (HCC): Primary | ICD-10-CM

## 2019-04-22 DIAGNOSIS — E11.40 TYPE 2 DIABETES MELLITUS WITH DIABETIC NEUROPATHY, WITHOUT LONG-TERM CURRENT USE OF INSULIN (HCC): ICD-10-CM

## 2019-04-22 NOTE — PROGRESS NOTES
[facility] Tech Details      Patient Name: Estefania Eng    Patient Number: [id number]   Patient YOB: 1955      Date: 4/22/2019   Physician / Casie London: Rush Ortega, [address]: [facility] Treatment Course Details          Treatment C

## 2019-04-23 ENCOUNTER — OFFICE VISIT (OUTPATIENT)
Dept: WOUND CARE | Facility: HOSPITAL | Age: 64
End: 2019-04-23
Attending: INTERNAL MEDICINE
Payer: COMMERCIAL

## 2019-04-23 DIAGNOSIS — E11.40 TYPE 2 DIABETES MELLITUS WITH DIABETIC NEUROPATHY, WITHOUT LONG-TERM CURRENT USE OF INSULIN (HCC): ICD-10-CM

## 2019-04-23 DIAGNOSIS — L97.518 NON-PRESSURE CHRONIC ULCER OF OTHER PART OF RIGHT FOOT WITH OTHER SPECIFIED SEVERITY (HCC): Primary | ICD-10-CM

## 2019-04-23 NOTE — PROGRESS NOTES
HBO Tech Details      Patient Name: Jenny Sams    Patient Number: [id number]   Patient YOB: 1955      Date: 4/23/2019   Physician / Tahmina Friend: Aroldo Kamara [facility] [address]: [facility] Treatment Course Details          Treatment C

## 2019-04-24 ENCOUNTER — OFFICE VISIT (OUTPATIENT)
Dept: WOUND CARE | Facility: HOSPITAL | Age: 64
End: 2019-04-24
Attending: INTERNAL MEDICINE
Payer: COMMERCIAL

## 2019-04-24 DIAGNOSIS — E11.621 DIABETES MELLITUS WITH ULCER OF FOOT (HCC): ICD-10-CM

## 2019-04-24 DIAGNOSIS — L97.518 NON-PRESSURE CHRONIC ULCER OF OTHER PART OF RIGHT FOOT WITH OTHER SPECIFIED SEVERITY (HCC): Primary | ICD-10-CM

## 2019-04-24 DIAGNOSIS — L97.509 DIABETES MELLITUS WITH ULCER OF FOOT (HCC): ICD-10-CM

## 2019-04-24 DIAGNOSIS — E11.40 TYPE 2 DIABETES MELLITUS WITH DIABETIC NEUROPATHY, WITHOUT LONG-TERM CURRENT USE OF INSULIN (HCC): ICD-10-CM

## 2019-04-24 NOTE — PROGRESS NOTES
HBO Tech Details      Patient Name: Tristan Jose    Patient Number: [id number]   Patient YOB: 1955      Date: 4/24/2019   Physician / Harpal Lockett: Zhang Daniels, [address]: [facility] Treatment Course Details          Treatment C

## 2019-04-25 ENCOUNTER — OFFICE VISIT (OUTPATIENT)
Dept: WOUND CARE | Facility: HOSPITAL | Age: 64
End: 2019-04-25
Attending: HOSPITALIST
Payer: COMMERCIAL

## 2019-04-25 DIAGNOSIS — L97.518 NON-PRESSURE CHRONIC ULCER OF OTHER PART OF RIGHT FOOT WITH OTHER SPECIFIED SEVERITY (HCC): Primary | ICD-10-CM

## 2019-04-25 DIAGNOSIS — E11.40 TYPE 2 DIABETES MELLITUS WITH DIABETIC NEUROPATHY, WITHOUT LONG-TERM CURRENT USE OF INSULIN (HCC): ICD-10-CM

## 2019-04-25 NOTE — PROGRESS NOTES
[facility] Tech Details  Patient Name: Heather Gallardo   Patient Number: [id number]  Patient YOB: 1955  Date: 4/25/2019  Physician / Shu Flatness: [address]: [address] [facility] Treatment Course Details  Treatment Course Number: 1  Total Treatm

## 2019-04-26 ENCOUNTER — OFFICE VISIT (OUTPATIENT)
Dept: WOUND CARE | Facility: HOSPITAL | Age: 64
End: 2019-04-26
Attending: INTERNAL MEDICINE
Payer: COMMERCIAL

## 2019-04-26 DIAGNOSIS — E11.621 DIABETES MELLITUS WITH ULCER OF FOOT (HCC): ICD-10-CM

## 2019-04-26 DIAGNOSIS — L97.518 NON-PRESSURE CHRONIC ULCER OF OTHER PART OF RIGHT FOOT WITH OTHER SPECIFIED SEVERITY (HCC): Primary | ICD-10-CM

## 2019-04-26 DIAGNOSIS — E11.40 TYPE 2 DIABETES MELLITUS WITH DIABETIC NEUROPATHY, WITHOUT LONG-TERM CURRENT USE OF INSULIN (HCC): ICD-10-CM

## 2019-04-26 DIAGNOSIS — L97.509 DIABETES MELLITUS WITH ULCER OF FOOT (HCC): ICD-10-CM

## 2019-04-26 PROCEDURE — 97597 DBRDMT OPN WND 1ST 20 CM/<: CPT

## 2019-04-26 PROCEDURE — 15275 SKIN SUB GRAFT FACE/NK/HF/G: CPT

## 2019-04-26 PROCEDURE — 82962 GLUCOSE BLOOD TEST: CPT

## 2019-04-26 NOTE — PROGRESS NOTES
HBO Tech Details  Patient Name: Judah Duckworth   Patient Number: [id number]  Patient YOB: 1955  Date: 4/26/2019  Physician / Jeremiah Fragmin: Miguel Morrissey, [address] [address] [facility] Treatment Course Details  Treatment Course Number: 1  Total Treatme

## 2019-04-29 ENCOUNTER — OFFICE VISIT (OUTPATIENT)
Dept: WOUND CARE | Facility: HOSPITAL | Age: 64
End: 2019-04-29
Attending: INTERNAL MEDICINE
Payer: COMMERCIAL

## 2019-04-29 DIAGNOSIS — L97.509 DIABETES MELLITUS WITH ULCER OF FOOT (HCC): ICD-10-CM

## 2019-04-29 DIAGNOSIS — E11.40 TYPE 2 DIABETES MELLITUS WITH DIABETIC NEUROPATHY, WITHOUT LONG-TERM CURRENT USE OF INSULIN (HCC): ICD-10-CM

## 2019-04-29 DIAGNOSIS — E11.621 DIABETES MELLITUS WITH ULCER OF FOOT (HCC): ICD-10-CM

## 2019-04-29 DIAGNOSIS — L97.518 NON-PRESSURE CHRONIC ULCER OF OTHER PART OF RIGHT FOOT WITH OTHER SPECIFIED SEVERITY (HCC): Primary | ICD-10-CM

## 2019-04-30 ENCOUNTER — OFFICE VISIT (OUTPATIENT)
Dept: WOUND CARE | Facility: HOSPITAL | Age: 64
End: 2019-04-30
Attending: HOSPITALIST
Payer: COMMERCIAL

## 2019-04-30 DIAGNOSIS — E11.40 TYPE 2 DIABETES MELLITUS WITH DIABETIC NEUROPATHY, WITHOUT LONG-TERM CURRENT USE OF INSULIN (HCC): ICD-10-CM

## 2019-04-30 DIAGNOSIS — L97.518 NON-PRESSURE CHRONIC ULCER OF OTHER PART OF RIGHT FOOT WITH OTHER SPECIFIED SEVERITY (HCC): Primary | ICD-10-CM

## 2019-04-30 NOTE — PROGRESS NOTES
HBO Tech Details      Patient Name: Erasmo Ochoa    Patient Number: [id number]   Patient YOB: 1955      Date: 4/29/2019   Physician / Mary : Rell Rodriguez, [address] [place] [address] Street: [facility] Treatment Course Details          Treatment C

## 2019-05-01 ENCOUNTER — APPOINTMENT (OUTPATIENT)
Dept: WOUND CARE | Facility: HOSPITAL | Age: 64
End: 2019-05-01
Payer: COMMERCIAL

## 2019-05-01 NOTE — PROGRESS NOTES
[facility] Tech Details      Patient Name: Nilda Osborn    Patient Number: [id number]   Patient YOB: 1955      Date: 4/30/2019   Physician / Jessica [place]: Padmini Teran, [address] [address] [place]: Ronnie Ramos Treatment Course Details          Treatment

## 2019-05-03 ENCOUNTER — OFFICE VISIT (OUTPATIENT)
Dept: WOUND CARE | Facility: HOSPITAL | Age: 64
End: 2019-05-03
Attending: NURSE PRACTITIONER
Payer: COMMERCIAL

## 2019-05-03 DIAGNOSIS — E11.40 TYPE 2 DIABETES MELLITUS WITH DIABETIC NEUROPATHY, WITHOUT LONG-TERM CURRENT USE OF INSULIN (HCC): ICD-10-CM

## 2019-05-03 DIAGNOSIS — L97.518 NON-PRESSURE CHRONIC ULCER OF OTHER PART OF RIGHT FOOT WITH OTHER SPECIFIED SEVERITY (HCC): Primary | ICD-10-CM

## 2019-05-03 DIAGNOSIS — E11.621 DIABETES MELLITUS WITH ULCER OF FOOT (HCC): ICD-10-CM

## 2019-05-03 DIAGNOSIS — L97.509 DIABETES MELLITUS WITH ULCER OF FOOT (HCC): ICD-10-CM

## 2019-05-03 PROCEDURE — 82962 GLUCOSE BLOOD TEST: CPT

## 2019-05-03 PROCEDURE — 99214 OFFICE O/P EST MOD 30 MIN: CPT

## 2019-05-03 NOTE — PROGRESS NOTES
Subjective    Chief Complaint  This information was obtained from the patient  Patient is here for an wound care follow-up visit. Patients states no new complaints or pain.     Allergies  NKDA    HPI  This information was obtained from the patient  The foll him he needs an amputation and he does not want to do this. Op report reviewed it does not appear any bone was removed at the time of surgery for pathology or sensitivities. February MRI was + for osteo.   see plan for discussion, no c/o pain and no s/s o utilizing wheelchairs    4-12-19 patient returns today, callus is essentially the same however wound bed is more healthy and pink in color, he states he is using the darco to get up at noc to use the bathroom.   the drainage has slowed significantly since s Edema  Musculoskeletal: Deformities (Right Chopart and left 5th ray amputations), Assistive Devices (AFO to right foot and Diabetic shoe left foot)  Integumentary (Hair/Skin/Nails): Dryness, Calluses/Corns, Ulcers  Neurological: Loss of Protective Sensatio There is a moderate amount of sero-sanguineous drainage noted which has no odor. The patient reports a wound pain of level 0/10. The wound margin is well defined. The periwound skin exhibited: Callus, Dry/Scaly.  The periwound skin did not exhibit: Fluctu chronic ulcer of other part of right foot with necrosis of bone  (Encounter Diagnosis) L97.521 - Non-pressure chronic ulcer of other part of left foot limited to breakdown of skin  (Encounter Diagnosis) M86.471 - Chronic osteomyelitis with draining sinus, fortified dairy products, liver, fortified cereals  Zinc: Fortified cereals, red meats, seafood    S/S of Infection  Non-adherence    Care summary  Reviewed and evaluated labs.  - Feb 2019 bun 24, creat 1.32, gfr 57  GFR 47: 1/29/18  Wound type: - DFU/CHRON after he gets his inserts    Electronic Signature(s)  Signed By: Date:  Jenny STOVER, MARKEL-PERCY, CFCN, [facility] 05/03/2019 15:52:44       Entered By: Jenny Reyes on 05/03/2019 15:52:26

## 2019-05-10 ENCOUNTER — OFFICE VISIT (OUTPATIENT)
Dept: WOUND CARE | Facility: HOSPITAL | Age: 64
End: 2019-05-10
Attending: NURSE PRACTITIONER
Payer: COMMERCIAL

## 2019-05-10 DIAGNOSIS — L97.518 NON-PRESSURE CHRONIC ULCER OF OTHER PART OF RIGHT FOOT WITH OTHER SPECIFIED SEVERITY (HCC): Primary | ICD-10-CM

## 2019-05-10 DIAGNOSIS — E11.40 TYPE 2 DIABETES MELLITUS WITH DIABETIC NEUROPATHY, WITHOUT LONG-TERM CURRENT USE OF INSULIN (HCC): ICD-10-CM

## 2019-05-10 PROCEDURE — 82962 GLUCOSE BLOOD TEST: CPT

## 2019-05-10 PROCEDURE — 15275 SKIN SUB GRAFT FACE/NK/HF/G: CPT

## 2019-05-10 NOTE — PROGRESS NOTES
Subjective    Chief Complaint  This information was obtained from the patient  Patient is here for an wound care follow-up visit. Patients states no new complaints or pain.     Allergies  NKDA    HPI  This information was obtained from the patient  The foll him he needs an amputation and he does not want to do this. Op report reviewed it does not appear any bone was removed at the time of surgery for pathology or sensitivities. February MRI was + for osteo.   see plan for discussion, no c/o pain and no s/s o utilizing wheelchairs    4-12-19 patient returns today, callus is essentially the same however wound bed is more healthy and pink in color, he states he is using the darco to get up at noc to use the bathroom.   the drainage has slowed significantly since s c/o pain. General Notes:  Patient arrived wearing a new shoe and insert from [facility]. Follow up visit at [place] [address] in 2 weeks. #2 Theraskin this visit.  RachaelSand ForkRADHA    Review of Systems (ROS)  This information was obtained from the patient, caregiver and kasandra chopart type amp on the right. Capillary refill < 3 seconds. + hairgrowth on le. .     Musculoskeletal:  Gait independent with assistance of crutches. Integumentary (Hair, Skin)  see wound documentation.      Wound #3 Right Foot is a chronic Marty Obey and has received a status of Not Healed. No tunneling has been noted. No sinus tract has been noted. No undermining has been noted. There was no drainage noted. The patient reports no wound pain due to the wound being insensate. The wound margin is callus. with an area of 1.08 sq cm and a volume of 0.324 cubic cm; Wound #3 (Diabetic Ulcer) is located on the right foot. A skin substitute procedure was performed using Theraskin by Wing Steele NP with an application area of 6 sq cm.  Using sterile techniq on right foot!!! ever!   Wheelchair - While traveling use wheelchair to avoid any prolonged walking  Pressure relief shoe / inserts / foams - Wear shoe at all times even when in the house    Wound #5 Right, Posterior Heel     Wound Cleansing & Dressings  Ma JOHANNA/TBI - 11/5/18: Bedside L 1.21, R 1.22  Perfusion assessed by doppler. - Biphasic signals  Perfusion assessed by palpation of pulses.    Last sharp debridement date: - 5-10-19 prior to theraskin placement  Last A1C date and value: - Feb 2019 albumin 3.0/

## 2019-05-17 ENCOUNTER — OFFICE VISIT (OUTPATIENT)
Dept: WOUND CARE | Facility: HOSPITAL | Age: 64
End: 2019-05-17
Attending: NURSE PRACTITIONER
Payer: COMMERCIAL

## 2019-05-17 DIAGNOSIS — E11.40 TYPE 2 DIABETES MELLITUS WITH DIABETIC NEUROPATHY, WITHOUT LONG-TERM CURRENT USE OF INSULIN (HCC): ICD-10-CM

## 2019-05-17 DIAGNOSIS — E11.621 DIABETES MELLITUS WITH ULCER OF FOOT (HCC): ICD-10-CM

## 2019-05-17 DIAGNOSIS — L97.509 DIABETES MELLITUS WITH ULCER OF FOOT (HCC): ICD-10-CM

## 2019-05-17 DIAGNOSIS — L97.518 NON-PRESSURE CHRONIC ULCER OF OTHER PART OF RIGHT FOOT WITH OTHER SPECIFIED SEVERITY (HCC): Primary | ICD-10-CM

## 2019-05-17 PROCEDURE — 99214 OFFICE O/P EST MOD 30 MIN: CPT

## 2019-05-17 PROCEDURE — 82962 GLUCOSE BLOOD TEST: CPT

## 2019-05-17 NOTE — PROGRESS NOTES
Subjective    Chief Complaint  This information was obtained from the patient  Patient is here for a follow up of the LLE. Patient denies pain at the moment, but states he will get random spirts of pain.      Allergies  NKA    HPI  This information was obta Hyperpigmentation  Psychiatric: Nervousness / Tension    Patient denies complaints or symptoms related to:   Constitutional Symptoms (General Health):  Fatigue, Fever, Loss of Appetite, Marked Weight Change, Night Sweats, Chills  Respiratory: Cough, Shortne Yes  Compression Device Used: Tubigrip or Tubifast  Calf Measurement 33 cm from heel with left measurement of 31.7 cm  Ankle Measurement 9 cm from heel with left measurement of 19.5 cm  Right Extremity: Edema is not present  Compression Device In Use: No

## 2019-05-23 ENCOUNTER — APPOINTMENT (OUTPATIENT)
Dept: WOUND CARE | Facility: HOSPITAL | Age: 64
End: 2019-05-23
Attending: NURSE PRACTITIONER
Payer: COMMERCIAL

## 2019-05-23 DIAGNOSIS — L97.509 DIABETES MELLITUS WITH ULCER OF FOOT (HCC): ICD-10-CM

## 2019-05-23 DIAGNOSIS — E11.40 TYPE 2 DIABETES MELLITUS WITH DIABETIC NEUROPATHY, WITHOUT LONG-TERM CURRENT USE OF INSULIN (HCC): Primary | ICD-10-CM

## 2019-05-23 DIAGNOSIS — L97.518 NON-PRESSURE CHRONIC ULCER OF OTHER PART OF RIGHT FOOT WITH OTHER SPECIFIED SEVERITY (HCC): ICD-10-CM

## 2019-05-23 DIAGNOSIS — E11.621 DIABETES MELLITUS WITH ULCER OF FOOT (HCC): ICD-10-CM

## 2019-05-23 PROCEDURE — 15275 SKIN SUB GRAFT FACE/NK/HF/G: CPT

## 2019-05-23 PROCEDURE — 82962 GLUCOSE BLOOD TEST: CPT

## 2019-05-23 NOTE — PROGRESS NOTES
Subjective    Chief Complaint  This information was obtained from the patient  Patient is here for an wound care follow-up visit. Patients states no new complaints or pain.     Allergies  NKDA    HPI  This information was obtained from the patient  The foll him he needs an amputation and he does not want to do this. Op report reviewed it does not appear any bone was removed at the time of surgery for pathology or sensitivities. February MRI was + for osteo.   see plan for discussion, no c/o pain and no s/s o utilizing wheelchairs    4-12-19 patient returns today, callus is essentially the same however wound bed is more healthy and pink in color, he states he is using the darco to get up at noc to use the bathroom.   the drainage has slowed significantly since s c/o pain. 5-17-19 patient returns today, he did get his insert last week, he does have callus around the wound he states he is getting the shoe adjusted next week.  the theraskin is not adherent to wound base but is in the wound, this was left to continu significantly), Erythema, Malodor, Pain    Additional Information  Medication reconciliation completed at today's visit. : Yes        Objective    Constitutional  bp wnl for patient. Pulse Regular and wnl for patient. Ottie Clack Respirations easy and unlabored.  Temp of Not Healed. Subsequent wound encounter measurements are 0.5cm length x 0.4cm width x 0.1cm depth, with an area of 0.2 sq cm and a volume of 0.02 cubic cm. No tunneling has been noted. No sinus tract has been noted. No undermining has been noted.  There i Pressure ulcer of right heel, unstageable  (Encounter Diagnosis) E11.621 - Type 2 diabetes mellitus with foot ulcer  (Encounter Diagnosis) E11.40 - Type 2 diabetes mellitus with diabetic neuropathy, unspecified        Procedures    Wound #3  Wound #3 (Diab any prolonged walking  Pressure relief shoe / inserts / foams - Wear shoe at all times even when in the house  Other: - need to get shoe adjusted for better offloading    Wound #5 Right, Posterior Heel     Wound Cleansing & Dressings  May shower with prote Biphasic signals  Perfusion assessed by palpation of pulses.    Last sharp debridement date: - 5-10-19 prior to theraskin placement  Last A1C date and value: - Feb 2019 albumin 3.0/tp8.2  9/18: 7.3  Last albumin date and value: - Dec 2018 a1c 7.9  12/16/17:

## 2019-05-23 NOTE — PROGRESS NOTES
Subjective    Chief Complaint  This information was obtained from the patient  Patient is here for an wound care follow-up visit. Patients states no new complaints or pain.     Allergies  NKDA    HPI  This information was obtained from the patient  The foll him he needs an amputation and he does not want to do this. Op report reviewed it does not appear any bone was removed at the time of surgery for pathology or sensitivities. February MRI was + for osteo.   see plan for discussion, no c/o pain and no s/s o utilizing wheelchairs    4-12-19 patient returns today, callus is essentially the same however wound bed is more healthy and pink in color, he states he is using the darco to get up at noc to use the bathroom.   the drainage has slowed significantly since s c/o pain. 5-17-19 patient returns today, he did get his insert last week, he does have callus around the wound he states he is getting the shoe adjusted next week.  the theraskin is not adherent to wound base but is in the wound, this was left to continu Pulse: 80 bpm, Respiratory Rate: 16 breaths/min, Blood Pressure: 145/75 mmHg, Capillary Blood Glucose: 151 mg/dl. Cardiovascular:  dp/pt palpable right. right lower Extremity free of varicosities, + edema. patient with chopart type amp on the right.  Ca signs or symptoms of infection. Local Pulse is Normal.   General Notes:  nonintegrating TheraSkin in wound bed    Wound #7 Left, Plantar Foot is a Mckeon Grade 0 Diabetic Ulcer and has received a status of Not Healed.  Subsequent wound encounter measurement ever!  Wheelchair - While traveling use wheelchair to avoid any prolonged walking  Pressure relief shoe / inserts / foams - Wear shoe at all times even when in the house  Other: - need to get shoe adjusted for better offloading    Wound #5 Right, Posterior by doppler. - Biphasic signals  Perfusion assessed by palpation of pulses.    Last sharp debridement date: - 5-10-19 prior to theraskin placement  Last A1C date and value: - Feb 2019 albumin 3.0/tp8.2  9/18: 7.3  Last albumin date and value: - Dec 2018 a1c

## 2019-05-31 ENCOUNTER — OFFICE VISIT (OUTPATIENT)
Dept: WOUND CARE | Facility: HOSPITAL | Age: 64
End: 2019-05-31
Attending: NURSE PRACTITIONER
Payer: COMMERCIAL

## 2019-05-31 DIAGNOSIS — E11.40 TYPE 2 DIABETES MELLITUS WITH DIABETIC NEUROPATHY, WITHOUT LONG-TERM CURRENT USE OF INSULIN (HCC): Primary | ICD-10-CM

## 2019-05-31 DIAGNOSIS — L97.518 NON-PRESSURE CHRONIC ULCER OF OTHER PART OF RIGHT FOOT WITH OTHER SPECIFIED SEVERITY (HCC): ICD-10-CM

## 2019-05-31 PROCEDURE — 97597 DBRDMT OPN WND 1ST 20 CM/<: CPT

## 2019-05-31 PROCEDURE — 82962 GLUCOSE BLOOD TEST: CPT

## 2019-05-31 NOTE — PROGRESS NOTES
Subjective    Chief Complaint  This information was obtained from the patient  Patient is here for an wound care follow-up visit. Patients states he is concerned about infection.     Allergies  NKDA    HPI  This information was obtained from the patient  Th tell him he needs an amputation and he does not want to do this. Op report reviewed it does not appear any bone was removed at the time of surgery for pathology or sensitivities. February MRI was + for osteo.   see plan for discussion, no c/o pain and no utilizing wheelchairs    4-12-19 patient returns today, callus is essentially the same however wound bed is more healthy and pink in color, he states he is using the darco to get up at noc to use the bathroom.   the drainage has slowed significantly since s c/o pain. 5-17-19 patient returns today, he did get his insert last week, he does have callus around the wound he states he is getting the shoe adjusted next week.  the theraskin is not adherent to wound base but is in the wound, this was left to continu Breath, Wheezing  Cardiovascular (Central/Peripheral): Chest Pain, Dyspnea on Exertion, Intermittent Claudication, Lower extremity (leg) resting pain, Orthopnea  Gastrointestinal (GI): Difficulty Swallowing  Neurological: Memory Loss  Psychiatric: Anxiety, exhibit: Fluctuance, Friable, Erythema. The temperature of the periwound skin is Warm. Periwound skin does not exhibit signs or symptoms of infection.  Local Pulse is Normal.    Wound #5 Right, Posterior Heel is a chronic Unstageable Pressure Injury Obscure No tunneling has been noted. No sinus tract has been noted. No undermining has been noted. There is a small amount of serous drainage noted which has no odor. The patient reports a wound pain of level 0/10. The wound margin is undefined.  Wound bed has 1-25 Debridement Measurements: 1.4cm length x 1.1cm width x 0.3cm depth; with an area of 1.54 sq cm and a volume of 0.462 cubic cm;        Plan    Wound Orders:  Wound #3 Right Foot     Wound Cleansing & Dressings  May shower with protection.   Cleanse with sali baked potatoes, spinach, broccoli, cauliflower, [place] sprouts, cabbage  Vitamin A: Dark green, leafy vegetables, orange or yellow vegetables, cantaloupe, fortified dairy products, liver, fortified cereals  Zinc: Fortified cereals, red meats, seafood imaging followup is recommended. Follow-Up Appointments:  A follow-up appointment should be scheduled. I had a long discussion with patient the reasons that the theraskin was no longer present  including pressure/friction and mmp's.   I discussed

## 2019-06-14 ENCOUNTER — APPOINTMENT (OUTPATIENT)
Dept: WOUND CARE | Facility: HOSPITAL | Age: 64
End: 2019-06-14
Attending: NURSE PRACTITIONER

## 2019-06-19 ENCOUNTER — OFFICE VISIT (OUTPATIENT)
Dept: WOUND CARE | Age: 64
End: 2019-06-19
Attending: NURSE PRACTITIONER
Payer: COMMERCIAL

## 2019-06-19 DIAGNOSIS — L97.518 NON-PRESSURE CHRONIC ULCER OF OTHER PART OF RIGHT FOOT WITH OTHER SPECIFIED SEVERITY (HCC): ICD-10-CM

## 2019-06-19 DIAGNOSIS — E11.40 TYPE 2 DIABETES MELLITUS WITH DIABETIC NEUROPATHY, WITHOUT LONG-TERM CURRENT USE OF INSULIN (HCC): Primary | ICD-10-CM

## 2019-06-19 PROCEDURE — 15275 SKIN SUB GRAFT FACE/NK/HF/G: CPT

## 2019-06-19 NOTE — PROGRESS NOTES
Subjective    Chief Complaint  This information was obtained from the patient  Patient is here for an wound care follow-up visit. Denies pain.     Allergies  NKDA    HPI  This information was obtained from the patient  The following HPI elements were docume he does not want to do this. Op report reviewed it does not appear any bone was removed at the time of surgery for pathology or sensitivities. February MRI was + for osteo. see plan for discussion, no c/o pain and no s/s of acute infection noted. today. patient returns today, callus is essentially the same however wound bed is more healthy and pink in color, he states he is using the darco to get up at noc to use the bathroom.   the drainage has slowed significantly since starting hbo which is a good sign returns today, he did get his insert last week, he does have callus around the wound he states he is getting the shoe adjusted next week.  the theraskin is not adherent to wound base but is in the wound, this was left to continue to integrate, the patient j (Hair/Skin/Nails): Dryness, Calluses/Corns, Ulcers  Neurological: Loss of Protective Sensation (Pt stated has neuropathy), Numbness, Tingling  Psychiatric: Nervousness / Tension    Patient denies complaints or symptoms related to:   Constitutional Symptoms No undermining has been noted. There is a small amount of sero-sanguineous drainage noted which has no odor. The patient reports a wound pain of level 0/10. The wound margin is well defined. Wound bed has % pink, firm granulation.    The periwound ski of infection. Local Pulse is Normal.    Wound #8 Right, Medial Ankle is an acute Full Thickness Edema and has received an outcome of Resolved.  Subsequent wound encounter measurements are 0cm length x 0cm width with no measurable depth, with an area of 0 sq skin  (Encounter Diagnosis) M86.471 - Chronic osteomyelitis with draining sinus, right ankle and foot  (Encounter Diagnosis) L89.610 - Pressure ulcer of right heel, unstageable  (Encounter Diagnosis) E11.621 - Type 2 diabetes mellitus with foot ulcer  (Enc layer. bolstered with drawtex  Kerramax/Super absorbent  Medipore tape (no substitution)   Change dressing every: - weekly    Off-Loading  No Weight Bearing - No pressure on right foot!!!   Wheelchair - While traveling use wheelchair to avoid any prolonged 47: 1/29/18  Wound type: - DFU/CHRONIC OSTEO  Wound improving. No s/s of infection.    Start antibiotics as prescribed: - on picc with dr Burch Mems:  CPR:  2-6-19 2.34  1-30-19 4.94  1-23-19 8.56    Workflow: Diabetes  Perfusion assessed by JOHANNA/TBI - 11/5/18:

## 2019-06-26 ENCOUNTER — OFFICE VISIT (OUTPATIENT)
Dept: WOUND CARE | Age: 64
End: 2019-06-26
Attending: NURSE PRACTITIONER
Payer: COMMERCIAL

## 2019-06-26 DIAGNOSIS — E11.40 TYPE 2 DIABETES MELLITUS WITH DIABETIC NEUROPATHY, WITHOUT LONG-TERM CURRENT USE OF INSULIN (HCC): Primary | ICD-10-CM

## 2019-06-26 DIAGNOSIS — L97.518 NON-PRESSURE CHRONIC ULCER OF OTHER PART OF RIGHT FOOT WITH OTHER SPECIFIED SEVERITY (HCC): ICD-10-CM

## 2019-06-26 PROCEDURE — 99214 OFFICE O/P EST MOD 30 MIN: CPT

## 2019-06-26 NOTE — PROGRESS NOTES
Subjective    Chief Complaint  This information was obtained from the patient  Patient is here for an wound care follow-up visit. Denies pain.     Allergies  NKDA    HPI  This information was obtained from the patient  The following HPI elements were docume he does not want to do this. Op report reviewed it does not appear any bone was removed at the time of surgery for pathology or sensitivities. February MRI was + for osteo. see plan for discussion, no c/o pain and no s/s of acute infection noted. today. patient returns today, callus is essentially the same however wound bed is more healthy and pink in color, he states he is using the darco to get up at noc to use the bathroom.   the drainage has slowed significantly since starting hbo which is a good sign returns today, he did get his insert last week, he does have callus around the wound he states he is getting the shoe adjusted next week.  the theraskin is not adherent to wound base but is in the wound, this was left to continue to integrate, the patient j of Systems (ROS)  This information was obtained from the patient, caregiver and chart. Complaints and Symptoms  Patient complains of:   Cardiovascular (Central/Peripheral):  Lower extremity (leg) swelling (Minimal), Other (htn), Edema  Musculoskeletal: D Musculoskeletal:  Gait independent with assistance of crutches. Integumentary (Hair, Skin)  see wound documentation. Wound #3 Right Foot is a chronic Mckeon Grade 3 Diabetic Ulcer and has received a status of Not Healed.  Subsequent wound encoun depth, with an area of 0.08 sq cm . No tunneling has been noted. No sinus tract has been noted. No undermining has been noted. There was no drainage noted. The patient reports no wound pain due to the wound being insensate. The wound margin is callus.  [place] mellitus with diabetic neuropathy, unspecified        Plan    Wound Orders:  Wound #3 Right Foot     Wound Cleansing & Dressings  May shower with protection.   Cleanse with saline or wound cleanser  Cleanse with Vashe - soaked with prontosan  Collagen - end fortified dairy products, liver, fortified cereals  Zinc: Fortified cereals, red meats, seafood    S/S of Infection  Non-adherence    Care summary  Reviewed and evaluated labs.  - Feb 2019 bun 24, creat 1.32, gfr 57  GFR 47: 1/29/18  Wound type: - DFU/CHRON still building callus and the theraskin grafts are not present at week 1.  this could be due to mmp's or pressure. will utilize endoform to address the mmp's over the next week and revisit the offloading issue again.     Electronic Signature(s)  Signed By:

## 2019-07-03 ENCOUNTER — OFFICE VISIT (OUTPATIENT)
Dept: WOUND CARE | Age: 64
End: 2019-07-03
Attending: NURSE PRACTITIONER
Payer: MEDICARE

## 2019-07-03 DIAGNOSIS — L97.509 DIABETES MELLITUS WITH ULCER OF FOOT (HCC): ICD-10-CM

## 2019-07-03 DIAGNOSIS — E11.621 DIABETES MELLITUS WITH ULCER OF FOOT (HCC): ICD-10-CM

## 2019-07-03 DIAGNOSIS — E11.40 TYPE 2 DIABETES MELLITUS WITH DIABETIC NEUROPATHY, WITHOUT LONG-TERM CURRENT USE OF INSULIN (HCC): Primary | ICD-10-CM

## 2019-07-03 DIAGNOSIS — L97.518 NON-PRESSURE CHRONIC ULCER OF OTHER PART OF RIGHT FOOT WITH OTHER SPECIFIED SEVERITY (HCC): ICD-10-CM

## 2019-07-03 PROCEDURE — 97597 DBRDMT OPN WND 1ST 20 CM/<: CPT

## 2019-07-03 NOTE — PROGRESS NOTES
Subjective    Chief Complaint  This information was obtained from the patient  Patient is here for an wound care follow-up visit. Denies pain.     Allergies  NKDA    HPI  This information was obtained from the patient  The following HPI elements were docume 2-15-19 Patient was last seen in wound center in January. He was direct admitted and is status post right stump wound I&D. Patient's surgery was on 1/15/19 and the procedure was done by Dr. Amie Marrero.   patient reports he has had this wound on and off for 10 y 6-19-19 patient returns today, he is back from visiting with his grandkids in [place], surprisingly there is not significantly more callus that has built up over the last 2 weeks.   patient states he cannot get cast on today due to not being able to drive w Cardiovascular (Central/Peripheral): Chest Pain, Dyspnea on Exertion, Intermittent Claudication, Lower extremity (leg) resting pain, Orthopnea  Gastrointestinal (GI): Difficulty Swallowing  Neurological: Memory Loss  Psychiatric: Anxiety, Memory Loss, Depr The periwound skin moisture is normal. The periwound skin exhibited: Callus. The periwound skin did not exhibit: Fluctuance, Friable, Erythema. The temperature of the periwound skin is Warm. Periwound skin does not exhibit signs or symptoms of infection.  L Wound #9 Left Great Toe is a chronic Mckeon Grade 0 Diabetic Ulcer and has received a status of Not Healed. Subsequent wound encounter measurements are 0.6cm length x 0.7cm width with no measurable depth, with an area of 0.42 sq cm .  No tunneling has been Wound #3 (Diabetic Ulcer) is located on the right foot. A selective debridement with a total area debrided of 1.17 sq cm was performed by Svetlana Hernandez NP. to remove devitalized tissue: biofilm and callus. The following instrument(s) were used: blade.  Pa Increase dietary protein - look for Atul by [employer] (These are essential branch chain amino acids that help with tissue building and wound healing) and take 2 packets/day.  you can order online at [employer] or [address] [place] [address] or some [address] carry it or can orde 4. There is ulceration within the plantar aspect of the hindfoot with a defect extending superiorly by 2 cm.   5. There is a 4.8 cm fluid collection along the posterior margin of the distal tibia surrounding the flexor hallucis longus tendon with peripheral

## 2019-07-19 ENCOUNTER — APPOINTMENT (OUTPATIENT)
Dept: WOUND CARE | Facility: HOSPITAL | Age: 64
End: 2019-07-19
Attending: NURSE PRACTITIONER
Payer: MEDICARE

## 2019-07-22 ENCOUNTER — APPOINTMENT (OUTPATIENT)
Dept: WOUND CARE | Facility: HOSPITAL | Age: 64
End: 2019-07-22
Attending: NURSE PRACTITIONER
Payer: MEDICARE

## 2019-07-26 ENCOUNTER — APPOINTMENT (OUTPATIENT)
Dept: WOUND CARE | Facility: HOSPITAL | Age: 64
End: 2019-07-26
Attending: NURSE PRACTITIONER
Payer: MEDICARE

## 2019-07-29 ENCOUNTER — OFFICE VISIT (OUTPATIENT)
Dept: WOUND CARE | Facility: HOSPITAL | Age: 64
End: 2019-07-29
Attending: NURSE PRACTITIONER
Payer: MEDICARE

## 2019-07-29 DIAGNOSIS — L97.518 NON-PRESSURE CHRONIC ULCER OF OTHER PART OF RIGHT FOOT WITH OTHER SPECIFIED SEVERITY (HCC): ICD-10-CM

## 2019-07-29 DIAGNOSIS — E11.40 TYPE 2 DIABETES MELLITUS WITH DIABETIC NEUROPATHY, WITHOUT LONG-TERM CURRENT USE OF INSULIN (HCC): Primary | ICD-10-CM

## 2019-07-29 LAB — GLUCOSE BLD-MCNC: 128 MG/DL (ref 70–99)

## 2019-07-29 PROCEDURE — 82962 GLUCOSE BLOOD TEST: CPT

## 2019-07-29 PROCEDURE — 15275 SKIN SUB GRAFT FACE/NK/HF/G: CPT

## 2019-07-29 PROCEDURE — 29445 APPL RIGID TOT CNTC LEG CAST: CPT

## 2019-07-31 ENCOUNTER — OFFICE VISIT (OUTPATIENT)
Dept: WOUND CARE | Facility: HOSPITAL | Age: 64
End: 2019-07-31
Attending: NURSE PRACTITIONER
Payer: MEDICARE

## 2019-07-31 DIAGNOSIS — E11.40 TYPE 2 DIABETES MELLITUS WITH DIABETIC NEUROPATHY, WITHOUT LONG-TERM CURRENT USE OF INSULIN (HCC): Primary | ICD-10-CM

## 2019-07-31 DIAGNOSIS — L97.518 NON-PRESSURE CHRONIC ULCER OF OTHER PART OF RIGHT FOOT WITH OTHER SPECIFIED SEVERITY (HCC): ICD-10-CM

## 2019-07-31 LAB — GLUCOSE BLD-MCNC: 137 MG/DL (ref 70–99)

## 2019-07-31 PROCEDURE — 82962 GLUCOSE BLOOD TEST: CPT

## 2019-07-31 PROCEDURE — 29445 APPL RIGID TOT CNTC LEG CAST: CPT

## 2019-07-31 NOTE — PROGRESS NOTES
Chief Complaint  This information was obtained from the patient  Patient is here for an wound care follow-up visit. Denies pain.     Allergies  NKDA    HPI  This information was obtained from the patient  The following HPI elements were documented for the want to do this. Op report reviewed it does not appear any bone was removed at the time of surgery for pathology or sensitivities. February MRI was + for osteo. see plan for discussion, no c/o pain and no s/s of acute infection noted. today.     2-18-19: is not any s/s of infection noted. he still cannot go into a cast yet today although we did talk about a cast.    7-3-19 Patient returns today, patient does have callus build up over the last 2 weeks and the wound is exactly the same in area.   see plan for reconciliation completed at today's visit. : Yes          Objective    Constitutional  Height/Length: 75 in (190.5 cm), Weight: 273 lbs (124.09 kgs), BMI: 34.1, Temperature: 98.7 °F (37.06 °C), Pulse: 73 bpm, Respiratory Rate: 16 breaths/min, Blood Pressur encounter measurements are 0.7cm length x 0.7cm width with no measurable depth, with an area of 0.49 sq cm . No tunneling has been noted. No sinus tract has been noted. No undermining has been noted. There was no drainage noted.  The patient reports a wound volume of 0.78 cubic cm; Wound #3 (Diabetic Ulcer) is located on the right foot. A skin substitute procedure was performed using Theraskin by Dola Fothergill., MD with an application area of 6 sq cm.  Using sterile technique, the product was fenestra Greek yogurt), tofu, soy nuts, soy protein products  Vitamin C: Citrus fruits and juices, strawberries, tomatoes, tomato juice, peppers, baked potatoes, spinach, broccoli, cauliflower, [place] sprouts, cabbage  Vitamin A: Dark green, leafy vegetables, ora tendon with peripheral enhancement which may represent an abscess.   6. There is patchy T1 hypointense signal and T2 hyperintense signal within the medial and lateral malleoli which may represent reactive bone marrow edema with early osteomyelitis not entir

## 2019-08-02 ENCOUNTER — APPOINTMENT (OUTPATIENT)
Dept: WOUND CARE | Age: 64
End: 2019-08-02
Attending: NURSE PRACTITIONER
Payer: MEDICARE

## 2019-08-06 ENCOUNTER — OFFICE VISIT (OUTPATIENT)
Dept: WOUND CARE | Facility: HOSPITAL | Age: 64
End: 2019-08-06
Attending: NURSE PRACTITIONER
Payer: MEDICARE

## 2019-08-06 DIAGNOSIS — L97.518 NON-PRESSURE CHRONIC ULCER OF OTHER PART OF RIGHT FOOT WITH OTHER SPECIFIED SEVERITY (HCC): Primary | ICD-10-CM

## 2019-08-06 DIAGNOSIS — E11.40 TYPE 2 DIABETES MELLITUS WITH DIABETIC NEUROPATHY, WITHOUT LONG-TERM CURRENT USE OF INSULIN (HCC): ICD-10-CM

## 2019-08-06 LAB — GLUCOSE BLD-MCNC: 230 MG/DL (ref 70–99)

## 2019-08-06 PROCEDURE — 82962 GLUCOSE BLOOD TEST: CPT

## 2019-08-06 PROCEDURE — 97597 DBRDMT OPN WND 1ST 20 CM/<: CPT

## 2019-08-06 NOTE — PROGRESS NOTES
Subjective    Chief Complaint  This information was obtained from the patient  Pt here for a f/u to wound on right foot.  Pt denies any problems with the cast.    Allergies  NKDA    HPI  This information was obtained from the patient  The following HPI [place] amputation and he does not want to do this. Op report reviewed it does not appear any bone was removed at the time of surgery for pathology or sensitivities. February MRI was + for osteo.   see plan for discussion, no c/o pain and no s/s of acute infectio patient, caregiver and chart. Complaints and Symptoms  Patient complains of:  Cardiovascular (Central/Peripheral):  Lower extremity (leg) swelling (Minimal), Other (htn), Edema  Musculoskeletal: Deformities (Right Chopart and left 5th ray amputations), A + hairgrowth on le. .    Musculoskeletal:  Gait independent with assistance of cane . Integumentary (Hair, Skin)  see wound documentation. Wound #3 Right Foot is a chronic Mckeon Grade 3 Diabetic Ulcer and has received a status of Not Healed.  [facility] been noted. No undermining has been noted. There was no drainage noted. The patient reports a wound pain of level 0/10. The wound margin is well defined. Wound bed has % epithelialization. The periwound skin exhibited: Callus, Dry/Scaly.  The periwou TCC Application procedure was performed for the lower right extremity. The procedure was tolerated well with pain level of 0 throughout and a pain level of 0 following the procedure.     Wound #7  Wound #7 (Diabetic Ulcer) is located on the left, plantar fo traveling use wheelchair to avoid any prolonged walking  Pressure relief shoe / inserts / foams - Wear shoe at all times even when in the house  Darco shoe with peg insert - felted foam with darco (TCC unavailable in [place])  TCC - plan to place on 8-7 1.22  Perfusion assessed by doppler. - Biphasic signals  Perfusion assessed by palpation of pulses.   Last sharp debridement date: - 8-6-19  Last A1C date and value: - Feb 2019 albumin 3.0/tp8.2  9/18: 7.3  Last albumin date and value: - Dec 2018 a1c 7.9  1

## 2019-08-07 ENCOUNTER — OFFICE VISIT (OUTPATIENT)
Dept: WOUND CARE | Age: 64
End: 2019-08-07
Attending: NURSE PRACTITIONER
Payer: MEDICARE

## 2019-08-07 DIAGNOSIS — L97.518 NON-PRESSURE CHRONIC ULCER OF OTHER PART OF RIGHT FOOT WITH OTHER SPECIFIED SEVERITY (HCC): Primary | ICD-10-CM

## 2019-08-07 DIAGNOSIS — E11.40 TYPE 2 DIABETES MELLITUS WITH DIABETIC NEUROPATHY, WITHOUT LONG-TERM CURRENT USE OF INSULIN (HCC): ICD-10-CM

## 2019-08-07 PROCEDURE — 29445 APPL RIGID TOT CNTC LEG CAST: CPT

## 2019-08-09 ENCOUNTER — APPOINTMENT (OUTPATIENT)
Dept: WOUND CARE | Age: 64
End: 2019-08-09
Attending: NURSE PRACTITIONER
Payer: MEDICARE

## 2019-08-09 ENCOUNTER — APPOINTMENT (OUTPATIENT)
Dept: WOUND CARE | Facility: HOSPITAL | Age: 64
End: 2019-08-09
Attending: NURSE PRACTITIONER
Payer: MEDICARE

## 2019-08-14 ENCOUNTER — OFFICE VISIT (OUTPATIENT)
Dept: WOUND CARE | Age: 64
End: 2019-08-14
Attending: NURSE PRACTITIONER
Payer: MEDICARE

## 2019-08-14 DIAGNOSIS — E11.40 TYPE 2 DIABETES MELLITUS WITH DIABETIC NEUROPATHY, WITHOUT LONG-TERM CURRENT USE OF INSULIN (HCC): ICD-10-CM

## 2019-08-14 DIAGNOSIS — L97.518 NON-PRESSURE CHRONIC ULCER OF OTHER PART OF RIGHT FOOT WITH OTHER SPECIFIED SEVERITY (HCC): Primary | ICD-10-CM

## 2019-08-14 PROCEDURE — 29445 APPL RIGID TOT CNTC LEG CAST: CPT

## 2019-08-14 PROCEDURE — 15275 SKIN SUB GRAFT FACE/NK/HF/G: CPT

## 2019-08-14 NOTE — PROGRESS NOTES
Subjective    Chief Complaint  This information was obtained from the patient  Patient is here for a follow up visit for a right foot wound.     Allergies  NKDA    HPI  This information was obtained from the patient  The following HPI elements were document does not want to do this. Op report reviewed it does not appear any bone was removed at the time of surgery for pathology or sensitivities. February MRI was + for osteo. see plan for discussion, no c/o pain and no s/s of acute infection noted. today. the tcc and is seeing progress. the wound bed is more pink, there is less callus, minimal undermining and the wound is smaller. callus was removed today and theraskin was placed.  no s/s of infection, no c/o pain    General Notes:  2nd application of TheraS Respiratory Rate: 18 breaths/min, Blood Pressure: 146/99 mmHg, Capillary Blood Glucose: 151 mg/dl. Cardiovascular:  dp/pt palpable bilaterally. bilateral lower Extremities free of varicosities, + edema. patient with chopart type amp on the right.  5th d infection. Local Pulse is Normal.    Wound #9 Left Great Toe is a chronic Mckeon Grade 0 Diabetic Ulcer and has received an outcome of Resolved.  Subsequent wound encounter measurements are 0cm length x 0cm width with no measurable depth, with an area of 0 procedure was tolerated well with a loss of sensation throughout and a loss of sensation following the procedure. Post Debridement Measurements: 1.2cm length x 0.9cm width x 0.2cm depth; with an area of 1.08 sq cm and a volume of 0.216 cubic cm;     Wound # healing) and take 2 packets/day.  you can order online at [employer] or [address] [place], [place] [address] or some [employer] carry it or can order it for you  AND  FOCUS ON THE FOLLOWING FOODS:  Protein: Meats, beans, eggs, milk and yogurt particularly Greek yogurt), tofu, soy nuts, soy p hallucis longus tendon with peripheral enhancement which may represent an abscess.   6. There is patchy T1 hypointense signal and T2 hyperintense signal within the medial and lateral malleoli which may represent reactive bone marrow edema with early osteomy

## 2019-08-21 ENCOUNTER — APPOINTMENT (OUTPATIENT)
Dept: WOUND CARE | Age: 64
End: 2019-08-21
Attending: NURSE PRACTITIONER
Payer: MEDICARE

## 2019-08-21 DIAGNOSIS — E11.621 DIABETES MELLITUS WITH ULCER OF FOOT (HCC): ICD-10-CM

## 2019-08-21 DIAGNOSIS — L97.518 NON-PRESSURE CHRONIC ULCER OF OTHER PART OF RIGHT FOOT WITH OTHER SPECIFIED SEVERITY (HCC): Primary | ICD-10-CM

## 2019-08-21 DIAGNOSIS — E11.40 TYPE 2 DIABETES MELLITUS WITH DIABETIC NEUROPATHY, WITHOUT LONG-TERM CURRENT USE OF INSULIN (HCC): ICD-10-CM

## 2019-08-21 DIAGNOSIS — L97.509 DIABETES MELLITUS WITH ULCER OF FOOT (HCC): ICD-10-CM

## 2019-08-21 PROCEDURE — 29445 APPL RIGID TOT CNTC LEG CAST: CPT

## 2019-08-21 NOTE — PROGRESS NOTES
Subjective    Chief Complaint  This information was obtained from the patient  Patient is here for a follow up visit for a right foot wound.     Allergies  NKDA    HPI  This information was obtained from the patient  The following HPI elements were document does not want to do this. Op report reviewed it does not appear any bone was removed at the time of surgery for pathology or sensitivities. February MRI was + for osteo. see plan for discussion, no c/o pain and no s/s of acute infection noted. today. the tcc and is seeing progress. the wound bed is more pink, there is less callus, minimal undermining and the wound is smaller. callus was removed today and theraskin was placed.  no s/s of infection, no c/o pain    8-21-19 patient returns today, this is we BMI: 34.1, Temperature: 98.2 °F (36.78 °C), Pulse: 77 bpm, Respiratory Rate: 18 breaths/min, Blood Pressure: 117/73 mmHg, Capillary Blood Glucose: 142 mg/dl.    Vital Signs Notes: Patient reported blood sugar taken at 1:00    Cardiovascular:  dp/pt palpable Diagnosis) E11.40 - Type 2 diabetes mellitus with diabetic neuropathy, unspecified        Plan    Wound Orders:  Wound #3 Right Foot     Wound Cleansing & Dressings  May shower with protection.   Cleanse with saline or wound cleanser  Cleanse with Vashe - s signals  Perfusion assessed by palpation of pulses.    Last sharp debridement date: - 8-14-19  Last A1C date and value: - Feb 2019 albumin 3.0/tp8.2  9/18: 7.3  Last albumin date and value: - Dec 2018 a1c 7.9  12/16/17: 2.9  Osteomyelitis confirmed by: - Fe

## 2019-08-28 ENCOUNTER — OFFICE VISIT (OUTPATIENT)
Dept: WOUND CARE | Age: 64
End: 2019-08-28
Attending: NURSE PRACTITIONER
Payer: MEDICARE

## 2019-08-28 DIAGNOSIS — L97.518 NON-PRESSURE CHRONIC ULCER OF OTHER PART OF RIGHT FOOT WITH OTHER SPECIFIED SEVERITY (HCC): Primary | ICD-10-CM

## 2019-08-28 DIAGNOSIS — E11.40 TYPE 2 DIABETES MELLITUS WITH DIABETIC NEUROPATHY, WITHOUT LONG-TERM CURRENT USE OF INSULIN (HCC): ICD-10-CM

## 2019-08-28 PROCEDURE — 29445 APPL RIGID TOT CNTC LEG CAST: CPT

## 2019-08-28 PROCEDURE — 15275 SKIN SUB GRAFT FACE/NK/HF/G: CPT

## 2019-08-28 NOTE — PROGRESS NOTES
Subjective    Chief Complaint  This information was obtained from the patient  Patient is here for a follow up visit for a right foot wound.     Allergies  NKDA    HPI  This information was obtained from the patient  The following HPI elements were document 2-15-19 Patient was last seen in wound center in January. He was direct admitted and is status post right stump wound I&D. Patient's surgery was on 1/15/19 and the procedure was done by Dr. Juliette Mcdaniel.   patient reports he has had this wound on and off for 10 y 8-6-19 patient returns today, he was agreeable and had a tcc placed along with another theraskin 1 week ago today. He is tolerating the TCC without difficulty, there is not any s/s of infection, no c/o pain.   I did remove the staples from the theraskin to Cardiovascular (Central/Peripheral): Chest Pain, Dyspnea on Exertion, Intermittent Claudication, Lower extremity (leg) resting pain, Orthopnea  Gastrointestinal (GI): Difficulty Swallowing  Neurological: Memory Loss  Psychiatric: Anxiety, Memory Loss, Depr The periwound skin exhibited: Callus, Moist. The periwound skin did not exhibit: Fluctuance, Friable, Erythema. The temperature of the periwound skin is Warm. Periwound skin does not exhibit signs or symptoms of infection.  Local Pulse is Normal.    Psychia Wound #3 (Diabetic Ulcer) is located on the right foot. A skin substitute procedure was performed using Theraskin by Yakelin Duvall NP with an application area of 5.56 sq cm. Using sterile technique, the product was fenestrated.  2 sq cm of product was was Zinc: Fortified cereals, red meats, seafood    S/S of Infection  Non-adherence    Care summary  Reviewed and evaluated labs. - Feb 2019 bun 24, creat 1.32, gfr 57  GFR 47: 1/29/18  Wound type: - DFU/CHRONIC OSTEO  Wound improving. No s/s of infection.    St

## 2019-09-04 ENCOUNTER — OFFICE VISIT (OUTPATIENT)
Dept: WOUND CARE | Age: 64
End: 2019-09-04
Attending: NURSE PRACTITIONER
Payer: MEDICARE

## 2019-09-04 DIAGNOSIS — E11.40 TYPE 2 DIABETES MELLITUS WITH DIABETIC NEUROPATHY, WITHOUT LONG-TERM CURRENT USE OF INSULIN (HCC): ICD-10-CM

## 2019-09-04 DIAGNOSIS — L97.518 NON-PRESSURE CHRONIC ULCER OF OTHER PART OF RIGHT FOOT WITH OTHER SPECIFIED SEVERITY (HCC): Primary | ICD-10-CM

## 2019-09-04 PROCEDURE — 29445 APPL RIGID TOT CNTC LEG CAST: CPT

## 2019-09-04 NOTE — PROGRESS NOTES
Subjective    Chief Complaint  This information was obtained from the patient  Patient is here for a follow up visit for a right foot wound.     Allergies  NKDA    HPI  This information was obtained from the patient  The following HPI elements were document does not want to do this. Op report reviewed it does not appear any bone was removed at the time of surgery for pathology or sensitivities. February MRI was + for osteo. see plan for discussion, no c/o pain and no s/s of acute infection noted. today. the tcc and is seeing progress. the wound bed is more pink, there is less callus, minimal undermining and the wound is smaller. callus was removed today and theraskin was placed.  no s/s of infection, no c/o pain    8-21-19 patient returns today, this is we Orthopnea  Gastrointestinal (GI): Difficulty Swallowing  Neurological: Memory Loss  Psychiatric: Anxiety, Memory Loss, Depression  Prior Wound History: Drainage (slowed significantly), Erythema, Malodor, Pain    Additional Information  Medication reconcili signs or symptoms of infection. Local Pulse is Normal.   General Notes:  Wound bed covered with integrating TheraSkin-(measurements taken from 8-28-19)    Psychiatric:  Judgment and insight intact. Alert and oriented times 3.  No evidence of depression, anx FOLLOWING FOODS:  Protein: Meats, beans, eggs, milk and yogurt particularly [place] yogurt), tofu, soy nuts, soy protein products  Vitamin C: Citrus fruits and juices, strawberries, tomatoes, tomato juice, peppers, baked potatoes, spinach, broccoli, cauliflo signal within the medial and lateral malleoli which may represent reactive bone marrow edema with early osteomyelitis not entirely excluded. Close clinical and imaging followup is recommended.     Follow-Up Appointments:  A follow-up appointment should be s

## 2019-09-11 ENCOUNTER — OFFICE VISIT (OUTPATIENT)
Dept: WOUND CARE | Age: 64
End: 2019-09-11
Attending: NURSE PRACTITIONER
Payer: MEDICARE

## 2019-09-11 DIAGNOSIS — E11.40 TYPE 2 DIABETES MELLITUS WITH DIABETIC NEUROPATHY, WITHOUT LONG-TERM CURRENT USE OF INSULIN (HCC): ICD-10-CM

## 2019-09-11 DIAGNOSIS — L97.518 NON-PRESSURE CHRONIC ULCER OF OTHER PART OF RIGHT FOOT WITH OTHER SPECIFIED SEVERITY (HCC): Primary | ICD-10-CM

## 2019-09-11 PROCEDURE — 15275 SKIN SUB GRAFT FACE/NK/HF/G: CPT

## 2019-09-11 PROCEDURE — 29445 APPL RIGID TOT CNTC LEG CAST: CPT

## 2019-09-11 NOTE — PROGRESS NOTES
Subjective    Chief Complaint  This information was obtained from the patient  Patient is here for a follow up visit for a right foot wound.     Allergies  NKDA    HPI  This information was obtained from the patient  The following HPI elements were document does not want to do this. Op report reviewed it does not appear any bone was removed at the time of surgery for pathology or sensitivities. February MRI was + for osteo. see plan for discussion, no c/o pain and no s/s of acute infection noted. today. [facility] and is seeing progress. the wound bed is more pink, there is less callus, minimal undermining and the wound is smaller. callus was removed today and theraskin was placed.  no s/s of infection, no c/o pain    8-21-19 patient returns today, this is week 1 Marked Weight Change, Chills  Respiratory: Cough, Shortness of Breath, Wheezing  Cardiovascular (Central/Peripheral): Chest Pain, Dyspnea on Exertion, Intermittent Claudication, Lower extremity (leg) resting pain, Orthopnea  Gastrointestinal (GI): Difficul moisture is normal. The periwound skin exhibited: Callus. The periwound skin did not exhibit: Fluctuance, Friable, Erythema. The temperature of the periwound skin is Warm. Periwound skin does not exhibit signs or symptoms of infection.  Local Pulse is [employer] applied: versatel contact layer, kerramax. A time out was not conducted prior to the start of the procedure. The procedure was tolerated well with a pain level of 0 throughout and a pain level of 0 following the procedure.   General Notes:  Vick knutson evaluated labs. - Feb 2019 bun 24, creat 1.32, gfr 57  GFR 47: 1/29/18  Wound type: - DFU/CHRONIC OSTEO  Wound improving. No s/s of infection.  - 10% decrease since last graft  40% + decrease since TCC and theraskin combination  Start antibiotics as prescri LEONEL, [facility], [address] [place] [address],[address], [address] [place] [address] 09/11/2019 16:29:34       Entered By: Janett Sigala on 09/11/2019 16:29:21

## 2019-09-18 ENCOUNTER — OFFICE VISIT (OUTPATIENT)
Dept: WOUND CARE | Age: 64
End: 2019-09-18
Attending: NURSE PRACTITIONER
Payer: MEDICARE

## 2019-09-18 DIAGNOSIS — L97.518 NON-PRESSURE CHRONIC ULCER OF OTHER PART OF RIGHT FOOT WITH OTHER SPECIFIED SEVERITY (HCC): Primary | ICD-10-CM

## 2019-09-18 DIAGNOSIS — E11.40 TYPE 2 DIABETES MELLITUS WITH DIABETIC NEUROPATHY, WITHOUT LONG-TERM CURRENT USE OF INSULIN (HCC): ICD-10-CM

## 2019-09-18 PROCEDURE — 29445 APPL RIGID TOT CNTC LEG CAST: CPT

## 2019-09-18 NOTE — PROGRESS NOTES
Subjective    Chief Complaint  This information was obtained from the patient  Patient is here for a follow up visit for a right foot wound.     Allergies  NKDA    HPI  This information was obtained from the patient  The following HPI elements were document does not want to do this. Op report reviewed it does not appear any bone was removed at the time of surgery for pathology or sensitivities. February MRI was + for osteo. see plan for discussion, no c/o pain and no s/s of acute infection noted. today. [facility] and is seeing progress. the wound bed is more pink, there is less callus, minimal undermining and the wound is smaller. callus was removed today and theraskin was placed.  no s/s of infection, no c/o pain    8-21-19 patient returns today, this is week 1 Protective Sensation (Pt stated has neuropathy), Numbness, Tingling  Psychiatric: Nervousness / Tension    Patient denies complaints or symptoms related to:  Constitutional Symptoms (General Health):  Fatigue, Fever, Marked Weight Change, Chills  Respirator serous drainage noted which has no odor. The patient reports a wound pain of level 0/10. The wound margin is well defined. The periwound skin moisture is normal. The periwound skin did not exhibit: Callus, Fluctuance, Friable, Erythema.  The temperature of [employer] (These are essential branch chain amino acids that help with tissue building and wound healing) and take 2 packets/day.  you can order online at [address] [place] [address] or some [employer] carry it or can order it for you  AND  FOCUS ON THE FOLLOWING MARRY plantar aspect of the hindfoot with a defect extending superiorly by 2 cm.   5. There is a 4.8 cm fluid collection along the posterior margin of the distal tibia surrounding the flexor hallucis longus tendon with peripheral enhancement which may represent a

## 2019-09-25 ENCOUNTER — APPOINTMENT (OUTPATIENT)
Dept: WOUND CARE | Age: 64
End: 2019-09-25
Attending: NURSE PRACTITIONER
Payer: MEDICARE

## 2019-09-25 DIAGNOSIS — E11.40 TYPE 2 DIABETES MELLITUS WITH DIABETIC NEUROPATHY, WITHOUT LONG-TERM CURRENT USE OF INSULIN (HCC): ICD-10-CM

## 2019-09-25 DIAGNOSIS — L97.518 NON-PRESSURE CHRONIC ULCER OF OTHER PART OF RIGHT FOOT WITH OTHER SPECIFIED SEVERITY (HCC): Primary | ICD-10-CM

## 2019-09-25 PROCEDURE — 29445 APPL RIGID TOT CNTC LEG CAST: CPT

## 2019-09-25 NOTE — PROGRESS NOTES
Subjective    Chief Complaint  This information was obtained from the patient  Patient is here for a follow up visit for a right foot wound. Pt states no pain.     Allergies  NKDA    HPI  This information was obtained from the patient  The following HPI cara amputation and he does not want to do this. Op report reviewed it does not appear any bone was removed at the time of surgery for pathology or sensitivities. February MRI was + for osteo.   see plan for discussion, no c/o pain and no s/s of acute infectio continues to tolerate the tcc and is seeing progress. the wound bed is more pink, there is less callus, minimal undermining and the wound is smaller. callus was removed today and theraskin was placed.  no s/s of infection, no c/o pain    8-21-19 patient ret protectant etc.  patient returned to tcc today. Review of Systems (ROS)  This information was obtained from the patient, caregiver and chart. Complaints and Symptoms  Patient complains of:  Cardiovascular (Central/Peripheral):  Lower extremity (leg) s hygeine. Capillary refill < 3 seconds. + hairgrowth on le. .    Musculoskeletal:  Gait and station stable. Integumentary (Hair, Skin)  see wound documentation.     Wound #3 Right Foot is a chronic Mckeon Grade 3 Diabetic Ulcer and has received a status of Vashe - soaked with prontosan  Other skin sub: - theraskin 7 cm (from the 13 cm graft) stacked into wound 9-11-19  Other: - 1) ammonium lactate around the wound  2) betamethasone and antifungal powder on the entire foot  3) kerramax x2 to fold over the fro 3.0/tp8.2  9/18: 7.3  Last albumin date and value: - Dec 2018 a1c 7.9  12/16/17: 2.9  Osteomyelitis confirmed by: - Feb 2019 mri:  1.  There is confluent and patchy T1 hypointense signal within portions of the navicular bone, calcaneus, and residual medial

## 2019-10-02 ENCOUNTER — OFFICE VISIT (OUTPATIENT)
Dept: WOUND CARE | Age: 64
End: 2019-10-02
Attending: NURSE PRACTITIONER
Payer: MEDICARE

## 2019-10-02 DIAGNOSIS — L97.518 NON-PRESSURE CHRONIC ULCER OF OTHER PART OF RIGHT FOOT WITH OTHER SPECIFIED SEVERITY (HCC): Primary | ICD-10-CM

## 2019-10-02 DIAGNOSIS — E11.40 TYPE 2 DIABETES MELLITUS WITH DIABETIC NEUROPATHY, WITHOUT LONG-TERM CURRENT USE OF INSULIN (HCC): ICD-10-CM

## 2019-10-02 PROCEDURE — 29445 APPL RIGID TOT CNTC LEG CAST: CPT

## 2019-10-02 NOTE — PROGRESS NOTES
Subjective    Chief Complaint  This information was obtained from the patient  Patient is here for a follow up visit for a right foot wound. Pt states no pain.     Allergies  NKDA    HPI  This information was obtained from the patient  The following HPI cara amputation and he does not want to do this. Op report reviewed it does not appear any bone was removed at the time of surgery for pathology or sensitivities. February MRI was + for osteo.   see plan for discussion, no c/o pain and no s/s of acute infectio continues to tolerate the tcc and is seeing progress. the wound bed is more pink, there is less callus, minimal undermining and the wound is smaller. callus was removed today and theraskin was placed.  no s/s of infection, no c/o pain    8-21-19 patient ret protectant etc.  patient returned to [facility] today. 10-2-19 patient returns today, wound remains resolved.  we discussed options for transition to his shoe, he will order drysol as well offloading pads to protect the bottom of his foot on transition to the s varicosities, + edema. patient with chopart type amp on the right. 5th digit amp on the left, digits warm toenails discolored, adequate hygeine. Capillary refill < 3 seconds. + hairgrowth on le. .    Musculoskeletal:  Gait and station stable.     Amy Buff Right Foot    Wound Cleansing & Dressings  May shower with protection.   Other: - 1) urea over callussed area  2) betamethasone and antifungal powder on the entire foot  3) kerramax x2 to fold over the front and around the foot by the malleolous  Change hyacinth 3.0/tp8.2  9/18: 7.3  Last albumin date and value: - Dec 2018 a1c 7.9  12/16/17: 2.9  Osteomyelitis confirmed by: - Feb 2019 mri:  1.  There is confluent and patchy T1 hypointense signal within portions of the navicular bone, calcaneus, and residual medial

## 2019-10-09 ENCOUNTER — OFFICE VISIT (OUTPATIENT)
Dept: WOUND CARE | Age: 64
End: 2019-10-09
Attending: NURSE PRACTITIONER
Payer: MEDICARE

## 2019-10-09 DIAGNOSIS — L97.518 NON-PRESSURE CHRONIC ULCER OF OTHER PART OF RIGHT FOOT WITH OTHER SPECIFIED SEVERITY (HCC): Primary | ICD-10-CM

## 2019-10-09 DIAGNOSIS — E11.40 TYPE 2 DIABETES MELLITUS WITH DIABETIC NEUROPATHY, WITHOUT LONG-TERM CURRENT USE OF INSULIN (HCC): ICD-10-CM

## 2019-10-09 PROCEDURE — 99213 OFFICE O/P EST LOW 20 MIN: CPT

## 2019-10-09 NOTE — PROGRESS NOTES
Subjective    Chief Complaint  This information was obtained from the patient  Patient is here for a follow up visit for a right foot wound. Pt states no pain.     Allergies  NKDA    HPI  This information was obtained from the patient  The following HPI cara 2-15-19 Patient was last seen in wound center in January. He was direct admitted and is status post right stump wound I&D. Patient's surgery was on 1/15/19 and the procedure was done by Dr. Steven Byrnes.   patient reports he has had this wound on and off for 10 y 9-18-19 patient returns today,  his is week 1 as we placed theraskin last week, there is not any s/s of infection, no c/o pain, minimal drainage. the staples were removed, wound redressed.  TCC placed back on    9-25-19 patient returns today, it appears the Constitutional Symptoms (General Health):  Fatigue, Fever, Marked Weight Change, Chills  Respiratory: Cough, Shortness of Breath, Wheezing  Cardiovascular (Central/Peripheral): Chest Pain, Dyspnea on Exertion, Intermittent Claudication, Lower extremity (leg The periwound skin exhibited: Callus, Dry/Scaly. The periwound skin did not exhibit: Fluctuance, Friable, Erythema. The temperature of the periwound skin is Warm. Periwound skin does not exhibit signs or symptoms of infection.  Local Pulse is Normal.    Psy Protein: Meats, beans, eggs, milk and yogurt particularly [place] yogurt), tofu, soy nuts, soy protein products  Vitamin C: Citrus fruits and juices, strawberries, tomatoes, tomato juice, peppers, baked potatoes, spinach, broccoli, cauliflower, [employer] spro 6. There is patchy T1 hypointense signal and T2 hyperintense signal within the medial and lateral malleoli which may represent reactive bone marrow edema with early osteomyelitis not entirely excluded. Close clinical and imaging followup is recommended.

## 2019-10-16 ENCOUNTER — OFFICE VISIT (OUTPATIENT)
Dept: WOUND CARE | Age: 64
End: 2019-10-16
Attending: NURSE PRACTITIONER
Payer: MEDICARE

## 2019-10-16 DIAGNOSIS — E11.40 TYPE 2 DIABETES MELLITUS WITH DIABETIC NEUROPATHY, WITHOUT LONG-TERM CURRENT USE OF INSULIN (HCC): ICD-10-CM

## 2019-10-16 DIAGNOSIS — L97.518 NON-PRESSURE CHRONIC ULCER OF OTHER PART OF RIGHT FOOT WITH OTHER SPECIFIED SEVERITY (HCC): Primary | ICD-10-CM

## 2019-10-16 PROCEDURE — 99213 OFFICE O/P EST LOW 20 MIN: CPT

## 2019-10-16 NOTE — PROGRESS NOTES
Subjective    Chief Complaint  This information was obtained from the patient  Patient is here for a follow up visit for a right foot wound. Pt states no pain.     Allergies  NKDA    HPI  This information was obtained from the patient  The following HPI cara amputation and he does not want to do this. Op report reviewed it does not appear any bone was removed at the time of surgery for pathology or sensitivities. February MRI was + for osteo.   see plan for discussion, no c/o pain and no s/s of acute infectio challenge will be transitioning him to his shoe with prosthetic  without breaking down. patient has a business trip at the end of october.   we did discuss options including silicone socks, a hydrocolloid as a second skin, maybe 3m skin protectant etc.  pa Orthopnea  Gastrointestinal (GI): Difficulty Swallowing  Integumentary (Hair/Skin/Nails): Ulcers  Neurological: Memory Loss  Psychiatric: Anxiety, Memory Loss, Depression    Additional Information  Medication reconciliation completed at today's visit.  Gerri Gtz evidence of depression, anxiety, or agitation. Calm, cooperative, and communicative. Appropriate interactions and affect.       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 - Feb 2019 bun 24, creat 1.32, gfr 57  GFR 47: 1/29/18  Wound type: - DFU/CHRONIC OSTEO  Wound improving.  No s/s of infection. Sammy Quinteros healed remains resolved  Start antibiotics as 15:37:10       Entered By: Shannan Lowery on 10/16/2019 15:36:52

## 2019-10-16 NOTE — PROGRESS NOTES
Subjective    Chief Complaint  This information was obtained from the patient  Patient is here for a follow up visit for a right foot wound. Pt states no pain.     Allergies  NKDA    HPI  This information was obtained from the patient  The following HPI cara amputation and he does not want to do this. Op report reviewed it does not appear any bone was removed at the time of surgery for pathology or sensitivities. February MRI was + for osteo.   see plan for discussion, no c/o pain and no s/s of acute infectio challenge will be transitioning him to his shoe with prosthetic  without breaking down. patient has a business trip at the end of october.   we did discuss options including silicone socks, a hydrocolloid as a second skin, maybe 3m skin protectant etc.  pa Chills  Respiratory: Cough, Shortness of Breath, Wheezing  Cardiovascular (Central/Peripheral): Chest Pain, Dyspnea on Exertion, Intermittent Claudication, Lower extremity (leg) resting pain, Orthopnea  Gastrointestinal (GI): Difficulty Swallowing  Integum le..    Musculoskeletal:  Gait independent with assistance of cane . Integumentary (Hair, Skin)  see wound documentation. Psychiatric:  Judgment and insight intact. Alert and oriented times 3. No evidence of depression, anxiety, or agitation.  Calm, c cabbage  Vitamin A: Dark green, leafy vegetables, orange or yellow vegetables, cantaloupe, fortified dairy products, liver, fortified cereals  Zinc: Fortified cereals, red meats, seafood    S/S of Infection  Non-adherence    Care summary  Reviewed and eval Appointments:  A follow-up appointment should be scheduled. continue current poc with offloading and moisturization.  monitor area of discoloration under the callus for growth or changing    Electronic Signature(s)  Signed By: Date:  Dahlia Lincoln

## 2019-10-23 ENCOUNTER — OFFICE VISIT (OUTPATIENT)
Dept: WOUND CARE | Age: 64
End: 2019-10-23
Attending: NURSE PRACTITIONER
Payer: MEDICARE

## 2019-10-23 DIAGNOSIS — E11.40 TYPE 2 DIABETES MELLITUS WITH DIABETIC NEUROPATHY, WITHOUT LONG-TERM CURRENT USE OF INSULIN (HCC): ICD-10-CM

## 2019-10-23 DIAGNOSIS — L97.518 NON-PRESSURE CHRONIC ULCER OF OTHER PART OF RIGHT FOOT WITH OTHER SPECIFIED SEVERITY (HCC): Primary | ICD-10-CM

## 2019-10-23 PROCEDURE — 99213 OFFICE O/P EST LOW 20 MIN: CPT

## 2019-10-23 NOTE — PROGRESS NOTES
Subjective    Chief Complaint  This information was obtained from the patient  Patient is here for a follow up visit for a right foot wound.  He denies pain on the wound    Allergies  NKDA    HPI  This information was obtained from the patient  The followin needs an amputation and he does not want to do this. Op report reviewed it does not appear any bone was removed at the time of surgery for pathology or sensitivities. February MRI was + for osteo.  see plan for discussion, no c/o pain and no s/s of acute in challenge will be transitioning him to his shoe with prosthetic without breaking down. patient has a business trip at the end of october.  we did discuss options including silicone socks, a hydrocolloid as a second skin, maybe 3m skin protectant etc. mitchellen remains stable, will discharge him to f/u with podiatry. Review of Systems (ROS)  This information was obtained from the patient, caregiver and chart. Complaints and Symptoms  Patient complains of:   Cardiovascular (Central/Peripheral):  Lower extremi infection. Local Pulse is Normal.   General Notes:  dark callus area was able to be \"flaked\" off. under was two pinpoint areas with an epithelial bridge that are open or very tenuously resolved, no active drainage noted.     Constitutional  bp wnl for pat multiple times a day for any drainage.  if you notice drainage over the next week, call the wound center IMMEDIATELY    Off-Loading  Other: - offloading pad    Follow-Up Appointments  Return appointment in 4 weeks  Other: - look at these 2 podiatrists and r cuneiform bone suspicious for osteomyelitis. 2. There is a curvilinear fluid collection with peripheral enhancement along the anterior aspect of the navicular bone measuring up to 5 cm suspicious for an abscess.   3. Postoperative changes from amputation b

## 2019-11-06 ENCOUNTER — OFFICE VISIT (OUTPATIENT)
Dept: WOUND CARE | Age: 64
End: 2019-11-06
Attending: NURSE PRACTITIONER
Payer: MEDICARE

## 2019-11-06 ENCOUNTER — ORDER TRANSCRIPTION (OUTPATIENT)
Dept: WOUND CARE | Facility: HOSPITAL | Age: 64
End: 2019-11-06

## 2019-11-06 DIAGNOSIS — E11.40 TYPE 2 DIABETES MELLITUS WITH DIABETIC NEUROPATHY, WITHOUT LONG-TERM CURRENT USE OF INSULIN (HCC): ICD-10-CM

## 2019-11-06 DIAGNOSIS — L97.514 ULCER OF RIGHT FOOT, WITH NECROSIS OF BONE (HCC): Primary | ICD-10-CM

## 2019-11-06 DIAGNOSIS — L97.518 NON-PRESSURE CHRONIC ULCER OF OTHER PART OF RIGHT FOOT WITH OTHER SPECIFIED SEVERITY (HCC): Primary | ICD-10-CM

## 2019-11-06 DIAGNOSIS — E11.621 DM FOOT ULCER (HCC): ICD-10-CM

## 2019-11-06 DIAGNOSIS — L97.509 DM FOOT ULCER (HCC): ICD-10-CM

## 2019-11-06 PROCEDURE — 29445 APPL RIGID TOT CNTC LEG CAST: CPT

## 2019-11-06 NOTE — PROGRESS NOTES
Subjective    Chief Complaint  This information was obtained from the patient  Patient is here for a follow up visit for a right foot wound.  Patients stated re-open the wound couple days ago     Allergies  NKDA    HPI  This information was obtained from th specialists continue to tell him he needs an amputation and he does not want to do this. Op report reviewed it does not appear any bone was removed at the time of surgery for pathology or sensitivities. February MRI was + for osteo.  see plan for discussion there is not any s/s of infection and no c/o pain. we also discussed getting an appointment with the prosthetist to get a new prosthetic. Review of Systems (ROS)  This information was obtained from the patient, caregiver and chart.     Complaints and Sym Callus, Dry/Scaly. The periwound skin did not exhibit: Fluctuance, Friable, Erythema. The temperature of the periwound skin is Warm. Periwound skin does not exhibit signs or symptoms of infection.  Local Pulse is Normal.    Wound #10 Right, Lateral Ankle is Psychiatric:  Judgment and insight intact. Alert and oriented times 3. Patient is anxious, but cooperative with exam and answers questions appropriately.      Additional Information  BP (mmHg):: 147/86 retaken at 0745        Assessment    Active Problem foot    Follow-Up Appointments  Return appointment in 3 weeks - dixie almaraz  RN visit for assessment of wound(s). - Friday November 8  Friday November 15    Misc/Additional Orders  Supplement with a daily multivitamin.   Increase dietary protein - l cm suspicious for an abscess. 3. Postoperative changes from amputation between the midfoot and hindfoot are noted. 4. There is ulceration within the plantar aspect of the hindfoot with a defect extending superiorly by 2 cm.   5. There is a 4.8 cm fluid co

## 2019-11-08 ENCOUNTER — OFFICE VISIT (OUTPATIENT)
Dept: WOUND CARE | Age: 64
End: 2019-11-08
Attending: NURSE PRACTITIONER
Payer: MEDICARE

## 2019-11-08 DIAGNOSIS — L97.518 NON-PRESSURE CHRONIC ULCER OF OTHER PART OF RIGHT FOOT WITH OTHER SPECIFIED SEVERITY (HCC): Primary | ICD-10-CM

## 2019-11-08 DIAGNOSIS — E11.40 TYPE 2 DIABETES MELLITUS WITH DIABETIC NEUROPATHY, WITHOUT LONG-TERM CURRENT USE OF INSULIN (HCC): ICD-10-CM

## 2019-11-08 PROCEDURE — 29445 APPL RIGID TOT CNTC LEG CAST: CPT

## 2019-11-15 ENCOUNTER — OFFICE VISIT (OUTPATIENT)
Dept: WOUND CARE | Age: 64
End: 2019-11-15
Attending: NURSE PRACTITIONER
Payer: MEDICARE

## 2019-11-15 DIAGNOSIS — L97.518 NON-PRESSURE CHRONIC ULCER OF OTHER PART OF RIGHT FOOT WITH OTHER SPECIFIED SEVERITY (HCC): Primary | ICD-10-CM

## 2019-11-15 DIAGNOSIS — E11.40 TYPE 2 DIABETES MELLITUS WITH DIABETIC NEUROPATHY, WITHOUT LONG-TERM CURRENT USE OF INSULIN (HCC): ICD-10-CM

## 2019-11-15 PROCEDURE — 29445 APPL RIGID TOT CNTC LEG CAST: CPT

## 2019-11-20 ENCOUNTER — OFFICE VISIT (OUTPATIENT)
Dept: WOUND CARE | Age: 64
End: 2019-11-20
Attending: NURSE PRACTITIONER
Payer: MEDICARE

## 2019-11-20 DIAGNOSIS — L97.518 NON-PRESSURE CHRONIC ULCER OF OTHER PART OF RIGHT FOOT WITH OTHER SPECIFIED SEVERITY (HCC): Primary | ICD-10-CM

## 2019-11-20 DIAGNOSIS — E11.40 TYPE 2 DIABETES MELLITUS WITH DIABETIC NEUROPATHY, WITHOUT LONG-TERM CURRENT USE OF INSULIN (HCC): ICD-10-CM

## 2019-11-20 PROCEDURE — 29445 APPL RIGID TOT CNTC LEG CAST: CPT

## 2019-11-20 NOTE — PROGRESS NOTES
Subjective    Chief Complaint  This information was obtained from the patient  Patient is here for a follow up .  Patients denies pain on the wound    Allergies  NKDA    HPI  This information was obtained from the patient, caregiver  The following HPI eleme amputation and he does not want to do this. Op report reviewed it does not appear any bone was removed at the time of surgery for pathology or sensitivities. February MRI was + for osteo.  see plan for discussion, no c/o pain and no s/s of acute infection n pain. we also discussed getting an appointment with the prosthetist to get a new prosthetic.    11-20-19 patient returns today, he has been in a TCC since last visit, there is a small area that remains open on plantar foot, the lateral foot/ankle is resolv sinus tract has been noted. No undermining has been noted. There was no drainage noted. The patient reports a wound pain of level 0/10. The wound margin is well defined. Wound bed has % pink, firm granulation.    The periwound skin exhibited: Callus, assistance of cane . Integumentary (Hair, Skin)  see wound documentation. Psychiatric:  Judgment and insight intact. Alert and oriented times 3. No evidence of depression, anxiety, or agitation. Calm, cooperative, and communicative.  Appropriate int Foam  Change dressing every: - weekly    Off-Loading  TCC - please \"extend\" the forefoot with padding/rolled sock to create 'illusion' of a longer foot    Follow-Up Appointments  Return Appointment in 1 week.     Misc/Additional Orders  Supplement with a the navicular bone measuring up to 5 cm suspicious for an abscess. 3. Postoperative changes from amputation between the midfoot and hindfoot are noted.   4. There is ulceration within the plantar aspect of the hindfoot with a defect extending superiorly by

## 2019-11-26 ENCOUNTER — OFFICE VISIT (OUTPATIENT)
Dept: WOUND CARE | Facility: HOSPITAL | Age: 64
End: 2019-11-26
Attending: NURSE PRACTITIONER
Payer: MEDICARE

## 2019-11-26 DIAGNOSIS — E11.40 TYPE 2 DIABETES MELLITUS WITH DIABETIC NEUROPATHY, WITHOUT LONG-TERM CURRENT USE OF INSULIN (HCC): Primary | ICD-10-CM

## 2019-11-26 DIAGNOSIS — L97.514 ULCER OF RIGHT FOOT, WITH NECROSIS OF BONE (HCC): ICD-10-CM

## 2019-11-26 PROCEDURE — 29445 APPL RIGID TOT CNTC LEG CAST: CPT

## 2019-11-26 PROCEDURE — 82962 GLUCOSE BLOOD TEST: CPT

## 2019-11-26 NOTE — PROGRESS NOTES
Subjective    Chief Complaint  This information was obtained from the patient  Patient is here for a follow up . Patients states no new concerns or pain.     Allergies  NKDA    HPI  This information was obtained from the patient, caregiver  The following HP 11-6-19 RE-EVALUATION:  Patient returns today with spouse, last seen 2 weeks ago when we scheduled his appointment out for a last recheck to be in 4 weeks. patient did do his business travel and last week called clinic to say the area re-curred.   Today he Patient complains of:  Cardiovascular (Central/Peripheral):  Lower extremity (leg) swelling (Minimal), Other (htn), Edema  Musculoskeletal: Deformities (Right Chopart and left 5th ray amputations), Assistive Devices (AFO to right foot and Diabetic shoe left Wound #11 Right, Medial Heel is a Mckeon Grade 1 Diabetic Ulcer and has received a status of Not Healed. Initial wound encounter measurements are 0.7cm length x 0.2cm width x 0.1cm depth, with an area of 0.14 sq cm and a volume of 0.014 cubic cm.  No tunnel (Encounter Diagnosis) M86.471 - Chronic osteomyelitis with draining sinus, right ankle and foot  (Encounter Diagnosis) E11.621 - Type 2 diabetes mellitus with foot ulcer  (Encounter Diagnosis) E11.40 - Type 2 diabetes mellitus with diabetic neuropathy, uns Vitamin A: Dark green, leafy vegetables, orange or yellow vegetables, cantaloupe, fortified dairy products, liver, fortified cereals  Zinc: Fortified cereals, red meats, seafood    S/S of Infection  Non-adherence    Care summary  Reviewed and evaluated lab A follow-up appointment should be scheduled. [employer] faxed. patient is healed again.  plan is for tcc removal and offloading construction next wednesday and patient appontment next thursday and then he will return to clinic to TCC u

## 2019-12-04 ENCOUNTER — OFFICE VISIT (OUTPATIENT)
Dept: WOUND CARE | Age: 64
End: 2019-12-04
Attending: NURSE PRACTITIONER
Payer: MEDICARE

## 2019-12-04 DIAGNOSIS — E11.40 TYPE 2 DIABETES MELLITUS WITH DIABETIC NEUROPATHY, WITHOUT LONG-TERM CURRENT USE OF INSULIN (HCC): Primary | ICD-10-CM

## 2019-12-04 DIAGNOSIS — L97.514 ULCER OF RIGHT FOOT, WITH NECROSIS OF BONE (HCC): ICD-10-CM

## 2019-12-04 DIAGNOSIS — L97.518 NON-PRESSURE CHRONIC ULCER OF OTHER PART OF RIGHT FOOT WITH OTHER SPECIFIED SEVERITY (HCC): ICD-10-CM

## 2019-12-04 PROCEDURE — 99214 OFFICE O/P EST MOD 30 MIN: CPT

## 2019-12-04 NOTE — PROGRESS NOTES
Subjective    Chief Complaint  This information was obtained from the patient  Patient is here for a follow up for wounds to the right foot. Appt. with [facility] scheduled for today. Patient also states he walked over 15,000 steps over the last week. 11-6-19 RE-EVALUATION:  Patient returns today with spouse, last seen 2 weeks ago when we scheduled his appointment out for a last recheck to be in 4 weeks. patient did do his business travel and last week called clinic to say the area re-curred.   Today he 12-4-19 patient returns today, he did got to [facility] for the holiday and states he was walking about 3000 steps a day.   he has an appointment with jacinto and lena so we will take tcc off today, place a felted foam for protection and patient to come to wound The periwound skin moisture is normal. The periwound skin exhibited: Callus, Hemosiderosis. The periwound skin did not exhibit: Fluctuance, Friable, Erythema. The temperature of the periwound skin is Warm.  Periwound skin does not exhibit signs or symptoms Judgment and insight intact. Alert and oriented times 3. No evidence of depression, anxiety, or agitation. Calm, cooperative, and communicative. Appropriate interactions and affect.     Additional Information  BP (mmHg):: 132/84 retaken at 11:20am        As Vitamin C: Citrus fruits and juices, strawberries, tomatoes, tomato juice, peppers, baked potatoes, spinach, broccoli, cauliflower, [place] sprouts, cabbage  Vitamin A: Dark green, leafy vegetables, orange or yellow vegetables, cantaloupe, fortified dairy 6. There is patchy T1 hypointense signal and T2 hyperintense signal within the medial and lateral malleoli which may represent reactive bone marrow edema with early osteomyelitis not entirely excluded. Close clinical and imaging followup is recommended.

## 2019-12-05 ENCOUNTER — OFFICE VISIT (OUTPATIENT)
Dept: WOUND CARE | Facility: HOSPITAL | Age: 64
End: 2019-12-05
Attending: NURSE PRACTITIONER
Payer: MEDICARE

## 2019-12-05 DIAGNOSIS — E11.40 TYPE 2 DIABETES MELLITUS WITH DIABETIC NEUROPATHY, WITHOUT LONG-TERM CURRENT USE OF INSULIN (HCC): Primary | ICD-10-CM

## 2019-12-05 DIAGNOSIS — L97.514 ULCER OF RIGHT FOOT, WITH NECROSIS OF BONE (HCC): ICD-10-CM

## 2019-12-05 PROCEDURE — 82962 GLUCOSE BLOOD TEST: CPT

## 2019-12-05 PROCEDURE — 29445 APPL RIGID TOT CNTC LEG CAST: CPT

## 2019-12-11 ENCOUNTER — OFFICE VISIT (OUTPATIENT)
Dept: WOUND CARE | Age: 64
End: 2019-12-11
Attending: NURSE PRACTITIONER
Payer: MEDICARE

## 2019-12-11 DIAGNOSIS — L97.514 ULCER OF RIGHT FOOT, WITH NECROSIS OF BONE (HCC): ICD-10-CM

## 2019-12-11 DIAGNOSIS — E11.40 TYPE 2 DIABETES MELLITUS WITH DIABETIC NEUROPATHY, WITHOUT LONG-TERM CURRENT USE OF INSULIN (HCC): Primary | ICD-10-CM

## 2019-12-11 PROCEDURE — 99214 OFFICE O/P EST MOD 30 MIN: CPT

## 2019-12-11 NOTE — PROGRESS NOTES
Subjective    Chief Complaint  This information was obtained from the patient  Patient is here for a follow up for wounds to the right foot.  Pt states he fell 3 days due to the cast.     Allergies  NKDA    HPI  This information was obtained from the patien 11-6-19 RE-EVALUATION: Patient returns today with spouse, last seen 2 weeks ago when we scheduled his appointment out for a last recheck to be in 4 weeks. patient did do his business travel and last week called clinic to say the area re-curred.  Today he st 12-4-19 patient returns today, he did got to [facility] for the holiday and states he was walking about 3000 steps a day.  he has an appointment with jacinto and lena so we will take tcc off today, place a felted foam for protection and patient to come to wound c Gastrointestinal (GI): Difficulty Swallowing  Neurological: Memory Loss  Psychiatric: Anxiety, Memory Loss, Depression    Additional Information  Medication reconciliation completed at today's visit. : Yes        Objective    Wound Assessment(s)  Wound #3 Assessment    Active Problems    ICD-10  (Encounter Diagnosis) L97.514 - Non-pressure chronic ulcer of other part of right foot with necrosis of bone  (Encounter Diagnosis) M86.471 - Chronic osteomyelitis with draining sinus, right ankle and foot  (Encount Last A1C date and value: - November 2019 Feb 2019 albumin 3.0/tp8.2  9/18: 7.3  Last albumin date and value: - November 2019 7.7  Dec 2018 a1c 7.9  12/16/17: 2.9  Osteomyelitis confirmed by: - Feb 2019 mri:  1.  There is confluent and patchy T1 hypointense

## 2019-12-18 ENCOUNTER — OFFICE VISIT (OUTPATIENT)
Dept: WOUND CARE | Age: 64
End: 2019-12-18
Attending: NURSE PRACTITIONER
Payer: MEDICARE

## 2019-12-18 DIAGNOSIS — E11.40 TYPE 2 DIABETES MELLITUS WITH DIABETIC NEUROPATHY, WITHOUT LONG-TERM CURRENT USE OF INSULIN (HCC): Primary | ICD-10-CM

## 2019-12-18 DIAGNOSIS — L97.514 ULCER OF RIGHT FOOT, WITH NECROSIS OF BONE (HCC): ICD-10-CM

## 2019-12-18 PROCEDURE — 99213 OFFICE O/P EST LOW 20 MIN: CPT

## 2020-04-22 ENCOUNTER — OFFICE VISIT (OUTPATIENT)
Dept: WOUND CARE | Facility: HOSPITAL | Age: 65
End: 2020-04-22
Attending: NURSE PRACTITIONER
Payer: MEDICARE

## 2020-04-22 DIAGNOSIS — L97.514 ULCER OF RIGHT FOOT, WITH NECROSIS OF BONE (HCC): ICD-10-CM

## 2020-04-22 DIAGNOSIS — E11.40 TYPE 2 DIABETES MELLITUS WITH DIABETIC NEUROPATHY, WITHOUT LONG-TERM CURRENT USE OF INSULIN (HCC): Primary | ICD-10-CM

## 2020-04-22 PROCEDURE — 97597 DBRDMT OPN WND 1ST 20 CM/<: CPT

## 2020-04-22 PROCEDURE — 82962 GLUCOSE BLOOD TEST: CPT

## 2020-04-22 PROCEDURE — 99214 OFFICE O/P EST MOD 30 MIN: CPT

## 2020-04-22 NOTE — PROGRESS NOTES
Subjective    Chief Complaint  This information was obtained from the patient  Patient is here for an initial consult. He presents with a diabetic wound on his left foot that he states opened a couple of days ago.     Allergies  NKDA    HPI  This informatio a callus with a fissure noted in the center. the left plantar forefoot has significant callus build up with a dark spot under the callus noted.    patient states that he has rolls of felted foam and he has been utilizing the felted foam and his orthotic/sh foot)  Integumentary (Hair/Skin/Nails): Dryness, Calluses/Corns, Ulcers  Neurological: Loss of Protective Sensation (Pt stated has neuropathy), Numbness, Tingling  Psychiatric: Nervousness / Tension    Patient denies complaints or symptoms related to:  Con periwound skin moisture is normal. The periwound skin color is normal. The temperature of the periwound skin is WNL. Periwound skin does not exhibit signs or symptoms of infection. Local Pulse is Doppler.     Wound #13 Right, Plantar Foot is a chronic W[place] mg/dl.    Physical Exam  Constitutional  bp wnl for patient. Pulse Regular and wnl for patient. Mirna Pencil Respirations easy and unlabored. Temperature wnl. Obese. Appearance neat and clean. Appears in no acute distress. Well nourished and well developed.     Cardiov Debridement Measurements: 0.6cm length x 0.1cm width x 0.1cm depth; with an area of 0.06 sq cm and a volume of 0.006 cubic cm;        Plan    Wound Orders:  Wound #12 Right Heel    Wound Cleansing & Dressings  Silver Foam  Medipore tape (no substitution) and value: - November 2019 Feb 2019 albumin 3.0/tp8.2  9/18: 7.3  Last albumin date and value: - November 2019 7.7  Dec 2018 a1c 7.9  12/16/17: 2.9  Osteomyelitis confirmed by: - Feb 2019 mri:  1.  There is confluent and patchy T1 hypointense signal within

## 2020-04-28 ENCOUNTER — APPOINTMENT (OUTPATIENT)
Dept: WOUND CARE | Facility: HOSPITAL | Age: 65
End: 2020-04-28
Attending: NURSE PRACTITIONER
Payer: MEDICARE

## 2020-04-29 ENCOUNTER — OFFICE VISIT (OUTPATIENT)
Dept: WOUND CARE | Facility: HOSPITAL | Age: 65
End: 2020-04-29
Attending: NURSE PRACTITIONER
Payer: MEDICARE

## 2020-04-29 DIAGNOSIS — E11.40 TYPE 2 DIABETES MELLITUS WITH DIABETIC NEUROPATHY, WITHOUT LONG-TERM CURRENT USE OF INSULIN (HCC): Primary | ICD-10-CM

## 2020-04-29 DIAGNOSIS — L97.514 ULCER OF RIGHT FOOT, WITH NECROSIS OF BONE (HCC): ICD-10-CM

## 2020-04-29 DIAGNOSIS — L97.518 NON-PRESSURE CHRONIC ULCER OF OTHER PART OF RIGHT FOOT WITH OTHER SPECIFIED SEVERITY (HCC): ICD-10-CM

## 2020-04-29 PROCEDURE — 97597 DBRDMT OPN WND 1ST 20 CM/<: CPT

## 2020-04-29 PROCEDURE — 82962 GLUCOSE BLOOD TEST: CPT

## 2020-04-29 NOTE — PROGRESS NOTES
Subjective    Chief Complaint  This information was obtained from the patient  Patient is here for a follow up.  He voices no new concerns for wound    Allergies  NKDA    HPI  This information was obtained from the patient, caregiver  The following HPI [place] forefoot has significant callus build up with a dark spot under the callus noted. patient states that he has rolls of felted foam and he has been utilizing the felted foam and his orthotic/shoe.  today there is not any s/s of infection, no c/o pain.     4 Subsequent wound encounter measurements are 0cm length x 0cm width with no measurable depth, with an area of 0 sq cm . No tunneling has been noted. No sinus tract has been noted. No undermining has been noted. There was no drainage noted.  The patient repor exhibited: Callus, Dry/Scaly. The temperature of the periwound skin is Warm. Periwound skin does not exhibit signs or symptoms of infection. Local Pulse is Weak.     Vitals  Height/Length: 75 in (190.5 cm), Weight: 278 lbs (126.36 kgs), BMI: 34.7, Temperatu was required due to loss of sensation. A time out was not conducted prior to the start of the procedure. A minimal amount of bleeding was controlled with n/a.  The procedure was tolerated well with a pain level of 0 throughout and a pain level of 0 followin navicular bone, calcaneus, and residual medial cuneiform bone suspicious for osteomyelitis.   2. There is a curvilinear fluid collection with peripheral enhancement along the anterior aspect of the navicular bone measuring up to 5 cm suspicious for an absce

## 2020-05-06 ENCOUNTER — OFFICE VISIT (OUTPATIENT)
Dept: WOUND CARE | Facility: HOSPITAL | Age: 65
End: 2020-05-06
Attending: NURSE PRACTITIONER
Payer: MEDICARE

## 2020-05-06 DIAGNOSIS — L97.518 NON-PRESSURE CHRONIC ULCER OF OTHER PART OF RIGHT FOOT WITH OTHER SPECIFIED SEVERITY (HCC): ICD-10-CM

## 2020-05-06 DIAGNOSIS — E11.40 TYPE 2 DIABETES MELLITUS WITH DIABETIC NEUROPATHY, WITHOUT LONG-TERM CURRENT USE OF INSULIN (HCC): Primary | ICD-10-CM

## 2020-05-06 DIAGNOSIS — L97.514 ULCER OF RIGHT FOOT, WITH NECROSIS OF BONE (HCC): ICD-10-CM

## 2020-05-06 PROCEDURE — 82962 GLUCOSE BLOOD TEST: CPT

## 2020-05-06 PROCEDURE — 99213 OFFICE O/P EST LOW 20 MIN: CPT

## 2020-05-06 NOTE — PROGRESS NOTES
Subjective    Chief Complaint  This information was obtained from the patient  Patient is here for a follow up. He denies pain or new wound concerns.     Allergies  NKDA    HPI  This information was obtained from the patient, caregiver  The following HPI el forefoot has significant callus build up with a dark spot under the callus noted. patient states that he has rolls of felted foam and he has been utilizing the felted foam and his orthotic/shoe.  today there is not any s/s of infection, no c/o pain.     4 Yes        Objective    Wound Assessment(s)  Wound #12 Right Heel is an acute Mckeon Grade 1 Diabetic Ulcer and has received a status of Not Healed.  Subsequent wound encounter measurements are 0.1cm length x 0.1cm width with no measurable depth, with an ar sero-sanguineous drainage noted which has no odor. The patient reports no wound pain due to the wound being insensate. The wound margin is callus. Wound bed has % pink, firm granulation.   The periwound skin color is normal. The periwound skin exhibit Cleansing & Dressings:  May shower with protection. Silver Foam  Change Dressing Every Other Day and As Needed. Wound #13 Right, Plantar Foot    Wound Cleansing & Dressings:  May shower with protection.   Cleanse with saline or wound cleanser  Camille Co There is ulceration within the plantar aspect of the hindfoot with a defect extending superiorly by 2 cm.   5. There is a 4.8 cm fluid collection along the posterior margin of the distal tibia surrounding the flexor hallucis longus tendon with peripheral en

## 2020-05-12 ENCOUNTER — OFFICE VISIT (OUTPATIENT)
Dept: WOUND CARE | Facility: HOSPITAL | Age: 65
End: 2020-05-12
Attending: NURSE PRACTITIONER
Payer: MEDICARE

## 2020-05-12 DIAGNOSIS — E11.40 TYPE 2 DIABETES MELLITUS WITH DIABETIC NEUROPATHY, WITHOUT LONG-TERM CURRENT USE OF INSULIN (HCC): Primary | ICD-10-CM

## 2020-05-12 DIAGNOSIS — L97.518 NON-PRESSURE CHRONIC ULCER OF OTHER PART OF RIGHT FOOT WITH OTHER SPECIFIED SEVERITY (HCC): ICD-10-CM

## 2020-05-12 DIAGNOSIS — L97.514 ULCER OF RIGHT FOOT, WITH NECROSIS OF BONE (HCC): ICD-10-CM

## 2020-05-12 PROCEDURE — 97597 DBRDMT OPN WND 1ST 20 CM/<: CPT

## 2020-05-12 PROCEDURE — 82962 GLUCOSE BLOOD TEST: CPT

## 2020-05-12 NOTE — PROGRESS NOTES
Subjective    Chief Complaint  This information was obtained from the patient  Patient is here for a follow up. He denies pain or new wound concerns.     Allergies  NKDA    HPI  This information was obtained from the patient, caregiver  The following HPI el forefoot has significant callus build up with a dark spot under the callus noted. patient states that he has rolls of felted foam and he has been utilizing the felted foam and his orthotic/shoe.  today there is not any s/s of infection, no c/o pain.     4 Claudication, Lower extremity (leg) resting pain, Orthopnea  Gastrointestinal (GI): Difficulty Swallowing  Neurological: Memory Loss  Psychiatric: Anxiety, Memory Loss, Depression    Additional Information  Medication reconciliation completed at today's vi is a Mckeon Grade 1 Diabetic Ulcer and has received a status of Not Healed. Subsequent wound encounter measurements are 0.1cm length x 0.2cm width x 0.2cm depth, with an area of 0.02 sq cm and a volume of 0.004 cubic cm. No tunneling has been noted.  No sin ulcer of other part of right foot with fat layer exposed  (Encounter Diagnosis) L97.522 - Non-pressure chronic ulcer of other part of left foot with fat layer exposed  (Encounter Diagnosis) E11.621 - Type 2 diabetes mellitus with foot ulcer  (Encounter Jamia Cleansing & Dressings:  May shower with protection. Cleanse with saline or wound cleanser  Camille Collagen  Xeroform gauze  Dry Gauze  Change Dressing Every Other Day and As Needed.     Off-Loading:  TCC - plan to place on the left foot in 2 weeks-schedule collection along the posterior margin of the distal tibia surrounding the flexor hallucis longus tendon with peripheral enhancement which may represent an abscess.   6. There is patchy T1 hypointense signal and T2 hyperintense signal within the medial and l

## 2020-05-26 ENCOUNTER — OFFICE VISIT (OUTPATIENT)
Dept: WOUND CARE | Facility: HOSPITAL | Age: 65
End: 2020-05-26
Attending: NURSE PRACTITIONER
Payer: MEDICARE

## 2020-05-26 DIAGNOSIS — L97.518 NON-PRESSURE CHRONIC ULCER OF OTHER PART OF RIGHT FOOT WITH OTHER SPECIFIED SEVERITY (HCC): ICD-10-CM

## 2020-05-26 DIAGNOSIS — E11.40 TYPE 2 DIABETES MELLITUS WITH DIABETIC NEUROPATHY, WITHOUT LONG-TERM CURRENT USE OF INSULIN (HCC): Primary | ICD-10-CM

## 2020-05-26 DIAGNOSIS — L97.514 ULCER OF RIGHT FOOT, WITH NECROSIS OF BONE (HCC): ICD-10-CM

## 2020-05-26 PROCEDURE — 82962 GLUCOSE BLOOD TEST: CPT

## 2020-05-26 PROCEDURE — 15275 SKIN SUB GRAFT FACE/NK/HF/G: CPT

## 2020-05-26 NOTE — PROGRESS NOTES
Subjective    Chief Complaint  This information was obtained from the patient  Patient is here for a follow up. Pt states no new concerns or pain.     Allergies  NKDA    HPI  This information was obtained from the patient, caregiver  The following HPI eleme forefoot has significant callus build up with a dark spot under the callus noted. patient states that he has rolls of felted foam and he has been utilizing the felted foam and his orthotic/shoe.  today there is not any s/s of infection, no c/o pain.     H Breath, Wheezing  Cardiovascular (Central/Peripheral): Chest Pain, Dyspnea on Exertion, Intermittent Claudication, Lower extremity (leg) resting pain, Orthopnea  Gastrointestinal (GI): Difficulty Swallowing  Neurological: Memory Loss  Psychiatric: Anxiety, Not Healed. Subsequent wound encounter measurements are 0.3cm length x 0.2cm width with no measurable depth, with an area of 0.06 sq cm . No tunneling has been noted. No sinus tract has been noted. No undermining has been noted.  There was no drainage noted foot with fat layer exposed  (Encounter Diagnosis) L97.522 - Non-pressure chronic ulcer of other part of left foot with fat layer exposed  (Encounter Diagnosis) E11.621 - Type 2 diabetes mellitus with foot ulcer  (Encounter Diagnosis) Z79.4 - Long term (cu weekly    Wound #13 Right, Plantar Foot    Wound Cleansing & Dressings:  May shower with protection.   Cleanse with saline or wound cleanser  Epifix - 14mm disc stacked/secured with silicone contact layer, kerramax, medipore    !! AT RN VISIT CHANGE THE KER posterior margin of the distal tibia surrounding the flexor hallucis longus tendon with peripheral enhancement which may represent an abscess.   6. There is patchy T1 hypointense signal and T2 hyperintense signal within the medial and lateral malleoli which

## 2020-05-28 ENCOUNTER — OFFICE VISIT (OUTPATIENT)
Dept: WOUND CARE | Facility: HOSPITAL | Age: 65
End: 2020-05-28
Attending: NURSE PRACTITIONER
Payer: MEDICARE

## 2020-05-28 DIAGNOSIS — E11.40 TYPE 2 DIABETES MELLITUS WITH DIABETIC NEUROPATHY, WITHOUT LONG-TERM CURRENT USE OF INSULIN (HCC): Primary | ICD-10-CM

## 2020-05-28 PROCEDURE — 29445 APPL RIGID TOT CNTC LEG CAST: CPT

## 2020-05-28 PROCEDURE — 82962 GLUCOSE BLOOD TEST: CPT

## 2020-06-01 PROCEDURE — 29445 APPL RIGID TOT CNTC LEG CAST: CPT

## 2020-06-01 PROCEDURE — 15275 SKIN SUB GRAFT FACE/NK/HF/G: CPT

## 2020-06-02 ENCOUNTER — OFFICE VISIT (OUTPATIENT)
Dept: WOUND CARE | Facility: HOSPITAL | Age: 65
End: 2020-06-02
Attending: NURSE PRACTITIONER
Payer: MEDICARE

## 2020-06-02 DIAGNOSIS — L97.518 NON-PRESSURE CHRONIC ULCER OF OTHER PART OF RIGHT FOOT WITH OTHER SPECIFIED SEVERITY (HCC): ICD-10-CM

## 2020-06-02 DIAGNOSIS — E11.40 TYPE 2 DIABETES MELLITUS WITH DIABETIC NEUROPATHY, WITHOUT LONG-TERM CURRENT USE OF INSULIN (HCC): Primary | ICD-10-CM

## 2020-06-02 DIAGNOSIS — L97.514 ULCER OF RIGHT FOOT, WITH NECROSIS OF BONE (HCC): ICD-10-CM

## 2020-06-02 PROCEDURE — 82962 GLUCOSE BLOOD TEST: CPT

## 2020-06-02 PROCEDURE — 29445 APPL RIGID TOT CNTC LEG CAST: CPT

## 2020-06-02 NOTE — PROGRESS NOTES
Subjective    Chief Complaint  This information was obtained from the patient  Patient is here for a follow up.  Pt arrives with intact TCC and voices no concerns    Allergies  NKDA    HPI  This information was obtained from the patient, caregiver  The foll left plantar forefoot has significant callus build up with a dark spot under the callus noted.    patient states that he has rolls of felted foam and he has been utilizing the felted foam and his orthotic/shoe.  today there is not any s/s of infection, no c (GI): Difficulty Swallowing  Neurological: Memory Loss  Psychiatric: Anxiety, Memory Loss, Depression    Additional Information  Medication reconciliation completed at today's visit. : Yes        Objective    Wound Assessment(s)  Wound #12 Right Heel is a has been noted. No undermining has been noted. There was no drainage noted. The patient reports no wound pain due to the wound being insensate. The wound margin is callus. Wound bed has % epithelialization.   The periwound skin color is normal. The pe with foot ulcer  (Encounter Diagnosis) Z79.4 - Long term (current) use of insulin        Procedures    Wound #13  Wound #13 (Diabetic Ulcer) is located on the right, plantar foot. A TCC Application procedure was performed.  The procedure was tolerated well of the navicular bone measuring up to 5 cm suspicious for an abscess. 3. Postoperative changes from amputation between the midfoot and hindfoot are noted.   4. There is ulceration within the plantar aspect of the hindfoot with a defect extending superiorly

## 2020-06-09 ENCOUNTER — OFFICE VISIT (OUTPATIENT)
Dept: WOUND CARE | Facility: HOSPITAL | Age: 65
End: 2020-06-09
Attending: NURSE PRACTITIONER
Payer: MEDICARE

## 2020-06-09 DIAGNOSIS — E11.621 TYPE 2 DIABETES MELLITUS WITH FOOT ULCER, UNSPECIFIED WHETHER LONG TERM INSULIN USE (HCC): Primary | ICD-10-CM

## 2020-06-09 DIAGNOSIS — L97.509 TYPE 2 DIABETES MELLITUS WITH FOOT ULCER, UNSPECIFIED WHETHER LONG TERM INSULIN USE (HCC): Primary | ICD-10-CM

## 2020-06-09 DIAGNOSIS — L97.514 ULCER OF RIGHT FOOT, WITH NECROSIS OF BONE (HCC): ICD-10-CM

## 2020-06-09 DIAGNOSIS — E11.40 TYPE 2 DIABETES MELLITUS WITH DIABETIC NEUROPATHY, WITHOUT LONG-TERM CURRENT USE OF INSULIN (HCC): ICD-10-CM

## 2020-06-09 DIAGNOSIS — L97.518 NON-PRESSURE CHRONIC ULCER OF OTHER PART OF RIGHT FOOT WITH OTHER SPECIFIED SEVERITY (HCC): ICD-10-CM

## 2020-06-09 PROCEDURE — 82962 GLUCOSE BLOOD TEST: CPT

## 2020-06-09 PROCEDURE — 29445 APPL RIGID TOT CNTC LEG CAST: CPT

## 2020-06-09 NOTE — PROGRESS NOTES
Subjective    Chief Complaint  This information was obtained from the patient  Patient is here for a follow up denies any pain at this time.     Allergies  NKDA    HPI  This information was obtained from the patient, caregiver  The following HPI elements we has significant callus build up with a dark spot under the callus noted. patient states that he has rolls of felted foam and he has been utilizing the felted foam and his orthotic/shoe.  today there is not any s/s of infection, no c/o pain.     HPI PRIOR Change, Night Sweats, Chills  Respiratory: Cough, Shortness of Breath, Wheezing  Cardiovascular (Central/Peripheral): Chest Pain, Dyspnea on Exertion, Intermittent Claudication, Lower extremity (leg) resting pain, Orthopnea  Gastrointestinal (GI): Difficul Ulcer and has received an outcome of Resolved. Subsequent wound encounter measurements are 0cm length x 0cm width with no measurable depth, with an area of 0 sq cm . No tunneling has been noted. No sinus tract has been noted. No undermining has been noted. of other part of right foot with fat layer exposed  (Encounter Diagnosis) L97.522 - Non-pressure chronic ulcer of other part of left foot with fat layer exposed  (Encounter Diagnosis) E11.621 - Type 2 diabetes mellitus with foot ulcer  (Encounter Diagnosis may represent an abscess. 6. There is patchy T1 hypointense signal and T2 hyperintense signal within the medial and lateral malleoli which may represent reactive bone marrow edema with early osteomyelitis not entirely excluded.  Close clinical and imaging

## 2020-06-09 NOTE — PROGRESS NOTES
Discussed patient's trend of elevated bs over last month of treatment. He reports he saw his endocrinologist about 3 weeks ago and states it is because the appointments have been early in the morning and he eats before he comes to clinic.  Will continue to

## 2020-06-16 ENCOUNTER — OFFICE VISIT (OUTPATIENT)
Dept: WOUND CARE | Facility: HOSPITAL | Age: 65
End: 2020-06-16
Attending: NURSE PRACTITIONER
Payer: MEDICARE

## 2020-06-16 DIAGNOSIS — L97.509 TYPE 2 DIABETES MELLITUS WITH FOOT ULCER, UNSPECIFIED WHETHER LONG TERM INSULIN USE (HCC): Primary | ICD-10-CM

## 2020-06-16 DIAGNOSIS — L97.514 ULCER OF RIGHT FOOT, WITH NECROSIS OF BONE (HCC): ICD-10-CM

## 2020-06-16 DIAGNOSIS — E11.621 TYPE 2 DIABETES MELLITUS WITH FOOT ULCER, UNSPECIFIED WHETHER LONG TERM INSULIN USE (HCC): Primary | ICD-10-CM

## 2020-06-16 DIAGNOSIS — L97.518 NON-PRESSURE CHRONIC ULCER OF OTHER PART OF RIGHT FOOT WITH OTHER SPECIFIED SEVERITY (HCC): ICD-10-CM

## 2020-06-16 DIAGNOSIS — E11.40 TYPE 2 DIABETES MELLITUS WITH DIABETIC NEUROPATHY, WITHOUT LONG-TERM CURRENT USE OF INSULIN (HCC): ICD-10-CM

## 2020-06-16 PROCEDURE — 82962 GLUCOSE BLOOD TEST: CPT

## 2020-06-16 PROCEDURE — 99214 OFFICE O/P EST MOD 30 MIN: CPT

## 2020-06-16 NOTE — PROGRESS NOTES
Subjective    Chief Complaint  This information was obtained from the patient  Patient is here for a follow up denies any pain at this time. Wants left foot to be seen again.     Allergies  NKDA    HPI  This information was obtained from the patient, [facility]. the left plantar forefoot has significant callus build up with a dark spot under the callus noted.    patient states that he has rolls of felted foam and he has been utilizing the felted foam and his orthotic/shoe.  today there is not any s/s of in amputations), Assistive Devices (AFO to right foot and Diabetic shoe left foot)  Integumentary (Hair/Skin/Nails): Dryness, Calluses/Corns, Ulcers  Neurological: Loss of Protective Sensation (Pt stated has neuropathy), Numbness, Tingling  Psychiatric: Nervo easy and unlabored. Temperature wnl. Obese. Appearance neat and clean. Appears in no acute distress. Well nourished and well developed. Cardiovascular:  dp/pt palpable bilaterally. Extremities free of varicosities, + edema. Capillary refill < 3 seconds. pulses. Osteomyelitis confirmed by: - Feb 2019 mri:  1. There is confluent and patchy T1 hypointense signal within portions of the navicular bone, calcaneus, and residual medial cuneiform bone suspicious for osteomyelitis.   2. There is a curvilinear fluid

## 2020-06-30 ENCOUNTER — APPOINTMENT (OUTPATIENT)
Dept: WOUND CARE | Facility: HOSPITAL | Age: 65
End: 2020-06-30
Attending: NURSE PRACTITIONER
Payer: MEDICARE

## 2020-08-14 ENCOUNTER — OFFICE VISIT (OUTPATIENT)
Dept: WOUND CARE | Facility: HOSPITAL | Age: 65
End: 2020-08-14
Attending: NURSE PRACTITIONER
Payer: MEDICARE

## 2020-08-14 DIAGNOSIS — E11.621 TYPE 2 DIABETES MELLITUS WITH FOOT ULCER, UNSPECIFIED WHETHER LONG TERM INSULIN USE (HCC): Primary | ICD-10-CM

## 2020-08-14 DIAGNOSIS — L97.512 NON-PRESSURE CHRONIC ULCER OF OTHER PART OF RIGHT FOOT WITH FAT LAYER EXPOSED (HCC): ICD-10-CM

## 2020-08-14 DIAGNOSIS — L97.509 TYPE 2 DIABETES MELLITUS WITH FOOT ULCER, UNSPECIFIED WHETHER LONG TERM INSULIN USE (HCC): Primary | ICD-10-CM

## 2020-08-14 LAB — GLUCOSE BLD-MCNC: 261 MG/DL (ref 70–99)

## 2020-08-14 PROCEDURE — 11042 DBRDMT SUBQ TIS 1ST 20SQCM/<: CPT

## 2020-08-14 PROCEDURE — 99214 OFFICE O/P EST MOD 30 MIN: CPT

## 2020-08-14 PROCEDURE — 82962 GLUCOSE BLOOD TEST: CPT

## 2020-08-14 NOTE — PROGRESS NOTES
Subjective    Chief Complaint  This information was obtained from the patient  Patient is here for an initial consult. He presents with his wound re-opened a couple of days ago. He is unsure of how it opened.  He did see John in June and got a ne 8-14-20 INItial (reeval):  Patient is a 77 yo male well known to wound clinic and myself from previous admissions. He was last discharged/seen in June 2020 and he was to have his shoe/insert adjusted and f/u with podiatry on a regular basis.   Patient has This information was obtained from the patient, chart  Patient has a medical history of:  Cardiomyopathy  Hyperlipidemia  Hypertension  Renal insufficiency  Peripheral neuropathy  Type II Diabetes  Chronic Osteomyelitis of right foot  Cataract of left eye metoprolol tartrate - oral 100 mg tablet  Lantus Solostar U-100 Insulin - subcutaneous 100 unit/mL (3 mL) insulin pen  Novolog Flexpen U-100 Insulin - subcutaneous 100 unit/mL (3 mL) insulin pen  fenofibrate - oral 150 mg capsule  furosemide - oral 40 mg t Gait and station stable. Integumentary (Hair, Skin)  see wound documentation. Psychiatric:  Judgment and insight intact. Alert and oriented times 3. No evidence of depression, anxiety, or agitation. Calm, cooperative, and communicative.  Appropriate i Increase protein into diet  Start or continue taking Atul  S/S of Infection  Non-adherence    Care summary  Reviewed and evaluated labs.  - November 2019 Feb 2019 bun 24, creat 1.32, gfr 57  GFR 47: 1/29/18  Wound type: - DFU/CHRONIC OSTEO  Perfusion asse

## 2020-08-17 ENCOUNTER — APPOINTMENT (OUTPATIENT)
Dept: WOUND CARE | Facility: HOSPITAL | Age: 65
End: 2020-08-17
Attending: NURSE PRACTITIONER
Payer: MEDICARE

## 2020-08-19 ENCOUNTER — OFFICE VISIT (OUTPATIENT)
Dept: WOUND CARE | Facility: HOSPITAL | Age: 65
End: 2020-08-19
Attending: NURSE PRACTITIONER
Payer: MEDICARE

## 2020-08-19 DIAGNOSIS — E11.621 TYPE 2 DIABETES MELLITUS WITH FOOT ULCER (HCC): ICD-10-CM

## 2020-08-19 DIAGNOSIS — L97.509 TYPE 2 DIABETES MELLITUS WITH FOOT ULCER (HCC): ICD-10-CM

## 2020-08-19 DIAGNOSIS — Z79.4 LONG TERM (CURRENT) USE OF INSULIN (HCC): ICD-10-CM

## 2020-08-19 DIAGNOSIS — L97.512 NON-PRESSURE CHRONIC ULCER OF OTHER PART OF RIGHT FOOT WITH FAT LAYER EXPOSED (HCC): Primary | ICD-10-CM

## 2020-08-19 PROCEDURE — 99213 OFFICE O/P EST LOW 20 MIN: CPT

## 2020-08-19 NOTE — PROGRESS NOTES
Subjective    Chief Complaint  This information was obtained from the patient  Patient is here for a follow up.  Patients denies any pain on the wound and no issues to complain    Allergies  NKDA    HPI  This information was obtained from the patient, careg today, the plan was for TCC today however instead we discussed him going to [facility] to get the right insert adjusted to decrease the callus build up.  will plan for TCC next prn. the left foot looks great without any sort of offloading pad being used.   no s/s has been noted. No undermining has been noted. There is a scant amount of sero-sanguineous drainage noted which has no odor. The patient reports a wound pain of level 0/10. The wound margin is undefined. Wound bed has % bright red, firm granulation. protection. Other: - 1) skin protectant around the wound and on the side to prevent tape stripping  2) triad paste on the wound  3) offloading pad  Change Dressing Every Other Day and As Needed.     Off-Loading:  Other: - custom shoe with insert/afo plan f

## 2020-08-21 ENCOUNTER — APPOINTMENT (OUTPATIENT)
Dept: WOUND CARE | Facility: HOSPITAL | Age: 65
End: 2020-08-21
Attending: NURSE PRACTITIONER
Payer: MEDICARE

## 2020-08-26 ENCOUNTER — OFFICE VISIT (OUTPATIENT)
Dept: WOUND CARE | Facility: HOSPITAL | Age: 65
End: 2020-08-26
Attending: NURSE PRACTITIONER
Payer: MEDICARE

## 2020-08-26 DIAGNOSIS — L97.509 TYPE 2 DIABETES MELLITUS WITH FOOT ULCER (HCC): ICD-10-CM

## 2020-08-26 DIAGNOSIS — E11.621 TYPE 2 DIABETES MELLITUS WITH FOOT ULCER (HCC): ICD-10-CM

## 2020-08-26 DIAGNOSIS — Z79.4 LONG TERM (CURRENT) USE OF INSULIN (HCC): ICD-10-CM

## 2020-08-26 DIAGNOSIS — L97.512 NON-PRESSURE CHRONIC ULCER OF OTHER PART OF RIGHT FOOT WITH FAT LAYER EXPOSED (HCC): Primary | ICD-10-CM

## 2020-08-26 PROCEDURE — 99213 OFFICE O/P EST LOW 20 MIN: CPT

## 2020-08-26 NOTE — PROGRESS NOTES
Subjective    Chief Complaint  This information was obtained from the patient  Patient is here for a follow up.  Patient states he does not want a cast anymore because he had his shoe adjusted yesterday and he would like to see \"how this goes\" with his ne we discussed the importance of this for prevention and monitoring so this does not continue to happen. no acute s/s of infection.     8-19-20 patient returns today, the plan was for TCC today however instead we discussed him going to [facility] to get the right in Mckeon Grade 1 Diabetic Ulcer and has received a status of Not Healed. Subsequent wound encounter measurements are 0.1cm length x 0.1cm width with no measurable depth, with an area of 0.01 sq cm . No tunneling has been noted. No sinus tract has been noted. interactions and affect.     Additional Information  BP (mmHg):: Manual check: 144/86        Assessment    Active Problems    ICD-10  (Encounter Diagnosis) L97.512 - Non-pressure chronic ulcer of other part of right foot with fat layer exposed  (Encounter D

## 2020-08-28 ENCOUNTER — APPOINTMENT (OUTPATIENT)
Dept: WOUND CARE | Facility: HOSPITAL | Age: 65
End: 2020-08-28
Attending: NURSE PRACTITIONER
Payer: MEDICARE

## 2020-09-02 ENCOUNTER — OFFICE VISIT (OUTPATIENT)
Dept: WOUND CARE | Facility: HOSPITAL | Age: 65
End: 2020-09-02
Attending: NURSE PRACTITIONER
Payer: MEDICARE

## 2020-09-02 DIAGNOSIS — L97.509 TYPE 2 DIABETES MELLITUS WITH FOOT ULCER (HCC): ICD-10-CM

## 2020-09-02 DIAGNOSIS — E11.621 TYPE 2 DIABETES MELLITUS WITH FOOT ULCER, UNSPECIFIED WHETHER LONG TERM INSULIN USE (HCC): Primary | ICD-10-CM

## 2020-09-02 DIAGNOSIS — L97.512 NON-PRESSURE CHRONIC ULCER OF OTHER PART OF RIGHT FOOT WITH FAT LAYER EXPOSED (HCC): ICD-10-CM

## 2020-09-02 DIAGNOSIS — L97.509 TYPE 2 DIABETES MELLITUS WITH FOOT ULCER, UNSPECIFIED WHETHER LONG TERM INSULIN USE (HCC): Primary | ICD-10-CM

## 2020-09-02 DIAGNOSIS — Z79.4 LONG TERM (CURRENT) USE OF INSULIN (HCC): ICD-10-CM

## 2020-09-02 DIAGNOSIS — E11.621 TYPE 2 DIABETES MELLITUS WITH FOOT ULCER (HCC): ICD-10-CM

## 2020-09-02 LAB — GLUCOSE BLD-MCNC: 290 MG/DL (ref 70–99)

## 2020-09-02 PROCEDURE — 82962 GLUCOSE BLOOD TEST: CPT

## 2020-09-02 PROCEDURE — 99213 OFFICE O/P EST LOW 20 MIN: CPT

## 2020-09-02 NOTE — PROGRESS NOTES
Subjective    Chief Complaint  This information was obtained from the patient  Patient is here for a follow up visit for a right foot wound.     Allergies  NKDA    HPI  This information was obtained from the patient, caregiver  The following HPI elements we 8-19-20 patient returns today, the plan was for TCC today however instead we discussed him going to [facility] to get the right insert adjusted to decrease the callus build up.  will plan for TCC next prn. the left foot looks great without any sort of offloading Cardiovascular (Central/Peripheral): Chest Pain, Dyspnea on Exertion, Intermittent Claudication, Lower extremity (leg) resting pain, Orthopnea  Gastrointestinal (GI): Difficulty Swallowing  Neurological: Memory Loss  Psychiatric: Anxiety, Memory Loss, Depr Musculoskeletal:  Gait and station stable. Integumentary (Hair, Skin)  see wound documentation. Psychiatric:  Judgment and insight intact. Alert and oriented times 3. No evidence of depression, anxiety, or agitation.  Calm, cooperative, and communicat Brianna Herman, FLAVIO, LEONEL, EUGENE, WCC, [facility] 09/02/2020 09:19:18       Entered By: Brionna STOVER, FLAVIO, LEONEL, EUGENE, WCC, [facility] on 09/02/2020 09:19:03

## 2020-09-09 ENCOUNTER — OFFICE VISIT (OUTPATIENT)
Dept: WOUND CARE | Facility: HOSPITAL | Age: 65
End: 2020-09-09
Attending: NURSE PRACTITIONER
Payer: MEDICARE

## 2020-09-09 DIAGNOSIS — Z79.4 LONG TERM (CURRENT) USE OF INSULIN (HCC): ICD-10-CM

## 2020-09-09 DIAGNOSIS — L97.509 TYPE 2 DIABETES MELLITUS WITH FOOT ULCER, UNSPECIFIED WHETHER LONG TERM INSULIN USE (HCC): Primary | ICD-10-CM

## 2020-09-09 DIAGNOSIS — E11.621 TYPE 2 DIABETES MELLITUS WITH FOOT ULCER, UNSPECIFIED WHETHER LONG TERM INSULIN USE (HCC): Primary | ICD-10-CM

## 2020-09-09 DIAGNOSIS — L97.512 NON-PRESSURE CHRONIC ULCER OF OTHER PART OF RIGHT FOOT WITH FAT LAYER EXPOSED (HCC): ICD-10-CM

## 2020-09-09 LAB — GLUCOSE BLD-MCNC: 124 MG/DL (ref 70–99)

## 2020-09-09 PROCEDURE — 99213 OFFICE O/P EST LOW 20 MIN: CPT

## 2020-09-09 PROCEDURE — 82962 GLUCOSE BLOOD TEST: CPT

## 2020-09-09 NOTE — PROGRESS NOTES
Subjective    Chief Complaint  This information was obtained from the patient  Patient is here for a follow up visit for a right foot wound.  Patients stated no pain    Allergies  NKDA    HPI  This information was obtained from the patient, caregiver  The f 8-19-20 patient returns today, the plan was for TCC today however instead we discussed him going to [facility] to get the right insert adjusted to decrease the callus build up.  will plan for TCC next prn. 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 Pulse Regular and wnl for patient. Quentin Angles Respirations easy and unlabored. Temperature wnl. Obese. Appearance neat and clean. Appears in no acute distress. Well nourished and well developed.     Cardiovascular:

## 2020-10-12 ENCOUNTER — APPOINTMENT (OUTPATIENT)
Dept: WOUND CARE | Facility: HOSPITAL | Age: 65
End: 2020-10-12
Attending: PODIATRIST
Payer: MEDICARE

## 2020-10-19 ENCOUNTER — OFFICE VISIT (OUTPATIENT)
Dept: WOUND CARE | Facility: HOSPITAL | Age: 65
End: 2020-10-19
Attending: PODIATRIST
Payer: MEDICARE

## 2020-10-19 DIAGNOSIS — L97.512 NON-PRESSURE CHRONIC ULCER OF OTHER PART OF RIGHT FOOT WITH FAT LAYER EXPOSED (HCC): ICD-10-CM

## 2020-10-19 DIAGNOSIS — L97.509 TYPE 2 DIABETES MELLITUS WITH FOOT ULCER, UNSPECIFIED WHETHER LONG TERM INSULIN USE (HCC): Primary | ICD-10-CM

## 2020-10-19 DIAGNOSIS — E11.621 TYPE 2 DIABETES MELLITUS WITH FOOT ULCER, UNSPECIFIED WHETHER LONG TERM INSULIN USE (HCC): Primary | ICD-10-CM

## 2020-10-19 DIAGNOSIS — E11.621 TYPE 2 DIABETES MELLITUS WITH FOOT ULCER (HCC): ICD-10-CM

## 2020-10-19 DIAGNOSIS — L97.509 TYPE 2 DIABETES MELLITUS WITH FOOT ULCER (HCC): ICD-10-CM

## 2020-10-19 PROCEDURE — 11042 DBRDMT SUBQ TIS 1ST 20SQCM/<: CPT

## 2020-10-19 PROCEDURE — 99214 OFFICE O/P EST MOD 30 MIN: CPT

## 2020-10-19 PROCEDURE — 82962 GLUCOSE BLOOD TEST: CPT

## 2020-10-19 PROCEDURE — 29445 APPL RIGID TOT CNTC LEG CAST: CPT

## 2020-10-20 NOTE — PROGRESS NOTES
Progress Note Details  Patient Name: Shanda Osbonre.   Patient Number: [id number]  Patient YOB: 1955  Date: 10/19/2020  Physician / Jeremiah Fragmin: Sherie He 19: Julia Layman    Chief Complaint  This information was obtained from the p states that his inserts and afo were adjusted however he continued to utilize the offloading pad on both feet, we discussed not wearing offloading pad on the left. he states that recently he noted the callus was starting to drain.   again he has significant to see dr. Pravin Juares (or other podiatrist) in 2 mo.  he should also make an appointment for 6 months to get his prosthetic and inserts reassessed. patient dc'd from clinic to f/u as needed. General Notes:  Pt placed in TCC.  Dinesh BSNRN    Review of Sys (DR/EC)        Objective    Wound Assessment(s)  Wound #16 Right, Plantar Foot is a chronic Mckeon Grade 0 Diabetic Ulcer and has received a status of Not Healed.  Initial wound encounter measurements are 0cm length x 0cm width with no measurable depth, wit Ulcer) is located on the right, plantar foot.  A skin/subcutaneous tissue level excisional/surgical debridement with a total area debrided of 0.49 sq cm was performed by Nava Ferrell DPM. Subcutaneous was removed along with devitalized tissue: biofilm, call

## 2020-10-22 ENCOUNTER — OFFICE VISIT (OUTPATIENT)
Dept: WOUND CARE | Facility: HOSPITAL | Age: 65
End: 2020-10-22
Attending: PODIATRIST
Payer: MEDICARE

## 2020-10-22 DIAGNOSIS — E11.621 TYPE 2 DIABETES MELLITUS WITH FOOT ULCER (HCC): ICD-10-CM

## 2020-10-22 DIAGNOSIS — L97.509 TYPE 2 DIABETES MELLITUS WITH FOOT ULCER (HCC): ICD-10-CM

## 2020-10-22 DIAGNOSIS — L97.512 NON-PRESSURE CHRONIC ULCER OF OTHER PART OF RIGHT FOOT WITH FAT LAYER EXPOSED (HCC): Primary | ICD-10-CM

## 2020-10-22 DIAGNOSIS — E08.43 DIABETES MELLITUS DUE TO UNDERLYING CONDITION WITH DIABETIC AUTONOMIC (POLY)NEUROPATHY (HCC): ICD-10-CM

## 2020-10-22 PROCEDURE — 82962 GLUCOSE BLOOD TEST: CPT

## 2020-10-22 PROCEDURE — 29445 APPL RIGID TOT CNTC LEG CAST: CPT

## 2020-10-26 ENCOUNTER — OFFICE VISIT (OUTPATIENT)
Dept: WOUND CARE | Facility: HOSPITAL | Age: 65
End: 2020-10-26
Attending: PODIATRIST
Payer: MEDICARE

## 2020-10-26 DIAGNOSIS — E11.621 TYPE 2 DIABETES MELLITUS WITH FOOT ULCER (HCC): ICD-10-CM

## 2020-10-26 DIAGNOSIS — L97.509 TYPE 2 DIABETES MELLITUS WITH FOOT ULCER (HCC): ICD-10-CM

## 2020-10-26 DIAGNOSIS — E08.43 DIABETES MELLITUS DUE TO UNDERLYING CONDITION WITH DIABETIC AUTONOMIC (POLY)NEUROPATHY (HCC): ICD-10-CM

## 2020-10-26 DIAGNOSIS — L97.512 NON-PRESSURE CHRONIC ULCER OF OTHER PART OF RIGHT FOOT WITH FAT LAYER EXPOSED (HCC): Primary | ICD-10-CM

## 2020-10-26 PROCEDURE — 29445 APPL RIGID TOT CNTC LEG CAST: CPT

## 2020-11-02 ENCOUNTER — OFFICE VISIT (OUTPATIENT)
Dept: WOUND CARE | Facility: HOSPITAL | Age: 65
End: 2020-11-02
Attending: PODIATRIST
Payer: MEDICARE

## 2020-11-02 DIAGNOSIS — L97.509 TYPE 2 DIABETES MELLITUS WITH FOOT ULCER (HCC): ICD-10-CM

## 2020-11-02 DIAGNOSIS — L97.509 TYPE 2 DIABETES MELLITUS WITH FOOT ULCER, WITH LONG-TERM CURRENT USE OF INSULIN (HCC): ICD-10-CM

## 2020-11-02 DIAGNOSIS — E11.621 TYPE 2 DIABETES MELLITUS WITH FOOT ULCER (HCC): ICD-10-CM

## 2020-11-02 DIAGNOSIS — E11.621 TYPE 2 DIABETES MELLITUS WITH FOOT ULCER, WITH LONG-TERM CURRENT USE OF INSULIN (HCC): ICD-10-CM

## 2020-11-02 DIAGNOSIS — Z79.4 TYPE 2 DIABETES MELLITUS WITH FOOT ULCER, WITH LONG-TERM CURRENT USE OF INSULIN (HCC): ICD-10-CM

## 2020-11-02 DIAGNOSIS — L97.512 NON-PRESSURE CHRONIC ULCER OF OTHER PART OF RIGHT FOOT WITH FAT LAYER EXPOSED (HCC): Primary | ICD-10-CM

## 2020-11-02 PROCEDURE — 11042 DBRDMT SUBQ TIS 1ST 20SQCM/<: CPT

## 2020-11-02 NOTE — PROGRESS NOTES
Progress Note Details  Patient Name: Shannan Salas   Patient Number: [id number]  Patient YOB: 1955  Date: 11/2/2020  Physician / Jose Nichols: Sherie Coon 19: Vonnie Smalls    Chief Complaint  This information was obtained from the pa however he continued to utilize the offloading pad on both feet, we discussed not wearing offloading pad on the left. he states that recently he noted the callus was starting to drain. again he has significant callus build up with a fissure at the base.  h mo.  he should also make an appointment for 6 months to get his prosthetic and inserts reassessed. patient dc'd from clinic to f/u as needed. Review of Systems (ROS)  This information was obtained from the patient, caregiver and chart.     Complaints and (190.5 cm), Weight: 278 lbs (126.36 kgs), BMI: 34.7, Temperature: 97.7 °F (36.5 °C), Pulse: 73 bpm, Respiratory Rate: 18 breaths/min, Blood Pressure: 136/83 mmHg.   Vital Signs Notes: Patient took his blood sugars this morning - 132    Lower Extremity Asses procedure was tolerated well with pain level of 0 throughout and a pain level of 0 following the procedure. Plan    Wound Orders:  Wound #16 Right, Plantar Foot    Topicals:  No anesthetic needed. Insensate.     Wound Cleansing & Dressings:  Camille C

## 2020-11-09 ENCOUNTER — OFFICE VISIT (OUTPATIENT)
Dept: WOUND CARE | Facility: HOSPITAL | Age: 65
End: 2020-11-09
Attending: PODIATRIST
Payer: MEDICARE

## 2020-11-09 DIAGNOSIS — E11.621 TYPE 2 DIABETES MELLITUS WITH FOOT ULCER (HCC): ICD-10-CM

## 2020-11-09 DIAGNOSIS — E08.43 DIABETES MELLITUS DUE TO UNDERLYING CONDITION WITH DIABETIC AUTONOMIC (POLY)NEUROPATHY (HCC): ICD-10-CM

## 2020-11-09 DIAGNOSIS — L97.512 NON-PRESSURE CHRONIC ULCER OF OTHER PART OF RIGHT FOOT WITH FAT LAYER EXPOSED (HCC): Primary | ICD-10-CM

## 2020-11-09 DIAGNOSIS — L97.509 TYPE 2 DIABETES MELLITUS WITH FOOT ULCER (HCC): ICD-10-CM

## 2020-11-09 PROCEDURE — 99213 OFFICE O/P EST LOW 20 MIN: CPT

## 2020-11-16 ENCOUNTER — APPOINTMENT (OUTPATIENT)
Dept: WOUND CARE | Facility: HOSPITAL | Age: 65
End: 2020-11-16
Payer: MEDICARE

## 2020-11-25 ENCOUNTER — OFFICE VISIT (OUTPATIENT)
Dept: PODIATRY CLINIC | Facility: CLINIC | Age: 65
End: 2020-11-25
Payer: MEDICARE

## 2020-11-25 DIAGNOSIS — E11.42 DIABETIC POLYNEUROPATHY ASSOCIATED WITH TYPE 2 DIABETES MELLITUS (HCC): ICD-10-CM

## 2020-11-25 DIAGNOSIS — T87.89 NON-HEALING WOUND OF AMPUTATION STUMP (HCC): ICD-10-CM

## 2020-11-25 PROCEDURE — 99203 OFFICE O/P NEW LOW 30 MIN: CPT | Performed by: PODIATRIST

## 2020-11-25 NOTE — PROGRESS NOTES
Mateo Laureano is a 76year old male.  Patient presents with:  Diabetic Ulcer: Right heel - he was in wound clinic and it was healed but after 1 week it opened up again - has a little bleeding, no pain - this AM his HU=255 - last [id number]=9.7 from 11/19/19 an ML Does not apply Misc USE AS DIRECTED. 5 TIMES A  each 3   • HYDROcodone-acetaminophen  MG Oral Tab Take 1-2 tablets by mouth every 4 (four) hours as needed.  20 tablet 0   • Continuous Blood Gluc Transmit (DEXCOM G6 TRANSMITTER) Does not appl MAIN OR   • EXTREMITY LOWER IRRIGATION & DEBRIDEMENT Left 5/13/2017    Performed by Pilar Mckenzie DPM at [address]   [facility]    Performed by Pilar Mckenzie DPM at [address]  the right stump was evaluated he has hyperkeratosis. I did review his former x-rays as well he has no signs of bone infection.   There is hyperkeratotic tissue that measures about 0.2 to 0.3 cm in depth and he has a small ulceration measuring only 0.4 cm i

## 2020-12-02 ENCOUNTER — OFFICE VISIT (OUTPATIENT)
Dept: WOUND CARE | Facility: HOSPITAL | Age: 65
End: 2020-12-02
Attending: NURSE PRACTITIONER
Payer: MEDICARE

## 2020-12-02 DIAGNOSIS — E08.43 DIABETES MELLITUS DUE TO UNDERLYING CONDITION WITH DIABETIC AUTONOMIC (POLY)NEUROPATHY (HCC): ICD-10-CM

## 2020-12-02 DIAGNOSIS — L97.512 NON-PRESSURE CHRONIC ULCER OF OTHER PART OF RIGHT FOOT WITH FAT LAYER EXPOSED (HCC): Primary | ICD-10-CM

## 2020-12-02 DIAGNOSIS — L97.509 TYPE 2 DIABETES MELLITUS WITH FOOT ULCER (HCC): ICD-10-CM

## 2020-12-02 DIAGNOSIS — E11.621 TYPE 2 DIABETES MELLITUS WITH FOOT ULCER (HCC): ICD-10-CM

## 2020-12-02 PROCEDURE — 97597 DBRDMT OPN WND 1ST 20 CM/<: CPT

## 2020-12-02 PROCEDURE — 99214 OFFICE O/P EST MOD 30 MIN: CPT

## 2020-12-02 NOTE — PROGRESS NOTES
Subjective    Chief Complaint  This information was obtained from the patient  Patient is here for a follow up appt.  Patient states no new concerns or pain    Allergies  NKDA    HPI  This information was obtained from the patient, caregiver  The following 12-2-20 patient returns today, he saw dr. Wes Hawkins here in wound center and was discharged in november 2020, he followed up with dr. Willy Damon and when dr. Willy Damon saw him and trimmed the callus he identified that the ulceration was still present.  I d/w patient Wound #16 Right, Plantar Foot is a chronic Mckeon Grade 0 Diabetic Ulcer and has received a status of Not Healed.  Subsequent wound encounter measurements are 0.1cm length x 0.1cm width x 0.3cm depth, with an area of 0.01 sq cm and a volume of 0.003 cubic c Right Extremity: Edema is not present  Compression Device In Use: No  Calf Measurement 34 cm from heel with right measurement of 41.2 cm  Ankle Measurement 11 cm from heel with right measurement of 26.2 cm          Assessment    Active Problems    ICD-10 Xeroform gauze  Other: - bandaid  Change Dressing Daily and/or PRN    Follow-Up Appointments:  Return Appointment in 1 week.     Misc/Additional Orders:  Supplement with a daily multivitamin  Increase protein into diet  Start or continue taking Tyrone Oreilly

## 2020-12-10 ENCOUNTER — OFFICE VISIT (OUTPATIENT)
Dept: WOUND CARE | Facility: HOSPITAL | Age: 65
End: 2020-12-10
Attending: NURSE PRACTITIONER
Payer: MEDICARE

## 2020-12-10 DIAGNOSIS — E11.621 TYPE 2 DIABETES MELLITUS WITH FOOT ULCER (HCC): ICD-10-CM

## 2020-12-10 DIAGNOSIS — L97.509 TYPE 2 DIABETES MELLITUS WITH FOOT ULCER (HCC): ICD-10-CM

## 2020-12-10 DIAGNOSIS — L97.512 NON-PRESSURE CHRONIC ULCER OF OTHER PART OF RIGHT FOOT WITH FAT LAYER EXPOSED (HCC): Primary | ICD-10-CM

## 2020-12-10 DIAGNOSIS — E08.43 DIABETES MELLITUS DUE TO UNDERLYING CONDITION WITH DIABETIC AUTONOMIC (POLY)NEUROPATHY (HCC): ICD-10-CM

## 2020-12-10 PROCEDURE — 97597 DBRDMT OPN WND 1ST 20 CM/<: CPT

## 2020-12-10 NOTE — PROGRESS NOTES
Subjective    Chief Complaint  This information was obtained from the patient  Patient is here for a wound care follow up. He denies any pain or new wound concerns.     Allergies  NKDA    HPI  This information was obtained from the patient, caregiver  The f 12-2-20 patient returns today, he saw dr. Kaylie Serrano here in wound center and was discharged in november 2020, he followed up with dr. Easton Tobin and when dr. Easton Tobin saw him and trimmed the callus he identified that the ulceration was still present.  I d/w patient Psychiatric: Nervousness / Tension    Patient denies complaints or symptoms related to:  Constitutional Symptoms (General Health):  Fatigue, Fever, Loss of Appetite, Marked Weight Change, Night Sweats, Chills  Respiratory: Cough, Shortness of Breath, [place] bp wnl for patient. Pulse Regular and wnl for patient. Jaylan Delude Respirations easy and unlabored. Temperature wnl. elevated BMI. Appearance neat and clean. Appears in no acute distress. Well nourished and well developed. Cardiovascular:  dp/pt palpable right.  ri Wound Orders:  Wound #16 Right, Plantar Foot    Off-Loading:  TCC - plan for tcc on wednesday  Other: - felted foam    make an appointment with jacinto and lena    Care summary  Reviewed and evaluated labs.  - dec 2020-patient reminded to get labs done as

## 2020-12-16 ENCOUNTER — OFFICE VISIT (OUTPATIENT)
Dept: WOUND CARE | Facility: HOSPITAL | Age: 65
End: 2020-12-16
Attending: NURSE PRACTITIONER
Payer: MEDICARE

## 2020-12-16 DIAGNOSIS — E11.621 TYPE 2 DIABETES MELLITUS WITH FOOT ULCER (HCC): ICD-10-CM

## 2020-12-16 DIAGNOSIS — E08.43 DIABETES MELLITUS DUE TO UNDERLYING CONDITION WITH DIABETIC AUTONOMIC (POLY)NEUROPATHY (HCC): ICD-10-CM

## 2020-12-16 DIAGNOSIS — L97.509 TYPE 2 DIABETES MELLITUS WITH FOOT ULCER (HCC): ICD-10-CM

## 2020-12-16 DIAGNOSIS — L97.512 NON-PRESSURE CHRONIC ULCER OF OTHER PART OF RIGHT FOOT WITH FAT LAYER EXPOSED (HCC): Primary | ICD-10-CM

## 2020-12-16 PROCEDURE — 15275 SKIN SUB GRAFT FACE/NK/HF/G: CPT

## 2020-12-16 NOTE — PROGRESS NOTES
Subjective    Chief Complaint  This information was obtained from the patient  Patient is here for a wound care follow up. He denies any pain or new wound concerns.     Allergies  NKDA    HPI  This information was obtained from the patient, caregiver  The f 12-2-20 patient returns today, he saw dr. Christiano Chavarria here in wound center and was discharged in november 2020, he followed up with dr. Pravin Juares and when dr. Pravin Juares saw him and trimmed the callus he identified that the ulceration was still present.  I d/w patient Review of Systems (ROS)  This information was obtained from the patient, caregiver and chart. Complaints and Symptoms  Patient complains of:  Cardiovascular (Central/Peripheral):  Lower extremity (leg) swelling (Minimal), Other (htn), Edema  Musculoske The periwound skin exhibited: Edema, Callus, Dry/Scaly. The periwound skin did not exhibit: Erythema. The temperature of the periwound skin is Cool. Periwound skin does not exhibit signs or symptoms of infection. Local Pulse is Weak.     Vitals  Height/Christa Wound #16 (Diabetic Ulcer) is located on the right, plantar foot. A selective debridement with a total area debrided of 0.16 sq cm was performed by Ema Raman NP. to remove devitalized tissue: biofilm and callus.  The following instrument(s) were used: RN visit for assessment of wound(s).  - as previously schedule on Friday    Misc/Additional Orders:  Supplement with a daily multivitamin  Increase protein into diet  Start or continue taking Atul  Incontinence Care  S/S of Infection  Non-adherence    Care

## 2020-12-18 ENCOUNTER — OFFICE VISIT (OUTPATIENT)
Dept: WOUND CARE | Facility: HOSPITAL | Age: 65
End: 2020-12-18
Attending: NURSE PRACTITIONER
Payer: MEDICARE

## 2020-12-18 DIAGNOSIS — L97.509 TYPE 2 DIABETES MELLITUS WITH FOOT ULCER (HCC): ICD-10-CM

## 2020-12-18 DIAGNOSIS — E11.621 TYPE 2 DIABETES MELLITUS WITH FOOT ULCER (HCC): ICD-10-CM

## 2020-12-18 DIAGNOSIS — L97.512 NON-PRESSURE CHRONIC ULCER OF OTHER PART OF RIGHT FOOT WITH FAT LAYER EXPOSED (HCC): Primary | ICD-10-CM

## 2020-12-18 DIAGNOSIS — E08.43 DIABETES MELLITUS DUE TO UNDERLYING CONDITION WITH DIABETIC AUTONOMIC (POLY)NEUROPATHY (HCC): ICD-10-CM

## 2020-12-18 PROCEDURE — 29445 APPL RIGID TOT CNTC LEG CAST: CPT

## 2020-12-22 ENCOUNTER — OFFICE VISIT (OUTPATIENT)
Dept: WOUND CARE | Facility: HOSPITAL | Age: 65
End: 2020-12-22
Attending: NURSE PRACTITIONER
Payer: MEDICARE

## 2020-12-22 DIAGNOSIS — L97.512 NON-PRESSURE CHRONIC ULCER OF OTHER PART OF RIGHT FOOT WITH FAT LAYER EXPOSED (HCC): Primary | ICD-10-CM

## 2020-12-22 DIAGNOSIS — E08.43 DIABETES MELLITUS DUE TO UNDERLYING CONDITION WITH DIABETIC AUTONOMIC (POLY)NEUROPATHY (HCC): ICD-10-CM

## 2020-12-22 DIAGNOSIS — E11.621 TYPE 2 DIABETES MELLITUS WITH FOOT ULCER (HCC): ICD-10-CM

## 2020-12-22 DIAGNOSIS — L97.509 TYPE 2 DIABETES MELLITUS WITH FOOT ULCER (HCC): ICD-10-CM

## 2020-12-22 PROCEDURE — 99214 OFFICE O/P EST MOD 30 MIN: CPT

## 2020-12-22 PROCEDURE — 29445 APPL RIGID TOT CNTC LEG CAST: CPT

## 2020-12-22 NOTE — PROGRESS NOTES
Subjective    Chief Complaint  This information was obtained from the patient  Patient is here for a wound care follow up. He denies any pain at this time.     Allergies  NKDA    HPI  This information was obtained from the patient, caregiver  The following 12-2-20 patient returns today, he saw dr. Lea Moulton here in wound center and was discharged in november 2020, he followed up with dr. Amy Hurst and when dr. Amy Hurst saw him and trimmed the callus he identified that the ulceration was still present.  I d/w patient 12-22-20 patient returns today, it appears the wound is resolved again, minimal callus. he has an appointment next wednesday at [address] [place] [address] for prosthetic adjustment. he will come here first and get tcc removed.  we will also make an appointment f Wound #16 Right, Plantar Foot is a chronic Mckeon Grade 1 Diabetic Ulcer and has received a status of Not Healed.  Subsequent wound encounter measurements are 0.1cm length x 0.1cm width x 0.1cm depth, with an area of 0.01 sq cm and a volume of 0.001 cubic c Judgment and insight intact. Alert and oriented times 3. No evidence of depression, anxiety, or agitation. Calm, cooperative, and communicative. Appropriate interactions and affect.     Lower Extremity Assessment  Edema Assessment:  Left Extremity: Edema is Other: - Patient to come ignacio Wednesday dec 30 for TCC removal. Will place felted foam until his appointment later in the morning with [facility] for shoe/prosthetic adjustment.  IF the adjustment is not able to be done at [facility] and the pr

## 2020-12-30 ENCOUNTER — OFFICE VISIT (OUTPATIENT)
Dept: WOUND CARE | Facility: HOSPITAL | Age: 65
End: 2020-12-30
Attending: NURSE PRACTITIONER
Payer: MEDICARE

## 2020-12-30 DIAGNOSIS — E11.621 TYPE 2 DIABETES MELLITUS WITH FOOT ULCER (HCC): ICD-10-CM

## 2020-12-30 DIAGNOSIS — L97.509 TYPE 2 DIABETES MELLITUS WITH FOOT ULCER (HCC): ICD-10-CM

## 2020-12-30 DIAGNOSIS — E08.43 DIABETES MELLITUS DUE TO UNDERLYING CONDITION WITH DIABETIC AUTONOMIC (POLY)NEUROPATHY (HCC): ICD-10-CM

## 2020-12-30 DIAGNOSIS — L97.512 NON-PRESSURE CHRONIC ULCER OF OTHER PART OF RIGHT FOOT WITH FAT LAYER EXPOSED (HCC): Primary | ICD-10-CM

## 2020-12-30 PROCEDURE — 99213 OFFICE O/P EST LOW 20 MIN: CPT

## 2020-12-30 NOTE — PROGRESS NOTES
Subjective    Chief Complaint  This information was obtained from the patient  Patient is here for a wound care follow up. He denies any pain at this time.     Allergies  NKDA    HPI  This information was obtained from the patient, caregiver  The following 12-2-20 patient returns today, he saw dr. Donavon Duval here in wound center and was discharged in november 2020, he followed up with dr. Lynnette Green and when dr. Lynnette Green saw him and trimmed the callus he identified that the ulceration was still present.  I d/w patient 12-22-20 patient returns today, it appears the wound is resolved again, minimal callus. he has an appointment next wednesday at [address] [place] [address] for prosthetic adjustment. he will come here first and get tcc removed.  we will also make an appointment f Cardiovascular (Central/Peripheral): Chest Pain, Dyspnea on Exertion, Intermittent Claudication, Lower extremity (leg) resting pain, Orthopnea  Gastrointestinal (GI): Difficulty Swallowing  Neurological: Memory Loss  Psychiatric: Anxiety, Memory Loss, Depr see wound documentation. Psychiatric:  Judgment and insight intact. Alert and oriented times 3. No evidence of depression, anxiety, or agitation. Calm, cooperative, and communicative. Appropriate interactions and affect.     Lower Extremity Assessment  E Wound type: - dfu  Wound improving. No s/s of infection. Perfusion assessed by doppler. - Biphasic signals  Perfusion assessed by palpation of pulses.   Last Sharp Debridement Date: - 12-16-20  Last A1C Date and Value: - dec 2020 patient reminded to get la

## 2021-01-07 ENCOUNTER — OFFICE VISIT (OUTPATIENT)
Dept: WOUND CARE | Facility: HOSPITAL | Age: 66
End: 2021-01-07
Attending: NURSE PRACTITIONER
Payer: MEDICARE

## 2021-01-07 DIAGNOSIS — E08.43 DIABETES MELLITUS DUE TO UNDERLYING CONDITION WITH DIABETIC AUTONOMIC (POLY)NEUROPATHY (HCC): ICD-10-CM

## 2021-01-07 DIAGNOSIS — E11.621 TYPE 2 DIABETES MELLITUS WITH FOOT ULCER (HCC): ICD-10-CM

## 2021-01-07 DIAGNOSIS — L97.512 NON-PRESSURE CHRONIC ULCER OF OTHER PART OF RIGHT FOOT WITH FAT LAYER EXPOSED (HCC): Primary | ICD-10-CM

## 2021-01-07 DIAGNOSIS — L97.509 TYPE 2 DIABETES MELLITUS WITH FOOT ULCER (HCC): ICD-10-CM

## 2021-01-07 PROCEDURE — 11042 DBRDMT SUBQ TIS 1ST 20SQCM/<: CPT

## 2021-01-13 ENCOUNTER — OFFICE VISIT (OUTPATIENT)
Dept: WOUND CARE | Facility: HOSPITAL | Age: 66
End: 2021-01-13
Attending: NURSE PRACTITIONER
Payer: MEDICARE

## 2021-01-13 DIAGNOSIS — L97.512 NON-PRESSURE CHRONIC ULCER OF OTHER PART OF RIGHT FOOT WITH FAT LAYER EXPOSED (HCC): Primary | ICD-10-CM

## 2021-01-13 DIAGNOSIS — E08.43 DIABETES MELLITUS DUE TO UNDERLYING CONDITION WITH DIABETIC AUTONOMIC (POLY)NEUROPATHY (HCC): ICD-10-CM

## 2021-01-13 DIAGNOSIS — E11.621 TYPE 2 DIABETES MELLITUS WITH FOOT ULCER (HCC): ICD-10-CM

## 2021-01-13 DIAGNOSIS — L97.509 TYPE 2 DIABETES MELLITUS WITH FOOT ULCER (HCC): ICD-10-CM

## 2021-01-13 PROCEDURE — 97597 DBRDMT OPN WND 1ST 20 CM/<: CPT

## 2021-01-13 NOTE — PROGRESS NOTES
Subjective    Chief Complaint  This information was obtained from the patient  Patient is here for a follow up of right foot wound    Allergies  NKDA    HPI  This information was obtained from the patient, caregiver  The following HPI elements were documen was discharged in november 2020, he followed up with dr. Mac Ormond and when dr. Mac Ormond saw him and trimmed the callus he identified that the ulceration was still present. I d/w patient that he will need to get his shoe/prosthetic adjusted again.   he has bee again, minimal callus. he has an appointment next wednesday at [address] [place] [address] for prosthetic adjustment. he will come here first and get tcc removed.  we will also make an appointment for the afternoon in case the prosthetic can not be adjusted on that patient, caregiver and chart. 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   Vital Signs Notes: Patient refused blood glucose to be taken    Physical Exam  Constitution width x 0.3cm depth; with an area of 0.03 sq cm and a volume of 0.009 cubic cm;        Plan    Wound Orders:  Wound #16 Right, Plantar Foot    Off-Loading:  Other: - felted foam, patient's shoe with prosthetic    Additional Orders:    Wound Cleansing & Jay

## 2021-01-22 ENCOUNTER — OFFICE VISIT (OUTPATIENT)
Dept: WOUND CARE | Facility: HOSPITAL | Age: 66
End: 2021-01-22
Attending: NURSE PRACTITIONER
Payer: MEDICARE

## 2021-01-22 DIAGNOSIS — L97.509 TYPE 2 DIABETES MELLITUS WITH FOOT ULCER (HCC): ICD-10-CM

## 2021-01-22 DIAGNOSIS — E11.621 TYPE 2 DIABETES MELLITUS WITH FOOT ULCER (HCC): ICD-10-CM

## 2021-01-22 DIAGNOSIS — E08.43 DIABETES MELLITUS DUE TO UNDERLYING CONDITION WITH DIABETIC AUTONOMIC (POLY)NEUROPATHY (HCC): ICD-10-CM

## 2021-01-22 DIAGNOSIS — L97.512 NON-PRESSURE CHRONIC ULCER OF OTHER PART OF RIGHT FOOT WITH FAT LAYER EXPOSED (HCC): Primary | ICD-10-CM

## 2021-01-22 PROCEDURE — 11042 DBRDMT SUBQ TIS 1ST 20SQCM/<: CPT

## 2021-01-22 NOTE — PROGRESS NOTES
Subjective    Chief Complaint  This information was obtained from the patient  Patient is here for a follow up of right foot wound denies any pain at this time.     Allergies  NKDA    HPI  This information was obtained from the patient, caregiver  The blaine INDIVIDUAL PROGRESS NOTES  12-30-20 patient returns today, his S&S appointment got moved to tomorrow however he was unable to come for a nurse visit earlier this week and i didn't want the tcc left on for >8 days.  we discussed with him on the phone and rita goes.    Review of Systems (ROS)  This information was obtained from the patient, caregiver and chart. Complaints and Symptoms  Patient complains of:  Cardiovascular (Central/Peripheral):  Lower extremity (leg) swelling (Minimal), Other (htn), Edema  Mus Notes:  Unable to view wound bed. Vitals  Height/Length: 75 in (190.5 cm), Weight: 278 lbs (126.36 kgs), BMI: 34.7, Temperature: 97 °F (36.11 °C), Pulse: 73 bpm, Respiratory Rate: 18 breaths/min, Blood Pressure: 164/84 mmHg.   Vital Signs Notes: Patient bleeding was controlled with pressure. The procedure was tolerated well with a pain level of 0 throughout and a pain level of 0 following the procedure.  Post Debridement Measurements: 0.3cm length x 0.2cm width x 0.1cm depth; with an area of 0.06 sq cm and

## 2021-01-29 ENCOUNTER — OFFICE VISIT (OUTPATIENT)
Dept: WOUND CARE | Facility: HOSPITAL | Age: 66
End: 2021-01-29
Attending: NURSE PRACTITIONER
Payer: MEDICARE

## 2021-01-29 DIAGNOSIS — E08.43 DIABETES MELLITUS DUE TO UNDERLYING CONDITION WITH DIABETIC AUTONOMIC (POLY)NEUROPATHY (HCC): ICD-10-CM

## 2021-01-29 DIAGNOSIS — E11.621 TYPE 2 DIABETES MELLITUS WITH FOOT ULCER (HCC): ICD-10-CM

## 2021-01-29 DIAGNOSIS — L97.509 TYPE 2 DIABETES MELLITUS WITH FOOT ULCER (HCC): ICD-10-CM

## 2021-01-29 DIAGNOSIS — L97.512 NON-PRESSURE CHRONIC ULCER OF OTHER PART OF RIGHT FOOT WITH FAT LAYER EXPOSED (HCC): Primary | ICD-10-CM

## 2021-01-29 PROCEDURE — 15275 SKIN SUB GRAFT FACE/NK/HF/G: CPT

## 2021-01-29 NOTE — PROGRESS NOTES
Subjective    Chief Complaint  This information was obtained from the patient  Patient is here for a follow up of right foot wound denies any pain at this time.     Allergies  NKDA    HPI  This information was obtained from the patient, caregiver  The blaine HPI PRIOR TO JANUARY 2021 PLEASE SEE INDIVIDUAL PROGRESS NOTES  1-7-21 patient presents today, he states that he has worn the adjusted shoe and  kept the felted foam offloading pad in place, he states he has noted no drainage since last visit however today 3rd application of Epifix this visit. Amando Jensen (1st in 2021)    Review of Systems (ROS)  This information was obtained from the patient, caregiver and chart. Complaints and Symptoms  Patient complains of:  Cardiovascular (Central/Peripheral):  Lower ex The periwound skin moisture is normal. The periwound skin exhibited: Edema, Callus. The periwound skin did not exhibit: Erythema. The temperature of the periwound skin is WNL. Periwound skin does not exhibit signs or symptoms of infection.  Local Pulse is N Wound #16 (Diabetic Ulcer) is located on the right, plantar foot. A selective debridement with a total area debrided of 0.04 sq cm was performed by Urszula Paul NP. to remove devitalized tissue: biofilm and callus.  The following instrument(s) were used: RN visit for assessment of wound(s).  - Monday for TCC change    Misc/Additional Orders:  Supplement with a daily multivitamin  Increase protein into diet  Start or continue taking Atul  Incontinence Care  S/S of Infection  Non-adherence    Care summary  R

## 2021-02-01 ENCOUNTER — OFFICE VISIT (OUTPATIENT)
Dept: WOUND CARE | Facility: HOSPITAL | Age: 66
End: 2021-02-01
Attending: NURSE PRACTITIONER
Payer: MEDICARE

## 2021-02-01 DIAGNOSIS — L97.509 TYPE 2 DIABETES MELLITUS WITH FOOT ULCER (HCC): ICD-10-CM

## 2021-02-01 DIAGNOSIS — L97.512 NON-PRESSURE CHRONIC ULCER OF OTHER PART OF RIGHT FOOT WITH FAT LAYER EXPOSED (HCC): Primary | ICD-10-CM

## 2021-02-01 DIAGNOSIS — E11.621 TYPE 2 DIABETES MELLITUS WITH FOOT ULCER (HCC): ICD-10-CM

## 2021-02-01 DIAGNOSIS — E08.43 DIABETES MELLITUS DUE TO UNDERLYING CONDITION WITH DIABETIC AUTONOMIC (POLY)NEUROPATHY (HCC): ICD-10-CM

## 2021-02-01 PROCEDURE — 29445 APPL RIGID TOT CNTC LEG CAST: CPT

## 2021-02-03 DIAGNOSIS — Z23 NEED FOR VACCINATION: ICD-10-CM

## 2021-02-04 ENCOUNTER — IMMUNIZATION (OUTPATIENT)
Dept: LAB | Age: 66
End: 2021-02-04
Attending: HOSPITALIST
Payer: MEDICARE

## 2021-02-04 DIAGNOSIS — Z23 NEED FOR VACCINATION: Primary | ICD-10-CM

## 2021-02-04 PROCEDURE — 0001A SARSCOV2 VAC 30MCG/0.3ML IM: CPT

## 2021-02-05 ENCOUNTER — OFFICE VISIT (OUTPATIENT)
Dept: WOUND CARE | Facility: HOSPITAL | Age: 66
End: 2021-02-05
Attending: NURSE PRACTITIONER
Payer: MEDICARE

## 2021-02-05 DIAGNOSIS — L97.512 NON-PRESSURE CHRONIC ULCER OF OTHER PART OF RIGHT FOOT WITH FAT LAYER EXPOSED (HCC): Primary | ICD-10-CM

## 2021-02-05 DIAGNOSIS — E11.621 TYPE 2 DIABETES MELLITUS WITH FOOT ULCER (HCC): ICD-10-CM

## 2021-02-05 DIAGNOSIS — L97.509 TYPE 2 DIABETES MELLITUS WITH FOOT ULCER (HCC): ICD-10-CM

## 2021-02-05 DIAGNOSIS — E08.43 DIABETES MELLITUS DUE TO UNDERLYING CONDITION WITH DIABETIC AUTONOMIC (POLY)NEUROPATHY (HCC): ICD-10-CM

## 2021-02-05 PROCEDURE — 29445 APPL RIGID TOT CNTC LEG CAST: CPT

## 2021-02-05 NOTE — PROGRESS NOTES
Subjective    Chief Complaint  This information was obtained from the patient  Patient is here for a follow up of right foot wound denies any pain at this time.  Patients denies any issues with the cast    Allergies  NKDA    HPI  This information was obtain HPI PRIOR TO JANUARY 2021 PLEASE SEE INDIVIDUAL PROGRESS NOTES  1-7-21 patient presents today, he states that he has worn the adjusted shoe and  kept the felted foam offloading pad in place, he states he has noted no drainage since last visit however today 2-5-21 patient returns today, has been in the TCC for the last week, no c/o. patient is very excited because he was able to get his covid vaccine yesterday. he is having no side effects.   the wound appears to be healed, there is very minimal surrounding ca Wound #16 Right, Plantar Foot is a chronic Mckeon Grade 1 Diabetic Ulcer and has received a status of Not Healed. Subsequent wound encounter measurements are 0.1cm length x 0.1cm width with no measurable depth, with an area of 0.01 sq cm .  No tunneling has Ankle Measurement 11 cm from heel  Right Extremity: Edema is present  Compression Device In Use: No  Calf Measurement 34 cm from heel with right measurement of 39.2 cm  Ankle Measurement 11 cm from heel with right measurement of 24.4 cm  Off-Loading:  [place] A follow-up appointment should be scheduled.         continue with tcc-will utilize hydrocolloid thick    Electronic Signature(s)  Signed By: Date:  Osmar STOVER, MARKEL-AP, CFABIMAEL, CSWS, [facility] 02/05/2021 09:34:48       Entered By: Osmar SILVERIO

## 2021-02-12 ENCOUNTER — OFFICE VISIT (OUTPATIENT)
Dept: WOUND CARE | Facility: HOSPITAL | Age: 66
End: 2021-02-12
Attending: NURSE PRACTITIONER
Payer: MEDICARE

## 2021-02-12 DIAGNOSIS — L97.512 NON-PRESSURE CHRONIC ULCER OF OTHER PART OF RIGHT FOOT WITH FAT LAYER EXPOSED (HCC): Primary | ICD-10-CM

## 2021-02-12 DIAGNOSIS — E11.621 TYPE 2 DIABETES MELLITUS WITH FOOT ULCER (HCC): ICD-10-CM

## 2021-02-12 DIAGNOSIS — L97.509 TYPE 2 DIABETES MELLITUS WITH FOOT ULCER (HCC): ICD-10-CM

## 2021-02-12 PROCEDURE — 29445 APPL RIGID TOT CNTC LEG CAST: CPT

## 2021-02-12 NOTE — PROGRESS NOTES
Subjective    Chief Complaint  This information was obtained from the patient  Patient is here for a follow up of right foot wound denies any pain at this time.  Patients denies any issues with the cast    Allergies  NKDA    HPI  This information was obtain HPI PRIOR TO JANUARY 2021 PLEASE SEE INDIVIDUAL PROGRESS NOTES  1-7-21 patient presents today, he states that he has worn the adjusted shoe and  kept the felted foam offloading pad in place, he states he has noted no drainage since last visit however today 2-5-21 patient returns today, has been in the TCC for the last week, no c/o. patient is very excited because he was able to get his covid vaccine yesterday. he is having no side effects.   the wound appears to be healed, there is very minimal surrounding ca Wound #16 Right, Plantar Foot is a chronic Mckeon Grade 1 Diabetic Ulcer and has received a status of Not Healed. Subsequent wound encounter measurements are 0.1cm length x 0.1cm width with no measurable depth, with an area of 0.01 sq cm .  No tunneling has Ankle Measurement 11 cm from heel  Right Extremity: Edema is present  Compression Device In Use: No  Calf Measurement 34 cm from heel with right measurement of 39.2 cm  Ankle Measurement 11 cm from heel with right measurement of 24.4 cm  Off-Loading:  [place] A follow-up appointment should be scheduled.         continue with tcc-will utilize hydrocolloid thick    Electronic Signature(s)  Signed By: Date:  Bri STOVER, MARKEL-AP, CFABIMAEL, CSWS, [facility] 02/05/2021 09:34:48       Entered By: Bri SILVERIO

## 2021-02-19 ENCOUNTER — OFFICE VISIT (OUTPATIENT)
Dept: WOUND CARE | Facility: HOSPITAL | Age: 66
End: 2021-02-19
Attending: NURSE PRACTITIONER
Payer: MEDICARE

## 2021-02-19 DIAGNOSIS — E08.43 DIABETES MELLITUS DUE TO UNDERLYING CONDITION WITH DIABETIC AUTONOMIC (POLY)NEUROPATHY (HCC): ICD-10-CM

## 2021-02-19 DIAGNOSIS — L97.509 TYPE 2 DIABETES MELLITUS WITH FOOT ULCER (HCC): ICD-10-CM

## 2021-02-19 DIAGNOSIS — L97.512 NON-PRESSURE CHRONIC ULCER OF OTHER PART OF RIGHT FOOT WITH FAT LAYER EXPOSED (HCC): Primary | ICD-10-CM

## 2021-02-19 DIAGNOSIS — E11.621 TYPE 2 DIABETES MELLITUS WITH FOOT ULCER (HCC): ICD-10-CM

## 2021-02-19 PROCEDURE — 29445 APPL RIGID TOT CNTC LEG CAST: CPT

## 2021-02-19 NOTE — PROGRESS NOTES
Subjective    Chief Complaint  This information was obtained from the patient  Patient is here for a follow up of right stump wound, denies any pain at this time, no issues with the cast. States he needs to know if he will be transitioned into his shoe tod podiatrist.  we discussed the importance of this for prevention and monitoring so this does not continue to happen. no acute s/s of infection.     HPI PRIOR TO JANUARY 2021 PLEASE SEE INDIVIDUAL PROGRESS NOTES  1-7-21 patient presents today, he states that his foot to protect from the friction. no s/s of infection, no c/o pain    2-5-21 patient returns today, has been in the TCC for the last week, no c/o. patient is very excited because he was able to get his covid vaccine yesterday.  he is having no side eff extremity (leg) swelling (Minimal), Other (htn), Edema  Musculoskeletal: Deformities (Right Chopart and left 5th ray amputations), Assistive Devices (AFO to right foot and Diabetic shoe left foot)  Integumentary (Hair/Skin/Nails): Dryness, Calluses/Corns, Rate: 18 breaths/min, Blood Pressure: 117/70 mmHg, Capillary Blood Glucose: 108 mg/dl. Vital Signs Notes: Declines to have blood sugar checked in clinic, states his sugar at home was 108 this morning. Physical Exam  Constitutional  bp wnl for patient. foot. A TCC Application procedure was performed for the lower right extremity. The procedure was tolerated well with pain level of 0 throughout and a pain level of 0 following the procedure.         Plan    Wound Orders:  Wound #16 Right, Plantar Foot    Of

## 2021-02-25 ENCOUNTER — IMMUNIZATION (OUTPATIENT)
Dept: LAB | Age: 66
End: 2021-02-25
Attending: HOSPITALIST
Payer: MEDICARE

## 2021-02-25 DIAGNOSIS — Z23 NEED FOR VACCINATION: Primary | ICD-10-CM

## 2021-02-25 PROCEDURE — 0002A SARSCOV2 VAC 30MCG/0.3ML IM: CPT

## 2021-02-26 ENCOUNTER — OFFICE VISIT (OUTPATIENT)
Dept: WOUND CARE | Facility: HOSPITAL | Age: 66
End: 2021-02-26
Attending: NURSE PRACTITIONER
Payer: MEDICARE

## 2021-02-26 DIAGNOSIS — L97.512 NON-PRESSURE CHRONIC ULCER OF OTHER PART OF RIGHT FOOT WITH FAT LAYER EXPOSED (HCC): Primary | ICD-10-CM

## 2021-02-26 DIAGNOSIS — E08.43 DIABETES MELLITUS DUE TO UNDERLYING CONDITION WITH DIABETIC AUTONOMIC (POLY)NEUROPATHY (HCC): ICD-10-CM

## 2021-02-26 DIAGNOSIS — L97.509 TYPE 2 DIABETES MELLITUS WITH FOOT ULCER (HCC): ICD-10-CM

## 2021-02-26 DIAGNOSIS — E11.621 TYPE 2 DIABETES MELLITUS WITH FOOT ULCER (HCC): ICD-10-CM

## 2021-02-26 PROCEDURE — 29445 APPL RIGID TOT CNTC LEG CAST: CPT

## 2021-02-26 NOTE — PROGRESS NOTES
Subjective    Chief Complaint  This information was obtained from the patient  Patient is here for a follow up of right stump wound, denies any pain at this time, no issues with the cast. States he needs to know if he will be transitioned into his shoe tod 8-14-20 INItial (reeval):  Patient is a 77 yo male well known to wound clinic and myself from previous admissions. He was last discharged/seen in June 2020 and he was to have his shoe/insert adjusted and f/u with podiatry on a regular basis.   Patient has 1-13-21 patient returns today, he has not seen [facility] because the prosthetist was out he will be seeing him on Friday or next Tuesday.  he states that he noted that callus was building up earlier this week so he started to use the  felted foam of 2-12-21 patient returns today, continues to tolerate TCC. the duoderm thick \"gel\" did stick to the skin/wound, it does appear that the wound appears to be fragilly epithelialized without drainage. no acute s/s of infection.  he did order his new shoes/ins Respiratory: Cough, Shortness of Breath, Wheezing  Cardiovascular (Central/Peripheral): Chest Pain, Dyspnea on Exertion, Intermittent Claudication, Lower extremity (leg) resting pain, Orthopnea  Gastrointestinal (GI): Difficulty Swallowing  Neurological: M dp/pt palpable right. right lower Extremity free of varicosities, + edema. Capillary refill < 3 seconds. right is a chopart amp, warm. + sparse hairgrowth on leg. .    Musculoskeletal:  Gait and station stable.     Integumentary (Hair, Skin)  see wound docum Follow-Up Appointments:  Return Appointment in 1 week. Other: - 1)  new shoe this week, bring to appointment next week  2) schedule a s&S appointment for AFTER we see you next week and take you out of the cast and put you in your new shoe.     Misc/

## 2021-03-04 NOTE — PROGRESS NOTES
Subjective    Chief Complaint  This information was obtained from the patient  Patient is here for a wound care follow up. He denies any pain or new wound concerns.     Allergies  NKDA    HPI  This information was obtained from the patient, caregiver  The f HPI PRIOR TO JANUARY 2021 PLEASE SEE INDIVIDUAL PROGRESS NOTES  1-7-21 patient presents today, he states that he has worn the adjusted shoe and  kept the felted foam offloading pad in place, he states he has noted no drainage since last visit however today 2-5-21 patient returns today, has been in the TCC for the last week, no c/o. patient is very excited because he was able to get his covid vaccine yesterday. he is having no side effects.   the wound appears to be healed, there is very minimal surrounding ca 2-26-21 patient returns today, he did get his 2nd covid vaccine yesterday-is feeling fine.   patient could not get an appointment for  S&S until today, he is going after this appointment to  his new shoe and get his prosthetic placed int he right brina Wound #16 Right, Plantar Foot is a chronic Mckeon Grade 1 Diabetic Ulcer and has received a status of Not Healed. Subsequent wound encounter measurements are 0.1cm length x 0.1cm width with no measurable depth, with an area of 0.01 sq cm .  No tunneling has Right Extremity: Edema is present  Compression Device In Use: No  Calf Measurement 34 cm from heel with right measurement of 39 cm  Ankle Measurement 11 cm from heel with right measurement of 24.1 cm  Off-Loading:  Right off-loading device in use: Yes  Dev Perfusion assessed by palpation of pulses.   Last Sharp Debridement Date: - 1-29-21  Last A1C Date and Value: - dec 2020 patient reminded to get labs drawn as ordered by dr Isabel Lau  Nov 2019 a1c 7.7    Follow-Up Appointments:  A follow-up appointment should be

## 2021-03-05 ENCOUNTER — APPOINTMENT (OUTPATIENT)
Dept: WOUND CARE | Facility: HOSPITAL | Age: 66
End: 2021-03-05
Attending: NURSE PRACTITIONER
Payer: MEDICARE

## 2021-03-05 DIAGNOSIS — E08.43 DIABETES MELLITUS DUE TO UNDERLYING CONDITION WITH DIABETIC AUTONOMIC (POLY)NEUROPATHY (HCC): ICD-10-CM

## 2021-03-05 DIAGNOSIS — E11.621 TYPE 2 DIABETES MELLITUS WITH FOOT ULCER (HCC): ICD-10-CM

## 2021-03-05 DIAGNOSIS — L97.512 NON-PRESSURE CHRONIC ULCER OF OTHER PART OF RIGHT FOOT WITH FAT LAYER EXPOSED (HCC): Primary | ICD-10-CM

## 2021-03-05 DIAGNOSIS — L97.509 TYPE 2 DIABETES MELLITUS WITH FOOT ULCER (HCC): ICD-10-CM

## 2021-03-05 PROCEDURE — 99213 OFFICE O/P EST LOW 20 MIN: CPT

## 2021-03-05 NOTE — PROGRESS NOTES
Subjective    Chief Complaint  This information was obtained from the patient  Patient is here for a wound care follow up. He denies any pain or new concerns.     Allergies  NKDA    HPI  This information was obtained from the patient, caregiver  The followi HPI PRIOR TO MARCH 2021 PLEASE SEE INDIVIDUAL PROGRESS NOTES  2-5-21 patient returns today, has been in the TCC for the last week, no c/o. patient is very excited because he was able to get his covid vaccine yesterday. he is having no side effects.   the wo 2-26-21 patient returns today, he did get his 2nd covid vaccine yesterday-is feeling fine.   patient could not get an appointment for  S&S until today, he is going after this appointment to  his new shoe and get his prosthetic placed int he right brina Respiratory: Cough, Shortness of Breath, Wheezing  Cardiovascular (Central/Peripheral): Chest Pain, Dyspnea on Exertion, Intermittent Claudication, Lower extremity (leg) resting pain, Orthopnea  Gastrointestinal (GI): Difficulty Swallowing  Neurological: M Judgment and insight intact. Alert and oriented times 3. No evidence of depression, anxiety, or agitation. Calm, cooperative, and communicative. Appropriate interactions and affect.     Lower Extremity Assessment  Edema Assessment:  Left Extremity: Edema is Perfusion assessed by palpation of pulses.   Last Sharp Debridement Date: - 1-29-21  Last A1C Date and Value: - dec 2020 patient reminded to get labs drawn as ordered by dr Buckner Plan  Nov 2019 a1c 7.7    Follow-Up Appointments:  A follow-up appointment should be

## 2021-03-19 ENCOUNTER — OFFICE VISIT (OUTPATIENT)
Dept: WOUND CARE | Facility: HOSPITAL | Age: 66
End: 2021-03-19
Attending: NURSE PRACTITIONER
Payer: MEDICARE

## 2021-03-19 DIAGNOSIS — E11.621 TYPE 2 DIABETES MELLITUS WITH FOOT ULCER (HCC): ICD-10-CM

## 2021-03-19 DIAGNOSIS — L97.509 TYPE 2 DIABETES MELLITUS WITH FOOT ULCER (HCC): ICD-10-CM

## 2021-03-19 DIAGNOSIS — E08.43 DIABETES MELLITUS DUE TO UNDERLYING CONDITION WITH DIABETIC AUTONOMIC (POLY)NEUROPATHY (HCC): ICD-10-CM

## 2021-03-19 DIAGNOSIS — L97.512 NON-PRESSURE CHRONIC ULCER OF OTHER PART OF RIGHT FOOT WITH FAT LAYER EXPOSED (HCC): Primary | ICD-10-CM

## 2021-03-19 PROCEDURE — 11042 DBRDMT SUBQ TIS 1ST 20SQCM/<: CPT

## 2021-03-19 NOTE — PROGRESS NOTES
Subjective    Chief Complaint  This information was obtained from the patient  Patient is here for a follow up visit for a right foot wound. Patient states he noted a scant amount of drainage on Tues. and the shoe was adjusted on Mon.     Allergies  NKDA happen. no acute s/s of infection. HPI PRIOR TO MARCH 2021 PLEASE SEE INDIVIDUAL PROGRESS NOTES  2-26-21 patient returns today, he did get his 2nd covid vaccine yesterday-is feeling fine.   patient could not get an appointment for  S&S until today, he is options will not be able to be obtained within the week-there might be a waiting time.  today there is not any s/s of infection, wound is definitely reopened with surrounding callus, no c/o pain    Review of Systems (ROS)  This information was obtained from periwound skin exhibited: Callus, Dry/Scaly. The periwound skin did not exhibit: Edema, Erythema. The temperature of the periwound skin is WNL. Periwound skin does not exhibit signs or symptoms of infection.  Local Pulse is Normal.    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         see hpi for discussion regarding next steps in li

## 2021-03-25 ENCOUNTER — OFFICE VISIT (OUTPATIENT)
Dept: WOUND CARE | Facility: HOSPITAL | Age: 66
End: 2021-03-25
Attending: NURSE PRACTITIONER
Payer: MEDICARE

## 2021-03-25 DIAGNOSIS — E11.621 TYPE 2 DIABETES MELLITUS WITH FOOT ULCER (HCC): ICD-10-CM

## 2021-03-25 DIAGNOSIS — E08.43 DIABETES MELLITUS DUE TO UNDERLYING CONDITION WITH DIABETIC AUTONOMIC (POLY)NEUROPATHY (HCC): ICD-10-CM

## 2021-03-25 DIAGNOSIS — L97.509 TYPE 2 DIABETES MELLITUS WITH FOOT ULCER (HCC): ICD-10-CM

## 2021-03-25 DIAGNOSIS — L97.512 NON-PRESSURE CHRONIC ULCER OF OTHER PART OF RIGHT FOOT WITH FAT LAYER EXPOSED (HCC): Primary | ICD-10-CM

## 2021-03-25 PROCEDURE — 99213 OFFICE O/P EST LOW 20 MIN: CPT

## 2021-03-25 NOTE — PROGRESS NOTES
Subjective    Chief Complaint  This information was obtained from the patient  Patient is here for a follow up visit for a right foot wound.  He has no complains    Allergies  NKDA    HPI  This information was obtained from the patient, caregiver  The [place] INDIVIDUAL PROGRESS NOTES  3-5-21 patient returns today, he did make an appointment with aren for monday. he also has his new shoes/inserts with him today. wound remains resolved without any drainage.    we will transition to his shoe/prostheti Symptoms  Patient complains of:  Cardiovascular (Central/Peripheral):  Lower extremity (leg) swelling (Minimal), Other (htn), Edema  Musculoskeletal: Deformities (Right Chopart and left 5th ray amputations), Assistive Devices (AFO to right foot and Diabetic (190.5 cm), Weight: 278 lbs (126.36 kgs), BMI: 34.7, Temperature: 96.8 °F (36 °C), Pulse: 84 bpm, Respiratory Rate: 16 breaths/min, Blood Pressure: 157/88 mmHg, Capillary Blood Glucose: 139 mg/dl.   Vital Signs Notes: Patients check his glucose 30 mins ago get labs done as ordered by dr Radha Schaefer  Feb 2019 bun 24, creat 1.32, gfr 57  GFR 47: 1/29/18    Wound type: - dfu  Wound no change. No s/s of infection. Perfusion assessed by doppler. - Biphasic signals  Perfusion assessed by palpation of pulses.   John Benedict

## 2021-04-02 ENCOUNTER — OFFICE VISIT (OUTPATIENT)
Dept: WOUND CARE | Facility: HOSPITAL | Age: 66
End: 2021-04-02
Attending: NURSE PRACTITIONER
Payer: MEDICARE

## 2021-04-02 DIAGNOSIS — L97.509 TYPE 2 DIABETES MELLITUS WITH FOOT ULCER (HCC): ICD-10-CM

## 2021-04-02 DIAGNOSIS — L97.512 NON-PRESSURE CHRONIC ULCER OF OTHER PART OF RIGHT FOOT WITH FAT LAYER EXPOSED (HCC): Primary | ICD-10-CM

## 2021-04-02 DIAGNOSIS — E08.43 DIABETES MELLITUS DUE TO UNDERLYING CONDITION WITH DIABETIC AUTONOMIC (POLY)NEUROPATHY (HCC): ICD-10-CM

## 2021-04-02 DIAGNOSIS — E11.621 TYPE 2 DIABETES MELLITUS WITH FOOT ULCER (HCC): ICD-10-CM

## 2021-04-02 PROCEDURE — 99213 OFFICE O/P EST LOW 20 MIN: CPT

## 2021-04-02 NOTE — PROGRESS NOTES
Subjective    Chief Complaint  This information was obtained from the patient  Patient is here for a follow up visit for a right foot wound. He has no complaints.     Allergies  NKDA    HPI  This information was obtained from the patient, caregiver  The fol INDIVIDUAL PROGRESS NOTES  3-5-21 patient returns today, he did make an appointment with aren for monday. he also has his new shoes/inserts with him today. wound remains resolved without any drainage.    we will transition to his shoe/prostheti next Tuesday to discuss option. the 40% urea was not available via rx and pharmacy suggested ammonium lactate or cetaphil as an alternative. patient has been using his urea and xeroform. there is definitiely less surrounding dry callus.   we discussed that scant amount of sero-sanguineous drainage noted which has no odor. The patient reports a wound pain of level 0/10. The wound margin is callus. Wound bed has % pink, firm granulation. The periwound skin exhibited: Callus, Dry/Scaly.  The periwound ski urea  Change Dressing Daily and/or PRN    Off-Loading:  Other: - diabetic shoe with insert and prosthetic to be adjusted on 4-6 with Joey    Follow-Up Appointments:  Return Appointment in 2 weeks.     Misc/Additional Orders:  Supplement with a daily multivit Yes

## 2021-04-16 ENCOUNTER — OFFICE VISIT (OUTPATIENT)
Dept: WOUND CARE | Facility: HOSPITAL | Age: 66
End: 2021-04-16
Attending: NURSE PRACTITIONER
Payer: MEDICARE

## 2021-04-16 ENCOUNTER — HOSPITAL ENCOUNTER (OUTPATIENT)
Dept: GENERAL RADIOLOGY | Facility: HOSPITAL | Age: 66
Discharge: HOME | End: 2021-04-16
Attending: NURSE PRACTITIONER
Payer: MEDICARE

## 2021-04-16 ENCOUNTER — LAB ENCOUNTER (OUTPATIENT)
Dept: LAB | Facility: HOSPITAL | Age: 66
End: 2021-04-16
Attending: NURSE PRACTITIONER
Payer: MEDICARE

## 2021-04-16 DIAGNOSIS — L97.509 TYPE 2 DIABETES MELLITUS WITH FOOT ULCER (HCC): ICD-10-CM

## 2021-04-16 DIAGNOSIS — L97.512 NON-PRESSURE CHRONIC ULCER OF OTHER PART OF RIGHT FOOT WITH FAT LAYER EXPOSED (HCC): ICD-10-CM

## 2021-04-16 DIAGNOSIS — E11.621 TYPE 2 DIABETES MELLITUS WITH FOOT ULCER (HCC): ICD-10-CM

## 2021-04-16 DIAGNOSIS — E08.43 DIABETES MELLITUS DUE TO UNDERLYING CONDITION WITH DIABETIC AUTONOMIC (POLY)NEUROPATHY (HCC): ICD-10-CM

## 2021-04-16 DIAGNOSIS — L97.512 NON-PRESSURE CHRONIC ULCER OF OTHER PART OF RIGHT FOOT WITH FAT LAYER EXPOSED (HCC): Primary | ICD-10-CM

## 2021-04-16 PROCEDURE — 36415 COLL VENOUS BLD VENIPUNCTURE: CPT

## 2021-04-16 PROCEDURE — 85025 COMPLETE CBC W/AUTO DIFF WBC: CPT

## 2021-04-16 PROCEDURE — 73650 X-RAY EXAM OF HEEL: CPT | Performed by: NURSE PRACTITIONER

## 2021-04-16 PROCEDURE — 86140 C-REACTIVE PROTEIN: CPT

## 2021-04-16 PROCEDURE — 73610 X-RAY EXAM OF ANKLE: CPT | Performed by: NURSE PRACTITIONER

## 2021-04-16 PROCEDURE — 97597 DBRDMT OPN WND 1ST 20 CM/<: CPT

## 2021-04-16 PROCEDURE — 73630 X-RAY EXAM OF FOOT: CPT | Performed by: NURSE PRACTITIONER

## 2021-04-16 PROCEDURE — 80053 COMPREHEN METABOLIC PANEL: CPT

## 2021-04-16 PROCEDURE — 85652 RBC SED RATE AUTOMATED: CPT

## 2021-04-16 NOTE — PROGRESS NOTES
Reviewed labs results and xray with patient. Instructed to continue with the plan for topical antibiotic treatment and to schedule follow up with [facility].   Amando Jensen

## 2021-04-16 NOTE — PROGRESS NOTES
Subjective    Chief Complaint  This information was obtained from the patient  Patient is here for a wound care follow up. He denies any pain.     Allergies  NKDA    HPI  This information was obtained from the patient, caregiver  The following HPI elements 3-5-21 patient returns today, he did make an appointment with aren for monday. he also has his new shoes/inserts with him today. wound remains resolved without any drainage.    we will transition to his shoe/prosthetic today, patient to wear w option. the 40% urea was not available via rx and pharmacy suggested ammonium lactate or cetaphil as an alternative. patient has been using his urea and xeroform. there is definitiely less surrounding dry callus.   we discussed that I will not take this of foot and Diabetic shoe left foot)  Integumentary (Hair/Skin/Nails): Dryness, Calluses/Corns, Ulcers  Neurological: Loss of Protective Sensation (Pt stated has neuropathy), Numbness, Tingling  Psychiatric: Nervousness / Tension    Patient denies complaints glucose, but reports 168 this morning. Patient denies any symptoms. Physical Exam  Constitutional  bp elevated recheck improved, patient denies symptoms of headache, dizziness, sob, or vision changes. will continue to monitor.  Pulse Regular and slightly 0 throughout and a pain level of 0 following the procedure.  Post Debridement Measurements: 1cm length x 0.8cm width x 0.2cm depth; with an area of 0.8 sq cm and a volume of 0.16 cubic cm;        Plan    Additional Orders:    Wound Cleansing & Dressings:  TRISTA

## 2021-04-23 ENCOUNTER — OFFICE VISIT (OUTPATIENT)
Dept: WOUND CARE | Facility: HOSPITAL | Age: 66
End: 2021-04-23
Attending: NURSE PRACTITIONER
Payer: MEDICARE

## 2021-04-23 DIAGNOSIS — L97.512 NON-PRESSURE CHRONIC ULCER OF OTHER PART OF RIGHT FOOT WITH FAT LAYER EXPOSED (HCC): Primary | ICD-10-CM

## 2021-04-23 DIAGNOSIS — E11.621 TYPE 2 DIABETES MELLITUS WITH FOOT ULCER (HCC): ICD-10-CM

## 2021-04-23 DIAGNOSIS — L97.509 TYPE 2 DIABETES MELLITUS WITH FOOT ULCER (HCC): ICD-10-CM

## 2021-04-23 DIAGNOSIS — E08.43 DIABETES MELLITUS DUE TO UNDERLYING CONDITION WITH DIABETIC AUTONOMIC (POLY)NEUROPATHY (HCC): ICD-10-CM

## 2021-04-23 PROCEDURE — 97597 DBRDMT OPN WND 1ST 20 CM/<: CPT

## 2021-04-23 NOTE — PROGRESS NOTES
Subjective    Chief Complaint  This information was obtained from the patient  Patient is here for a wound care follow up.  Patients denies any issues and no pain on the wound    Allergies  NKDA    HPI  This information was obtained from the patient, frances PLEASE SEE INDIVIDUAL PROGRESS NOTES  4-2-21 patient returns today, he did see S&S yesterday however it was Ricki Vyas and not ridge. he has an appointment with ridge next Tuesday to discuss option.  the 40% urea was not available via rx and pharmacy suggested Wayne Burgess weeks.  there is not as much callus build up as last week, I took it down further again today to \"saucerize\" the edges/periwound. continue gent at this time.  will recheck next week    Review of Systems (ROS)  This information was obtained from the tesha exhibited: Callus, Dry/Scaly. The periwound skin did not exhibit: Edema, Erythema. The temperature of the periwound skin is Warm. Periwound skin does not exhibit signs or symptoms of infection.  Local Pulse is Normal.    Vitals  Height/Length: 75 in (190.5 procedure. A minimal amount of bleeding was controlled with pressure. The procedure was tolerated well with a pain level of 0 throughout and a pain level of 0 following the procedure.  Post Debridement Measurements: 0.7cm length x 0.7cm width x 0.1cm depth; Entered By: Bri SILVERIO-LIDA, MARKEL-AP, CFCN, CSWS, WCC, DWC on 04/23/2021 08:56:03

## 2021-04-30 ENCOUNTER — OFFICE VISIT (OUTPATIENT)
Dept: WOUND CARE | Facility: HOSPITAL | Age: 66
End: 2021-04-30
Attending: NURSE PRACTITIONER
Payer: MEDICARE

## 2021-04-30 DIAGNOSIS — L97.512 NON-PRESSURE CHRONIC ULCER OF OTHER PART OF RIGHT FOOT WITH FAT LAYER EXPOSED (HCC): Primary | ICD-10-CM

## 2021-04-30 DIAGNOSIS — E11.621 TYPE 2 DIABETES MELLITUS WITH FOOT ULCER (HCC): ICD-10-CM

## 2021-04-30 DIAGNOSIS — L97.509 TYPE 2 DIABETES MELLITUS WITH FOOT ULCER (HCC): ICD-10-CM

## 2021-04-30 DIAGNOSIS — E08.43 DIABETES MELLITUS DUE TO UNDERLYING CONDITION WITH DIABETIC AUTONOMIC (POLY)NEUROPATHY (HCC): ICD-10-CM

## 2021-04-30 PROCEDURE — 11042 DBRDMT SUBQ TIS 1ST 20SQCM/<: CPT

## 2021-04-30 NOTE — PROGRESS NOTES
Subjective    Chief Complaint  This information was obtained from the patient  Patient is here for a wound care follow up.  Patients has no complain    Allergies  NKDA    HPI  This information was obtained from the patient, caregiver  The following HPI [place] NOTES  4-2-21 patient returns today, he did see S&S yesterday however it was Tong Given and not ridge. he has an appointment with ridge next Tuesday to discuss option.  the 40% urea was not available via rx and pharmacy suggested ammonium lactate or cetaphil as an al callus build up as last week, I took it down further again today to \"saucerize\" the edges/periwound. continue gent at this time. will recheck next week.     4-30-21 patient returns today, he has continued gent on the wound, he has an appointment with S&S and a volume of 0.016 cubic cm. No tunneling has been noted. No sinus tract has been noted. No undermining has been noted. There is a scant amount of sero-sanguineous drainage noted which has no odor. The patient reports a wound pain of level 0/10.  The wou tissue level excisional/surgical debridement with a total area debrided of 0.16 sq cm was performed by Caryle Countryman, NP. Subcutaneous was removed along with devitalized tissue: biofilm and callus. The following instrument(s) were used: blade.  Pain contro heterotopic ossification. There is no specific plain radiographic evidence of osteomyelitis    Follow-Up Appointments:  A follow-up appointment should be scheduled. will utilize hydrogel, patient to get shoe/insert adjusted next week.   less callus i

## 2021-05-14 ENCOUNTER — OFFICE VISIT (OUTPATIENT)
Dept: WOUND CARE | Facility: HOSPITAL | Age: 66
End: 2021-05-14
Attending: NURSE PRACTITIONER
Payer: MEDICARE

## 2021-05-14 VITALS
DIASTOLIC BLOOD PRESSURE: 93 MMHG | BODY MASS INDEX: 33.2 KG/M2 | HEIGHT: 75 IN | TEMPERATURE: 96 F | SYSTOLIC BLOOD PRESSURE: 163 MMHG | RESPIRATION RATE: 18 BRPM | WEIGHT: 267 LBS | HEART RATE: 83 BPM

## 2021-05-14 DIAGNOSIS — E11.621 TYPE 2 DIABETES MELLITUS WITH FOOT ULCER, WITH LONG-TERM CURRENT USE OF INSULIN (HCC): ICD-10-CM

## 2021-05-14 DIAGNOSIS — L97.514 ULCER OF RIGHT FOOT, WITH NECROSIS OF BONE (HCC): ICD-10-CM

## 2021-05-14 DIAGNOSIS — L97.509 TYPE 2 DIABETES MELLITUS WITH FOOT ULCER, WITH LONG-TERM CURRENT USE OF INSULIN (HCC): ICD-10-CM

## 2021-05-14 DIAGNOSIS — L97.512 NON-PRESSURE CHRONIC ULCER OF OTHER PART OF RIGHT FOOT WITH FAT LAYER EXPOSED (HCC): Primary | ICD-10-CM

## 2021-05-14 DIAGNOSIS — Z79.4 TYPE 2 DIABETES MELLITUS WITH FOOT ULCER, WITH LONG-TERM CURRENT USE OF INSULIN (HCC): ICD-10-CM

## 2021-05-14 DIAGNOSIS — Z79.4 LONG TERM (CURRENT) USE OF INSULIN (HCC): ICD-10-CM

## 2021-05-14 PROCEDURE — 11042 DBRDMT SUBQ TIS 1ST 20SQCM/<: CPT | Performed by: NURSE PRACTITIONER

## 2021-05-14 NOTE — PROGRESS NOTES
Patient presents with:  Wound Care: Patient denies any pain to the wound. HPI:     8-14-20 INItial (reeval):  Patient is a 75 yo male well known to wound clinic and myself from previous admissions.   He was last discharged/seen in June 2020 and he was weeks. patient is agreeable to plan. no c/o pain, no s/s of infection.     4-16-21 patient returns today, he saw ridge from S&S and discussed options including continuing to adjust his current prosthetic, make a new prosthetic (same design), or advance to the patient returns today, patient reports he did see jacinto and lena and they adjusted the shoe. There is still significant callus build up along the edges of the wound and adjacent periwound.   The other concerning thing is the wound is larger post debride mouth once daily. , Disp: 90 tablet, Rfl: 3  •  furosemide 40 MG Oral Tab, Take 1 tablet (40 mg total) by mouth every morning., Disp: 90 tablet, Rfl: 3  •  atorvastatin 40 MG Oral Tab, Take 1 tablet (40 mg total) by mouth daily. , Disp: 90 tablet, Rfl: 3  • of this encounter: 75\". Weight as of this encounter: 267 lb (121.1 kg).      Lab Results   Component Value Date    BUN 26 (H) 04/16/2021    CREATSERUM 1.53 (H) 04/16/2021    GFRCKDEPI 48.07 (L) 10/16/2017    ALB 3.8 04/16/2021    TP 8.0 04/16/2021    A1 Authorizing Provider   05/14/21 6626 Debridement Routine Active Nelwyn Case, APRN       Inactive Orders   Date Order Priority Status Authorizing Provider   05/14/21 7215 OP WOUND DRESSING Routine Completed Nelwyn Case, APRN     - Additional Wound Jay

## 2021-05-14 NOTE — PROGRESS NOTES
Patient ID: Mayela Horta is a 76year old male.     Debridement   Wound 10/19/20 #16 Right plantar foot Diabetic Ulcer Foot Anterior;Right    Consent obtained? verbal  Consent given by: patient    Performed by: provider  Debridement type: surgical  Level

## 2021-05-14 NOTE — PROGRESS NOTES
.Weekly Wound Education Note    Teaching Provided To: Patient                    Training Topics: Discharge instructions; Off-loading;Cleasing and general instructions;Dressing      Felted offloading piece applied, x1 sent home with patient.   Patient to ret

## 2021-05-14 NOTE — PATIENT INSTRUCTIONS
Additional Wound Dressing Information silver hydrogel, adaptic, gauze    Frequency Change dressing daily and PRN      Additional Offloading Device felted foam, patient's prosthetic/orthotic           Associated Diagnoses    Non-pressure chronic ulcer of ot

## 2021-05-21 ENCOUNTER — APPOINTMENT (OUTPATIENT)
Dept: WOUND CARE | Facility: HOSPITAL | Age: 66
End: 2021-05-21
Attending: NURSE PRACTITIONER
Payer: MEDICARE

## 2021-05-28 ENCOUNTER — OFFICE VISIT (OUTPATIENT)
Dept: WOUND CARE | Facility: HOSPITAL | Age: 66
End: 2021-05-28
Attending: NURSE PRACTITIONER
Payer: MEDICARE

## 2021-05-28 VITALS
RESPIRATION RATE: 14 BRPM | DIASTOLIC BLOOD PRESSURE: 96 MMHG | HEART RATE: 76 BPM | TEMPERATURE: 97 F | SYSTOLIC BLOOD PRESSURE: 156 MMHG

## 2021-05-28 DIAGNOSIS — Z79.4 TYPE 2 DIABETES MELLITUS WITH FOOT ULCER, WITH LONG-TERM CURRENT USE OF INSULIN (HCC): ICD-10-CM

## 2021-05-28 DIAGNOSIS — E11.621 TYPE 2 DIABETES MELLITUS WITH FOOT ULCER, WITH LONG-TERM CURRENT USE OF INSULIN (HCC): ICD-10-CM

## 2021-05-28 DIAGNOSIS — L97.509 TYPE 2 DIABETES MELLITUS WITH FOOT ULCER, WITH LONG-TERM CURRENT USE OF INSULIN (HCC): ICD-10-CM

## 2021-05-28 DIAGNOSIS — L97.512 NON-PRESSURE CHRONIC ULCER OF OTHER PART OF RIGHT FOOT WITH FAT LAYER EXPOSED (HCC): Primary | ICD-10-CM

## 2021-05-28 DIAGNOSIS — Z79.4 LONG TERM (CURRENT) USE OF INSULIN (HCC): ICD-10-CM

## 2021-05-28 PROCEDURE — 11042 DBRDMT SUBQ TIS 1ST 20SQCM/<: CPT | Performed by: NURSE PRACTITIONER

## 2021-05-28 NOTE — PROGRESS NOTES
.Weekly Wound Education Note    Teaching Provided To: Patient  Training Topics: Off-loading;Dressing; Discharge instructions;Cleasing and general instructions  Training Method: Explain/Verbal  Training Response: Patient responds and understands         Karen

## 2021-05-28 NOTE — PROGRESS NOTES
Patient ID: Brittney Landon is a 76year old male.     Debridement   Consent obtained? verbal  Consent given by: patient    Performed by: provider  Debridement type: surgical  Level of debridement: subcutaneous tissue    Post-debridement measurements  Lengt

## 2021-05-28 NOTE — PROGRESS NOTES
CHIEF COMPLAINT:   Patient presents with:  Wound Care: Patient is here for a follow up visit for a right foot wound. Blood sugar 129 - self taken this morning.       HPI:   8-14-20 INItial (reeval):  Patient is a 75 yo male well known to wound clinic and m lena and they adjusted the shoe. There is still significant callus build up along the edges of the wound and adjacent periwound. The other concerning thing is the wound is larger post debridement.   Patient states that at noc he has been starting to wea Does not apply route every 3 (three) months., Disp: 1 each, Rfl: 3  •  Continuous Blood Gluc Sensor (DEXCOM G6 SENSOR) Does not apply Misc, 1 each by Does not apply route Every 10 days. , Disp: 9 each, Rfl: 3  •  insulin glargine (LANTUS) 100 UNIT/ML Subcut mg/dL Final   09/09/2020 124 (H) 70 - 99 mg/dL Final       Vital signs reviewed. Appears stated age, well groomed. Constitutional  bp wnl for patient. Pulse Regular and wnl for patient. Mary Spies Respirations easy and unlabored. Temperature wnl. Obese.  Appearance n adaptic, gauze     - Frequency:    Change dressing daily and PRN   05/14/21 0835 WOUND CARE OFFLOADING Routine Completed Brianna Herman, FELICITA     - Additional Offloading Device:    felted foam, patient's prosthetic/orthotic   05/14/21 0826 Debridement Rout hydrofera transfer.   Going forward: hydrogel    Continue to work on offloading with Sumi and Erick and at home at night to make sure you are not putting pressure on the wounded area      [employer] FNP-C, MARKEL-AP, CFCN, CSWS, [facility], [facility]  5/28/2021  6

## 2021-05-28 NOTE — PATIENT INSTRUCTIONS
Return in about 2 weeks (around 6/11/2021). You may shower with protection of the wound (ie a cast cover or similar). Leave felted foam in place. CHANGING YOUR DRESSING:  Wash your hands with soap and water.  Remove old dressing, discard into plastic

## 2021-05-29 NOTE — INTERVAL H&P NOTE
Pre-op Diagnosis: CATARACT     The above referenced H&P was reviewed by Kee Bhatt MD on 3/21/2017, the patient was examined and no significant changes have occurred in the patient's condition since the H&P was performed.   I discussed with the patient and/ Anxiety    H. pylori infection    PCOS (polycystic ovarian syndrome)

## 2021-06-10 NOTE — ANESTHESIA PREPROCEDURE EVALUATION
PRE-OP EVALUATION    Patient Name: Mary Lou Webster    Pre-op Diagnosis: Cellulitis and abscess of foot [K00.848, H67.153]    Procedure(s):  Open left fifth digit amputation with debridement of ulcers bilateral feet, Condylectomy left fourth metatarsal    S [FreeTextEntry1] : s/p transperineal fusion biopsy yesterday, path pending\par failed post op trial of void and Ocampo replaced\par Here for TOV\par Known very enlarged prostate, > 100gm ondansetron HCl (ZOFRAN) injection 4 mg 4 mg Intravenous Q6H PRN   glucose (DEX4) oral liquid 15 g 15 g Oral Q15 Min PRN   Or      Glucose-Vitamin C (DEX-4) 4-0.006 g chewable tab 4 tablet 4 tablet Oral Q15 Min PRN   Or      dextrose 50% injection 50 mL mouth daily.  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

## 2021-06-11 ENCOUNTER — OFFICE VISIT (OUTPATIENT)
Dept: WOUND CARE | Facility: HOSPITAL | Age: 66
End: 2021-06-11
Attending: NURSE PRACTITIONER
Payer: MEDICARE

## 2021-06-11 VITALS
RESPIRATION RATE: 16 BRPM | SYSTOLIC BLOOD PRESSURE: 163 MMHG | DIASTOLIC BLOOD PRESSURE: 85 MMHG | TEMPERATURE: 97 F | HEART RATE: 74 BPM

## 2021-06-11 DIAGNOSIS — L97.509 TYPE 2 DIABETES MELLITUS WITH FOOT ULCER, WITH LONG-TERM CURRENT USE OF INSULIN (HCC): ICD-10-CM

## 2021-06-11 DIAGNOSIS — E11.621 TYPE 2 DIABETES MELLITUS WITH FOOT ULCER, WITH LONG-TERM CURRENT USE OF INSULIN (HCC): ICD-10-CM

## 2021-06-11 DIAGNOSIS — Z79.4 LONG TERM (CURRENT) USE OF INSULIN (HCC): ICD-10-CM

## 2021-06-11 DIAGNOSIS — L97.512 NON-PRESSURE CHRONIC ULCER OF OTHER PART OF RIGHT FOOT WITH FAT LAYER EXPOSED (HCC): Primary | ICD-10-CM

## 2021-06-11 DIAGNOSIS — Z79.4 TYPE 2 DIABETES MELLITUS WITH FOOT ULCER, WITH LONG-TERM CURRENT USE OF INSULIN (HCC): ICD-10-CM

## 2021-06-11 PROCEDURE — 11042 DBRDMT SUBQ TIS 1ST 20SQCM/<: CPT | Performed by: NURSE PRACTITIONER

## 2021-06-11 PROCEDURE — 99214 OFFICE O/P EST MOD 30 MIN: CPT | Performed by: NURSE PRACTITIONER

## 2021-06-11 NOTE — PATIENT INSTRUCTIONS
Return in about 2 weeks (around 6/25/2021). You may shower with protection of the wound (ie a cast cover or similar). Leave felted foam in place. CHANGING YOUR DRESSING:  Wash your hands with soap and water.  Remove old dressing, discard into plast

## 2021-06-11 NOTE — PROGRESS NOTES
.Weekly Wound Education Note    Teaching Provided To: Patient  Training Topics: Discharge instructions;Dressing;Cleasing and general instructions; Foot wear;Off-loading  Training Method: Explain/Verbal;Written  Training Response: Patient responds and unders

## 2021-06-11 NOTE — PROGRESS NOTES
Patient ID: Cris Liang is a 78year old male.     Debridement   Wound 10/19/20 #16 Right plantar foot Diabetic Ulcer Foot Anterior;Right    Consent obtained? verbal  Consent given by: patient    Performed by: provider  Debridement type: surgical  Level

## 2021-06-11 NOTE — PROGRESS NOTES
CHIEF COMPLAINT:   Patient presents with:  Wound Care: Foolow-up. Denies pain or concerns. Has gone to [facility] clinic and states he will be going back next week. He states, Krys Teddy are looking for shoes. \" Blood gloucose 118      HPI:   8-14-20 INItial (reev patient returns today, patient reports he did see jacinto and lena and they adjusted the shoe. There is still significant callus build up along the edges of the wound and adjacent periwound.   The other concerning thing is the wound is larger post debride ATORVASTATIN 40 MG Oral Tab, TAKE 1 TABLET BY MOUTH EVERY DAY, Disp: 90 tablet, Rfl: 0  •  LISINOPRIL 40 MG Oral Tab, TAKE 1 TABLET BY MOUTH EVERY DAY, Disp: 90 tablet, Rfl: 0  •  Insulin Syringe-Needle U-100 (BD INSULIN SYRINGE ULTRAFINE) 30G X 1/2\" 0.3 Known Allergies     REVIEW OF SYSTEMS:   This information was obtained from the patient/family and chart. See HPI for pertinent positives, otherwise 10 pt ROS negative.     Review of Systems     HISTORY:     Past medical, surgical, family and social histor Location Orientation: Anterior;Right      Assessments 5/14/2021  8:11 AM 6/11/2021  8:41 AM   Wound Image         Drainage Amount Scant Scant   Drainage Description Sanguineous Serosanguineous   Heather-wound Assessment Other (Comment) —   Wound Granulation T utilized: blade  Bleeding: small  Hemostasis obtained with: pressure  Procedural pain (0-10): 0  Post-procedural pain: 0   Response to treatment: procedure was tolerated well          ASSESSMENT AND PLAN:      1. Non-pressure chronic ulcer of other part of

## 2021-06-25 ENCOUNTER — OFFICE VISIT (OUTPATIENT)
Dept: WOUND CARE | Facility: HOSPITAL | Age: 66
End: 2021-06-25
Attending: NURSE PRACTITIONER
Payer: MEDICARE

## 2021-06-25 VITALS
RESPIRATION RATE: 14 BRPM | SYSTOLIC BLOOD PRESSURE: 134 MMHG | DIASTOLIC BLOOD PRESSURE: 90 MMHG | HEART RATE: 91 BPM | TEMPERATURE: 98 F

## 2021-06-25 DIAGNOSIS — L97.509 TYPE 2 DIABETES MELLITUS WITH FOOT ULCER, WITH LONG-TERM CURRENT USE OF INSULIN (HCC): ICD-10-CM

## 2021-06-25 DIAGNOSIS — Z79.4 LONG TERM (CURRENT) USE OF INSULIN (HCC): ICD-10-CM

## 2021-06-25 DIAGNOSIS — L97.512 NON-PRESSURE CHRONIC ULCER OF OTHER PART OF RIGHT FOOT WITH FAT LAYER EXPOSED (HCC): Primary | ICD-10-CM

## 2021-06-25 DIAGNOSIS — E11.621 TYPE 2 DIABETES MELLITUS WITH FOOT ULCER, WITH LONG-TERM CURRENT USE OF INSULIN (HCC): ICD-10-CM

## 2021-06-25 DIAGNOSIS — Z79.4 TYPE 2 DIABETES MELLITUS WITH FOOT ULCER, WITH LONG-TERM CURRENT USE OF INSULIN (HCC): ICD-10-CM

## 2021-06-25 PROCEDURE — 99214 OFFICE O/P EST MOD 30 MIN: CPT | Performed by: NURSE PRACTITIONER

## 2021-06-25 PROCEDURE — 11042 DBRDMT SUBQ TIS 1ST 20SQCM/<: CPT | Performed by: NURSE PRACTITIONER

## 2021-06-25 NOTE — PROGRESS NOTES
.Weekly Wound Education Note    Teaching Provided To: Patient  Training Topics: Dressing; Discharge instructions; Off-loading;Skin care  Training Method: Explain/Verbal;Written  Training Response: Patient responds and understands         Next follow up appoi

## 2021-06-25 NOTE — PROGRESS NOTES
CHIEF COMPLAINT:   Patient presents with:  Wound Care: Patients is here for a follow up right plantar foot and no issues       HPI:   8-14-20 INItial (reeval):  Patient is a 75 yo male well known to wound clinic and myself from previous admissions.   He was when they change the dressing before he goes to bed vs in the morning and therefore he is attempting to \"protect\" the area at noc without putting on his shoe. This would make sense as the TCC resolves him quickly. No acute s/s of infection, no c/o pain. Outpatient Medications:   •  FUROSEMIDE 40 MG Oral Tab, TAKE 1 TABLET BY MOUTH EVERY DAY IN THE MORNING, Disp: 90 tablet, Rfl: 0  •  ATORVASTATIN 40 MG Oral Tab, TAKE 1 TABLET BY MOUTH EVERY DAY, Disp: 90 tablet, Rfl: 0  •  LISINOPRIL 40 MG Oral Tab, TAKE Oral Tab, Take 1 tablet by mouth daily. , Disp: , Rfl:   •  ASPIRIN 81 MG OR TABS, 1 TABLET DAILY, Disp: , Rfl:   ALLERGIES:   No Known Allergies     REVIEW OF SYSTEMS:   This information was obtained from the patient/family and chart.   See HPI for pertinen 9928    Wound Number (Wound Clinic Only): #16 Right plantar foot  Primary Wound Type: Diabetic Ulcer  Location: (c) Foot  Wound Location Orientation: Anterior;Right      Assessments 5/14/2021  8:11 AM 6/25/2021  8:46 AM   Wound Image         Drainage Amoun (cm^2): 0.01  Volume (cm^3): 0        Post-debridement measurements  Length (cm): 0.5  Width (cm): 0.2  Depth (cm): 0.1  Percent debrided: 100%  Surface Area (cm^2): 0.1  Area debrided (cm^2): 0.1  Volume (cm^3): 0.01     Tissue and other material debrided

## 2021-06-25 NOTE — PROGRESS NOTES
Patient ID: Mary Lou Webster is a 78year old male.     Debridement   Wound 10/19/20 #16 Right plantar foot Diabetic Ulcer Foot Anterior;Right    Consent obtained? verbal  Consent given by: patient    Performed by: provider  Debridement type: surgical  Level

## 2021-06-25 NOTE — PATIENT INSTRUCTIONS
Return in about 10 days (around 7/5/2021). You may shower with protection of the wound (ie a cast cover or similar). CHANGING YOUR DRESSING:  Wash your hands with soap and water. Remove old dressing, discard into plastic bag and place into trash.  Gilberto

## 2021-07-06 NOTE — PROGRESS NOTES
CHIEF COMPLAINT:   Patient presents with:  Wound Care: Patient is here for a follow up of right plantar foot wound. HPI:   8-14-20 INItial (reeval):  Patient is a 75 yo male well known to wound clinic and myself from previous admissions.   He was last they change the dressing before he goes to bed vs in the morning and therefore he is attempting to \"protect\" the area at noc without putting on his shoe. This would make sense as the TCC resolves him quickly.  No acute s/s of infection, no c/o pain.    prosthetic adjusted about 1.5 weeks ago, the callusand ulceration remains essentially the same. He is not scheduled for another visit with s&S for about 3 more weeks.   I did take the callus down today and placed epicord, patient will f/u in another 1.5 we Misc, USE AS DIRECTED. 5 TIMES A DAY, Disp: 500 each, Rfl: 3  •  Continuous Blood Gluc Transmit (DEXCOM G6 TRANSMITTER) Does not apply Misc, Please provide 1 transmitter every 3 months, Disp: 1 each, Rfl: 3  •  Continuous Blood Gluc Sensor (DEXCOM G6 SENSO Psychiatric:  Judgment and insight intact. Alert and oriented times 3. No evidence of depression, anxiety, or agitation. Calm, cooperative, and communicative. Appropriate interactions and affect.       WOUND ASSESSMENT:     Wound 10/19/20 #16 Right plantar daily and PRN   05/14/21 0835 WOUND CARE OFFLOADING Routine Completed Brianna Herman, APRN     - Additional Offloading Device:    felted foam, patient's prosthetic/orthotic   05/14/21 0826 Debridement Routine Completed Rose Marie Fofana, FELICITA    Debridement diabetes mellitus with foot ulcer, with long-term current use of insulin ([facility] [address]    3. Long term (current) use of insulin (HCC)      Risks, benefits, and alternatives of current treatment plan discussed in detail. Questions and concerns addressed.  Red flags

## 2021-07-06 NOTE — PATIENT INSTRUCTIONS
Please return on:  Around 7-16-21  Then week of Aug 3. You may shower with protection of the wound (ie a cast cover or similar). CHANGING YOUR DRESSING:  Wash your hands with soap and water.  Remove old dressing, discard into plastic bag and place int

## 2021-07-07 ENCOUNTER — OFFICE VISIT (OUTPATIENT)
Dept: WOUND CARE | Facility: HOSPITAL | Age: 66
End: 2021-07-07
Attending: NURSE PRACTITIONER
Payer: MEDICARE

## 2021-07-07 VITALS
HEART RATE: 82 BPM | SYSTOLIC BLOOD PRESSURE: 159 MMHG | RESPIRATION RATE: 18 BRPM | DIASTOLIC BLOOD PRESSURE: 83 MMHG | TEMPERATURE: 97 F

## 2021-07-07 DIAGNOSIS — L97.512 NON-PRESSURE CHRONIC ULCER OF OTHER PART OF RIGHT FOOT WITH FAT LAYER EXPOSED (HCC): Primary | ICD-10-CM

## 2021-07-07 DIAGNOSIS — E11.621 TYPE 2 DIABETES MELLITUS WITH FOOT ULCER, WITH LONG-TERM CURRENT USE OF INSULIN (HCC): ICD-10-CM

## 2021-07-07 DIAGNOSIS — Z79.4 LONG TERM (CURRENT) USE OF INSULIN (HCC): ICD-10-CM

## 2021-07-07 DIAGNOSIS — Z79.4 TYPE 2 DIABETES MELLITUS WITH FOOT ULCER, WITH LONG-TERM CURRENT USE OF INSULIN (HCC): ICD-10-CM

## 2021-07-07 DIAGNOSIS — L97.509 TYPE 2 DIABETES MELLITUS WITH FOOT ULCER, WITH LONG-TERM CURRENT USE OF INSULIN (HCC): ICD-10-CM

## 2021-07-07 LAB — AMB EXT GLUCOSE: 123 MG/DL

## 2021-07-07 PROCEDURE — 11042 DBRDMT SUBQ TIS 1ST 20SQCM/<: CPT | Performed by: NURSE PRACTITIONER

## 2021-07-07 NOTE — PROGRESS NOTES
.Weekly Wound Education Note    Teaching Provided To: Patient  Training Topics: Dressing; Discharge instructions; Off-loading;Skin substitute  Training Method: Explain/Verbal;Written  Training Response: Patient responds and understands           Brittaney Jiménez

## 2021-07-07 NOTE — PROGRESS NOTES
Patient ID: Chas Christian is a 78year old male.     Cellular tissue product application    Date/Time: 7/7/2021 7:56 AM  Performed by: FELICITA Gonzalez  Authorized by: FELICITA Gonzalez   Associated Wounds:   Wound 10/19/20 #16 Right plantar foot D

## 2021-07-07 NOTE — PROGRESS NOTES
Patient ID: Donaldo Pena is a 78year old male.     Debridement   Wound 10/19/20 #16 Right plantar foot Diabetic Ulcer Foot Anterior;Right    Consent obtained? verbal  Consent given by: patient    Performed by: provider  Debridement type: surgical  Level

## 2021-07-15 NOTE — PROGRESS NOTES
CHIEF COMPLAINT:   Patient presents with:  Wound Care: follow-up. Denies pain or concerns at this time. HPI:   8-14-20 INItial (reeval):  Patient is a 75 yo male well known to wound clinic and myself from previous admissions.   He was last discharged/ return in a week and a half as I want to see if the most recent adjustments decrease the amount of callus build up.    7-6-21 Patient returns today, He had his prosthetic adjusted about 1.5 weeks ago, the callusand ulceration remains essentially the same. MORNING, Disp: 90 tablet, Rfl: 0  •  ATORVASTATIN 40 MG Oral Tab, TAKE 1 TABLET BY MOUTH EVERY DAY, Disp: 90 tablet, Rfl: 0  •  LISINOPRIL 40 MG Oral Tab, TAKE 1 TABLET BY MOUTH EVERY DAY, Disp: 90 tablet, Rfl: 0  •  Insulin Syringe-Needle U-100 (BD INSULI kg/m² as calculated from the following:    Height as of 5/14/21: 75\". Weight as of 5/14/21: 267 lb (121.1 kg). Lab Results   Component Value Date    BUN 26 (H) 04/16/2021    CREATSERUM 1.53 (H) 04/16/2021    GFRCKDEPI 48.07 (L) 10/16/2017    ALB 3. Margins — Well-defined edges   Non-staged Wound Description Full thickness Full thickness   Heather-wound Assessment Other (Comment) Callous;Dry   Wound Bed Granulation (%) 100 % —   Wound Odor — None   Mckeon Scale — Grade 2       Active Orders   Date Order debrided: callus, biofilm  Instrument(s) utilized: blade  Bleeding: moderate  Hemostasis obtained with: pressure, gelfoam  Procedural pain (0-10): 0  Post-procedural pain: 0   Response to treatment: procedure was tolerated well          ASSESSMENT AND PLAN changes in your wound(s) or have any questions regarding your home care instructions please contact the wound center [facility] @ [phone] If after regular business hours, please call your family doctor or local emergency room.     Ashwin Santoyo

## 2021-07-15 NOTE — PATIENT INSTRUCTIONS
Please return on:   Aug 3. We may need to consider casting again to get this to heal.    You may shower with protection of the wound (ie a cast cover or similar). CHANGING YOUR DRESSING:  Wash your hands with soap and water.  Remove old dressing, discar

## 2021-07-16 ENCOUNTER — OFFICE VISIT (OUTPATIENT)
Dept: WOUND CARE | Facility: HOSPITAL | Age: 66
End: 2021-07-16
Attending: NURSE PRACTITIONER
Payer: MEDICARE

## 2021-07-16 VITALS
DIASTOLIC BLOOD PRESSURE: 83 MMHG | RESPIRATION RATE: 16 BRPM | TEMPERATURE: 97 F | HEART RATE: 86 BPM | SYSTOLIC BLOOD PRESSURE: 153 MMHG

## 2021-07-16 DIAGNOSIS — Z79.4 TYPE 2 DIABETES MELLITUS WITH FOOT ULCER, WITH LONG-TERM CURRENT USE OF INSULIN (HCC): ICD-10-CM

## 2021-07-16 DIAGNOSIS — Z79.4 LONG TERM (CURRENT) USE OF INSULIN (HCC): ICD-10-CM

## 2021-07-16 DIAGNOSIS — E11.621 TYPE 2 DIABETES MELLITUS WITH FOOT ULCER, WITH LONG-TERM CURRENT USE OF INSULIN (HCC): ICD-10-CM

## 2021-07-16 DIAGNOSIS — L97.512 NON-PRESSURE CHRONIC ULCER OF OTHER PART OF RIGHT FOOT WITH FAT LAYER EXPOSED (HCC): Primary | ICD-10-CM

## 2021-07-16 DIAGNOSIS — L97.509 TYPE 2 DIABETES MELLITUS WITH FOOT ULCER, WITH LONG-TERM CURRENT USE OF INSULIN (HCC): ICD-10-CM

## 2021-07-16 PROCEDURE — 11042 DBRDMT SUBQ TIS 1ST 20SQCM/<: CPT | Performed by: NURSE PRACTITIONER

## 2021-07-16 NOTE — PROGRESS NOTES
Patient ID: Ruth Nicole is a 78year old male.     Debridement   Wound 10/19/20 #16 Right plantar foot Diabetic Ulcer Foot Anterior;Right    Consent obtained? verbal  Consent given by: patient    Performed by: provider  Debridement type: surgical  Level

## 2021-07-16 NOTE — PROGRESS NOTES
.Weekly Wound Education Note    Teaching Provided To: Patient  Training Topics: Discharge instructions;Cleasing and general instructions;Dressing;Off-loading  Training Method: Explain/Verbal;Written  Training Response: Patient responds and understands

## 2021-08-03 ENCOUNTER — OFFICE VISIT (OUTPATIENT)
Dept: WOUND CARE | Facility: HOSPITAL | Age: 66
End: 2021-08-03
Attending: NURSE PRACTITIONER
Payer: MEDICARE

## 2021-08-03 VITALS
RESPIRATION RATE: 16 BRPM | HEART RATE: 80 BPM | DIASTOLIC BLOOD PRESSURE: 82 MMHG | SYSTOLIC BLOOD PRESSURE: 127 MMHG | TEMPERATURE: 97 F

## 2021-08-03 DIAGNOSIS — E08.43 DIABETES MELLITUS DUE TO UNDERLYING CONDITION WITH DIABETIC AUTONOMIC NEUROPATHY, WITH LONG-TERM CURRENT USE OF INSULIN (HCC): ICD-10-CM

## 2021-08-03 DIAGNOSIS — Z79.4 DIABETES MELLITUS DUE TO UNDERLYING CONDITION WITH DIABETIC AUTONOMIC NEUROPATHY, WITH LONG-TERM CURRENT USE OF INSULIN (HCC): ICD-10-CM

## 2021-08-03 DIAGNOSIS — L97.512 NON-PRESSURE CHRONIC ULCER OF OTHER PART OF RIGHT FOOT WITH FAT LAYER EXPOSED (HCC): Primary | ICD-10-CM

## 2021-08-03 LAB — AMB EXT GLUCOSE: 118 MG/DL

## 2021-08-03 PROCEDURE — 15271 SKIN SUB GRAFT TRNK/ARM/LEG: CPT | Performed by: NURSE PRACTITIONER

## 2021-08-03 PROCEDURE — 97597 DBRDMT OPN WND 1ST 20 CM/<: CPT | Performed by: NURSE PRACTITIONER

## 2021-08-03 NOTE — PATIENT INSTRUCTIONS
Please return on:  Friday Aug 6 for cast change  Tuesday Aug 10    You may shower with protection of the wound (ie a cast cover or similar). Cleansing/dressing:   In clinic , with each dressing change:    1. please cleanse the limb, foot, and between toe

## 2021-08-03 NOTE — PROGRESS NOTES
Patient ID: Cl Mcqueen is a 78year old male.     Cast application   Date/Time: 8/3/2021 10:37 AM   Performed by: FELICITA Oliva   Authorized by: FELICITA Oliva   Consent given by: patient  Procedure  Immobilization: cast  Cast type: total

## 2021-08-03 NOTE — PROGRESS NOTES
Patient ID: Ruth Nicole is a 78year old male.     Debridement   Wound 10/19/20 #16 Right plantar foot Diabetic Ulcer Foot Anterior;Right    Consent obtained? verbal  Consent given by: patient    Performed by: provider  Debridement type: selective    Pr

## 2021-08-03 NOTE — PROGRESS NOTES
CHIEF COMPLAINT:   Patient presents with:  Wound Care: Patients is here for a follow up. He has no complain or issues       HPI:   8-14-20 INItial (reeval):  Patient is a 77 yo male well known to wound clinic and myself from previous admissions.   He was la asked him to return in a week and a half as I want to see if the most recent adjustments decrease the amount of callus build up.    7-6-21 Patient returns today, He had his prosthetic adjusted about 1.5 weeks ago, the callusand ulceration remains essential Misc, USE AS DIRECTED 5 TIMES A DAY, Disp: 100 each, Rfl: 3  •  OZEMPIC, 1 MG/DOSE, 4 MG/3ML Subcutaneous Solution Pen-injector, Inject 1 mg into the skin once a week., Disp: 9 mL, Rfl: 0  •  insulin aspart (NOVOLOG) 100 UNIT/ML Subcutaneous Solution, Lacey Rosa ALLERGIES:   No Known Allergies     REVIEW OF SYSTEMS:   This information was obtained from the patient/family and chart. See HPI for pertinent positives, otherwise 10 pt ROS negative.     Review of Systems     HISTORY:     Past medical, surgical, family a Foot  Wound Location Orientation: Anterior;Right      Assessments 5/14/2021  8:11 AM 8/3/2021  8:04 AM   Wound Image         Drainage Amount Scant Small   Drainage Description Sanguineous Serosanguineous; Other (Comment)   Treatments — Cleansed   Wound Michelle Crowder 05/14/21 0826 Debridement Routine Completed FELICITA Sanchez   Debridement   Wound 10/19/20 #16 Right plantar foot Diabetic Ulcer Foot Anterior;Right    Consent obtained? verbal  Consent given by: patient     Performed by: provider  Debridement type: s Red flags to RTC or ED reviewed. Patient (or parent) agrees to plan. I spent  25 minutes with the patient.  This time included:  preparing to see the patient (eg, review notes and recent diagnostics),  seeing the patient, performing a medically approp

## 2021-08-03 NOTE — PROGRESS NOTES
Patient ID: Lacy Antonio is a 78year old male.     Cellular tissue product application    Date/Time: 8/3/2021 8:27 AM  Performed by: FELICITA Muñoz  Authorized by: FELICITA Muñoz   Associated Wounds:   Wound 10/19/20 #16 Right plantar foot D

## 2021-08-06 ENCOUNTER — OFFICE VISIT (OUTPATIENT)
Dept: WOUND CARE | Facility: HOSPITAL | Age: 66
End: 2021-08-06
Attending: NURSE PRACTITIONER
Payer: MEDICARE

## 2021-08-06 VITALS
DIASTOLIC BLOOD PRESSURE: 96 MMHG | SYSTOLIC BLOOD PRESSURE: 157 MMHG | HEART RATE: 80 BPM | RESPIRATION RATE: 16 BRPM | TEMPERATURE: 98 F

## 2021-08-06 DIAGNOSIS — E11.621 DIABETIC ULCER OF RIGHT HEEL ASSOCIATED WITH TYPE 2 DIABETES MELLITUS, WITH FAT LAYER EXPOSED (HCC): ICD-10-CM

## 2021-08-06 DIAGNOSIS — L97.412 DIABETIC ULCER OF RIGHT HEEL ASSOCIATED WITH TYPE 2 DIABETES MELLITUS, WITH FAT LAYER EXPOSED (HCC): ICD-10-CM

## 2021-08-06 LAB — AMB EXT GLUCOSE: 133 MG/DL

## 2021-08-06 PROCEDURE — 99213 OFFICE O/P EST LOW 20 MIN: CPT

## 2021-08-06 NOTE — PROGRESS NOTES
Patient ID: Jolene Lundberg is a 78year old male.     Cast application   Date/Time: 8/6/2021 4:30 PM   Performed by: FELICITA Vergara   Authorized by: FELICITA Vergara   Timeout: Immediately prior to procedure a time out was called to verify the co

## 2021-08-06 NOTE — PROGRESS NOTES
Noel Collet is an 78year old male. Patient presents with:  Wound Care: Patients is here for a nurse visit.  Patients denies any issues       BP (!) 157/96   Pulse 80   Temp 98 °F (36.7 °C)   Resp 16     Wound 10/19/20 #16 Right plantar foot Diabetic Debridement Routine Completed FELICITA Castelan   05/14/21 0934 OP WOUND DRESSING Routine Completed Brianna Herman, FELICITA     - Additional Wound Dressing Information:    silver hydrogel, adaptic, gauze     - Frequency:    Change dressing daily and PRN

## 2021-08-10 ENCOUNTER — APPOINTMENT (OUTPATIENT)
Dept: WOUND CARE | Facility: HOSPITAL | Age: 66
End: 2021-08-10
Attending: NURSE PRACTITIONER
Payer: MEDICARE

## 2021-08-13 ENCOUNTER — OFFICE VISIT (OUTPATIENT)
Dept: WOUND CARE | Facility: HOSPITAL | Age: 66
End: 2021-08-13
Attending: NURSE PRACTITIONER
Payer: MEDICARE

## 2021-08-13 VITALS
SYSTOLIC BLOOD PRESSURE: 139 MMHG | HEART RATE: 76 BPM | DIASTOLIC BLOOD PRESSURE: 78 MMHG | TEMPERATURE: 98 F | RESPIRATION RATE: 14 BRPM

## 2021-08-13 DIAGNOSIS — Z79.4 DIABETES MELLITUS DUE TO UNDERLYING CONDITION WITH DIABETIC AUTONOMIC NEUROPATHY, WITH LONG-TERM CURRENT USE OF INSULIN (HCC): ICD-10-CM

## 2021-08-13 DIAGNOSIS — E08.43 DIABETES MELLITUS DUE TO UNDERLYING CONDITION WITH DIABETIC AUTONOMIC NEUROPATHY, WITH LONG-TERM CURRENT USE OF INSULIN (HCC): ICD-10-CM

## 2021-08-13 DIAGNOSIS — L97.512 NON-PRESSURE CHRONIC ULCER OF OTHER PART OF RIGHT FOOT WITH FAT LAYER EXPOSED (HCC): Primary | ICD-10-CM

## 2021-08-13 LAB — AMB EXT GLUCOSE: 140 MG/DL

## 2021-08-13 PROCEDURE — 11042 DBRDMT SUBQ TIS 1ST 20SQCM/<: CPT | Performed by: NURSE PRACTITIONER

## 2021-08-13 NOTE — PROGRESS NOTES
Weekly Wound Education Note    Teaching Provided To: Patient  Training Topics: Cleasing and general instructions; Discharge instructions;Dressing;Off-loading;Signs and symptoms of infection  Training Method: Explain/Verbal  Training Response: Patient Bellaamena Durandier

## 2021-08-13 NOTE — PROGRESS NOTES
Patient ID: Freddie Duran is a 78year old male.     Cast application   Date/Time: 8/13/2021 9:11 AM   Performed by: FELICITA Garcia   Authorized by: FELICITA Garcia   Consent given by: patient  Procedure  Immobilization: cast  Cast type: total

## 2021-08-13 NOTE — PATIENT INSTRUCTIONS
Please return on:  1 week    You may shower with protection of the wound (ie a cast cover or similar). Cleansing/dressing: In clinic , with each dressing change:    1. please cleanse the limb, foot, and between toes with soap, water and washcloth.    2.

## 2021-08-13 NOTE — PROGRESS NOTES
CHIEF COMPLAINT:   Patient presents with:  Wound Care: Patients is here for a follow up. Patients has no complain the cast       HPI:   8-14-20 INItial (reeval):  Patient is a 77 yo male well known to wound clinic and myself from previous admissions.   He w I asked him to return in a week and a half as I want to see if the most recent adjustments decrease the amount of callus build up.    7-6-21 Patient returns today, He had his prosthetic adjusted about 1.5 weeks ago, the callusand ulceration remains essent build up of callus over the last week even in the TCC. This is concerning. Will reduce the layers of dressings this week. Wound debrided again today, gel foam placed, will use kerramax low profile dressing and assess next week.     CURRENT MEDICATIONS: Transmit (DEXCOM G6 TRANSMITTER) Does not apply Misc, Please provide 1 transmitter every 3 months, Disp: 1 each, Rfl: 3  •  Continuous Blood Gluc Sensor (DEXCOM G6 SENSOR) Does not apply Misc, Change sensor every 10 days, Disp: 3 each, Rfl: 11  •  Multiple anxiety, or agitation. Calm, cooperative, and communicative. Appropriate interactions and affect.       WOUND ASSESSMENT:     Wound 10/19/20 #16 Right plantar foot Diabetic Ulcer Foot Anterior;Right (Active)   Date First Assessed/Time First Assessed: 10/19/ TRANSFER, FELTED FOAM   06/11/21 0848 Debridement Routine Completed FELICITA Simmons   05/14/21 0835 OP WOUND DRESSING Routine Completed Brianna Herman, FELICITA     - Additional Wound Dressing Information:    silver hydrogel, adaptic, gauze     - Calos Andres recent diagnostics),  seeing the patient, performing a medically appropriate examination and/or evaluation, counseling and educating the patient, documenting in the record, Lindsay De Leon.     PATIENT DRESSING:      Patient Instructions   Please Marcelo Gannon

## 2021-08-20 ENCOUNTER — OFFICE VISIT (OUTPATIENT)
Dept: WOUND CARE | Facility: HOSPITAL | Age: 66
End: 2021-08-20
Attending: NURSE PRACTITIONER
Payer: MEDICARE

## 2021-08-20 VITALS
TEMPERATURE: 96 F | SYSTOLIC BLOOD PRESSURE: 151 MMHG | RESPIRATION RATE: 16 BRPM | DIASTOLIC BLOOD PRESSURE: 85 MMHG | HEART RATE: 86 BPM

## 2021-08-20 DIAGNOSIS — L97.509 TYPE 2 DIABETES MELLITUS WITH FOOT ULCER, WITH LONG-TERM CURRENT USE OF INSULIN (HCC): ICD-10-CM

## 2021-08-20 DIAGNOSIS — Z79.4 LONG TERM (CURRENT) USE OF INSULIN (HCC): ICD-10-CM

## 2021-08-20 DIAGNOSIS — L97.512 NON-PRESSURE CHRONIC ULCER OF OTHER PART OF RIGHT FOOT WITH FAT LAYER EXPOSED (HCC): Primary | ICD-10-CM

## 2021-08-20 DIAGNOSIS — E11.621 TYPE 2 DIABETES MELLITUS WITH FOOT ULCER, WITH LONG-TERM CURRENT USE OF INSULIN (HCC): ICD-10-CM

## 2021-08-20 DIAGNOSIS — E08.43 DIABETES MELLITUS DUE TO UNDERLYING CONDITION WITH DIABETIC AUTONOMIC NEUROPATHY, WITH LONG-TERM CURRENT USE OF INSULIN (HCC): ICD-10-CM

## 2021-08-20 DIAGNOSIS — Z79.4 DIABETES MELLITUS DUE TO UNDERLYING CONDITION WITH DIABETIC AUTONOMIC NEUROPATHY, WITH LONG-TERM CURRENT USE OF INSULIN (HCC): ICD-10-CM

## 2021-08-20 DIAGNOSIS — Z79.4 TYPE 2 DIABETES MELLITUS WITH FOOT ULCER, WITH LONG-TERM CURRENT USE OF INSULIN (HCC): ICD-10-CM

## 2021-08-20 PROCEDURE — 97597 DBRDMT OPN WND 1ST 20 CM/<: CPT | Performed by: NURSE PRACTITIONER

## 2021-08-20 NOTE — PROGRESS NOTES
Patient ID: Suyapa Coronado is a 78year old male.     Cast application   Date/Time: 8/20/2021 8:39 AM   Performed by: FELICITA Carrion   Authorized by: FELICITA Carrion   Consent given by: patient  Timeout: Immediately prior to procedure a time out

## 2021-08-20 NOTE — PROGRESS NOTES
CHIEF COMPLAINT:   Patient presents with:  Wound Care: Patient is here for a wound care follow up. He denies any pain or new wound concerns. Patient is refusing to have his glucose checked. He states he took it this morning at 168.        HPI:   8-14-20 INI no c/o pain. 7-16-21 patient returns today, patient has an appointment with aren the last week in July. There is significant callus today circumfrentially around the ulcer. Patient with surrounding remants of agno3?  Vs epidermal hemorrhag to correct or accomodate for). Patient states that he has not been wearing the cast shoe bc he needs to secure it on with the brown coban again because there is not a dorsal foot to hold it on.   We discussed that the cast shoe may distribute the pressure apply route Every 10 days. , Disp: 9 each, Rfl: 3  •  insulin glargine (LANTUS) 100 UNIT/ML Subcutaneous Solution, INJECT INTO SKIN UP TO 65 UNITS NIGHTLY, Disp: 60 mL, Rfl: 1  •  Continuous Blood Gluc Transmit (DEXCOM G6 TRANSMITTER) Does not apply Misc, P leg.    Musculoskeletal:  Gait and station stable. Integumentary (Hair, Skin)  see wound documentation. Psychiatric:  Judgment and insight intact. Alert and oriented times 3. No evidence of depression, anxiety, or agitation.  Calm, cooperative, and co 07/16/21 0813 Debridement Routine Completed Monique Romeo, APRN   07/07/21 0756 Cellular tissue product application Routine Completed Monique Romeo, APRN   07/07/21 0754 Debridement Routine Completed Monique Romeo, APRN   06/25/21 8898 [facility]    3. Type 2 diabetes mellitus with foot ulcer, with long-term current use of insulin ([facility]    4. Long term (current) use of insulin (HCC)      Risks, benefits, and alternatives of current treatment plan discussed in detail.   Questions and

## 2021-08-20 NOTE — PROGRESS NOTES
.Weekly Wound Education Note    Teaching Provided To: Patient  Training Topics: Off-loading;Dressing; Discharge instructions  Training Method: Explain/Verbal;Written  Training Response: Patient responds and understands         FLODY Lipscomb

## 2021-08-20 NOTE — PROGRESS NOTES
Patient ID: Emil Herron is a 78year old male.     Debridement   Wound 10/19/20 #16 Right plantar foot Diabetic Ulcer Foot Anterior;Right    Consent obtained? verbal  Consent given by: patient    Performed by: provider  Debridement type: selective    Pr

## 2021-08-27 ENCOUNTER — OFFICE VISIT (OUTPATIENT)
Dept: WOUND CARE | Facility: HOSPITAL | Age: 66
End: 2021-08-27
Attending: NURSE PRACTITIONER
Payer: MEDICARE

## 2021-08-27 VITALS
HEART RATE: 87 BPM | SYSTOLIC BLOOD PRESSURE: 122 MMHG | DIASTOLIC BLOOD PRESSURE: 78 MMHG | TEMPERATURE: 97 F | RESPIRATION RATE: 18 BRPM

## 2021-08-27 DIAGNOSIS — E11.621 TYPE 2 DIABETES MELLITUS WITH FOOT ULCER, WITH LONG-TERM CURRENT USE OF INSULIN (HCC): ICD-10-CM

## 2021-08-27 DIAGNOSIS — Z79.4 LONG TERM (CURRENT) USE OF INSULIN (HCC): ICD-10-CM

## 2021-08-27 DIAGNOSIS — L97.509 TYPE 2 DIABETES MELLITUS WITH FOOT ULCER, WITH LONG-TERM CURRENT USE OF INSULIN (HCC): ICD-10-CM

## 2021-08-27 DIAGNOSIS — Z79.4 TYPE 2 DIABETES MELLITUS WITH FOOT ULCER, WITH LONG-TERM CURRENT USE OF INSULIN (HCC): ICD-10-CM

## 2021-08-27 DIAGNOSIS — Z79.4 DIABETES MELLITUS DUE TO UNDERLYING CONDITION WITH DIABETIC AUTONOMIC NEUROPATHY, WITH LONG-TERM CURRENT USE OF INSULIN (HCC): ICD-10-CM

## 2021-08-27 DIAGNOSIS — E08.43 DIABETES MELLITUS DUE TO UNDERLYING CONDITION WITH DIABETIC AUTONOMIC NEUROPATHY, WITH LONG-TERM CURRENT USE OF INSULIN (HCC): ICD-10-CM

## 2021-08-27 DIAGNOSIS — L97.512 NON-PRESSURE CHRONIC ULCER OF OTHER PART OF RIGHT FOOT WITH FAT LAYER EXPOSED (HCC): Primary | ICD-10-CM

## 2021-08-27 PROCEDURE — 97597 DBRDMT OPN WND 1ST 20 CM/<: CPT | Performed by: NURSE PRACTITIONER

## 2021-08-27 NOTE — PROGRESS NOTES
CHIEF COMPLAINT:   Patient presents with:  Wound Care: Patient is here for a follow up of right plantar foot wound. [facility] HPI:   8-14-20 INItial (reeval):  Patient is a 75 yo male well known to wound clinic and myself from previous admissions.   He was last The patient has a significant build up of callus over the last week even in the TCC. This is concerning. Will reduce the layers of dressings this week.  Wound debrided again today, gel foam placed, will use kerramax low profile dressing and assess next week TABLET (145 MG TOTAL) BY MOUTH ONCE DAILY. , Disp: 90 tablet, Rfl: 3  •  Insulin Syringe-Needle U-100 (BD INSULIN SYRINGE U/F) 30G X 1/2\" 0.3 ML Does not apply Misc, 1 each by Does not apply route 5 (five) times daily. , Disp: 100 each, Rfl: 1  •  OZEMPIC, 04/16/2021    GFRCKDEPI 48.07 (L) 10/16/2017    ALB 3.8 04/16/2021    TP 8.0 04/16/2021    A1C 7.7 (A) 11/18/2019       POC Glucose   Date Value Ref Range Status   10/22/2020 236 (H) 70 - 99 mg/dL Final   10/19/2020 168 (H) 70 - 99 mg/dL Final   09/09/2020 Grade 2   Shoe Type — TCC total contact cast       Active Orders   Date Order Priority Status Authorizing Provider   08/27/21 4557 Debridement Routine Active FELICITA Oliva       Inactive Orders   Date Order Priority Status Authorizing Provider   08/ 0.6  Width (cm): 0.1  Depth (cm): 0.1  Surface Area (cm^2): 0.06  Volume (cm^3): 0.01        Post-debridement measurements  Length (cm): 0.9  Width (cm): 0.4  Depth (cm): 0.1  Percent debrided: 100%  Surface Area (cm^2): 0.36  Area debrided (cm^2): 0.36  V foot do NOT drive while in cast. Do not get cast wet. If foot becomes painful or numb please call the wound clinic or report to the nearest emergency room to have cast removed.     CONCERNS:  Signs of infection may include the following: • Increase in redne

## 2021-08-27 NOTE — PROGRESS NOTES
.Weekly Wound Education Note    Teaching Provided To: Patient  Training Topics: Off-loading; Discharge instructions;Dressing  Training Method: Explain/Verbal;Written  Training Response: Patient responds and understands             Continue fior. TCC.

## 2021-08-27 NOTE — PROGRESS NOTES
Patient ID: Freddie Duran is a 78year old male.     Debridement   Wound 10/19/20 #16 Right plantar foot Diabetic Ulcer Foot Anterior;Right    Consent obtained? verbal  Consent given by: patient    Performed by: provider  Debridement type: selective    Pr

## 2021-09-03 ENCOUNTER — OFFICE VISIT (OUTPATIENT)
Dept: WOUND CARE | Facility: HOSPITAL | Age: 66
End: 2021-09-03
Attending: NURSE PRACTITIONER
Payer: MEDICARE

## 2021-09-03 VITALS
HEART RATE: 75 BPM | DIASTOLIC BLOOD PRESSURE: 89 MMHG | TEMPERATURE: 97 F | SYSTOLIC BLOOD PRESSURE: 151 MMHG | RESPIRATION RATE: 16 BRPM

## 2021-09-03 DIAGNOSIS — Z79.4 DIABETES MELLITUS DUE TO UNDERLYING CONDITION WITH DIABETIC AUTONOMIC NEUROPATHY, WITH LONG-TERM CURRENT USE OF INSULIN (HCC): ICD-10-CM

## 2021-09-03 DIAGNOSIS — Z79.4 LONG TERM (CURRENT) USE OF INSULIN (HCC): ICD-10-CM

## 2021-09-03 DIAGNOSIS — L97.512 NON-PRESSURE CHRONIC ULCER OF OTHER PART OF RIGHT FOOT WITH FAT LAYER EXPOSED (HCC): Primary | ICD-10-CM

## 2021-09-03 DIAGNOSIS — E08.43 DIABETES MELLITUS DUE TO UNDERLYING CONDITION WITH DIABETIC AUTONOMIC NEUROPATHY, WITH LONG-TERM CURRENT USE OF INSULIN (HCC): ICD-10-CM

## 2021-09-03 PROCEDURE — 99214 OFFICE O/P EST MOD 30 MIN: CPT | Performed by: NURSE PRACTITIONER

## 2021-09-03 NOTE — PATIENT INSTRUCTIONS
Please return in:  1 week. ( Patient will most likely need weekly appointments for 4 weeks). You may shower with protection of the wound (ie a cast cover or similar). Cleansing/dressing:   In clinic , with each dressing change:    1. please cleanse th

## 2021-09-03 NOTE — PROGRESS NOTES
.Weekly Wound Education Note    Teaching Provided To: Patient  Training Topics: Off-loading; Discharge instructions;Dressing  Training Method: Explain/Verbal;Written  Training Response: Patient responds and understands         Continue FLOYD childress

## 2021-09-03 NOTE — PROGRESS NOTES
CHIEF COMPLAINT:   Patient presents with:  Wound Care: Patient is here for a wound care follow up. HPI:   8-14-20 INItial (reeval):  Patient is a 75 yo male well known to wound clinic and myself from previous admissions.   He was last discharged/seen i significant build up of callus over the last week even in the TCC. This is concerning. Will reduce the layers of dressings this week. Wound debrided again today, gel foam placed, will use kerramax low profile dressing and assess next week.     8-20-21 Too UNITS THREE TIMES DAILY WITH MEALS.  TOTAL DAILY DOSE, Disp: 120 mL, Rfl: 1  •  METOPROLOL TARTRATE 100 MG Oral Tab, TAKE 1 TABLET BY MOUTH TWICE A DAY, Disp: 180 tablet, Rfl: 3  •  FENOFIBRATE 145 MG Oral Tab, TAKE 1 TABLET (145 MG TOTAL) BY MOUTH ONCE ADEOLA index is 33.37 kg/m² as calculated from the following:    Height as of 5/14/21: 75\". Weight as of 5/14/21: 267 lb (121.1 kg).      Lab Results   Component Value Date    BUN 26 (H) 04/16/2021    CREATSERUM 1.53 (H) 04/16/2021    GFRCKDEPI 48.07 (L) 10/16 Healing % — 100   Margins — Undefined edges   Non-staged Wound Description Full thickness —   Heather-wound Assessment Other (Comment) Callous   Wound Granulation Tissue — Firm;Pink   Wound Bed Granulation (%) 100 % 100 %   Wound Odor — None   Mckeon Scale — ulcer of other part of right foot with fat layer exposed ([address]    2. Diabetes mellitus due to underlying condition with diabetic autonomic neuropathy, with long-term current use of insulin ([address]    3.  Long term (current) use of insulin (HCC)      Risks, be doctor or local emergency room.        Brianna Herman FNP-C, CWCN-AP, CFCN, CSWS, WCC, DWC

## 2021-09-03 NOTE — PROGRESS NOTES
Patient ID: Brittney Landon is a 78year old male.     Cast application   Date/Time: 9/3/2021 8:33 AM   Performed by: FELICITA Donovan   Authorized by: FELICITA Donovan   Consent given by: patient  Timeout: Immediately prior to procedure a time out

## 2021-09-10 ENCOUNTER — OFFICE VISIT (OUTPATIENT)
Dept: WOUND CARE | Facility: HOSPITAL | Age: 66
End: 2021-09-10
Attending: NURSE PRACTITIONER
Payer: MEDICARE

## 2021-09-10 VITALS
HEART RATE: 56 BPM | RESPIRATION RATE: 16 BRPM | TEMPERATURE: 97 F | SYSTOLIC BLOOD PRESSURE: 163 MMHG | DIASTOLIC BLOOD PRESSURE: 72 MMHG

## 2021-09-10 DIAGNOSIS — Z79.4 DIABETES MELLITUS DUE TO UNDERLYING CONDITION WITH DIABETIC AUTONOMIC NEUROPATHY, WITH LONG-TERM CURRENT USE OF INSULIN (HCC): ICD-10-CM

## 2021-09-10 DIAGNOSIS — L97.512 NON-PRESSURE CHRONIC ULCER OF OTHER PART OF RIGHT FOOT WITH FAT LAYER EXPOSED (HCC): Primary | ICD-10-CM

## 2021-09-10 DIAGNOSIS — Z79.4 LONG TERM (CURRENT) USE OF INSULIN (HCC): ICD-10-CM

## 2021-09-10 DIAGNOSIS — E08.43 DIABETES MELLITUS DUE TO UNDERLYING CONDITION WITH DIABETIC AUTONOMIC NEUROPATHY, WITH LONG-TERM CURRENT USE OF INSULIN (HCC): ICD-10-CM

## 2021-09-10 LAB — AMB EXT GLUCOSE: 139 MG/DL

## 2021-09-10 PROCEDURE — 97597 DBRDMT OPN WND 1ST 20 CM/<: CPT | Performed by: NURSE PRACTITIONER

## 2021-09-10 NOTE — PATIENT INSTRUCTIONS
Please return in:  1 week  You may shower with protection of the wound (ie a cast cover or similar). Cleansing/dressing: In clinic , with each dressing change:    1. please cleanse the limb, foot, and between toes with soap, water and washcloth.    2. D

## 2021-09-10 NOTE — PROGRESS NOTES
Patient ID: Lashonda Poloe is a 78year old male.     Debridement   Wound 10/19/20 #16 Right plantar foot Diabetic Ulcer Foot Anterior;Right    Consent obtained? verbal  Consent given by: patient    Performed by: provider  Debridement type: selective    Pr

## 2021-09-10 NOTE — PROGRESS NOTES
Patient ID: Jolene Lundberg is a 78year old male.     Cast application   Date/Time: 9/10/2021 8:23 AM   Performed by: FELICITA Vergara   Authorized by: FELICITA Vergara   Consent given by: patient  Timeout: Immediately prior to procedure a time out

## 2021-09-10 NOTE — PROGRESS NOTES
CHIEF COMPLAINT:   Patient presents with:  Wound Care: Patients is here for a follow up. Patients has no issues with the cast.       HPI:   8-14-20 INItial (reeval):  Patient is a 75 yo male well known to wound clinic and myself from previous admissions. the last week. The patient has a significant build up of callus over the last week even in the TCC. This is concerning. Will reduce the layers of dressings this week.  Wound debrided again today, gel foam placed, will use kerramax low profile dressing and a Tab, TAKE 1 TABLET BY MOUTH EVERY DAY IN THE MORNING, Disp: 90 tablet, Rfl: 0  •  insulin glargine (LANTUS) 100 UNIT/ML Subcutaneous Solution, INJECT INTO SKIN UP TO 65 UNITS NIGHTLY, Disp: 60 mL, Rfl: 1  •  atorvastatin 40 MG Oral Tab, Take 1 tablet (40 m Known Allergies     REVIEW OF SYSTEMS:   This information was obtained from the patient/family and chart. See HPI for pertinent positives, otherwise 10 pt ROS negative.     Review of Systems     HISTORY:     Past medical, surgical, family and social histor Location Orientation: Anterior;Right      Assessments 5/14/2021  8:11 AM 9/10/2021  8:09 AM   Wound Image         Drainage Amount Scant None   Drainage Description Sanguineous —   Wound Length (cm) 0.2 cm 0.1 cm   Wound Width (cm) 0.4 cm 0.1 cm   Wound Marcela Information:    HYDROFERA TRANSFER, FELTED FOAM   06/11/21 0848 Debridement Routine Completed FELICITA Wallis   05/14/21 0835 OP WOUND DRESSING Routine Completed FELICITA Wallis     - Additional Wound Dressing Information:    silver hydrogel, ada medically appropriate examination and/or evaluation, counseling and educating the patient, documenting in the record.   Bill selective debridment only    PATIENT DRESSING:      Patient Instructions   Please return in:  1 week  You may shower with protection

## 2021-09-10 NOTE — PROGRESS NOTES
.Weekly Wound Education Note    Teaching Provided To: Patient  Training Topics: Off-loading; Discharge instructions;Dressing  Training Method: Explain/Verbal;Written  Training Response: Patient responds and understands           fior Burdick medipor

## 2021-09-17 ENCOUNTER — OFFICE VISIT (OUTPATIENT)
Dept: WOUND CARE | Facility: HOSPITAL | Age: 66
End: 2021-09-17
Attending: NURSE PRACTITIONER
Payer: MEDICARE

## 2021-09-17 VITALS
HEART RATE: 80 BPM | RESPIRATION RATE: 14 BRPM | DIASTOLIC BLOOD PRESSURE: 93 MMHG | TEMPERATURE: 98 F | SYSTOLIC BLOOD PRESSURE: 175 MMHG

## 2021-09-17 DIAGNOSIS — L97.512 NON-PRESSURE CHRONIC ULCER OF OTHER PART OF RIGHT FOOT WITH FAT LAYER EXPOSED (HCC): Primary | ICD-10-CM

## 2021-09-17 DIAGNOSIS — Z79.4 LONG TERM (CURRENT) USE OF INSULIN (HCC): ICD-10-CM

## 2021-09-17 DIAGNOSIS — E08.43 DIABETES MELLITUS DUE TO UNDERLYING CONDITION WITH DIABETIC AUTONOMIC NEUROPATHY, WITH LONG-TERM CURRENT USE OF INSULIN (HCC): ICD-10-CM

## 2021-09-17 DIAGNOSIS — Z79.4 DIABETES MELLITUS DUE TO UNDERLYING CONDITION WITH DIABETIC AUTONOMIC NEUROPATHY, WITH LONG-TERM CURRENT USE OF INSULIN (HCC): ICD-10-CM

## 2021-09-17 LAB — AMB EXT GLUCOSE: 106 MG/DL

## 2021-09-17 PROCEDURE — 99214 OFFICE O/P EST MOD 30 MIN: CPT | Performed by: NURSE PRACTITIONER

## 2021-09-17 NOTE — PROGRESS NOTES
.Weekly Wound Education Note    Teaching Provided To: Patient  Training Topics: Off-loading;  Discharge instructions; Dressing  Training Method: Explain/Verbal; Written  Training Response: Patient responds and understands           Endoform, kerramax to wou

## 2021-09-17 NOTE — PATIENT INSTRUCTIONS
Please return in:  1 week    You may shower with protection of the wound (ie a cast cover or similar). Cleansing/dressing: In clinic , with each dressing change:    1. please cleanse the limb, foot, and between toes with soap, water and washcloth.    2.

## 2021-09-17 NOTE — PROGRESS NOTES
Patient ID: Mirna Murphy is a 78year old male.     Cast application   Date/Time: 9/17/2021 10:29 AM   Performed by: Maria Guadalupe Paiz RN   Authorized by: FELICITA Schneider   Consent given by: patient  Injury  Location details: right foot  Pre-proced

## 2021-09-17 NOTE — PROGRESS NOTES
CHIEF COMPLAINT:   Patient presents with:  Wound Care: Patients is here for a follow up. Patients denies any issues       HPI:   8-14-20 INItial (reeval):  Patient is a 77 yo male well known to wound clinic and myself from previous admissions.   He was last week. The patient has a significant build up of callus over the last week even in the TCC. This is concerning. Will reduce the layers of dressings this week.  Wound debrided again today, gel foam placed, will use kerramax low profile dressing and assess nex 6-12oclock, wound bed pink, no s/s of infection, no c/o pain, will utilize endoform as the collagen, continue with tcc and kerramax.     CURRENT MEDICATIONS:     Current Outpatient Medications:   •  LISINOPRIL 40 MG Oral Tab, TAKE 1 TABLET BY MOUTH EVERY DA every 3 months, Disp: 1 each, Rfl: 3  •  Continuous Blood Gluc Sensor (DEXCOM G6 SENSOR) Does not apply Misc, Change sensor every 10 days, Disp: 3 each, Rfl: 11  •  Multiple Vitamins Oral Tab, Take 1 tablet by mouth daily. , Disp: , Rfl:   •  ASPIRIN 81 MG or agitation. Calm, cooperative, and communicative. Appropriate interactions and affect.       WOUND ASSESSMENT:     Wound 10/19/20 #16 Right plantar foot Diabetic Ulcer Foot Anterior;Right (Active)   Date First Assessed/Time First Assessed: 10/19/20 0704 Routine Completed FELICITA Carrion     - Additional Wound Dressing Information:    HYDROFERA TRANSFER, FELTED FOAM   06/11/21 0848 Debridement Routine Completed FELICITA Carrion   05/14/21 0835 OP WOUND DRESSING Routine Completed Brianna Herman, AP the cast is on your right foot do NOT drive while in cast. Do not get cast wet. If foot becomes painful or numb please call the wound clinic or report to the nearest emergency room to have cast removed.     CONCERNS:  Signs of infection may include the foll

## 2021-09-24 ENCOUNTER — OFFICE VISIT (OUTPATIENT)
Dept: WOUND CARE | Facility: HOSPITAL | Age: 66
End: 2021-09-24
Attending: NURSE PRACTITIONER
Payer: MEDICARE

## 2021-09-24 VITALS
HEART RATE: 84 BPM | SYSTOLIC BLOOD PRESSURE: 145 MMHG | DIASTOLIC BLOOD PRESSURE: 87 MMHG | TEMPERATURE: 97 F | RESPIRATION RATE: 18 BRPM

## 2021-09-24 DIAGNOSIS — L97.512 NON-PRESSURE CHRONIC ULCER OF OTHER PART OF RIGHT FOOT WITH FAT LAYER EXPOSED (HCC): Primary | ICD-10-CM

## 2021-09-24 DIAGNOSIS — Z79.4 DIABETES MELLITUS DUE TO UNDERLYING CONDITION WITH DIABETIC AUTONOMIC NEUROPATHY, WITH LONG-TERM CURRENT USE OF INSULIN (HCC): ICD-10-CM

## 2021-09-24 DIAGNOSIS — Z79.4 LONG TERM (CURRENT) USE OF INSULIN (HCC): ICD-10-CM

## 2021-09-24 DIAGNOSIS — E08.43 DIABETES MELLITUS DUE TO UNDERLYING CONDITION WITH DIABETIC AUTONOMIC NEUROPATHY, WITH LONG-TERM CURRENT USE OF INSULIN (HCC): ICD-10-CM

## 2021-09-24 LAB — AMB EXT GLUCOSE: 148 MG/DL

## 2021-09-24 PROCEDURE — 99214 OFFICE O/P EST MOD 30 MIN: CPT | Performed by: NURSE PRACTITIONER

## 2021-09-24 NOTE — PROGRESS NOTES
CHIEF COMPLAINT:   Patient presents with:  Wound Care: Pt is here for a F/U appt, No c/o pain      HPI:   8-14-20 INItial (reeval):  Patient is a 77 yo male well known to wound clinic and myself from previous admissions.   He was last discharged/seen in [place] significant build up of callus over the last week even in the TCC. This is concerning. Will reduce the layers of dressings this week. Wound debrided again today, gel foam placed, will use kerramax low profile dressing and assess next week.     8-20-21 Too s/s of infection, no c/o pain, will utilize endoform as the collagen, continue with tcc and kerramax. 9-24-21 patient returns today, the wound is improving, definitely less callus in the immediate periwound.   I did trim callus further out on the foot (w Strip, USE TO CHECK BLOOD SUGAR 3 TIMES DAILY. , Disp: 300 strip, Rfl: 0  •  Continuous Blood Gluc Transmit (DEXCOM G6 TRANSMITTER) Does not apply Misc, 1 each by Does not apply route every 3 (three) months., Disp: 1 each, Rfl: 3  •  Continuous Blood Gluc S well developed. Cardiovascular:   dp/pt palpable right. right lower Extremity free of varicosities, + edema. Capillary refill < 3 seconds. right is a chopart amp, warm. + sparse hairgrowth on leg. Musculoskeletal:  Gait and station stable.     Integum Completed Aashish Aguilar, APRN   21/70/31 9771 Cast application Routine Completed Aashish Aguilar, APRN   08/20/21 3283 Debridement Routine Completed Aashish Aguilar, APRN   42/08/01 1172 Cast application Routine Completed Aashish Aguilar, APRN   08/13/21 080 parent) agrees to plan. I spent  30 minutes with the patient.  This time included:  preparing to see the patient (eg, review notes and recent diagnostics),  seeing the patient, performing a medically appropriate examination and/or evaluation, counselin

## 2021-09-24 NOTE — PROGRESS NOTES
Patient ID: Chas Christian is a 78year old male.     Cast application   Date/Time: 9/24/2021 8:36 AM   Performed by: Cristal Sanchez RN   Authorized by: FELICITA Gonzalez   Consent given by: patient  Timeout: Immediately prior to procedure a time ou

## 2021-09-24 NOTE — PROGRESS NOTES
.Weekly Wound Education Note    Teaching Provided To: Patient  Training Topics: Off-loading; Discharge instructions; Dressing  Training Method: Explain/Verbal; Written  Training Response: Patient responds and understands         Thin foam, medipore tape.

## 2021-10-01 ENCOUNTER — OFFICE VISIT (OUTPATIENT)
Dept: WOUND CARE | Facility: HOSPITAL | Age: 66
End: 2021-10-01
Attending: NURSE PRACTITIONER
Payer: MEDICARE

## 2021-10-01 VITALS
RESPIRATION RATE: 16 BRPM | HEART RATE: 57 BPM | SYSTOLIC BLOOD PRESSURE: 140 MMHG | TEMPERATURE: 98 F | DIASTOLIC BLOOD PRESSURE: 81 MMHG

## 2021-10-01 DIAGNOSIS — E11.621 TYPE 2 DIABETES MELLITUS WITH FOOT ULCER, WITH LONG-TERM CURRENT USE OF INSULIN (HCC): ICD-10-CM

## 2021-10-01 DIAGNOSIS — Z79.4 TYPE 2 DIABETES MELLITUS WITH FOOT ULCER, WITH LONG-TERM CURRENT USE OF INSULIN (HCC): ICD-10-CM

## 2021-10-01 DIAGNOSIS — Z79.4 DIABETES MELLITUS DUE TO UNDERLYING CONDITION WITH DIABETIC AUTONOMIC NEUROPATHY, WITH LONG-TERM CURRENT USE OF INSULIN (HCC): ICD-10-CM

## 2021-10-01 DIAGNOSIS — L97.509 TYPE 2 DIABETES MELLITUS WITH FOOT ULCER, WITH LONG-TERM CURRENT USE OF INSULIN (HCC): ICD-10-CM

## 2021-10-01 DIAGNOSIS — Z79.4 LONG TERM (CURRENT) USE OF INSULIN (HCC): ICD-10-CM

## 2021-10-01 DIAGNOSIS — E08.43 DIABETES MELLITUS DUE TO UNDERLYING CONDITION WITH DIABETIC AUTONOMIC NEUROPATHY, WITH LONG-TERM CURRENT USE OF INSULIN (HCC): ICD-10-CM

## 2021-10-01 DIAGNOSIS — L97.512 NON-PRESSURE CHRONIC ULCER OF OTHER PART OF RIGHT FOOT WITH FAT LAYER EXPOSED (HCC): Primary | ICD-10-CM

## 2021-10-01 PROCEDURE — 99214 OFFICE O/P EST MOD 30 MIN: CPT | Performed by: NURSE PRACTITIONER

## 2021-10-01 NOTE — PROGRESS NOTES
CHIEF COMPLAINT:   Patient presents with:  Wound Care: Follow up for Right Foot wound. No complains with the cast at this time. HPI:   8-14-20 INItial (reeval):  Patient is a 75 yo male well known to wound clinic and myself from previous admissions. s/s of infection, no c/o pain. Will utilize lesa collagen and continue with the low profile kerramax and TCC.     9-17-21 patient returns today, there is some moisture in the area, the wound is smaller than post debridement last week, no undermining note 90 tablet, Rfl: 0  •  METOPROLOL TARTRATE 100 MG Oral Tab, TAKE 1 TABLET BY MOUTH TWICE A DAY, Disp: 180 tablet, Rfl: 3  •  FENOFIBRATE 145 MG Oral Tab, TAKE 1 TABLET (145 MG TOTAL) BY MOUTH ONCE DAILY. , Disp: 90 tablet, Rfl: 3  •  Insulin Syringe-Needle U Height as of 5/14/21: 75\". Weight as of 5/14/21: 267 lb (121.1 kg).      Lab Results   Component Value Date    BUN 26 (H) 04/16/2021    CREATSERUM 1.53 (H) 04/16/2021    GFRCKDEPI 48.07 (L) 10/16/2017    ALB 3.8 04/16/2021    TP 8.0 04/16/2021    A1C 7 Description Full thickness Full thickness   Heather-wound Assessment Other (Comment) Dry; Callous   Wound Granulation Tissue — Pink; Firm   Wound Bed Granulation (%) 100 % 100 %   Wound Odor — None   Mckeon Scale — Grade 2   Shoe Type — TCC total contact cast Completed FELICITA Shultz     - Additional Offloading Device:    felted foam, patient's prosthetic/orthotic   05/14/21 0829 Debridement Routine Completed Brianna Herman APRN         ASSESSMENT AND PLAN:      1. Non-pressure chronic ulcer of other par Change in the amount of wound drainage Should you experience any significant changes in your wound(s) or have any questions regarding your home care instructions please contact the wound center [facility] @ [phone] If after regular business hour

## 2021-10-01 NOTE — PROGRESS NOTES
Weekly Wound Education Note    Teaching Provided To: Patient  Training Topics: Cleasing and general instructions;  Discharge instructions; Dressing; Off-loading  Training Method: Explain/Verbal  Training Response: Patient responds and understands        Not

## 2021-10-08 ENCOUNTER — OFFICE VISIT (OUTPATIENT)
Dept: WOUND CARE | Facility: HOSPITAL | Age: 66
End: 2021-10-08
Attending: NURSE PRACTITIONER
Payer: MEDICARE

## 2021-10-08 VITALS
HEART RATE: 72 BPM | DIASTOLIC BLOOD PRESSURE: 85 MMHG | SYSTOLIC BLOOD PRESSURE: 159 MMHG | RESPIRATION RATE: 14 BRPM | TEMPERATURE: 98 F

## 2021-10-08 DIAGNOSIS — E08.43 DIABETES MELLITUS DUE TO UNDERLYING CONDITION WITH DIABETIC AUTONOMIC NEUROPATHY, WITH LONG-TERM CURRENT USE OF INSULIN (HCC): ICD-10-CM

## 2021-10-08 DIAGNOSIS — Z79.4 DIABETES MELLITUS DUE TO UNDERLYING CONDITION WITH DIABETIC AUTONOMIC NEUROPATHY, WITH LONG-TERM CURRENT USE OF INSULIN (HCC): ICD-10-CM

## 2021-10-08 DIAGNOSIS — L97.512 NON-PRESSURE CHRONIC ULCER OF OTHER PART OF RIGHT FOOT WITH FAT LAYER EXPOSED (HCC): Primary | ICD-10-CM

## 2021-10-08 DIAGNOSIS — Z79.4 LONG TERM (CURRENT) USE OF INSULIN (HCC): ICD-10-CM

## 2021-10-08 PROCEDURE — 99214 OFFICE O/P EST MOD 30 MIN: CPT | Performed by: NURSE PRACTITIONER

## 2021-10-08 NOTE — PROGRESS NOTES
CHIEF COMPLAINT:   Patient presents with:  Wound Care: Patients is here for a follow up.  Patients denies any issues and no issues with the cast       HPI:   8-14-20 INItial (reeval):  Patient is a 75 yo male well known to wound clinic and myself from [facility] this week. No s/s of infection, no c/o pain. Will utilize lesa collagen and continue with the low profile kerramax and TCC.     9-17-21 patient returns today, there is some moisture in the area, the wound is smaller than post debridement last week, no u Disp: 120 mL, Rfl: 0  •  LISINOPRIL 40 MG Oral Tab, TAKE 1 TABLET BY MOUTH EVERY DAY, Disp: 90 tablet, Rfl: 0  •  FUROSEMIDE 40 MG Oral Tab, TAKE 1 TABLET BY MOUTH EVERY DAY IN THE MORNING, Disp: 90 tablet, Rfl: 0  •  insulin glargine (LANTUS) 100 UNIT/ML REVIEW OF SYSTEMS:   This information was obtained from the patient/family and chart. See HPI for pertinent positives, otherwise 10 pt ROS negative.     Review of Systems     HISTORY:     Past medical, surgical, family and social history updated where Orientation: Anterior;Right      Assessments 5/14/2021  8:11 AM 10/8/2021  8:07 AM   Wound Image         Drainage Amount Scant Scant   Drainage Description Sanguineous Serous;  Yellow   Wound Length (cm) 0.2 cm 0.1 cm   Wound Width (cm) 0.4 cm 0.1 cm   Woun Additional Wound Dressing Information:    [address] [address] FELTED FOAM   06/11/21 0848 Debridement Routine Completed FELICITA Muñoz   05/14/21 0835 OP WOUND DRESSING Routine Completed FELICITA Muñoz     - Additional Wound Dressing Informatio If foot becomes painful or numb please call the wound clinic or report to the nearest emergency room to have cast removed.     CONCERNS:  Signs of infection may include the following: • Increase in redness • Red \"streaks\" from wound • Increase in swelling

## 2021-10-15 ENCOUNTER — OFFICE VISIT (OUTPATIENT)
Dept: WOUND CARE | Facility: HOSPITAL | Age: 66
End: 2021-10-15
Attending: NURSE PRACTITIONER
Payer: MEDICARE

## 2021-10-15 VITALS
SYSTOLIC BLOOD PRESSURE: 123 MMHG | RESPIRATION RATE: 14 BRPM | HEART RATE: 80 BPM | DIASTOLIC BLOOD PRESSURE: 86 MMHG | TEMPERATURE: 98 F

## 2021-10-15 DIAGNOSIS — E08.43 DIABETES MELLITUS DUE TO UNDERLYING CONDITION WITH DIABETIC AUTONOMIC NEUROPATHY, WITH LONG-TERM CURRENT USE OF INSULIN (HCC): ICD-10-CM

## 2021-10-15 DIAGNOSIS — Z79.4 LONG TERM (CURRENT) USE OF INSULIN (HCC): ICD-10-CM

## 2021-10-15 DIAGNOSIS — E11.29 TYPE II DIABETES MELLITUS WITH RENAL MANIFESTATIONS, UNCONTROLLED (HCC): ICD-10-CM

## 2021-10-15 DIAGNOSIS — E11.65 TYPE II DIABETES MELLITUS WITH RENAL MANIFESTATIONS, UNCONTROLLED (HCC): ICD-10-CM

## 2021-10-15 DIAGNOSIS — L97.512 NON-PRESSURE CHRONIC ULCER OF OTHER PART OF RIGHT FOOT WITH FAT LAYER EXPOSED (HCC): Primary | ICD-10-CM

## 2021-10-15 DIAGNOSIS — Z79.4 DIABETES MELLITUS DUE TO UNDERLYING CONDITION WITH DIABETIC AUTONOMIC NEUROPATHY, WITH LONG-TERM CURRENT USE OF INSULIN (HCC): ICD-10-CM

## 2021-10-15 PROCEDURE — 99214 OFFICE O/P EST MOD 30 MIN: CPT | Performed by: NURSE PRACTITIONER

## 2021-10-15 NOTE — PROGRESS NOTES
.Weekly Wound Education Note    Teaching Provided To: Patient  Training Topics: Dressing; Discharge instructions; Off-loading  Training Method: Explain/Verbal;Written  Training Response: Patient responds and understands         Patient to call [facility]

## 2021-10-15 NOTE — PATIENT INSTRUCTIONS
Please return in:  1 week with Karine-if all looks good then transition with skin protectant to shoe/orthotic  Thursday October 28 with Brianna CABALLERO~  1) make an appointment with Sumi and Erick/Jefe for early in week October 25.  2) look for skin prote

## 2021-10-15 NOTE — PROGRESS NOTES
Patient ID: Ladan Ramirez is a 78year old male.     Cast application   Date/Time: 10/15/2021 8:44 AM   Performed by: FELICITA Ramírez   Authorized by: FELICITA Ramírez   Consent given by: patient  Timeout: Immediately prior to procedure a time ou

## 2021-10-15 NOTE — PROGRESS NOTES
CHIEF COMPLAINT:   Patient presents with:  Wound Care: Patients is here for a follow up.  Patients complain about the cast to high on the back       HPI:   8-14-20 INItial (reeval):  Patient is a 77 yo male well [facility] to wound [facility] No s/s of infection, no c/o pain. Will utilize lesa collagen and continue with the low profile kerramax and TCC.     9-17-21 patient returns today, there is some moisture in the area, the wound is smaller than post debridement last week, no u triglycerides are very elevated, patient is to send in glucose reading weekly to dr. Irwin Blakely.   the small brown dot is not there. The foot is looking very good.   We discussed that we will do a cast for another week and then next week transition him to his orth Solution Pen-injector, Inject 1 mg into the skin once a week., Disp: 9 mL, Rfl: 0  •  Insulin Syringe-Needle U-100 (BD INSULIN SYRINGE ULTRAFINE) 30G X 1/2\" 0.3 ML Does not apply Misc, use as directed.  5 per day, Disp: 500 each, Rfl: 0  •  CONTOUR NEXT TE Vital signs reviewed. Appears stated age, well groomed. Constitutional:   bp wnl for patient. Pulse Regular and wnl for patient. Respirations easy and unlabored. Temperature wnl. Obese. Appearance neat and clean. Appears in no acute distress.  Well no application Routine Completed Nobie Im, APRN   49/49/08 9754 Cast application Routine Completed Nobie Im, APRN   36/54/61 8015 Cast application Routine Completed Nobie Im, APRN   09/10/21 0820 Debridement Routine Completed Nobie Im, manifestations, uncontrolled ([facility] [address]    3. Diabetes mellitus due to underlying condition with diabetic autonomic neuropathy, with long-term current use of insulin ([address]    4.  Long term (current) use of insulin (HCC)      Risks, benefits, and alternatives of amount of wound drainage Should you experience any significant changes in your wound(s) or have any questions regarding your home care instructions please contact the wound center [facility] @ [phone] If after regular business hours, please call

## 2021-10-21 ENCOUNTER — TELEPHONE (OUTPATIENT)
Dept: WOUND CARE | Facility: HOSPITAL | Age: 66
End: 2021-10-21

## 2021-10-21 NOTE — TELEPHONE ENCOUNTER
Returning patient's call - asking what \"liquid skin application did she recommended? \"   Looked in provider's order - [employer] and [employer] Skin protectant. He verbalizes understanding ans will be purchasing soon.

## 2021-10-22 ENCOUNTER — OFFICE VISIT (OUTPATIENT)
Dept: WOUND CARE | Facility: HOSPITAL | Age: 66
End: 2021-10-22
Attending: NURSE PRACTITIONER
Payer: MEDICARE

## 2021-10-22 VITALS
TEMPERATURE: 97 F | SYSTOLIC BLOOD PRESSURE: 122 MMHG | HEART RATE: 90 BPM | RESPIRATION RATE: 16 BRPM | DIASTOLIC BLOOD PRESSURE: 70 MMHG

## 2021-10-22 DIAGNOSIS — L97.512 DIABETIC ULCER OF RIGHT FOOT ASSOCIATED WITH TYPE 2 DIABETES MELLITUS, WITH FAT LAYER EXPOSED, UNSPECIFIED PART OF FOOT (HCC): ICD-10-CM

## 2021-10-22 DIAGNOSIS — E11.621 DIABETIC ULCER OF RIGHT FOOT ASSOCIATED WITH TYPE 2 DIABETES MELLITUS, WITH FAT LAYER EXPOSED, UNSPECIFIED PART OF FOOT (HCC): ICD-10-CM

## 2021-10-22 PROCEDURE — 99213 OFFICE O/P EST LOW 20 MIN: CPT

## 2021-10-22 NOTE — PROGRESS NOTES
Carroll Lopez is an 78year old male. Patient presents with:  Wound Care: Patient is here for a RN visit.        /70   Pulse 90   Temp 97 °F (36.1 °C)   Resp 16     Wound 10/19/20 #16 Right plantar foot Diabetic Ulcer Foot Anterior;Right (Active) Svetlana Hernandez, APRN   08/03/21 0827 Cellular tissue product application Routine Completed Svetlana Hernandez, APRN   07/16/21 0813 Debridement Routine Completed Svetlana Hernandez, APRN   07/07/21 9033 Cellular tissue product application Routine Completed Virgil

## 2021-10-28 ENCOUNTER — OFFICE VISIT (OUTPATIENT)
Dept: WOUND CARE | Facility: HOSPITAL | Age: 66
End: 2021-10-28
Attending: NURSE PRACTITIONER
Payer: MEDICARE

## 2021-10-28 VITALS
RESPIRATION RATE: 14 BRPM | TEMPERATURE: 98 F | HEART RATE: 98 BPM | SYSTOLIC BLOOD PRESSURE: 135 MMHG | DIASTOLIC BLOOD PRESSURE: 67 MMHG

## 2021-10-28 DIAGNOSIS — L97.512 NON-PRESSURE CHRONIC ULCER OF OTHER PART OF RIGHT FOOT WITH FAT LAYER EXPOSED (HCC): ICD-10-CM

## 2021-10-28 DIAGNOSIS — E11.621 DIABETIC ULCER OF RIGHT FOOT ASSOCIATED WITH TYPE 2 DIABETES MELLITUS, WITH FAT LAYER EXPOSED, UNSPECIFIED PART OF FOOT (HCC): Primary | ICD-10-CM

## 2021-10-28 DIAGNOSIS — L97.512 DIABETIC ULCER OF RIGHT FOOT ASSOCIATED WITH TYPE 2 DIABETES MELLITUS, WITH FAT LAYER EXPOSED, UNSPECIFIED PART OF FOOT (HCC): Primary | ICD-10-CM

## 2021-10-28 DIAGNOSIS — Z79.4 DIABETES MELLITUS DUE TO UNDERLYING CONDITION WITH DIABETIC AUTONOMIC NEUROPATHY, WITH LONG-TERM CURRENT USE OF INSULIN (HCC): ICD-10-CM

## 2021-10-28 DIAGNOSIS — E08.43 DIABETES MELLITUS DUE TO UNDERLYING CONDITION WITH DIABETIC AUTONOMIC NEUROPATHY, WITH LONG-TERM CURRENT USE OF INSULIN (HCC): ICD-10-CM

## 2021-10-28 PROCEDURE — 99213 OFFICE O/P EST LOW 20 MIN: CPT | Performed by: NURSE PRACTITIONER

## 2021-10-28 NOTE — PROGRESS NOTES
Weekly Wound Education Note    Teaching Provided To: Patient  Training Topics: Cleasing and general instructions; Discharge instructions;Dressing;Off-loading  Training Method: Explain/Verbal  Training Response: Patient responds and understands        Notes:

## 2021-10-28 NOTE — PROGRESS NOTES
CHIEF COMPLAINT:   Patient presents with:  Wound Care: Patients is here for a follow up. Patients denies any issues       HPI:   8-14-20 INItial (reeval):  Patient is a 75 yo male well known to wound clinic and myself from previous admissions.   He was last is there at the wound site in the original assessment. I removed this top layer of skin/callus with a 15 blade and we will reassess next week to assure there is not any subepidermal drainage/bleeding.   Will just utilize skin protectant without a dressing Medications:   •  Empagliflozin 25 MG Oral Tab, Take 25 mg by mouth daily. , Disp: 90 tablet, Rfl: 1  •  Insulin Syringe-Needle U-100 (BD INSULIN SYRINGE U/F) 30G X 1/2\" 0.3 ML Does not apply Misc, 1 each by Does not apply route 5 (five) times daily. , Disp SENSOR) Does not apply Misc, 1 each by Does not apply route Every 10 days. , Disp: 9 each, Rfl: 3  •  Continuous Blood Gluc Transmit (DEXCOM G6 TRANSMITTER) Does not apply Misc, Please provide 1 transmitter every 3 months, Disp: 1 each, Rfl: 3  •  Continuou Skin)  see wound documentation. Psychiatric:  Judgment and insight intact. Alert and oriented times 3. No evidence of depression, anxiety, or agitation. Calm, cooperative, and communicative. Appropriate interactions and affect.       WOUND ASSESSMENT: application Routine Completed Francis Kendrick, FELICITA   08/03/21 1035 Debridement Routine Completed FELICITA Schneider   08/03/21 0810 Cellular tissue product application Routine Completed FELICITA Schneider   07/16/21 0813 Debridement Routine Completed J counseling and educating the patient, documenting in the record, discussing plans going forward,    PATIENT DRESSING:      Patient Instructions   Please return in:  2 weeks      DON~  1)  Moisturize everywhere  2) utilize the skin barrier wipe on the \"top

## 2021-10-28 NOTE — PATIENT INSTRUCTIONS
Please return in:  2 weeks      DON~  1)  Moisturize everywhere  2) utilize the skin barrier wipe on the \"top\" of the foot before you put on your shoe  3) Monitor, monitor, monitor      CONCERNS:  Signs of infection may include the following: • Increase

## 2021-11-03 ENCOUNTER — APPOINTMENT (OUTPATIENT)
Dept: ULTRASOUND IMAGING | Facility: HOSPITAL | Age: 66
DRG: 215 | End: 2021-11-03
Attending: HOSPITALIST
Payer: MEDICARE

## 2021-11-03 ENCOUNTER — APPOINTMENT (OUTPATIENT)
Dept: GENERAL RADIOLOGY | Facility: HOSPITAL | Age: 66
DRG: 215 | End: 2021-11-03
Attending: EMERGENCY MEDICINE
Payer: MEDICARE

## 2021-11-03 ENCOUNTER — HOSPITAL ENCOUNTER (INPATIENT)
Facility: HOSPITAL | Age: 66
LOS: 55 days | Discharge: NURSING, REHAB OR LONG-TERM CARE | DRG: 215 | End: 2021-12-28
Attending: EMERGENCY MEDICINE | Admitting: INTERNAL MEDICINE
Payer: MEDICARE

## 2021-11-03 ENCOUNTER — APPOINTMENT (OUTPATIENT)
Dept: NUCLEAR MEDICINE | Facility: HOSPITAL | Age: 66
DRG: 215 | End: 2021-11-03
Attending: HOSPITALIST
Payer: MEDICARE

## 2021-11-03 DIAGNOSIS — D64.9 ANEMIA: ICD-10-CM

## 2021-11-03 DIAGNOSIS — E16.2 HYPOGLYCEMIA: Primary | ICD-10-CM

## 2021-11-03 DIAGNOSIS — N18.30 STAGE 3 CHRONIC KIDNEY DISEASE, UNSPECIFIED WHETHER STAGE 3A OR 3B CKD (HCC): ICD-10-CM

## 2021-11-03 DIAGNOSIS — I50.9 ACUTE CONGESTIVE HEART FAILURE, UNSPECIFIED HEART FAILURE TYPE (HCC): ICD-10-CM

## 2021-11-03 DIAGNOSIS — R09.02 HYPOXIA: ICD-10-CM

## 2021-11-03 DIAGNOSIS — J18.9 PNEUMONIA OF BOTH LUNGS DUE TO INFECTIOUS ORGANISM, UNSPECIFIED PART OF LUNG: ICD-10-CM

## 2021-11-03 PROCEDURE — 71045 X-RAY EXAM CHEST 1 VIEW: CPT | Performed by: EMERGENCY MEDICINE

## 2021-11-03 PROCEDURE — 93970 EXTREMITY STUDY: CPT | Performed by: HOSPITALIST

## 2021-11-03 RX ORDER — METOPROLOL TARTRATE 100 MG/1
100 TABLET ORAL 2 TIMES DAILY
COMMUNITY
End: 2021-12-28

## 2021-11-03 RX ORDER — MULTIVITAMIN WITH FOLIC ACID 400 MCG
1 TABLET ORAL DAILY
COMMUNITY

## 2021-11-03 RX ORDER — ASPIRIN 81 MG/1
81 TABLET ORAL DAILY
Status: DISCONTINUED | OUTPATIENT
Start: 2021-11-03 | End: 2021-11-03

## 2021-11-03 RX ORDER — METOPROLOL TARTRATE 50 MG/1
100 TABLET, FILM COATED ORAL
Status: DISCONTINUED | OUTPATIENT
Start: 2021-11-03 | End: 2021-11-16

## 2021-11-03 RX ORDER — FENOFIBRATE 145 MG/1
145 TABLET, COATED ORAL DAILY
COMMUNITY

## 2021-11-03 RX ORDER — POTASSIUM CHLORIDE 20 MEQ/1
40 TABLET, EXTENDED RELEASE ORAL EVERY 4 HOURS
Status: COMPLETED | OUTPATIENT
Start: 2021-11-03 | End: 2021-11-03

## 2021-11-03 RX ORDER — FUROSEMIDE 10 MG/ML
40 INJECTION INTRAMUSCULAR; INTRAVENOUS
Status: DISCONTINUED | OUTPATIENT
Start: 2021-11-03 | End: 2021-11-04

## 2021-11-03 RX ORDER — LISINOPRIL 40 MG/1
40 TABLET ORAL DAILY
COMMUNITY
End: 2021-12-28

## 2021-11-03 RX ORDER — DEXTROSE MONOHYDRATE 25 G/50ML
50 INJECTION, SOLUTION INTRAVENOUS
Status: DISCONTINUED | OUTPATIENT
Start: 2021-11-03 | End: 2021-11-23 | Stop reason: SDUPTHER

## 2021-11-03 RX ORDER — ASPIRIN 81 MG/1
81 TABLET ORAL DAILY
COMMUNITY
End: 2021-12-28

## 2021-11-03 RX ORDER — FUROSEMIDE 40 MG/1
40 TABLET ORAL EVERY MORNING
COMMUNITY
End: 2021-12-28

## 2021-11-03 RX ORDER — FUROSEMIDE 10 MG/ML
40 INJECTION INTRAMUSCULAR; INTRAVENOUS ONCE
Status: COMPLETED | OUTPATIENT
Start: 2021-11-03 | End: 2021-11-03

## 2021-11-03 RX ORDER — LISINOPRIL 40 MG/1
40 TABLET ORAL DAILY
Status: DISCONTINUED | OUTPATIENT
Start: 2021-11-04 | End: 2021-11-03

## 2021-11-03 RX ORDER — FENOFIBRATE 134 MG/1
134 CAPSULE ORAL
Status: DISCONTINUED | OUTPATIENT
Start: 2021-11-04 | End: 2021-11-22

## 2021-11-03 RX ORDER — ASPIRIN 81 MG/1
81 TABLET ORAL DAILY
Status: DISCONTINUED | OUTPATIENT
Start: 2021-11-03 | End: 2021-11-04

## 2021-11-03 RX ORDER — ATORVASTATIN CALCIUM 40 MG/1
40 TABLET, FILM COATED ORAL NIGHTLY
Status: DISCONTINUED | OUTPATIENT
Start: 2021-11-03 | End: 2021-11-16

## 2021-11-03 RX ORDER — DEXTROSE MONOHYDRATE 25 G/50ML
25 INJECTION, SOLUTION INTRAVENOUS ONCE
Status: COMPLETED | OUTPATIENT
Start: 2021-11-03 | End: 2021-11-03

## 2021-11-03 NOTE — ED QUICK NOTES
RN to A9 @1305, pt sitting on cart, pale, diaphoretic,  states he feels like he is going to pass out. Pt states he feels like his sugar is low. Pt on O2 due to hypoxia on arrival. Accu check done, blood sugar 51, pt drank 2 Dex4.  Pt on tele, pulse ox,

## 2021-11-03 NOTE — PROGRESS NOTES
[facility] Pharmacy Note: Antimicrobial Weight Based Dose Adjustment for: piperacillin/tazobactam (Rahman Bar)    Kimmy Lozano is a 78year old patient who has been prescribed piperacillin/tazobactam (ZOSYN) 3.375 gm x1.     Estimated Creatinine Clearance: 53.5 mL/m

## 2021-11-03 NOTE — PLAN OF CARE
Received pt from ED around 1730. A&O x4. On 3 L/min o2. ST on tele with frequent PVCs. Lungs diminished upon auscultation. Non-productive cough. 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 Ambulates steadily. Continent of bowel and bladder.

## 2021-11-03 NOTE — ED PROVIDER NOTES
Patient Seen in: [facility] Emergency Department      History   Patient presents with:  Difficulty Breathing  Cough/URI    Stated Complaint: trouble breathing, coughing     Subjective:   HPI    Patient is a 77-year-old gentleman with diabetes chroni Chyrl Ambrosia;  Location: [address] [place] [address]   • INCISION EXTEN FOOT/TOE TENDON  11/9/09    Performed by Erin Camilo at [address] [place] [address]   • LENGTH/SHORT LEG/ANKL TENDON,SINGLE Left 12/22/2014    Procedure: REPAIR  PERONEUS LONGUS TENDON; General: There is no distension. Palpations: Abdomen is soft. Tenderness: There is no abdominal tenderness. Musculoskeletal:         General: No tenderness. Normal range of motion. Cervical back: Normal range of motion and neck supple. 68 (*)     All other components within normal limits   POCT GLUCOSE - Abnormal; Notable for the following components:    POC Glucose 147 (*)     All other components within normal limits   CBC W/ DIFFERENTIAL - Abnormal; Notable for the following component history.  -Nursing and tech notes reviewed and data confirmed.    -Tracing on cardiac monitor and pulse oximetry was reviewed by myself. -The cardiac monitor revealed wide QRS tachycardia with  multiple PVCs as interpreted by me.  The cardiac monitor was HGB 12.6 (*)     .0 (*)     MCH 25.4 (*)     Neutrophil Absolute Prelim 9.35 (*)     Neutrophil Absolute 9.35 (*)     Monocyte Absolute 1.15 (*)     All other components within normal limits   TROPONIN I - Normal   MAGNESIUM - Normal   PROCALCITO work-up was reviewed. Patient potassium 3.4 creatinine 1.6. Glucose 49. He was given oral and IV dextrose. Patient white count was 13,000 with chest x-ray showing bilateral opacities diffuse pulmonary vascular distribution with multiple nodular opacities.

## 2021-11-03 NOTE — H&P
[facility] Hospitalist History and Physical      CC: SOB    PCP: Deniz Dixon DO    History of Present Illness: Patient is a 78year old male with PMH sig for CKD3, DM2, HTN here with SOB. He reports he has been feeling SOB with activity for several weeks.  No S daily., Disp: 90 tablet, Rfl: 0  OZEMPIC, 1 MG/DOSE, 4 MG/3ML Subcutaneous Solution Pen-injector, Inject 1 mg into the skin once a week., Disp: 9 mL, Rfl: 0  Empagliflozin 25 MG Oral Tab, Take 25 mg by mouth daily. , Disp: 90 tablet, Rfl: 1  Continuous Bloo 11/03/2021    CA 9.4 11/03/2021    ALB 2.8 11/03/2021    ALKPHO 54 11/03/2021    BILT 0.8 11/03/2021    TP 7.8 11/03/2021    AST 53 11/03/2021    ALT 62 11/03/2021    INR 1.46 11/03/2021    PTP 18.1 11/03/2021    MG 2.1 11/03/2021    TROP <0.045 11/03/2021

## 2021-11-03 NOTE — CONSULTS
[facility] Cardiology Consult  Luz Dale Parents Patient Status:  Emergency    1955 MRN [id number]   Location [address] Attending Jens Pedersen MD   [facility] Day # 0 PCP Mayra Lockett, Supriya Arredondo Dr Non-healing wound of amputation stump (HCC)     Type II or unspecified type diabetes mellitus with renal manifestations, uncontrolled(250.42)     Obesity (BMI 30-39. 9)     Encounter for long-term (current) use of insulin (HCC)     Gastrocnemius recession organosplenomegally, mass or rebound, BS-present. Extremities: Without clubbing, cyanosis or edema. Peripheral pulses are 2+. Neurologic: Alert and oriented, normal affect. No motor or coordinational deficit. Skin: Warm and dry.      Cardiology Studies:

## 2021-11-03 NOTE — PROGRESS NOTES
[facility] Pharmacy Note: Antimicrobial Weight Based Dose Adjustment for: piperacillin/tazobactam (Jeanna Nunez)    Freddie Duran is a 78year old patient who has been prescribed piperacillin/tazobactam (ZOSYN) 3.375 g IVPB every 8 hours.       Estimated Creatinine Gilberto

## 2021-11-03 NOTE — CONSULTS
[facility]    Report of Consultation    Cl Mcqueen Patient Status:  Emergency    1955 MRN [id number]   Location [address] [facility] Attending Marty Vivar MD   Hosp Day # 0 PCP Grey Stinson DO       REASON FOR CONSULT: [address] [place] [address]   • LENGTH/SHORT LEG/ANKL TENDON,SINGLE Left 12/22/2014    Procedure: REPAIR  PERONEUS LONGUS TENDON;  Surgeon: Brayan Conn DPM;  Location: [address] [place] [address]   • OTHER SURGICAL HISTORY  2009    amputation R forefoot   • Does not apply Misc, Change sensor every 10 days          PHYSICAL EXAM:     Vital Signs: /67   Pulse 66   Temp 97 °F (36.1 °C) (Temporal)   Resp (!) 29   Ht 6' 3\" (1.905 m)   Wt 289 lb (131.1 kg)   SpO2 97%   BMI 36.12 kg/m²   Temp (24hrs), Av BAABSO 0.13 11/03/2021     Lab Results   Component Value Date    MALBP 45.8 (H) 10/12/2021    CREUR 135.74 10/12/2021     Lab Results   Component Value Date    COLORUR Yellow 11/03/2021    CLARITY Clear 11/03/2021    SPECGRAVITY 1.006 11/03/2021    GLUUR N

## 2021-11-03 NOTE — ED QUICK NOTES
Patient awake and alert in no distress resting comfortably in the stretcher. Glucose recheck is 85. Patient provided with orange juice and was updated on the plan of care.

## 2021-11-04 ENCOUNTER — APPOINTMENT (OUTPATIENT)
Dept: ULTRASOUND IMAGING | Facility: HOSPITAL | Age: 66
DRG: 215 | End: 2021-11-04
Attending: INTERNAL MEDICINE
Payer: MEDICARE

## 2021-11-04 ENCOUNTER — APPOINTMENT (OUTPATIENT)
Dept: CV DIAGNOSTICS | Facility: HOSPITAL | Age: 66
DRG: 215 | End: 2021-11-04
Attending: INTERNAL MEDICINE
Payer: MEDICARE

## 2021-11-04 PROCEDURE — 93306 TTE W/DOPPLER COMPLETE: CPT | Performed by: INTERNAL MEDICINE

## 2021-11-04 PROCEDURE — 76700 US EXAM ABDOM COMPLETE: CPT | Performed by: INTERNAL MEDICINE

## 2021-11-04 RX ORDER — MELATONIN
1000 DAILY
Status: DISCONTINUED | OUTPATIENT
Start: 2021-11-04 | End: 2021-12-28

## 2021-11-04 RX ORDER — FUROSEMIDE 10 MG/ML
40 INJECTION INTRAMUSCULAR; INTRAVENOUS DAILY
Status: DISCONTINUED | OUTPATIENT
Start: 2021-11-05 | End: 2021-11-05

## 2021-11-04 RX ORDER — AMIODARONE HYDROCHLORIDE 200 MG/1
400 TABLET ORAL
Status: DISCONTINUED | OUTPATIENT
Start: 2021-11-04 | End: 2021-11-13

## 2021-11-04 NOTE — PROGRESS NOTES
11/03/21 1729 11/03/21 1730 11/03/21 1731   Vital Signs   /64 110/61 90/60   MAP (mmHg) 72 71 66   BP Location Right arm Right arm Right arm   BP Method Automatic Automatic Automatic   Patient Position Lying Sitting Standing

## 2021-11-04 NOTE — PROGRESS NOTES
11/04/21 0203 11/04/21 0204 11/04/21 0205   Oxygen Therapy   SpO2 (!) 87 % (!) 85 % (!) 85 %   O2 Device None (Room air) None (Room air) None (Room air)   O2 Flow Rate (L/min)  --   --   --    Pulse Oximetry Type Continuous Continuous Continuous      11

## 2021-11-04 NOTE — PROGRESS NOTES
[facility] Hospitalist Progress Note                                                                   [address]  6/6/1955    CC: FU SOB    Interval History:  - Remains very tachycardic  - DW 46*   GFRNAA 44* 38* 40*   CA 9.4 9.2 9.1    137 138   K 3.4* 4.1 4.2    100 102   CO2 30.0 30.0 32.0           ROS: no change to ROS from documentation yesterday, except as otherwise noted in the Interval History above.     Assessment/Plan:

## 2021-11-04 NOTE — PROGRESS NOTES
Patient was away from room for a test.  Discussed with nursing. Will see patient later this afternoon.     Mitesh Stanford MD  [facility] Nephrology

## 2021-11-04 NOTE — PROGRESS NOTES
[facility] Cardiology Progress Note - Lee Bailey Patient Status:  Inpatient    1955 MRN [id number]   [facility] 2NE-A Attending Arnav Packer MD   [facility] Day # 1 PCP Dillon Kraus DO     Subjective:  Christiano Schultz of insulin (HCC)     Gastrocnemius recession and Peroneus longus tendon lengthening, L lower extremity.  Sx 12/22/14 GLENN 3/22/15     MSSA (methicillin susceptible Staphylococcus aureus) infection     Non-pressure chronic ulcer of other part of right foot wi wheezes, rales, rhonchi or dullness. Normal excursions and effort. Abdomen: Soft, non-tender. No organosplenomegally, mass or rebound, BS-present. Extremities: Without clubbing, cyanosis or edema. Peripheral pulses are 2+.   Neurologic: Alert and orient UNIT/ML flextouch 40 Units, 40 Units, Subcutaneous, Nightly        ROS:  General Health: otherwise feels well, weight stable  Constitutiona: no recent fevers  Skin: denies any unusual skin lesions or rashes  Eyes: no visual complaints or deficits  HEENT: d

## 2021-11-04 NOTE — PROGRESS NOTES
[facility]    Nephrology Progress Note    [facility] Press Attending:  Estefania Brunson MD       SUBJECTIVE:     No urinary complaints. Still tachycardic - started on dilt gtt per cardiology. Has low BPs at times but denies LH with standing.   Br MCH 25.4 (L) 11/03/2021    MCHC 31.0 11/03/2021    RDW 16.6 11/03/2021    NEPRELIM 9.35 (H) 11/03/2021    NEPERCENT 71.9 11/03/2021    LYPERCENT 16.1 11/03/2021    MOPERCENT 8.9 11/03/2021    EOPERCENT 1.3 11/03/2021    BAPERCENT 1.0 11/03/2021    NE 9.35 flexpen 1-5 Units, 1-5 Units, Subcutaneous, TID CC and HS  insulin detemir (LEVEMIR) 100 UNIT/ML flextouch 40 Units, 40 Units, Subcutaneous, Nightly        ASSESSMENT/PLAN:     77 yo M with history of CKD stage 3 with albuminuria, long standing diabetes co

## 2021-11-04 NOTE — PROGRESS NOTES
Ddimer mildly elevated. VQ scan ordered. Given very mild elevation will hold on empiric AC pending result.

## 2021-11-05 ENCOUNTER — APPOINTMENT (OUTPATIENT)
Dept: WOUND CARE | Facility: HOSPITAL | Age: 66
End: 2021-11-05
Attending: NURSE PRACTITIONER
Payer: MEDICARE

## 2021-11-05 ENCOUNTER — APPOINTMENT (OUTPATIENT)
Dept: GENERAL RADIOLOGY | Facility: HOSPITAL | Age: 66
DRG: 215 | End: 2021-11-05
Attending: INTERNAL MEDICINE
Payer: MEDICARE

## 2021-11-05 PROCEDURE — 71045 X-RAY EXAM CHEST 1 VIEW: CPT | Performed by: INTERNAL MEDICINE

## 2021-11-05 RX ORDER — FUROSEMIDE 40 MG/1
40 TABLET ORAL DAILY
Status: DISCONTINUED | OUTPATIENT
Start: 2021-11-06 | End: 2021-11-06

## 2021-11-05 NOTE — PROGRESS NOTES
[facility]    Nephrology Progress Note    Mary Lou Gum Attending:  Tori Hernandez MD       SUBJECTIVE:     Swelling and breathing are improving.     PHYSICAL EXAM:     Vital Signs: /67   Pulse 106   Temp 97.7 °F (36.5 °C) (Oral)   Resp 40.6 11/03/2021    .0 (H) 11/03/2021    MCV 81.7 11/03/2021    MCH 25.4 (L) 11/03/2021    MCHC 31.0 11/03/2021    RDW 16.6 11/03/2021    NEPRELIM 9.35 (H) 11/03/2021    NEPERCENT 71.9 11/03/2021    LYPERCENT 16.1 11/03/2021    MOPERCENT 8.9 11/03/20 Or  dextrose 50 % injection 50 mL, 50 mL, Intravenous, Q15 Min PRN   Or  glucose (DEX4) oral liquid 30 g, 30 g, Oral, Q15 Min PRN   Or  glucose-vitamin C (DEX-4) chewable tab 8 tablet, 8 tablet, Oral, Q15 Min PRN        ASSESSMENT/PLAN:     77 yo M with hi

## 2021-11-05 NOTE — PLAN OF CARE
AOx4. Calm, cooperative. Up with SB + walker. NSR on cardiac monitor. 3L of O2. Lung sounds diminished with crackles at the bases. Denies sob, chest discomfort, n/v. Denies pain. Voiding without difficulty. Bed alarm. Bed at the lowest position.  Hourly an oxygenation  Description: INTERVENTIONS:  - Assess for changes in respiratory status  - Assess for changes in mentation and behavior  - Position to facilitate oxygenation and minimize respiratory effort  - Oxygen supplementation based on oxygen saturation

## 2021-11-05 NOTE — PROGRESS NOTES
Shift note: elevated HR in AM in 130s, EKG confirmed aflutter and patient was started on cardizem gtt, titrated up to 10mg/hr at one point but titrated back to 5mg/hr for improved heart rate and low BP in afternoon.   Echo showed ef 25-30%, cardiomyopathy,

## 2021-11-05 NOTE — PROGRESS NOTES
[facility] Hospitalist Progress Note                                                                   [address]  6/6/1955    CC: CHAVEZ    Interval History:  - Feels about the same  - Remains o 9.1 9.3  9.3    138 138  138   K 4.1 4.2 3.9  3.9    102 102  102   CO2 30.0 32.0 32.0  32.0           ROS: no change to ROS from documentation yesterday, except as otherwise noted in the Interval History above.     Assessment/Plan:    # CHAVEZ/hyp

## 2021-11-05 NOTE — PROGRESS NOTES
[facility] Cardiology Progress Note - Corbin Evelioisiah Patient Status:  Inpatient    1955 MRN [id number]   [facility] 2NE-A Attending Estefania Brunson MD   [facility] Day # 2 PCP Louis Rivas DO     Subjective:  Sarita Otero renal manifestations, uncontrolled(250.42)     Obesity (BMI 30-39. 9)     Encounter for long-term (current) use of insulin (HCC)     Gastrocnemius recession and Peroneus longus tendon lengthening, L lower extremity.  Sx 12/22/14 GLENN 3/22/15     MSSA (methici S1, S2 normal. No murmur, pericardial rub, S3, thrill, heave or extra cardiac sounds. Lungs: Clear without wheezes, rales, rhonchi or dullness. Normal excursions and effort. Abdomen: Soft, non-tender. No organosplenomegally, mass or rebound, BS-present. breakfast  glucose (DEX4) oral liquid 15 g, 15 g, Oral, Q15 Min PRN   Or  glucose-vitamin C (DEX-4) chewable tab 4 tablet, 4 tablet, Oral, Q15 Min PRN   Or  dextrose 50 % injection 50 mL, 50 mL, Intravenous, Q15 Min PRN   Or  glucose (DEX4) oral liquid 30

## 2021-11-05 NOTE — CONSULTS
Pulmonary H&P/Consult       NAME: Zoya Henao Cir: 7553/0841-A - MRN: [id number] - Age: 78year old - :  1955    Date of Admission: 11/3/2021  1:06 PM  Admission Diagnosis: Hypoglycemia [E16.2]  Hypoxia [R09.02]  Pneumonia of both lungs due to [address] [place] [address]   • LENGTH/SHORT LEG/ANKL TENDON,SINGLE Left 12/22/2014    Procedure: REPAIR  PERONEUS LONGUS TENDON;  Surgeon: Yamileth Peters DPM;  Location: [address] [place] [address]   • OTHER SURGICAL HISTORY  2009    amputation R forefoot   • History      Marital status:       Spouse name: Don Chavira      Number of children: 3      Years of education: Not on file      Highest education level: Not on file    Occupational History      Occupation:     Tobacco Use      Sameer Other (Other) Father    • Diabetes Neg    • Thyroid Disorder Neg         Home Medications:  aspirin EC 81 MG Oral Tab EC, Take 81 mg by mouth daily. , Disp: , Rfl:   fenofibrate 145 MG Oral Tab, Take 145 mg by mouth daily. , Disp: , Rfl:   furosemide 40 MG O 97.7 °F (36.5 °C)   97.7 °F (36.5 °C)   TempSrc: Oral   Oral   SpO2: 90%  95% 91%   Weight:       Height:           Oxygen Therapy  SpO2: 91 %  O2 Device: Nasal cannula  O2 Flow Rate (L/min): 2 L/min  Pulse Oximetry Type: Continuous  Oximetry Probe Site Ch film    ASSESSMENT/PLAN:    1. Acute Hypoxia  -most likely due to pulm edema  -will check viral resp panel as viral PNA can also cause this pattern on x-ray  -wean O2 as tolerated, if unable to wean O2 will discuss CT of the chest  2.  Pulm Edema/CHF  -diur

## 2021-11-06 RX ORDER — FUROSEMIDE 10 MG/ML
40 INJECTION INTRAMUSCULAR; INTRAVENOUS DAILY
Status: DISCONTINUED | OUTPATIENT
Start: 2021-11-06 | End: 2021-11-07

## 2021-11-06 NOTE — PROGRESS NOTES
[facility]    Nephrology Progress Note    Chas Christian Attending:  Manuel Elise MD       SUBJECTIVE:   Low blood pressure but asymptomatic. No urinarycomplaints. Leg swelling is improving. Breathing ok.     PHYSICAL EXAM:     Vital Signs MCHC 29.1 (L) 11/05/2021    RDW 16.7 11/05/2021    NEPRELIM 5.72 11/05/2021    NEPERCENT 73.5 11/05/2021    LYPERCENT 14.5 11/05/2021    MOPERCENT 8.3 11/05/2021    EOPERCENT 2.2 11/05/2021    BAPERCENT 1.0 11/05/2021    NE 5.72 11/05/2021    LYMABS 1.13 1 g, 30 g, Oral, Q15 Min PRN   Or  glucose-vitamin C (DEX-4) chewable tab 8 tablet, 8 tablet, Oral, Q15 Min PRN        ASSESSMENT/PLAN:       77 yo M with history of CKD stage 3 with albuminuria, long standing diabetes complicated by R foot amputation, diast

## 2021-11-06 NOTE — PROGRESS NOTES
Pulmonary Progress Note      NAME: Carroll [place] - ROOM: 6269/9457-[id number] - MRN: [id number] - Age: 78year old - : 1955    Assessment/Plan:  1. Acute Hypoxia  - Suspected pulmonary edema  - PCT low, no leukocytosis. Afebrile.  RVP negative  -wean O2 as t 190* 97  97 98   BUN 28*   < > 30* 35*  35* 32*   CREATSERUM 1.60*   < > 1.74* 1.83*  1.83* 1.81*   GFRAA 51*   < > 46* 43*  43* 44*   GFRNAA 44*   < > 40* 37*  37* 38*   CA 9.4   < > 9.1 9.3  9.3 9.3   ALB 2.8*  --  2.5* 2.4* 2.4*      < > 138 138

## 2021-11-06 NOTE — PROGRESS NOTES
[facility] Cardiology Progress Note - Tyronne Siemens Patient Status:  Inpatient    1955 MRN [id number]   [facility] 2NE-A Attending Dez Johnson MD   [facility] Day # 3 PCP Iza Padilla DO     Subjective:  Fee infection     Non-pressure chronic ulcer of other part of right foot with fat layer exposed ([address]     Chronic osteomyelitis of right foot with draining sinus ([address]     Diabetic ulcer of right foot associated with type 2 diabetes mellitus ([address]     Diabetic edema. Peripheral pulses are 2+. Neurologic: Alert and oriented, normal affect. No motor or coordinational deficit. Skin: Warm and dry. Laboratory/Data:    Labs:         Recent Labs   Lab 11/03/21  1328 11/05/21  1220   WBC 13.0* 7.8   HGB 12.6* 11. Or  glucose-vitamin C (DEX-4) chewable tab 8 tablet, 8 tablet, Oral, Q15 Min PRN        ROS:  General Health: otherwise feels well, weight stable  Constitutiona: no recent fevers  Skin: denies any unusual skin lesions or rashes  Eyes: no visual complaints

## 2021-11-06 NOTE — PLAN OF CARE
Assumed care of pt at 0730. A/Ox4, was on 4L of oxygen nasal cannula but was sitting at mid-high 80s, so bumped it up to 5L. Bilateral crackles in the lungs, monitor shows a-fib and atrial flutter.  He is a standby assist, had an amputation of the right pia measures for life threatening arrhythmias  - Monitor electrolytes and administer replacement therapy as ordered  Outcome: Progressing     Problem: RESPIRATORY - ADULT  Goal: Achieves optimal ventilation and oxygenation  Description: INTERVENTIONS:  - Asses

## 2021-11-06 NOTE — PROGRESS NOTES
[facility] Hospitalist Progress Note                                                                   [address]  6/6/1955    CC: CHAVEZ    Interval History:  -on 4-5L nc    Current Meds:  Sched 3.9  3.9 4.0    102  102 102   CO2 32.0 32.0  32.0 32.0           ROS: no change to ROS from documentation yesterday, except as otherwise noted in the Interval History above.     Assessment/Plan:    # CHAVEZ/hypoxia  - Patient with volume overload an new

## 2021-11-06 NOTE — PLAN OF CARE
Assumed care of pt at 1930 a/o x4 and [place] in no apparent distress. Monitor shows AFib on cardizem gtt at 10mg/hr currently and HR remains in 70-80 range at rest.  HR up to low 100's when urinating or goes into bathroom. He denies pain presently.   O2 at 4 at baseline  Description: INTERVENTIONS:  - Continuous cardiac monitoring, monitor vital signs, obtain 12 lead EKG if indicated  - Evaluate effectiveness of antiarrhythmic and heart rate control medications as ordered  - Initiate emergency measures for lif ordered  - Instruct patient on fluid and nutrition restrictions as appropriate  Outcome: Progressing

## 2021-11-07 ENCOUNTER — APPOINTMENT (OUTPATIENT)
Dept: GENERAL RADIOLOGY | Facility: HOSPITAL | Age: 66
DRG: 215 | End: 2021-11-07
Attending: INTERNAL MEDICINE
Payer: MEDICARE

## 2021-11-07 ENCOUNTER — APPOINTMENT (OUTPATIENT)
Dept: CT IMAGING | Facility: HOSPITAL | Age: 66
DRG: 215 | End: 2021-11-07
Attending: HOSPITALIST
Payer: MEDICARE

## 2021-11-07 PROCEDURE — 71045 X-RAY EXAM CHEST 1 VIEW: CPT | Performed by: INTERNAL MEDICINE

## 2021-11-07 PROCEDURE — 71250 CT THORAX DX C-: CPT | Performed by: HOSPITALIST

## 2021-11-07 RX ORDER — BUMETANIDE 0.25 MG/ML
1 INJECTION, SOLUTION INTRAMUSCULAR; INTRAVENOUS
Status: DISCONTINUED | OUTPATIENT
Start: 2021-11-07 | End: 2021-11-08

## 2021-11-07 RX ORDER — ALBUMIN (HUMAN) 12.5 G/50ML
12.5 SOLUTION INTRAVENOUS EVERY 12 HOURS
Status: COMPLETED | OUTPATIENT
Start: 2021-11-07 | End: 2021-11-09

## 2021-11-07 NOTE — PLAN OF CARE
Per patient abdominal pain is present some relief w/ morphine;  Seen by dr. Smita beck added patient refusing norco  prefers morphine    Colace given tolerating clear liquids  Seen by marilynn connelly heparin drip discontinued as ordered  Xarelto sta hematoma  - Assess quality of pulses, skin color and temperature  - Assess for signs of decreased coronary artery perfusion - ex.  Angina  - Evaluate fluid balance, assess for edema, trend weights  Outcome: Progressing

## 2021-11-07 NOTE — PROGRESS NOTES
[facility]    Nephrology Progress Note    Becki Hardy Attending:  Khloe Woody MD       SUBJECTIVE:     Overall feels better and less short of breath but chest xray shows worsening. States he is urinating a lot.     PHYSICAL EXAM:     Vit 29.1 (L) 11/05/2021    RDW 16.7 11/05/2021    NEPRELIM 5.72 11/05/2021    NEPERCENT 73.5 11/05/2021    LYPERCENT 14.5 11/05/2021    MOPERCENT 8.3 11/05/2021    EOPERCENT 2.2 11/05/2021    BAPERCENT 1.0 11/05/2021    NE 5.72 11/05/2021    LYMABS 1.13 11/05/ g, Oral, Q15 Min PRN   Or  glucose-vitamin C (DEX-4) chewable tab 8 tablet, 8 tablet, Oral, Q15 Min PRN        ASSESSMENT/PLAN:   77 yo M with history of CKD stage 3 with albuminuria, long standing diabetes complicated by R foot amputation, diastolic CHF,

## 2021-11-07 NOTE — PLAN OF CARE
Will encourage patient to take norco and increase activity  Problem: PAIN - ADULT  Goal: Verbalizes/displays adequate comfort level or patient's stated pain goal  Description: INTERVENTIONS:  - Encourage pt to monitor pain and request assistance  - Assess

## 2021-11-07 NOTE — PROGRESS NOTES
[facility] Hospitalist Progress Note                                                                   [address]  6/6/1955    CC: CHAVEZ    Interval History:  -no c/o    Current Meds:  Scheduled 4.0 3.9     102 102 101   CO2 32.0  32.0 32.0 34.0*           ROS: no change to ROS from documentation yesterday, except as otherwise noted in the Interval History above.     Assessment/Plan:    Acute hypoxic resp failur on 5L nc  -cxr worsening b/l i

## 2021-11-07 NOTE — PLAN OF CARE
Assumed care of pt at 1930 a/o x4 in no apparent distress. Monitor shows AF and is on Cardizem gtt per titration orders. Rates from . IV site patent without redness/swelling. Edema still present to BLE. He denies pain.   On O2 at 3L at rounds and indicated  - Evaluate effectiveness of antiarrhythmic and heart rate control medications as ordered  - Initiate emergency measures for life threatening arrhythmias  - Monitor electrolytes and administer replacement therapy as ordered  11/6/2021 [id number] by Maurice Sparrow Assess for signs and symptoms of hyperglycemia and hypoglycemia  - Administer ordered medications to maintain glucose within target range  - Assess barriers to adequate nutritional intake and initiate nutrition consult as needed  - Instruct patient on self

## 2021-11-07 NOTE — PROGRESS NOTES
Pulmonary Progress Note     Assessment / Plan:  1. Acute Hypoxia  - possible pulmonary edema  - CT Chest and V/Q scans are pending  - PCT low, no leukocytosis. Afebrile. RVP negative  -wean O2 as tolerated   2.  Pulm Edema/CHF  -volume management per cards

## 2021-11-07 NOTE — PROGRESS NOTES
[facility] Pharmacy Note: Antimicrobial Weight Based Dose Adjustment for: piperacillin/tazobactam (Arley Reardon)    Jolene Lundberg is a 78year old patient who has been prescribed piperacillin/tazobactam (ZOSYN) 3.375 gm every 8 hours.     Estimated Creatinine Clearance

## 2021-11-07 NOTE — PROGRESS NOTES
[facility] Cardiology Progress Note - Yue Santana Patient Status:  Inpatient    1955 MRN [id number]   [facility] 2NE-A Attending Kelsie Livingston MD   [facility] Day # 4 PCP Gwendolyn Chaudhari DO     Subjective:  Pt uncontrolled(250.42)     Obesity (BMI 30-39. 9)     Encounter for long-term (current) use of insulin (HCC)     Gastrocnemius recession and Peroneus longus tendon lengthening, L lower extremity.  Sx 12/22/14 GLENN 3/22/15     MSSA (methicillin susceptible Staph pericardial rub, S3, thrill, heave or extra cardiac sounds. Lungs: Clear without wheezes, rales, rhonchi or dullness. Normal excursions and effort. Abdomen: Soft, non-tender. No organosplenomegally, mass or rebound, BS-present.   Extremities: Without clu Daily  insulin detemir (LEVEMIR) 100 UNIT/ML flextouch 65 Units, 65 Units, Subcutaneous, Nightly  atorvastatin (LIPITOR) tab 40 mg, 40 mg, Oral, Nightly  metoprolol tartrate (LOPRESSOR) tab 100 mg, 100 mg, Oral, 2x Daily(Beta Blocker)  fenofibrate microniz

## 2021-11-07 NOTE — PLAN OF CARE
A fib rate early am at 60's  titrate cardizem drip  heart rate  70's-rare 80's  Seen by dr. Juanjose Workman po amiodarone as ordered  Cardizem drip stopped  Per patient brandon is slightly less  deny dyspnea at rest  peripheral edema no improvement  P chest x ray done oxygenation  Description: INTERVENTIONS:  - Assess for changes in respiratory status  - Assess for changes in mentation and behavior  - Position to facilitate oxygenation and minimize respiratory effort  - Oxygen supplementation based on oxygen saturation

## 2021-11-08 ENCOUNTER — APPOINTMENT (OUTPATIENT)
Dept: GENERAL RADIOLOGY | Facility: HOSPITAL | Age: 66
DRG: 215 | End: 2021-11-08
Attending: INTERNAL MEDICINE
Payer: MEDICARE

## 2021-11-08 PROCEDURE — 71045 X-RAY EXAM CHEST 1 VIEW: CPT | Performed by: INTERNAL MEDICINE

## 2021-11-08 RX ORDER — BUMETANIDE 0.25 MG/ML
2 INJECTION, SOLUTION INTRAMUSCULAR; INTRAVENOUS
Status: DISCONTINUED | OUTPATIENT
Start: 2021-11-08 | End: 2021-11-13

## 2021-11-08 RX ORDER — AZITHROMYCIN 250 MG/1
500 TABLET, FILM COATED ORAL DAILY
Status: DISCONTINUED | OUTPATIENT
Start: 2021-11-08 | End: 2021-11-08

## 2021-11-08 RX ORDER — VANCOMYCIN 2 GRAM/500 ML IN 0.9 % SODIUM CHLORIDE INTRAVENOUS
25 ONCE
Status: COMPLETED | OUTPATIENT
Start: 2021-11-08 | End: 2021-11-08

## 2021-11-08 RX ORDER — BUMETANIDE 0.25 MG/ML
1 INJECTION, SOLUTION INTRAMUSCULAR; INTRAVENOUS ONCE
Status: COMPLETED | OUTPATIENT
Start: 2021-11-08 | End: 2021-11-08

## 2021-11-08 RX ORDER — DOXYCYCLINE HYCLATE 100 MG/1
100 CAPSULE ORAL EVERY 12 HOURS SCHEDULED
Status: COMPLETED | OUTPATIENT
Start: 2021-11-08 | End: 2021-11-12

## 2021-11-08 NOTE — PROGRESS NOTES
11/08/21 1211   Provider Notification   Reason for Communication New consult   Provider Name Bhavani Morris MD   Method of Communication Page   Response Waiting for response   Notification Time      ID consult notified.

## 2021-11-08 NOTE — PLAN OF CARE
Received report on this Pt. , with bedside rounding at 1552. Pt., awake, A&Ox4. Pt., is in AFib on Tele monitor, sats greater than 92% on 5L Oxygen per NC, desats with minimal activity.  Pt., up to the BR with SBA, voiding frequently after receiving Bumex, u

## 2021-11-08 NOTE — CONSULTS
INFECTIOUS DISEASE CONSULTATION    [facility] Patient Status:  Inpatient    1955 MRN [id number]   [facility] 2NE-A Attending Estefania Brunson, [facility] Day # 5 PCP Milad Wayne by Davi Theodore at [address] [place] [address]   • LENGTH/SHORT LEG/ANKL TENDON,SINGLE Left 12/22/2014    Procedure: REPAIR  PERONEUS LONGUS TENDON;  Surgeon: Francisca Parks DPM;  Location: [address] [place] [address]   • OTHER SURGICAL HISTORY  2009    a with breakfast  •  glucose (DEX4) oral liquid 15 g, 15 g, Oral, Q15 Min PRN **OR** glucose-vitamin C (DEX-4) chewable tab 4 tablet, 4 tablet, Oral, Q15 Min PRN **OR** dextrose 50 % injection 50 mL, 50 mL, Intravenous, Q15 Min PRN **OR** glucose (DEX4) oral 20  BP: ()/(46-81) 107/77  HEENT: Moist mucous membranes. Extraocular muscles are intact. Neck: No swelling, no masses  Abdomen: Soft, nontender, nondistended. Musculoskeletal: Full range of motion of all extremities. No swelling noted.   Joints: 168 hours.     Inflammatory Markers  Recent Labs   Lab 11/03/21  1328 11/03/21  1851   ZHANG 139.3  --    DDIMER  --  0.94*       Imaging:    Established Problem list:  Patient Active Problem List:     Traumatic amputation of foot (complete) (partial), unilat organism, unspecified part of lung      ASSESSMENT/PLAN:  1. Cardiomyopathy/CHF  -underlying poorly controlled DM /A1C 9%  Cardiology following and planning cath in future, high likelihood of ischemia    2.  Hypoxia/pulmonary infiltrates  CHF : dieureses pe

## 2021-11-08 NOTE — PLAN OF CARE
Assumed care of pt at 2300. Alert and oriented x4. On 5L O2 nc, A fib on tele, rates controlled. Continent to bowel and bladder. Denies pain. Up with SBA. QID accucheck. Call light within reach.        Problem: CARDIOVASCULAR - ADULT  Goal: Maintains optima retention  Outcome: Progressing     Problem: METABOLIC/FLUID AND ELECTROLYTES - ADULT  Goal: Glucose maintained within prescribed range  Description: INTERVENTIONS:  - Monitor Blood Glucose as ordered  - Assess for signs and symptoms of hyperglycemia and h

## 2021-11-08 NOTE — PROGRESS NOTES
[address] Patient Status:  Inpatient    1955 MRN [id number]   [facility] 2NE-A Attending Tanya Batista MD   [facility] Day # 5 PCP Beth Pleitez DO     SUBJECTIVE: Pt states that his breathing is feeling bett 30 g, 30 g, Oral, Q15 Min PRN **OR** glucose-vitamin C (DEX-4) chewable tab 8 tablet, 8 tablet, Oral, Q15 Min PRN     Physical Exam:                          General: alert, cooperative, in NAD                          HEENT: oropharynx clear without eryth

## 2021-11-08 NOTE — PROGRESS NOTES
[facility] Hospitalist Progress Note                                                                   [address]  6/6/1955    CC: KATHY    Interval History:   On 15L nc    Current Meds:  Schedul GFRNAA 38* 43* 47*   CA 9.3 9.5 9.3    136 137   K 4.0 3.9 3.7    101 101   CO2 32.0 34.0* 32.0           ROS: no change to ROS from documentation yesterday, except as otherwise noted in the Interval History above.     Assessment/Plan:    [place]

## 2021-11-08 NOTE — RESPIRATORY THERAPY NOTE
Called to assess patient after he was noted to be hypoxic after returning from the bathroom. Early this a.m., O2 was increased to 5L, now he is on 15L high flow cannula.  He was saturating high 70's-low 80's for about 15 min prior to arrival. He is in no di

## 2021-11-08 NOTE — PROGRESS NOTES
[facility]  Cardiology Progress Note    Mayela Horta Patient Status:  Inpatient    1955 MRN [id number]   [facility] 2NE-A Attending Brooklyn Swift MD   [facility] Day # 5 PCP Lucy Navarro,        Subjective: SOB.  During my i (partial), unilateral, complicated ([address]     Cardiomyopathy ([address]     Obesity     Diabetes mellitus due to underlying condition with diabetic autonomic neuropathy, with long-term current use of insulin ([address]     Type 2 diabetes mellitus with foot ulcer, wi hour   Intake 600 ml   Output 2670 ml   Net -2070 ml       Last 3 Weights  11/03/21 1731 : (!) 325 lb 13.4 oz (147.8 kg)  11/03/21 1238 : 289 lb (131.1 kg)  10/12/21 0921 : (!) 330 lb (149.7 kg)  05/14/21 0813 : 267 lb (121.1 kg)      Physical Exam:    [facility]:    Tele: Narrow complex tachycardia  ~ 110-120 bpm, suspect atrial flutter, frequent PVCs    EKG 11/4/21:  Atrial flutter with variable conduction   Left axis deviation   Nonspecific intraventricular block   Premature ventricular compl (S, est): 35mm Hg. 6. Tricuspid valve: There was mild regurgitation. Impression:  1. Acute systolic heart failure  2. Cardiomyopathy, LVEF 25-30% - may be tachy mediated, ischemic heart disease not ruled out  3.  Acute hypoxemic respiratory failure

## 2021-11-08 NOTE — CONSULTS
[facility] Dosing Service    Initial Pharmacokinetic Consult for Vancomycin AUC Dosing    Mayela Lynch is a 78year old patient who is being treated for pneumonia. Pharmacy has been asked to dose vancomycin by dr. Susan Ventura.     Weights:  Ideal body

## 2021-11-08 NOTE — PROGRESS NOTES
[facility]    Nephrology Progress Note    Lc Jennings Attending:  Chito Roland MD       SUBJECTIVE:     Getting albumin and bumex   Good UOP yesterday  Worsening o2    PHYSICAL EXAM:     Vital Signs: /77 (BP Location: Right arm)   P NEPRELIM 5.72 11/05/2021    NEPERCENT 73.5 11/05/2021    LYPERCENT 14.5 11/05/2021    MOPERCENT 8.3 11/05/2021    EOPERCENT 2.2 11/05/2021    BAPERCENT 1.0 11/05/2021    NE 5.72 11/05/2021    LYMABS 1.13 11/05/2021    MOABSO 0.65 11/05/2021    EOABSO 0. C (DEX-4) chewable tab 4 tablet, 4 tablet, Oral, Q15 Min PRN   Or  dextrose 50 % injection 50 mL, 50 mL, Intravenous, Q15 Min PRN   Or  glucose (DEX4) oral liquid 30 g, 30 g, Oral, Q15 Min PRN   Or  glucose-vitamin C (DEX-4) chewable tab 8 tablet, 8 tablet

## 2021-11-09 RX ORDER — HEPARIN SODIUM AND DEXTROSE 10000; 5 [USP'U]/100ML; G/100ML
INJECTION INTRAVENOUS CONTINUOUS
Status: DISCONTINUED | OUTPATIENT
Start: 2021-11-09 | End: 2021-11-16 | Stop reason: SDUPTHER

## 2021-11-09 RX ORDER — HEPARIN SODIUM 5000 [USP'U]/ML
5000 INJECTION INTRAVENOUS; SUBCUTANEOUS ONCE
Status: COMPLETED | OUTPATIENT
Start: 2021-11-09 | End: 2021-11-09

## 2021-11-09 RX ORDER — POTASSIUM CHLORIDE 20 MEQ/1
40 TABLET, EXTENDED RELEASE ORAL EVERY 4 HOURS
Status: COMPLETED | OUTPATIENT
Start: 2021-11-09 | End: 2021-11-09

## 2021-11-09 RX ORDER — HEPARIN SODIUM AND DEXTROSE 10000; 5 [USP'U]/100ML; G/100ML
1000 INJECTION INTRAVENOUS ONCE
Status: COMPLETED | OUTPATIENT
Start: 2021-11-09 | End: 2021-11-09

## 2021-11-09 NOTE — PROGRESS NOTES
[facility] Hospitalist Progress Note                                                                   [address]  6/6/1955    CC: CHAVEZ    Interval History:  O2 improved to 9L    Current Meds: CREATSERUM 1.64* 1.52* 1.58*   GFRAA 49* 54* 52*   GFRNAA 43* 47* 45*   CA 9.5 9.3 9.6    137 137   K 3.9 3.7 3.5    101 98   CO2 34.0* 32.0 34.0*           ROS: no change to ROS from documentation yesterday, except as otherwise noted in the

## 2021-11-09 NOTE — PROGRESS NOTES
[address] Patient Status:  Inpatient    1955 MRN [id number]   [facility] 2NE-A Attending Brooklyn Swift MD   [facility] Day # 6 PCP Lucy Navarro DO     SUBJECTIVE:  Pt desaturated yesterday when going to the breakfast  •  glucose (DEX4) oral liquid 15 g, 15 g, Oral, Q15 Min PRN **OR** glucose-vitamin C (DEX-4) chewable tab 4 tablet, 4 tablet, Oral, Q15 Min PRN **OR** dextrose 50 % injection 50 mL, 50 mL, Intravenous, Q15 Min PRN **OR** glucose (DEX4) oral liqu follow up imaging as OP to ensure resolution of LAD  - PCT low, no leukocytosis. Afebrile. RVP negative. On empiric Abx, low suspicion for infection from my perspective. ID following   - wean O2 as tolerated   2.  Pulm Edema/CHF  -EF: 25-30%  PASP: 40-45m

## 2021-11-09 NOTE — PROGRESS NOTES
[facility]  Cardiology Progress Note    Ezekiel Jiménez Patient Status:  Inpatient    1955 MRN [id number]   [facility] 2NE-A Attending Gloria Cerrato MD   [facility] Day # 6 PCP Lev Workman DO       Subjective: SOB improved and diabetic autonomic neuropathy, with long-term current use of insulin (HCC)     Type 2 diabetes mellitus with foot ulcer, with long-term current use of insulin (HCC)     Hyperlipidemia with target LDL less than 100     Mild renal insufficiency     Hypertens lb 14.4 oz (131.5 kg)  11/03/21 1731 : (!) 325 lb 13.4 oz (147.8 kg)  11/03/21 1238 : 289 lb (131.1 kg)  10/12/21 0921 : (!) 330 lb (149.7 kg)  05/14/21 0813 : 267 lb (121.1 kg)      Physical Exam:    General: Alert and oriented x 3. Tachypneic.    HEENT: N suspect atrial flutter, frequent PVCs    EKG 11/4/21:  Atrial flutter with variable conduction   Left axis deviation   Nonspecific intraventricular block   Premature ventricular complexes   Abnormal ECG    Echo 11/4/21:  1. Left ventricle:  The cavity size Impression:  1. Acute systolic heart failure  2. Cardiomyopathy, LVEF 25-30% - may be tachy mediated, ischemic heart disease not ruled out  3. Acute hypoxemic respiratory failure due to above, pulmonary edema  4. Atrial flutter, rates 110-120   5.  Po

## 2021-11-09 NOTE — PLAN OF CARE
Alert and oriented x4. On 6L O2 nc, sats 96%. A fib on tele, rates controlled. Continent to bowel and bladder. Denies pain. Up with SBA. Call light within reach. QID accucheck. Hypoglycemic this A.M blood sugar 58. Treated per protocol.        Problem: CARD anxiety  - Monitor for signs/symptoms of CO2 retention  Outcome: Progressing     Problem: METABOLIC/FLUID AND ELECTROLYTES - ADULT  Goal: Glucose maintained within prescribed range  Description: INTERVENTIONS:  - Monitor Blood Glucose as ordered  - Assess

## 2021-11-09 NOTE — PROGRESS NOTES
[facility]                INFECTIOUS DISEASE PROGRESS NOTE    Mark Graves Patient Status:  Inpatient    1955 MRN [id number]   [facility] 2NE-A Attending Yesi Melgar MD   [facility] Day # 6 PCP Robin Rouse DO     Antibiotics: 153*   BUN 28*   < > 31*   < > 29* 30* 30*   CREATSERUM 1.60*   < > 1.64*   < > 1.52* 1.58* 1.55*   GFRAA 51*   < > 49*   < > 54* 52* 53*   GFRNAA 44*   < > 43*   < > 47* 45* 46*   CA 9.4   < > 9.5   < > 9.3 9.6 9.7   ALB 2.8*   < > 2.6*  --  2.6* 2.7*  -- right foot with fat layer exposed ([address]     Chronic osteomyelitis of right foot with draining sinus (HCC)     Diabetic ulcer of right foot associated with type 2 diabetes mellitus ([address]     Diabetic polyneuropathy associated with type 2 diabetes mellitus (

## 2021-11-09 NOTE — PLAN OF CARE
Assumed patient care at 0730 this AM. Patient A&O x4. Received pt on 5L O2 (baseline RA). After getting up to use the restroom pt saturations maintaining 70-80s%, titrated up to 15L HFNC. Respiratory called to bedside; pulm Dr. Vicki Navarro updated on pt status. skin color and temperature  - Assess for signs of decreased coronary artery perfusion - ex.  Angina  - Evaluate fluid balance, assess for edema, trend weights  Outcome: Progressing  Goal: Absence of cardiac arrhythmias or at baseline  Description: INTERVENT and symptoms of electrolyte imbalances  - Administer electrolyte replacement as ordered  - Monitor response to electrolyte replacements, including rhythm and repeat lab results as appropriate  - Fluid restriction as ordered  - Instruct patient on fluid and

## 2021-11-09 NOTE — PROGRESS NOTES
[facility]    Nephrology Progress Note    Chas Anahi Attending:  Manuel Elise MD       SUBJECTIVE:     o2 improved per pt feeling a little better  No other complaints     PHYSICAL EXAM:     Vital Signs: BP 98/53 (BP Location: Right arm) NEPRELIM 5.72 11/05/2021    NEPERCENT 73.5 11/05/2021    LYPERCENT 14.5 11/05/2021    MOPERCENT 8.3 11/05/2021    EOPERCENT 2.2 11/05/2021    BAPERCENT 1.0 11/05/2021    NE 5.72 11/05/2021    LYMABS 1.13 11/05/2021    MOABSO 0.65 11/05/2021    EOABSO 0. Or  glucose-vitamin C (DEX-4) chewable tab 4 tablet, 4 tablet, Oral, Q15 Min PRN   Or  dextrose 50 % injection 50 mL, 50 mL, Intravenous, Q15 Min PRN   Or  glucose (DEX4) oral liquid 30 g, 30 g, Oral, Q15 Min PRN   Or  glucose-vitamin C (DEX-4) chewable ta

## 2021-11-10 ENCOUNTER — APPOINTMENT (OUTPATIENT)
Dept: GENERAL RADIOLOGY | Facility: HOSPITAL | Age: 66
DRG: 215 | End: 2021-11-10
Attending: INTERNAL MEDICINE
Payer: MEDICARE

## 2021-11-10 PROBLEM — I50.22 CHRONIC SYSTOLIC HEART FAILURE (HCC): Status: ACTIVE | Noted: 2021-11-10

## 2021-11-10 PROBLEM — N18.30 STAGE 3 CHRONIC KIDNEY DISEASE (HCC): Status: ACTIVE | Noted: 2021-11-10

## 2021-11-10 PROBLEM — N18.30 STAGE 3 CHRONIC KIDNEY DISEASE (HCC): Status: ACTIVE | Noted: 2021-01-01

## 2021-11-10 PROBLEM — I50.22 CHRONIC SYSTOLIC HEART FAILURE (HCC): Status: ACTIVE | Noted: 2021-01-01

## 2021-11-10 PROCEDURE — 71045 X-RAY EXAM CHEST 1 VIEW: CPT | Performed by: INTERNAL MEDICINE

## 2021-11-10 RX ORDER — ALBUMIN (HUMAN) 12.5 G/50ML
12.5 SOLUTION INTRAVENOUS EVERY 12 HOURS
Status: COMPLETED | OUTPATIENT
Start: 2021-11-10 | End: 2021-11-11

## 2021-11-10 RX ORDER — MELATONIN
3 NIGHTLY
Status: DISCONTINUED | OUTPATIENT
Start: 2021-11-10 | End: 2021-12-28

## 2021-11-10 NOTE — PROGRESS NOTES
[facility]    Nephrology Progress Note    Ladan Ramirez Attending:  Jia Quinteros MD       SUBJECTIVE:     Feels a little better    PHYSICAL EXAM:     Vital Signs: /53 (BP Location: Right arm)   Pulse 107   Temp 98 °F (36.7 °C) (Oral) 11/05/2021    NEPERCENT 73.5 11/05/2021    LYPERCENT 14.5 11/05/2021    MOPERCENT 8.3 11/05/2021    EOPERCENT 2.2 11/05/2021    BAPERCENT 1.0 11/05/2021    NE 5.72 11/05/2021    LYMABS 1.13 11/05/2021    MOABSO 0.65 11/05/2021    EOABSO 0.17 11/05/2021 Daily(Beta Blocker)  fenofibrate micronized (LOFIBRA) cap 134 mg, 134 mg, Oral, Daily with breakfast  glucose (DEX4) oral liquid 15 g, 15 g, Oral, Q15 Min PRN   Or  glucose-vitamin C (DEX-4) chewable tab 4 tablet, 4 tablet, Oral, Q15 Min PRN   Or  dextrose 25-30%. Diuresis. Cards on consult    Will follow. Thank you for allowing me to participate in the care of this patient. Please do not hesitate to call with questions or concerns.     D/w Dr Maria Teresa Jonas, [facility]

## 2021-11-10 NOTE — PROGRESS NOTES
Pulmonary Progress Note     Assessment / Plan:  1. Acute respiratory failure - suspect pulmonary edema. R/o autoimmune etiology. PNA considered less likely. Hypoxemia is significantly improved.  Interval chest imaging remains unchanged  - volume management

## 2021-11-10 NOTE — PROGRESS NOTES
[facility]  Cardiology Progress Note    Janie Hanson Patient Status:  Inpatient    1955 MRN [id number]   [facility] 2NE-A Attending aJiden Beavers MD   [facility] Day # 7 PCP Alexa Lemus DO       Subjective: SOB and O2 requi amputation of foot (complete) (partial), unilateral, complicated ([facility]     Cardiomyopathy ([facility] [facility]     Obesity     Diabetes mellitus due to underlying condition with diabetic autonomic neuropathy, with long-term current use of insulin (HCC)     Type 2 diabete Intake/Output Summary (Last 24 hours) at 11/10/2021 1331  Last data filed at 11/10/2021 1158  Gross per 24 hour   Intake 450 ml   Output 1825 ml   Net -1375 ml       Last 3 Weights  11/10/21 0757 : (!) 308 lb 12.8 oz (140.1 kg)  11/09/21 0515 : 289 lb < > 3.8  --  3.2  --  3.4 3.3  --   --  2.8   *   < > 97  97   < > 98   < > 89  --  92 75 153*  --  103*    < > = values in this interval not displayed.        Recent Labs   Lab 11/06/21  0745 11/07/21  4983 11/08/21  0710 11/09/21  0647 11/10/21  06 2 diastolic      dysfunction. 2. Aortic valve: Thickening, consistent with sclerosis. 3. Mitral valve: There was mild regurgitation. 4. Left atrium: The left atrium was mildly to moderately dilated.  The left      atrial volume was moderately increase

## 2021-11-10 NOTE — PLAN OF CARE
Pt is A/Ox4. Admit to feeling anxious, denies medication. 5-6L overnight. Pt appears dyspneic but denies feeling this way. Tachypneic at times. Currently 98%. A-fib on tele. Rates >100. Continent BB. Up ad mercy in the room. Heparin gtt infusing.  Denies any INTERVENTIONS:  - Assess for changes in respiratory status  - Assess for changes in mentation and behavior  - Position to facilitate oxygenation and minimize respiratory effort  - Oxygen supplementation based on oxygen saturation or ABGs  - Provide Smoking evaluate response  - Consider cultural and social influences on pain and pain management  - Manage/alleviate anxiety  - Utilize distraction and/or relaxation techniques  - Monitor for opioid side effects  - Notify MD/LIP if interventions unsuccessful or pa

## 2021-11-10 NOTE — PROGRESS NOTES
[facility] Hospitalist Progress Note                                                                   [address]  6/6/1955    CC: CHAVEZ    Interval History:  O2 improved to 6L    Current Meds: 16. 6  --  16.7 16.4  --    NEPRELIM 9.35*  --  5.72  --   --    WBC 13.0*  --  7.8 9.6  --    .0*   < > 543.0* 499.0* 467.0*    < > = values in this interval not displayed.        Recent Labs   Lab 11/09/21  0647 11/09/21  0647 11/09/21  1231 11/09/2

## 2021-11-10 NOTE — PROGRESS NOTES
Pt is A/Ox4. Admit to feeling anxious, denies medication. 5-6L . Pt appears dyspneic but denies feeling this way. Tachypneic at times. Currently 98%. A-fib on tele. Rates >100. Continent BB. Up ad mercy in the room. Heparin gtt infusing.  Denies any pain.     INTERVENTIONS:  - Assess for changes in respiratory status  - Assess for changes in mentation and behavior  - Position to facilitate oxygenation and minimize respiratory effort  - Oxygen supplementation based on oxygen saturation or ABGs  - Provide Smoking evaluate response  - Consider cultural and social influences on pain and pain management  - Manage/alleviate anxiety  - Utilize distraction and/or relaxation techniques  - Monitor for opioid side effects  - Notify MD/LIP if interventions unsuccessful or pa

## 2021-11-10 NOTE — PROGRESS NOTES
[facility]                INFECTIOUS DISEASE PROGRESS NOTE    Lacy Antonio Patient Status:  Inpatient    1955 MRN [id number]   [facility] 2NE-A Attending Tash Du MD   [address] Day # 7 PCP Carlos Cho DO     Antibiotics: 103*   BUN 29*   < > 30*  --  30*  --  33*   CREATSERUM 1.52*   < > 1.58*  --  1.55*  --  1.54*   GFRAA 54*   < > 52*  --  53*  --  53*   GFRNAA 47*   < > 45*  --  46*  --  46*   CA 9.3   < > 9.6  --  9.7  --  9.7   ALB 2.6*  --  2.7*  --   --   --  2.6* polyneuropathy associated with type 2 diabetes mellitus ([facility] [address]     Combined form of age-related cataract, left eye     Type II or unspecified type diabetes mellitus with neurological manifestations, uncontrolled(250.62)     Hyponatremia     Anemia     Hyper

## 2021-11-11 ENCOUNTER — ANESTHESIA EVENT (OUTPATIENT)
Dept: INTERVENTIONAL RADIOLOGY/VASCULAR | Facility: HOSPITAL | Age: 66
DRG: 215 | End: 2021-11-11
Payer: MEDICARE

## 2021-11-11 RX ORDER — LORAZEPAM 2 MG/ML
1 INJECTION INTRAMUSCULAR EVERY 6 HOURS PRN
Status: DISCONTINUED | OUTPATIENT
Start: 2021-11-11 | End: 2021-11-15

## 2021-11-11 RX ORDER — LORAZEPAM 1 MG/1
1 TABLET ORAL EVERY 4 HOURS PRN
Status: DISCONTINUED | OUTPATIENT
Start: 2021-11-11 | End: 2021-11-15

## 2021-11-11 RX ORDER — LORAZEPAM 0.5 MG/1
0.5 TABLET ORAL EVERY 4 HOURS PRN
Status: DISCONTINUED | OUTPATIENT
Start: 2021-11-11 | End: 2021-11-15

## 2021-11-11 RX ORDER — LORAZEPAM 2 MG/ML
0.5 INJECTION INTRAMUSCULAR EVERY 6 HOURS PRN
Status: DISCONTINUED | OUTPATIENT
Start: 2021-11-11 | End: 2021-11-15

## 2021-11-11 RX ORDER — POTASSIUM CHLORIDE 20 MEQ/1
40 TABLET, EXTENDED RELEASE ORAL ONCE
Status: COMPLETED | OUTPATIENT
Start: 2021-11-11 | End: 2021-11-11

## 2021-11-11 NOTE — PROGRESS NOTES
[facility]  Cardiology Progress Note    Cris Liang Patient Status:  Inpatient    1955 MRN [id number]   [facility] 2NE-A Attending Delfina Gardiner MD   [facility] Day # 8 PCP Annie Marc DO       Subjective: Stable dyspnea, Traumatic amputation of foot (complete) (partial), unilateral, complicated ([address]     Cardiomyopathy ([address]     Obesity     Diabetes mellitus due to underlying condition with diabetic autonomic neuropathy, with long-term current use of insulin ([address]     [address] 110/78    Intake/Output:     Intake/Output Summary (Last 24 hours) at 11/11/2021 0839  Last data filed at 11/11/2021 0758  Gross per 24 hour   Intake 905.1 ml   Output 1550 ml   Net -644.9 ml       Last 3 Weights  11/11/21 0555 : (!) 307 lb 1.6 oz (139.3 k --  3.4  --  3.3  --   --  2.8 3.2   GLU 97  97   < > 89   < > 92  --  75 153*  --  103* 172*    < > = values in this interval not displayed.        Recent Labs   Lab 11/07/21  0621 11/08/21  0710 11/09/21  0647 11/10/21  0635 11/11/21  0611   ALB 2.6* 2.6* dysfunction. 2. Aortic valve: Thickening, consistent with sclerosis. 3. Mitral valve: There was mild regurgitation. 4. Left atrium: The left atrium was mildly to moderately dilated. The left      atrial volume was moderately increased.    5. Right chely guidance for treatment of HF, restoration of sinus rhythm. Alternatives to the procedure include: not performing PRINCESS and DCCV, not performing cardiac catheterization, treatment with medications only, and observation.  The patient and any accompanying family

## 2021-11-11 NOTE — DIETARY NOTE
[facility]     Admitting diagnosis:  Hypoglycemia [E16.2]  Hypoxia [R09.02]  Pneumonia of both lungs due to infectious organism, unspecified part of lung [J18.9]  Acute congestive heart failure, unspecified hear

## 2021-11-11 NOTE — PROGRESS NOTES
[facility]    Nephrology Progress Note    Hannah Cárdenas Attending:  Shawna Hines MD       SUBJECTIVE:     Feels a little better    PHYSICAL EXAM:     Vital Signs: /78 (BP Location: Right arm)   Pulse 99   Temp 97.7 °F (36.5 °C) (Oral) 11/05/2021    NEPERCENT 73.5 11/05/2021    LYPERCENT 14.5 11/05/2021    MOPERCENT 8.3 11/05/2021    EOPERCENT 2.2 11/05/2021    BAPERCENT 1.0 11/05/2021    NE 5.72 11/05/2021    LYMABS 1.13 11/05/2021    MOABSO 0.65 11/05/2021    EOABSO 0.17 11/05/2021 15 g, 15 g, Oral, Q15 Min PRN   Or  glucose-vitamin C (DEX-4) chewable tab 4 tablet, 4 tablet, Oral, Q15 Min PRN   Or  dextrose 50 % injection 50 mL, 50 mL, Intravenous, Q15 Min PRN   Or  glucose (DEX4) oral liquid 30 g, 30 g, Oral, Q15 Min PRN   Or  gluco patient. Please do not hesitate to call with questions or concerns.     D/w MD Christelle Campuzano 2 Nephrology  [facility] 93  [address] [facility], [address] [place] [address]

## 2021-11-11 NOTE — PROGRESS NOTES
[facility] Hospitalist Progress Note                                                                   [address]  6/6/1955    CC: CHAVEZ    Interval History:  O2 improved to 5L    Current Meds: MCH 24.5*  --  24.8*  --   --    MCHC 29.1*  --  30.7*  --   --    RDW 16.7  --  16.4  --   --    NEPRELIM 5.72  --   --   --   --    WBC 7.8  --  9.6  --   --    .0*   < > 499.0* 467.0* 448.0    < > = values in this interval not displayed.

## 2021-11-11 NOTE — PROGRESS NOTES
Assumed care 0700  Tele Afib with hr-110s  O2 sat 96% with 5l/nc. Heparin drip infusing. Both legs still swollen-Bumex ivp. Up in chair with all meals. NPO after midnight for cath and PRINCESS-CV. Denies c/o pain. Medicate prn for pain.   Ambulate in the h oxygenation  Description: INTERVENTIONS:  - Assess for changes in respiratory status  - Assess for changes in mentation and behavior  - Position to facilitate oxygenation and minimize respiratory effort  - Oxygen supplementation based on oxygen saturation measures as appropriate and evaluate response  - Consider cultural and social influences on pain and pain management  - Manage/alleviate anxiety  - Utilize distraction and/or relaxation techniques  - Monitor for opioid side effects  - Notify MD/LIP if inte

## 2021-11-11 NOTE — PROGRESS NOTES
[address] Patient Status:  Inpatient    1955 MRN [id number]   [facility] 2NE-A Attending Maulik Cosme MD   [facility] Day # 8 PCP Dillon Kraus DO     SUBJECTIVE: Pt states that dyspnea on exertion is improving slowl 4 tablet, 4 tablet, Oral, Q15 Min PRN **OR** dextrose 50 % injection 50 mL, 50 mL, Intravenous, Q15 Min PRN **OR** glucose (DEX4) oral liquid 30 g, 30 g, Oral, Q15 Min PRN **OR** glucose-vitamin C (DEX-4) chewable tab 8 tablet, 8 tablet, Oral, Q15 Min PRN tolerated   2. Abnormal CT chest  - will need repeat chest imaging as an outpatient  3. Acute on chronic systolic heart failure, atrial flutter  - cardiology following  - plan for PRINCESS/DCCV + cardiac cath tomorrow per cards   4.  CKD  - nephrology following

## 2021-11-11 NOTE — ANESTHESIA PREPROCEDURE EVALUATION
PRE-OP EVALUATION    Patient Name: Alveria Mccauley    Admit Diagnosis: Hypoglycemia [E16.2]  Hypoxia [R09.02]  Pneumonia of both lungs due to infectious organism, unspecified part of lung [J18.9]  Acute congestive heart failure, unspecified heart failure t Push 200 mg, 200 mg, Intravenous, Once  [COMPLETED] Perflutren Lipid Microsphere (DEFINITY) 6.52 MG/ML injection 1.5 mL, 1.5 mL, Intravenous, ONCE PRN  amiodarone (PACERONE) tab 400 mg, 400 mg, Oral, TID CC  [COMPLETED] dilTIAZem BOLUS FROM BAG 5 mg infusi into the skin 3 (three) times daily before meals. , Disp: , Rfl: , 11/3/2021 at 0900  insulin glargine 100 UNIT/ML Subcutaneous Solution, Inject 65 Units into the skin nightly., Disp: , Rfl: , 11/2/2021 at 2100  lisinopril 40 MG Oral Tab, Take 40 mg by mout moderately dilated. 8. Pulmonary arteries: Systolic pressure was moderately increased, in the      range of 40mm Hg to 45mm Hg. Estimated pulmonary artery diastolic      pressure was 29mm Hg.      Impressions:  Compared to the previous study, these findin TARSAL/METATARSAL Right 12/22/2014    Procedure: EXOSTECTOMY FOOT/TOE;   Surgeon: Kenny Ha DPM;  Location: [address] [place] [address]     Social History    Tobacco Use      Smoking status: Never Smoker      Smokeless tobacco: Never Used    Alcohol use

## 2021-11-11 NOTE — PLAN OF CARE
Assumed care for pt at 19:30 on 11/10      A&Ox4. Dyspneic with activity, takes a couple minutes to recover. Declining Incentive spirometer, left in room. Denies pain. VSS on 5L HFNC with bubbler. Afib on tele, rates controlled.  Nonproductive cough, crackl ex. Angina  - Evaluate fluid balance, assess for edema, trend weights  Outcome: Progressing  Goal: Absence of cardiac arrhythmias or at baseline  Description: INTERVENTIONS:  - Continuous cardiac monitoring, monitor vital signs, obtain 12 lead EKG if indic ordered  - Monitor response to electrolyte replacements, including rhythm and repeat lab results as appropriate  - Fluid restriction as ordered  - Instruct patient on fluid and nutrition restrictions as appropriate  Outcome: Progressing     Problem: PAIN -

## 2021-11-12 ENCOUNTER — APPOINTMENT (OUTPATIENT)
Dept: CV DIAGNOSTICS | Facility: HOSPITAL | Age: 66
DRG: 215 | End: 2021-11-12
Attending: INTERNAL MEDICINE
Payer: MEDICARE

## 2021-11-12 ENCOUNTER — APPOINTMENT (OUTPATIENT)
Dept: WOUND CARE | Facility: HOSPITAL | Age: 66
End: 2021-11-12
Attending: NURSE PRACTITIONER
Payer: MEDICARE

## 2021-11-12 ENCOUNTER — APPOINTMENT (OUTPATIENT)
Dept: INTERVENTIONAL RADIOLOGY/VASCULAR | Facility: HOSPITAL | Age: 66
DRG: 215 | End: 2021-11-12
Attending: INTERNAL MEDICINE
Payer: MEDICARE

## 2021-11-12 ENCOUNTER — ANESTHESIA (OUTPATIENT)
Dept: INTERVENTIONAL RADIOLOGY/VASCULAR | Facility: HOSPITAL | Age: 66
DRG: 215 | End: 2021-11-12
Payer: MEDICARE

## 2021-11-12 PROCEDURE — 93320 DOPPLER ECHO COMPLETE: CPT | Performed by: INTERNAL MEDICINE

## 2021-11-12 PROCEDURE — 4A023N7 MEASUREMENT OF CARDIAC SAMPLING AND PRESSURE, LEFT HEART, PERCUTANEOUS APPROACH: ICD-10-PCS | Performed by: INTERNAL MEDICINE

## 2021-11-12 PROCEDURE — 5A2204Z RESTORATION OF CARDIAC RHYTHM, SINGLE: ICD-10-PCS | Performed by: INTERNAL MEDICINE

## 2021-11-12 PROCEDURE — 93325 DOPPLER ECHO COLOR FLOW MAPG: CPT | Performed by: INTERNAL MEDICINE

## 2021-11-12 PROCEDURE — B24BZZ4 ULTRASONOGRAPHY OF HEART WITH AORTA, TRANSESOPHAGEAL: ICD-10-PCS | Performed by: INTERNAL MEDICINE

## 2021-11-12 PROCEDURE — B211YZZ FLUOROSCOPY OF MULTIPLE CORONARY ARTERIES USING OTHER CONTRAST: ICD-10-PCS | Performed by: INTERNAL MEDICINE

## 2021-11-12 RX ORDER — LIDOCAINE HYDROCHLORIDE 10 MG/ML
INJECTION, SOLUTION EPIDURAL; INFILTRATION; INTRACAUDAL; PERINEURAL AS NEEDED
Status: DISCONTINUED | OUTPATIENT
Start: 2021-11-12 | End: 2021-11-12 | Stop reason: SURG

## 2021-11-12 RX ORDER — ACETAMINOPHEN 325 MG/1
TABLET ORAL
Status: COMPLETED
Start: 2021-11-12 | End: 2021-11-12

## 2021-11-12 RX ORDER — HEPARIN SODIUM 5000 [USP'U]/ML
INJECTION, SOLUTION INTRAVENOUS; SUBCUTANEOUS
Status: COMPLETED
Start: 2021-11-12 | End: 2021-11-12

## 2021-11-12 RX ORDER — NITROGLYCERIN 20 MG/100ML
INJECTION INTRAVENOUS
Status: COMPLETED
Start: 2021-11-12 | End: 2021-11-12

## 2021-11-12 RX ORDER — LIDOCAINE HYDROCHLORIDE 10 MG/ML
INJECTION, SOLUTION EPIDURAL; INFILTRATION; INTRACAUDAL; PERINEURAL
Status: COMPLETED
Start: 2021-11-12 | End: 2021-11-12

## 2021-11-12 RX ORDER — SODIUM CHLORIDE 9 MG/ML
INJECTION, SOLUTION INTRAVENOUS CONTINUOUS PRN
Status: DISCONTINUED | OUTPATIENT
Start: 2021-11-12 | End: 2021-11-12 | Stop reason: SURG

## 2021-11-12 RX ORDER — VERAPAMIL HYDROCHLORIDE 2.5 MG/ML
INJECTION, SOLUTION INTRAVENOUS
Status: COMPLETED
Start: 2021-11-12 | End: 2021-11-12

## 2021-11-12 RX ADMIN — SODIUM CHLORIDE: 9 INJECTION, SOLUTION INTRAVENOUS at 13:18:00

## 2021-11-12 RX ADMIN — LIDOCAINE HYDROCHLORIDE 50 MG: 10 INJECTION, SOLUTION EPIDURAL; INFILTRATION; INTRACAUDAL; PERINEURAL at 13:18:00

## 2021-11-12 NOTE — PROCEDURES
Transesophageal Echocardiogram with direct current cardioversion      History:  Mayela Horta is a 78year old male with DM2, systolic CHF, CAD, PAD, difficult to control Afib/flutter RVR. He presents for PRINCESS and DCCV.  The risks (including, but not limit procedure and awoke without neurologic deficit.      Tejas Herrera MD  Interventional Cardiology  [facility]  Office: [phone]

## 2021-11-12 NOTE — PROGRESS NOTES
[facility]    Nephrology Progress Note    Harry Gregg Attending:  Shana Lesches, MD       SUBJECTIVE:     Awaiting procedure, no complaints    PHYSICAL EXAM:     Vital Signs: /60   Pulse 75   Temp 97.7 °F (36.5 °C) (Oral)   Resp (!) 2 11/05/2021    NEPERCENT 73.5 11/05/2021    LYPERCENT 14.5 11/05/2021    MOPERCENT 8.3 11/05/2021    EOPERCENT 2.2 11/05/2021    BAPERCENT 1.0 11/05/2021    NE 5.72 11/05/2021    LYMABS 1.13 11/05/2021    MOABSO 0.65 11/05/2021    EOABSO 0.17 11/05/2021 Nightly  metoprolol tartrate (LOPRESSOR) tab 100 mg, 100 mg, Oral, 2x Daily(Beta Blocker)  fenofibrate micronized (LOFIBRA) cap 134 mg, 134 mg, Oral, Daily with breakfast  glucose (DEX4) oral liquid 15 g, 15 g, Oral, Q15 Min PRN   Or  glucose-vitamin C (DE UPCR. Pulm. ID, cards on consult     Acute CHF  -- EF 25-30%. Diuresis. Cards on consult    Will follow. Thank you for allowing me to participate in the care of this patient. Please do not hesitate to call with questions or concerns.     D/w Dr Hugh Burk

## 2021-11-12 NOTE — PROGRESS NOTES
[facility]                INFECTIOUS DISEASE PROGRESS NOTE    Brian Gibbs Patient Status:  Inpatient    1955 MRN [id number]   [facility] 2NE-A Attending Luis Puri MD   [facility] Day # 9 PCP Beth Pleitez DO     Antibiotics: 2:29 PM    Specimen: Blood,peripheral   Result Value Ref Range    Blood Culture Result No Growth 5 Days N/A         \    Problem list reviewed:  Patient Active Problem List:     Traumatic amputation of foot (complete) (partial), unilateral, complicated ([facility] part of lung     Chronic systolic heart failure (HCC)     Stage 3 chronic kidney disease (HCC)            ASSESSMENT/PLAN:  1.  Cardiomyopathy/CHF  -underlying poorly controlled DM /A1C 9%  SP DC cardioversion for afib and cardiac cath, cardiology managing

## 2021-11-12 NOTE — PLAN OF CARE
Patient is aox3, very anxious, 6l nc 94%, attempted to wean to 5L but desatted to 88%, Currently on 6Lnc, lungs diminished crackly at the bases. Positive bowel sound. Last bm yesterday. Afib  in the bathroom with pct. At rest .  Heparin gtt ( threatening arrhythmias  - Monitor electrolytes and administer replacement therapy as ordered  Outcome: Progressing     Problem: RESPIRATORY - ADULT  Goal: Achieves optimal ventilation and oxygenation  Description: INTERVENTIONS:  - Assess for changes in r pain and request assistance  - Assess pain using appropriate pain scale  - Administer analgesics based on type and severity of pain and evaluate response  - Implement non-pharmacological measures as appropriate and evaluate response  - Consider cultural an

## 2021-11-12 NOTE — PROGRESS NOTES
[address] Patient Status:  Inpatient    1955 MRN [id number]   [facility] 2NE-A Attending Toi Hodgson MD   [facility] Day # 9 PCP Lucy Navarro, DO     SUBJECTIVE: Pt is anxious about upcoming procedures today, states fenofibrate micronized (LOFIBRA) cap 134 mg, 134 mg, Oral, Daily with breakfast  •  glucose (DEX4) oral liquid 15 g, 15 g, Oral, Q15 Min PRN **OR** glucose-vitamin C (DEX-4) chewable tab 4 tablet, 4 tablet, Oral, Q15 Min PRN **OR** dextrose 50 % injection consider bronchoscopy pending clinical course; would address cardiac issues first before taking for a procedure. Alternatively, can consider empiric steroids   - wean supplemental O2 as tolerated   2.  Abnormal CT chest  - will need repeat chest imaging as

## 2021-11-12 NOTE — PROCEDURES
OPERATIVE REPORT - DIAGNOSTIC CARDIAC CATHETERIZATION    Date of Procedure: 11/12/2021     Performing Physician: Janelle Rodriguez. Timoteo Ferreira MD    Pre-Procedure Diagnosis:   1. Acute systolic heart failure  2.  Cardiomyopathy, LVEF 25-30% - may be tachy mediated, isch coronary angiography to evaluate for obstructive CAD. Description of Procedure: Informed consent was obtained from the patient in writing.  The risks and benefits of the procedure were reviewed in detail with the patient, including but not limited to th hemostasis achieved with a large TR band using patent hemostasis technique. There was no bleeding or hematoma. The patient tolerated the procedure well without complication. EBL <10 ml  Total contrast 70 mL    Thania Fregoso MD  Interventional Cardiol

## 2021-11-12 NOTE — PROGRESS NOTES
[facility] Hospitalist Progress Note                                                                   [address]  6/6/1955    CC: CHAVEZ    Interval History:  6L nc, no cp    Current Meds:  Sche --   --    MCH 24.5*  --  24.8*  --   --   --   --    MCHC 29.1*  --  30.7*  --   --   --   --    RDW 16.7  --  16.4  --   --   --   --    NEPRELIM 5.72  --   --   --   --   --   --    WBC 7.8  --  9.6  --   --   --   --    .0*   < > 499.0*   < > 4

## 2021-11-12 NOTE — ANESTHESIA POSTPROCEDURE EVALUATION
[address] Patient Status:  Inpatient   Age/Gender 78year old male MRN [id number]   Location [facility] [address] [facility] Attending Magaly Jacobsen, [facility] Day # 9 PCP Addy Sanders DO       Anesthesia Post-op Note        P

## 2021-11-12 NOTE — PLAN OF CARE
Assumed care for pt at 19:30 on 11/11    A&Ox4. Anxious, reported concern of hypoglycemia at 20:09. , 10 units novolog given. Worried about NPO status at midnight. Relieved with 1 tab 0.5 mg ativan. Melatonin given.  VSS on 5L, later during sleep spO2

## 2021-11-13 ENCOUNTER — APPOINTMENT (OUTPATIENT)
Dept: GENERAL RADIOLOGY | Facility: HOSPITAL | Age: 66
DRG: 215 | End: 2021-11-13
Attending: INTERNAL MEDICINE
Payer: MEDICARE

## 2021-11-13 PROCEDURE — 71045 X-RAY EXAM CHEST 1 VIEW: CPT | Performed by: INTERNAL MEDICINE

## 2021-11-13 RX ORDER — POLYETHYLENE GLYCOL 3350 17 G/17G
17 POWDER, FOR SOLUTION ORAL DAILY
Status: DISCONTINUED | OUTPATIENT
Start: 2021-11-13 | End: 2021-12-28

## 2021-11-13 RX ORDER — BUMETANIDE 0.25 MG/ML
1 INJECTION, SOLUTION INTRAMUSCULAR; INTRAVENOUS
Status: DISCONTINUED | OUTPATIENT
Start: 2021-11-13 | End: 2021-11-14

## 2021-11-13 RX ORDER — AMIODARONE HYDROCHLORIDE 200 MG/1
200 TABLET ORAL 2 TIMES DAILY WITH MEALS
Status: DISCONTINUED | OUTPATIENT
Start: 2021-11-13 | End: 2021-11-16

## 2021-11-13 NOTE — PROGRESS NOTES
[address] Patient Status:  Inpatient    1955 MRN [id number]   [facility] 2NE-A Attending Eloy Mercedes MD   Hosp Day # 10 PCP Iza Padilla,      SUBJECTIVE: doing OK.  Has occ cough with sticky phlegm     OB Oral, Daily with breakfast  •  glucose (DEX4) oral liquid 15 g, 15 g, Oral, Q15 Min PRN **OR** glucose-vitamin C (DEX-4) chewable tab 4 tablet, 4 tablet, Oral, Q15 Min PRN **OR** dextrose 50 % injection 50 mL, 50 mL, Intravenous, Q15 Min PRN **OR** glucose

## 2021-11-13 NOTE — PLAN OF CARE
During report pt is sleeping w regular respirations sat mid 90's. Pt wakes at 800 feels sob shallow breaths RR 30 sat 88% he states he feels light headed his sugar was just 218 he says he feels like it is his blood sugar.     127/76   72  28RR 92% sitting

## 2021-11-13 NOTE — PROGRESS NOTES
[facility] Cardiology Progress Note - Paco Guille Patient Status:  Inpatient    1955 MRN [id number]   [facility] 2NE-A Attending Radha Salter MD   Hosp Day # 10 PCP Gracie Bolanos DO     Subjective:  Having a mellitus ([facility]     Combined form of age-related cataract, left eye     Type II or unspecified type diabetes mellitus with neurological manifestations, uncontrolled(250.62)     Hyponatremia     Anemia     Hyperglycemia     Cellulitis of foot     Debridement BS-present. Extremities: Without clubbing, cyanosis or edema. Peripheral pulses are 2+. Neurologic: Alert and oriented, normal affect. No motor or coordinational deficit. Skin: Warm and dry.      Laboratory/Data:    Labs:         Recent Labs   Lab 11/09 mg/hr, Intravenous, Continuous  Insulin Aspart Pen (NOVOLOG) 100 UNIT/ML flexpen 2-10 Units, 2-10 Units, Subcutaneous, TID CC and HS  amiodarone (PACERONE) tab 400 mg, 400 mg, Oral, TID CC  Vitamin D3 (Cholecalciferol) (VITAMIN D3) tab 1,000 Units, 1,000 U

## 2021-11-13 NOTE — PROGRESS NOTES
[facility] Hospitalist Progress Note     Becki Hardy Patient Status:  Inpatient    1955 MRN [id number]   [facility] 2NE-A Attending Kevin Abdalla MD   Hosp Day # 8 PCP Rianna Andres DO     Chief Complaint: Patient p in the last 168 hours. Cardiac  No results for input(s): TROP, PBNP in the last 168 hours. Creatinine Kinase  No results for input(s): CK in the last 168 hours.     Inflammatory Markers  Recent Labs   Lab 11/09/21  1231   CRP 11.10*       Imaging: Kenzie resolution of acute issues     Supplementary Documentation:     Quality:  · DVT Prophylaxis: heparin gtt  · CODE status: FULL  · [place]: n/a  · Central line: n/a  · If COVID testing is negative, may discontinue isolation: yes     Estimated date of discharge

## 2021-11-13 NOTE — PROGRESS NOTES
[facility]    Nephrology Progress Note    Alexus Vanegas Attending:  Dez Johnson MD       SUBJECTIVE:     Remains on O2  No complaints     PHYSICAL EXAM:     Vital Signs: /76 (BP Location: Left arm)   Pulse 77   Temp 98.1 °F (36.7 °C) 11/12/2021    NEPRELIM 5.72 11/05/2021    NEPERCENT 73.5 11/05/2021    LYPERCENT 14.5 11/05/2021    MOPERCENT 8.3 11/05/2021    EOPERCENT 2.2 11/05/2021    BAPERCENT 1.0 11/05/2021    NE 5.72 11/05/2021    LYMABS 1.13 11/05/2021    MOABSO 0.65 11/05/2021 Daily(Beta Blocker)  fenofibrate micronized (LOFIBRA) cap 134 mg, 134 mg, Oral, Daily with breakfast  glucose (DEX4) oral liquid 15 g, 15 g, Oral, Q15 Min PRN   Or  glucose-vitamin C (DEX-4) chewable tab 4 tablet, 4 tablet, Oral, Q15 Min PRN   Or  dextrose EF 25-30%. Diuresis. Cards on consult    Will follow. Thank you for allowing me to participate in the care of this patient. Please do not hesitate to call with questions or concerns.       Adeola Fabian MD  [address] Nephrology  [facility]

## 2021-11-13 NOTE — PLAN OF CARE
Patient aox3, hard of hearing , 10L nc (finger probe) weaned to 6L nc (ear probe). Lunch bs at 026 848 14 90 elevated covered with insulin. Patient stated he will order his late lunch. Dr Velez Loop decreased bumex dose.    Heparin gtt at 16ml/hr, next PTT level at 22 Progressing     Problem: RESPIRATORY - ADULT  Goal: Achieves optimal ventilation and oxygenation  Description: INTERVENTIONS:  - Assess for changes in respiratory status  - Assess for changes in mentation and behavior  - Position to facilitate oxygenation on type and severity of pain and evaluate response  - Implement non-pharmacological measures as appropriate and evaluate response  - Consider cultural and social influences on pain and pain management  - Manage/alleviate anxiety  - Utilize distraction and/

## 2021-11-13 NOTE — PROGRESS NOTES
Pt seen by  Dr Rashawn Martinez and after review of case with cardiology   Pt to have PCI per Dr Robyn Poon Monday

## 2021-11-13 NOTE — PLAN OF CARE
Assumed care for pt at 19:30 on 11/12    A&Ox4. Anxious, tachypnea with shallow respirations. Encouraged incentive spirometer. Pt refused \"not tonight, I'll do it in the morning. \" 0.5 ativan given 19:07 and again at 20:38 d/t persistent tachypnea and res

## 2021-11-14 ENCOUNTER — APPOINTMENT (OUTPATIENT)
Dept: GENERAL RADIOLOGY | Facility: HOSPITAL | Age: 66
DRG: 215 | End: 2021-11-14
Attending: HOSPITALIST
Payer: MEDICARE

## 2021-11-14 PROCEDURE — 71045 X-RAY EXAM CHEST 1 VIEW: CPT | Performed by: HOSPITALIST

## 2021-11-14 RX ORDER — BUMETANIDE 0.25 MG/ML
1 INJECTION, SOLUTION INTRAMUSCULAR; INTRAVENOUS 3 TIMES DAILY
Status: DISCONTINUED | OUTPATIENT
Start: 2021-11-14 | End: 2021-11-15

## 2021-11-14 RX ORDER — CLOPIDOGREL BISULFATE 75 MG/1
75 TABLET ORAL DAILY
Status: DISCONTINUED | OUTPATIENT
Start: 2021-11-15 | End: 2021-11-16

## 2021-11-14 RX ORDER — CLOPIDOGREL BISULFATE 75 MG/1
600 TABLET ORAL ONCE
Status: COMPLETED | OUTPATIENT
Start: 2021-11-14 | End: 2021-11-14

## 2021-11-14 RX ORDER — BUMETANIDE 0.25 MG/ML
1 INJECTION, SOLUTION INTRAMUSCULAR; INTRAVENOUS ONCE
Status: COMPLETED | OUTPATIENT
Start: 2021-11-14 | End: 2021-11-14

## 2021-11-14 RX ORDER — BUMETANIDE 0.25 MG/ML
INJECTION, SOLUTION INTRAMUSCULAR; INTRAVENOUS
Status: COMPLETED
Start: 2021-11-14 | End: 2021-11-14

## 2021-11-14 RX ORDER — METOLAZONE 2.5 MG/1
2.5 TABLET ORAL DAILY
Status: DISCONTINUED | OUTPATIENT
Start: 2021-11-14 | End: 2021-11-15

## 2021-11-14 NOTE — PLAN OF CARE
Patient is alert and orientated, anxious and irritable. Pt is on high flow oxygen 10L NC, crackles at lung bases. Pt NSR with first degree block occasional PVCs on monitor. Abdomen soft, round, non-tender with active bowel sounds in all four quadrants.  Con ADULT  Goal: Achieves optimal ventilation and oxygenation  Description: INTERVENTIONS:  - Assess for changes in respiratory status  - Assess for changes in mentation and behavior  - Position to facilitate oxygenation and minimize respiratory effort  - Oxyg evaluate response  - Implement non-pharmacological measures as appropriate and evaluate response  - Consider cultural and social influences on pain and pain management  - Manage/alleviate anxiety  - Utilize distraction and/or relaxation techniques  - Monit

## 2021-11-14 NOTE — PROGRESS NOTES
[facility]    Nephrology Progress Note    Janie Hanson Attending:  Jaiden Beavesr MD       SUBJECTIVE:     Remains on O2  No complaints     PHYSICAL EXAM:     Vital Signs: /67 (BP Location: Left arm)   Pulse 74   Temp 97.5 °F (36.4 °C) 11/05/2021    NEPERCENT 73.5 11/05/2021    LYPERCENT 14.5 11/05/2021    MOPERCENT 8.3 11/05/2021    EOPERCENT 2.2 11/05/2021    BAPERCENT 1.0 11/05/2021    NE 5.72 11/05/2021    LYMABS 1.13 11/05/2021    MOABSO 0.65 11/05/2021    EOABSO 0.17 11/05/2021 1,000 Units, Oral, Daily  atorvastatin (LIPITOR) tab 40 mg, 40 mg, Oral, Nightly  metoprolol tartrate (LOPRESSOR) tab 100 mg, 100 mg, Oral, 2x Daily(Beta Blocker)  fenofibrate micronized (LOFIBRA) cap 134 mg, 134 mg, Oral, Daily with breakfast  glucose (DE chest 11/7 w extensive infiltrates. Getting diuresis -- increase bumex to TID and metolaoxone 2.5. May need bronch but ideally want on drier side first. Pulm. ID, cards on consult     Acute CHF  -- EF 25-30%. Diuresis.  Cards on consult    D/w RN and Dr Lian Tracy Fall with Harm Risk

## 2021-11-14 NOTE — PROGRESS NOTES
[facility] Cardiology Progress Note - Elysia Melendez Patient Status:  Inpatient    1955 MRN [id number]   [facility] 2NE-A Attending Alicia Yee MD   [facility] Day # 6 PCP Annie Marc,      Subjective:  Remains (methicillin susceptible Staphylococcus aureus) infection     Non-pressure chronic ulcer of other part of right foot with fat layer exposed ([address]     Chronic osteomyelitis of right foot with draining sinus (HCC)     Diabetic ulcer of right foot associated Normocephalic, anicteric sclera, neck supple, no thyromegaly or adenopathy. Neck: No JVD, carotids 2+, no bruits. Cardiac: Regular rate and rhythm. S1, S2 normal. No murmur, pericardial rub, S3, thrill, heave or extra cardiac sounds.   Lungs: Clear withou injection 0.5 mg, 0.5 mg, Intravenous, Q6H PRN   Or  LORazepam (ATIVAN) injection 1 mg, 1 mg, Intravenous, Q6H PRN  melatonin tab 3 mg, 3 mg, Oral, Nightly  heparin (PORCINE) drip [id number]/250mL infusion CONTINUOUS, 200-3,000 Units/hr, Intravenous, Fabby Coral

## 2021-11-14 NOTE — PROGRESS NOTES
[address] Patient Status:  Inpatient    1955 MRN [id number]   [facility] 2NE-A Attending Susana Aparicio MD   [facility] Day # 6 PCP Yamil Subramanian DO     SUBJECTIVE: doing OK.  Has occ cough with sticky phlegm     OB Blocker)  •  fenofibrate micronized (LOFIBRA) cap 134 mg, 134 mg, Oral, Daily with breakfast  •  glucose (DEX4) oral liquid 15 g, 15 g, Oral, Q15 Min PRN **OR** glucose-vitamin C (DEX-4) chewable tab 4 tablet, 4 tablet, Oral, Q15 Min PRN **OR** dextrose 50 nephrology following  5.  Dispo  - will follow    Merritt Naranjo MD  11/14/21

## 2021-11-14 NOTE — PLAN OF CARE
Assumed care for pt at 19:30 on 11/13    A&Ox4. Withdrawn. Dyspnea at rest. Requested ativan (0.5 given) and melatonin. Tachypneic, shallow breaths. \"I will not do that tonight\" regarding incentive spirometer.  Gradually increased to 8L, slept a bit an ma cardiac monitoring, monitor vital signs, obtain 12 lead EKG if indicated  - Evaluate effectiveness of antiarrhythmic and heart rate control medications as ordered  - Initiate emergency measures for life threatening arrhythmias  - Monitor electrolytes and a restrictions as appropriate  Outcome: Progressing     Problem: PAIN - ADULT  Goal: Verbalizes/displays adequate comfort level or patient's stated pain goal  Description: INTERVENTIONS:  - Encourage pt to monitor pain and request assistance  - Assess pain u

## 2021-11-14 NOTE — PROGRESS NOTES
[facility] Hospitalist Progress Note     Brittney Landon Patient Status:  Inpatient    1955 MRN [id number]   [facility] 2NE-A Attending Emily Vincent MD   [address] Day # 6 PCP Marc Mcguire DO     Chief Complaint: Patient p last 168 hours.          COVID-19 Lab Results    COVID-19  Lab Results   Component Value Date    COVID19 Not Detected 11/11/2021    COVID19 Not Detected 11/07/2021    COVID19 Not Detected 11/05/2021       Pro-Calcitonin  Recent Labs   Lab 11/13/21  0801   P cardiology   - on heparin gtt     Possible Gram Negative Pneumonia  - ID consulted  - empiric antibiotics completed 11/12/21  - ctm     CKD3 w/ albuminuria   - baseline Cr ~1.5  - holding ACE due to hypotension  - renally dose meds, avoid nephrotoxic agent

## 2021-11-15 ENCOUNTER — APPOINTMENT (OUTPATIENT)
Dept: GENERAL RADIOLOGY | Facility: HOSPITAL | Age: 66
DRG: 215 | End: 2021-11-15
Attending: INTERNAL MEDICINE
Payer: MEDICARE

## 2021-11-15 ENCOUNTER — APPOINTMENT (OUTPATIENT)
Dept: INTERVENTIONAL RADIOLOGY/VASCULAR | Facility: HOSPITAL | Age: 66
DRG: 215 | End: 2021-11-15
Attending: INTERNAL MEDICINE
Payer: MEDICARE

## 2021-11-15 PROCEDURE — 0BH17EZ INSERTION OF ENDOTRACHEAL AIRWAY INTO TRACHEA, VIA NATURAL OR ARTIFICIAL OPENING: ICD-10-PCS | Performed by: STUDENT IN AN ORGANIZED HEALTH CARE EDUCATION/TRAINING PROGRAM

## 2021-11-15 PROCEDURE — 5A1955Z RESPIRATORY VENTILATION, GREATER THAN 96 CONSECUTIVE HOURS: ICD-10-PCS | Performed by: STUDENT IN AN ORGANIZED HEALTH CARE EDUCATION/TRAINING PROGRAM

## 2021-11-15 PROCEDURE — 71045 X-RAY EXAM CHEST 1 VIEW: CPT | Performed by: INTERNAL MEDICINE

## 2021-11-15 PROCEDURE — 3E033XZ INTRODUCTION OF VASOPRESSOR INTO PERIPHERAL VEIN, PERCUTANEOUS APPROACH: ICD-10-PCS | Performed by: HOSPITALIST

## 2021-11-15 PROCEDURE — 5A12012 PERFORMANCE OF CARDIAC OUTPUT, SINGLE, MANUAL: ICD-10-PCS | Performed by: HOSPITALIST

## 2021-11-15 PROCEDURE — 99291 CRITICAL CARE FIRST HOUR: CPT | Performed by: HOSPITALIST

## 2021-11-15 PROCEDURE — 5A2204Z RESTORATION OF CARDIAC RHYTHM, SINGLE: ICD-10-PCS | Performed by: HOSPITALIST

## 2021-11-15 RX ORDER — MIDAZOLAM HYDROCHLORIDE 1 MG/ML
INJECTION INTRAMUSCULAR; INTRAVENOUS
Status: DISCONTINUED
Start: 2021-11-15 | End: 2021-11-16 | Stop reason: WASHOUT

## 2021-11-15 RX ORDER — POTASSIUM CHLORIDE 20 MEQ/1
40 TABLET, EXTENDED RELEASE ORAL ONCE
Status: COMPLETED | OUTPATIENT
Start: 2021-11-15 | End: 2021-11-15

## 2021-11-15 RX ORDER — HEPARIN SODIUM 5000 [USP'U]/ML
INJECTION, SOLUTION INTRAVENOUS; SUBCUTANEOUS
Status: COMPLETED
Start: 2021-11-15 | End: 2021-11-15

## 2021-11-15 RX ORDER — LIDOCAINE HYDROCHLORIDE 10 MG/ML
INJECTION, SOLUTION EPIDURAL; INFILTRATION; INTRACAUDAL; PERINEURAL
Status: DISCONTINUED
Start: 2021-11-15 | End: 2021-11-15 | Stop reason: WASHOUT

## 2021-11-15 RX ORDER — HEPARIN SODIUM 5000 [USP'U]/ML
INJECTION, SOLUTION INTRAVENOUS; SUBCUTANEOUS
Status: DISCONTINUED
Start: 2021-11-15 | End: 2021-11-15 | Stop reason: WASHOUT

## 2021-11-15 RX ORDER — METOLAZONE 2.5 MG/1
2.5 TABLET ORAL ONCE
Status: DISCONTINUED | OUTPATIENT
Start: 2021-11-15 | End: 2021-11-16

## 2021-11-15 RX ORDER — BUMETANIDE 0.25 MG/ML
2 INJECTION, SOLUTION INTRAMUSCULAR; INTRAVENOUS 3 TIMES DAILY
Status: DISCONTINUED | OUTPATIENT
Start: 2021-11-15 | End: 2021-12-28

## 2021-11-15 NOTE — PROGRESS NOTES
Cardiology follow-up  Through the course of yesterday evening and this morning, patient has been more short of breath. Sleeping he had Cheyne-Marcum respiration and is required and increasing FiO2–10 L/min last night.   I discussed the case with the interv

## 2021-11-15 NOTE — PROGRESS NOTES
[facility] Hospitalist Progress Note     Shelbi Mccauley Patient Status:  Inpatient    1955 MRN [id number]   [facility] 2NE-A Attending Cheryl Bradley MD   [facility] Day # 15 PCP Thierry Ansari DO     Chief Complaint: Patient p 55.6 mL/min (A) (based on SCr of 1.54 mg/dL (H)). No results for input(s): PTP, INR in the last 168 hours.          COVID-19 Lab Results    COVID-19  Lab Results   Component Value Date    COVID19 Not Detected 11/11/2021    COVID19 Not Detected 11/07/2021 Cardiology consulted, plan discussed   - diuresis per Cardiology   - has mid-proximal LAD lesion, plan for PCI Monday 11/15 -> postponed due to worsening respiratory status   - D/w Dr. Nelson Chavarria, will need medical optimization prior to [facility] along with anesthe

## 2021-11-15 NOTE — PROGRESS NOTES
[facility]    Nephrology Progress Note    Carroll Lopez Attending:  Minor Severe, MD       SUBJECTIVE:     Remains on supplemental O2, breathing is stable. No urinary complaints.     PHYSICAL EXAM:     Vital Signs: /53 (BP Location: Right ar 11/05/2021    NEPERCENT 73.5 11/05/2021    LYPERCENT 14.5 11/05/2021    MOPERCENT 8.3 11/05/2021    EOPERCENT 2.2 11/05/2021    BAPERCENT 1.0 11/05/2021    NE 5.72 11/05/2021    LYMABS 1.13 11/05/2021    MOABSO 0.65 11/05/2021    EOABSO 0.17 11/05/2021 Subcutaneous, TID CC and HS  Vitamin D3 (Cholecalciferol) (VITAMIN D3) tab 1,000 Units, 1,000 Units, Oral, Daily  atorvastatin (LIPITOR) tab 40 mg, 40 mg, Oral, Nightly  metoprolol tartrate (LOPRESSOR) tab 100 mg, 100 mg, Oral, 2x Daily(Beta Blocker)  feno CHF     Atypical flutter:  -- per cardiology for rate/rhythm control     Acute hypoxic resp failure  -- hold metolazone, he has contraction alkalosis with HCO3 39  -- continue bumex for now  -- consider alternative explanations for O2 requirements per abov

## 2021-11-15 NOTE — PLAN OF CARE
Assumed care for pt at 19:30 on 11/14    A&Ox4. Asks for sleeping medication, melatonin and 0.5 ativan given. At 22:00, pt restless, unable to sleep. 1mg PO ativan given. IV Bumex given. Dyspnea and tachypnea upon standing to urinate, spills onto floor.

## 2021-11-15 NOTE — PLAN OF CARE
Patient alert and oriented, anxious and irritable, denies pain/discomfort. Pt on 6L high flow NC. Pt was educated on the importance of the use of IS. Pt is NSR with first degree block on tele. Pts abdomen is soft, round, non-tender, continent of B&B.    Na oxygenation  Description: INTERVENTIONS:  - Assess for changes in respiratory status  - Assess for changes in mentation and behavior  - Position to facilitate oxygenation and minimize respiratory effort  - Oxygen supplementation based on oxygen saturation measures as appropriate and evaluate response  - Consider cultural and social influences on pain and pain management  - Manage/alleviate anxiety  - Utilize distraction and/or relaxation techniques  - Monitor for opioid side effects  - Notify MD/LIP if inte

## 2021-11-15 NOTE — PROGRESS NOTES
[address] Patient Status:  Inpatient    1955 MRN [id number]   [facility] 2NE-A Attending Radha Salter MD   Hosp Day # 12 PCP Gracie Bolanos DO     SUBJECTIVE:  C/o dry throat     OBJECTIVE:  /53 (BP Loc fenofibrate micronized (LOFIBRA) cap 134 mg, 134 mg, Oral, Daily with breakfast  •  glucose (DEX4) oral liquid 15 g, 15 g, Oral, Q15 Min PRN **OR** glucose-vitamin C (DEX-4) chewable tab 4 tablet, 4 tablet, Oral, Q15 Min PRN **OR** dextrose 50 % injection outpatient  3. Acute on chronic systolic heart failure, atrial flutter  - cardiology following  - s/p PRINCESS/DCCV + cardiac cath   4. CKD  - nephrology following  5.  Dispo  - will follow  - going for angiogram today    Brittny Rojas MD  11/15/21

## 2021-11-15 NOTE — PROGRESS NOTES
[facility] Cardiology Progress Note - Constantin Macias Patient Status:  Inpatient    1955 MRN [id number]   [facility] 2NE-A Attending Neto Antonio MD   Hosp Day # 12 PCP Benny Smith DO     Subjective:  Remains cataract, left eye     Type II or unspecified type diabetes mellitus with neurological manifestations, uncontrolled(250.62)     Hyponatremia     Anemia     Hyperglycemia     Cellulitis of foot     Debridement of open wound, 4th MT head resection, wound VAC organosplenomegally, mass or rebound, BS-present. Extremities: Without clubbing, cyanosis or edema. Peripheral pulses are 2+. Neurologic: Alert and oriented, normal affect. No motor or coordinational deficit. Skin: Warm and dry.      Laboratory/Data: Continuous  dilTIAZem 100mg/100ml in NaCl (cardIZEM) 1 mg/mL IVPB add-vantage, 2.5-20 mg/hr, Intravenous, Continuous  Insulin Aspart Pen (NOVOLOG) 100 UNIT/ML flexpen 2-10 Units, 2-10 Units, Subcutaneous, TID CC and HS  Vitamin D3 (Cholecalciferol) (VITAMI

## 2021-11-15 NOTE — CM/SW NOTE
Care Progression Note:  Active Acute Medical Issue:   Hypoglycemia   Hypoxia/acute respiratory failure- secondary to pulmonary edema vs autoimmune process  Acute on chronic heart failure/ aflutter  Pneumonia- not likely per pulm.     Other Contributing Medi

## 2021-11-16 ENCOUNTER — APPOINTMENT (OUTPATIENT)
Dept: INTERVENTIONAL RADIOLOGY/VASCULAR | Facility: HOSPITAL | Age: 66
DRG: 215 | End: 2021-11-16
Attending: INTERNAL MEDICINE
Payer: MEDICARE

## 2021-11-16 ENCOUNTER — APPOINTMENT (OUTPATIENT)
Dept: GENERAL RADIOLOGY | Facility: HOSPITAL | Age: 66
DRG: 215 | End: 2021-11-16
Attending: HOSPITALIST
Payer: MEDICARE

## 2021-11-16 ENCOUNTER — ANESTHESIA EVENT (OUTPATIENT)
Dept: MEDSURG UNIT | Facility: HOSPITAL | Age: 66
DRG: 215 | End: 2021-11-16
Payer: MEDICARE

## 2021-11-16 ENCOUNTER — APPOINTMENT (OUTPATIENT)
Dept: CV DIAGNOSTICS | Facility: HOSPITAL | Age: 66
DRG: 215 | End: 2021-11-16
Attending: INTERNAL MEDICINE
Payer: MEDICARE

## 2021-11-16 ENCOUNTER — APPOINTMENT (OUTPATIENT)
Dept: GENERAL RADIOLOGY | Facility: HOSPITAL | Age: 66
DRG: 215 | End: 2021-11-16
Attending: INTERNAL MEDICINE
Payer: MEDICARE

## 2021-11-16 ENCOUNTER — ANESTHESIA (OUTPATIENT)
Dept: MEDSURG UNIT | Facility: HOSPITAL | Age: 66
DRG: 215 | End: 2021-11-16
Payer: MEDICARE

## 2021-11-16 PROCEDURE — 5A0221D ASSISTANCE WITH CARDIAC OUTPUT USING IMPELLER PUMP, CONTINUOUS: ICD-10-PCS | Performed by: INTERNAL MEDICINE

## 2021-11-16 PROCEDURE — 71045 X-RAY EXAM CHEST 1 VIEW: CPT | Performed by: INTERNAL MEDICINE

## 2021-11-16 PROCEDURE — 93308 TTE F-UP OR LMTD: CPT | Performed by: INTERNAL MEDICINE

## 2021-11-16 PROCEDURE — 027135Z DILATION OF CORONARY ARTERY, TWO ARTERIES WITH TWO DRUG-ELUTING INTRALUMINAL DEVICES, PERCUTANEOUS APPROACH: ICD-10-PCS | Performed by: INTERNAL MEDICINE

## 2021-11-16 PROCEDURE — 02C03ZZ EXTIRPATION OF MATTER FROM CORONARY ARTERY, ONE ARTERY, PERCUTANEOUS APPROACH: ICD-10-PCS | Performed by: INTERNAL MEDICINE

## 2021-11-16 PROCEDURE — 02HA3RZ INSERTION OF SHORT-TERM EXTERNAL HEART ASSIST SYSTEM INTO HEART, PERCUTANEOUS APPROACH: ICD-10-PCS | Performed by: INTERNAL MEDICINE

## 2021-11-16 PROCEDURE — 4A023N6 MEASUREMENT OF CARDIAC SAMPLING AND PRESSURE, RIGHT HEART, PERCUTANEOUS APPROACH: ICD-10-PCS | Performed by: INTERNAL MEDICINE

## 2021-11-16 PROCEDURE — B41GYZZ FLUOROSCOPY OF LEFT LOWER EXTREMITY ARTERIES USING OTHER CONTRAST: ICD-10-PCS | Performed by: INTERNAL MEDICINE

## 2021-11-16 PROCEDURE — 71045 X-RAY EXAM CHEST 1 VIEW: CPT | Performed by: HOSPITALIST

## 2021-11-16 PROCEDURE — B41FYZZ FLUOROSCOPY OF RIGHT LOWER EXTREMITY ARTERIES USING OTHER CONTRAST: ICD-10-PCS | Performed by: INTERNAL MEDICINE

## 2021-11-16 RX ORDER — AMIODARONE HYDROCHLORIDE 200 MG/1
200 TABLET ORAL 2 TIMES DAILY WITH MEALS
Status: DISCONTINUED | OUTPATIENT
Start: 2021-11-16 | End: 2021-11-25

## 2021-11-16 RX ORDER — HEPARIN SODIUM AND DEXTROSE 10000; 5 [USP'U]/100ML; G/100ML
INJECTION INTRAVENOUS CONTINUOUS
Status: DISCONTINUED | OUTPATIENT
Start: 2021-11-16 | End: 2021-11-18

## 2021-11-16 RX ORDER — MIDAZOLAM HYDROCHLORIDE 1 MG/ML
INJECTION INTRAMUSCULAR; INTRAVENOUS
Status: COMPLETED
Start: 2021-11-16 | End: 2021-11-16

## 2021-11-16 RX ORDER — HEPARIN SODIUM 5000 [USP'U]/ML
INJECTION, SOLUTION INTRAVENOUS; SUBCUTANEOUS
Status: COMPLETED
Start: 2021-11-16 | End: 2021-11-16

## 2021-11-16 RX ORDER — CLOPIDOGREL BISULFATE 75 MG/1
300 TABLET ORAL ONCE
Status: COMPLETED | OUTPATIENT
Start: 2021-11-16 | End: 2021-11-16

## 2021-11-16 RX ORDER — MIDAZOLAM HYDROCHLORIDE 1 MG/ML
INJECTION INTRAMUSCULAR; INTRAVENOUS
Status: DISPENSED
Start: 2021-11-16 | End: 2021-11-16

## 2021-11-16 RX ORDER — SENNOSIDES 8.8 MG/5ML
10 LIQUID ORAL NIGHTLY PRN
Status: DISCONTINUED | OUTPATIENT
Start: 2021-11-16 | End: 2021-12-28

## 2021-11-16 RX ORDER — SODIUM PHOSPHATE, DIBASIC AND SODIUM PHOSPHATE, MONOBASIC 7; 19 G/133ML; G/133ML
1 ENEMA RECTAL ONCE AS NEEDED
Status: DISCONTINUED | OUTPATIENT
Start: 2021-11-16 | End: 2021-12-28

## 2021-11-16 RX ORDER — BISACODYL 10 MG
10 SUPPOSITORY, RECTAL RECTAL
Status: DISCONTINUED | OUTPATIENT
Start: 2021-11-16 | End: 2021-12-28

## 2021-11-16 RX ORDER — METOPROLOL TARTRATE 50 MG/1
100 TABLET, FILM COATED ORAL
Status: DISCONTINUED | OUTPATIENT
Start: 2021-11-16 | End: 2021-11-27

## 2021-11-16 RX ORDER — MIDAZOLAM HYDROCHLORIDE 1 MG/ML
INJECTION INTRAMUSCULAR; INTRAVENOUS
Status: DISCONTINUED
Start: 2021-11-16 | End: 2021-11-16 | Stop reason: WASHOUT

## 2021-11-16 RX ORDER — CHLORHEXIDINE GLUCONATE 0.12 MG/ML
15 RINSE ORAL
Status: DISCONTINUED | OUTPATIENT
Start: 2021-11-16 | End: 2021-12-04

## 2021-11-16 RX ORDER — DEXMEDETOMIDINE HYDROCHLORIDE 4 UG/ML
INJECTION, SOLUTION INTRAVENOUS
Status: COMPLETED
Start: 2021-11-16 | End: 2021-11-16

## 2021-11-16 RX ORDER — ACETAMINOPHEN 160 MG/5ML
650 SOLUTION ORAL EVERY 4 HOURS PRN
Status: DISCONTINUED | OUTPATIENT
Start: 2021-11-16 | End: 2021-12-28

## 2021-11-16 RX ORDER — ACETAMINOPHEN 10 MG/ML
1000 INJECTION, SOLUTION INTRAVENOUS EVERY 6 HOURS PRN
Status: DISCONTINUED | OUTPATIENT
Start: 2021-11-16 | End: 2021-12-07 | Stop reason: ALTCHOICE

## 2021-11-16 RX ORDER — ATORVASTATIN CALCIUM 40 MG/1
40 TABLET, FILM COATED ORAL NIGHTLY
Status: DISCONTINUED | OUTPATIENT
Start: 2021-11-16 | End: 2021-12-04

## 2021-11-16 RX ORDER — KETOROLAC TROMETHAMINE 15 MG/ML
15 INJECTION, SOLUTION INTRAMUSCULAR; INTRAVENOUS EVERY 6 HOURS PRN
Status: DISCONTINUED | OUTPATIENT
Start: 2021-11-16 | End: 2021-11-16

## 2021-11-16 RX ORDER — FAMOTIDINE 10 MG/ML
20 INJECTION, SOLUTION INTRAVENOUS DAILY
Status: DISCONTINUED | OUTPATIENT
Start: 2021-11-16 | End: 2021-12-07

## 2021-11-16 RX ORDER — ACETAMINOPHEN 325 MG/1
650 TABLET ORAL EVERY 4 HOURS PRN
Status: DISCONTINUED | OUTPATIENT
Start: 2021-11-16 | End: 2021-12-28

## 2021-11-16 RX ORDER — ACETAMINOPHEN 650 MG/1
650 SUPPOSITORY RECTAL EVERY 4 HOURS PRN
Status: DISCONTINUED | OUTPATIENT
Start: 2021-11-16 | End: 2021-12-28

## 2021-11-16 RX ORDER — DEXMEDETOMIDINE HYDROCHLORIDE 4 UG/ML
INJECTION, SOLUTION INTRAVENOUS
Status: DISPENSED
Start: 2021-11-16 | End: 2021-11-17

## 2021-11-16 RX ORDER — ASPIRIN 81 MG/1
324 TABLET, CHEWABLE ORAL DAILY
Status: DISCONTINUED | OUTPATIENT
Start: 2021-11-16 | End: 2021-11-19

## 2021-11-16 RX ORDER — POLYETHYLENE GLYCOL 3350 17 G/17G
17 POWDER, FOR SOLUTION ORAL DAILY PRN
Status: DISCONTINUED | OUTPATIENT
Start: 2021-11-16 | End: 2021-12-28

## 2021-11-16 RX ORDER — MIDAZOLAM HYDROCHLORIDE 1 MG/ML
2 INJECTION INTRAMUSCULAR; INTRAVENOUS ONCE
Status: COMPLETED | OUTPATIENT
Start: 2021-11-16 | End: 2021-11-16

## 2021-11-16 RX ORDER — CLOPIDOGREL BISULFATE 75 MG/1
75 TABLET ORAL DAILY
Status: DISCONTINUED | OUTPATIENT
Start: 2021-11-16 | End: 2021-11-29

## 2021-11-16 RX ORDER — DEXMEDETOMIDINE HYDROCHLORIDE 4 UG/ML
INJECTION, SOLUTION INTRAVENOUS CONTINUOUS
Status: DISCONTINUED | OUTPATIENT
Start: 2021-11-16 | End: 2021-11-16

## 2021-11-16 RX ORDER — LIDOCAINE HYDROCHLORIDE 10 MG/ML
INJECTION, SOLUTION EPIDURAL; INFILTRATION; INTRACAUDAL; PERINEURAL
Status: COMPLETED
Start: 2021-11-16 | End: 2021-11-16

## 2021-11-16 NOTE — PLAN OF CARE
Received pt at 1930. A&Ox4. NSR on tele. On 3L NC. No complaints of pain. CHAVEZ. Heparin gtt infusing. IV bumex TID. Voids in urinal. Possible PCI once breathing issues resolve. Up with 2x walker. PT/OT to see.  Pt updated on poc and resting comfortably in be INTERVENTIONS:  - Assess for changes in respiratory status  - Assess for changes in mentation and behavior  - Position to facilitate oxygenation and minimize respiratory effort  - Oxygen supplementation based on oxygen saturation or ABGs  - Provide Smoking

## 2021-11-16 NOTE — PROGRESS NOTES
[facility]    Nephrology Progress Note    Mayela Lynch Attending:  Susana Aparicio MD       SUBJECTIVE:     PEA arrest then VT noted s/p shock. Intubated. Underwent PCI with impella support. Currently on norepi.     PHYSICAL EXAM:     Vital Signs BAPERCENT 1.0 11/05/2021    NE 5.72 11/05/2021    LYMABS 1.13 11/05/2021    MOABSO 0.65 11/05/2021    EOABSO 0.17 11/05/2021    BAABSO 0.08 11/05/2021     Lab Results   Component Value Date    MALBP 3.71 11/08/2021    CREUR 46.20 11/08/2021    CREUR 46. PRN  bisacodyl (DULCOLAX) rectal suppository 10 mg, 10 mg, Rectal, Daily PRN  Fleet Enema (FLEET) 7-19 GM/118ML enema 133 mL, 1 enema, Rectal, Once PRN  Chlorhexidine Gluconate (PERIDEX) 0.12 % solution 15 mL, 15 mL, Mouth/Throat, [email or web address]  Dexmedetom dyspnea on exertion x 2 weeks.    Prolonged hospital course complicated by PEA arrest, persistent hypoxia.     HEATHER on CKD stage 3 with albuminuria: baseline creatinine ~1.5 mg/dl  -- in the setting of cardiac arrest  -- now on norepi and 0.9% at 100 ml/hr,

## 2021-11-16 NOTE — ANESTHESIA PROCEDURE NOTES
Airway  Date/Time: 11/15/2021 11:40 PM  Urgency: emergent      General Information and Staff    Patient location during procedure: floor  Anesthesiologist: Mercedes Art DO  Performed: anesthesiologist     Consent for Airway (if performed for an anestheti

## 2021-11-16 NOTE — PROGRESS NOTES
[facility] Hospitalist Progress Note     Susanstar Liang Patient Status:  Inpatient    1955 MRN [id number]   [facility] 2NE-A Attending Alicia Yee MD   [facility] Day # 15 PCP Annie Marc DO     Chief Complaint: Patient p 2.22 mg/dL (H)). No results for input(s): PTP, INR in the last 168 hours.          COVID-19 Lab Results    COVID-19  Lab Results   Component Value Date    COVID19 Not Detected 11/11/2021    COVID19 Not Detected 11/07/2021    COVID19 Not Detected 11/05/20 Acute Respiratory Failure with Hypoxia   - O2 requirements stable, weaning as able  - possibly multifactorial between below infectious & cardiac etiologies   - AI serologies unremarkable  - dimer elevated 11/3/21, pt refused V/Q scan   - pulmonary foll clinical improvement     Chi Richardson MD  [address] [place] [address]

## 2021-11-16 NOTE — CONSULTS
[facility] Cardiology Progress Note - Tawnya Toribio Patient Status:  Inpatient    1955 MRN [id number]   [facility] 6NE-A Attending Adam Burris MD   [facility] Day # 15 PCP Carlos Cho DO     Subjective:   The even stable. Assessment:  -Post cardiopulmonary arrest–likely respiratory followed by cardiac preceded by benzo diazepam  -Pulmonary edema–initially cardiogenic now unilateral right side may have element of noncardiogenic etiology. Concern for aspiration.   Elenore Donate wound of amputation stump (HCC)     Type II or unspecified type diabetes mellitus with renal manifestations, uncontrolled(250.42)     Obesity (BMI 30-39. 9)     Encounter for long-term (current) use of insulin (HCC)     Gastrocnemius recession and Peroneus oz (139.3 kg)  11/10/21 0757 : (!) 308 lb 12.8 oz (140.1 kg)  11/09/21 0515 : 289 lb 14.5 oz (131.5 kg)  11/09/21 0500 : 289 lb 14.4 oz (131.5 kg)  11/03/21 1731 : (!) 325 lb 13.4 oz (147.8 kg)  11/03/21 1238 : 289 lb (131.1 kg)  10/12/21 0921 : (!) 330 lb 11/15/21  0610 11/16/21  0455   ALB 3.0* 2.8* 2.6* 2.6* 2.3*       Recent Labs   Lab 11/16/21  0737   TROP 0.069*       Allergies:     Ativan [Lorazepam]      CARDIAC ARREST    Comment:Had asystolic cardiac arrest shortly after ativan.              Unclear Intravenous, Continuous  propofol (DIPRIVAN) infusion, 5-100 mcg/kg/min (Dosing Weight), Intravenous, Continuous  norepinephrine (LEVOPHED) 4 mg/250 ml premix infusion, 0.5-30 mcg/min, Intravenous, Continuous  [Held by provider] bumetanide (BUMEX) 0.25 MG/ bleeding  Endocrine: no history of diabetes  Allergy: see above drug allergies    Rosaura Wei MD  11/16/2021  9:03 AM

## 2021-11-16 NOTE — PLAN OF CARE
Patient had Code Blue called on floor for PEA arrest. 3 rounds of ACLS done before ROSC to VT with pulse. Synchronized cardioversion done, into NSR with PVCs. All while on heparin gtt. Loading dose of amio given during code, gtt infused thereafter.  Intubat

## 2021-11-16 NOTE — PROGRESS NOTES
[facility] HOSPITALIST  CODE NOTE     Noel Collet Patient Status:  Inpatient    1955 MRN [id number]   [facility] 2NE-A Attending Ji Mckeon MD   Hosp Day # 15 PCP Levy Sun DO     Reason for Code Alert (narrative): aystolic

## 2021-11-16 NOTE — PROGRESS NOTES
Procedure    Microkit. Left and right access. Femoral angios. Upgraded to impella. Impella placed in LV. Turned on to full output. 7F in right groin. EBU 3.5 guide. BMW and corsair. Rota 1.5. Stented with 3.0x38mm Keny.   Posted with 3.5 and

## 2021-11-16 NOTE — CONSULTS
[address] Cardiology  Report of Consultation    [facility] Patient Status:  Inpatient    1955 MRN [id number]   [facility] Attending Janina Goltz, MD   [facility] Day # 15 PCP Louis Rivas DO     Reason for [facility] unresponsive and was felt to be in PEA. He underwent brief attempt at [facility] 1772 was brought back after intubation. Patient apparently is alert and oriented this morning when sedation is lightened.     Patient is going to undergo a high risk coronary mention wi has never smoked. He has never used smokeless tobacco. He reports that he does not drink alcohol and does not use drugs. Allergies:    Ativan [Lorazepam]      CARDIAC ARREST    Comment:Had asystolic cardiac arrest shortly after ativan.              Ro Diego Mouth/Throat, [email or web address]  •  Dexmedetomidine HCl (PRECEDEX) 800 mcg in sodium chloride 0.9% 100 mL infusion, 0.2-1.5 mcg/kg/hr (Dosing Weight), Intravenous, Continuous  •  propofol (DIPRIVAN) infusion, 5-100 mcg/kg/min (Dosing Weight), Intravenous, Cont Max:98.5 °F (36.9 °C)    Wt Readings from Last 3 Encounters:  11/16/21 : 297 lb 2.9 oz (134.8 kg)  10/12/21 : (!) 330 lb (149.7 kg)  05/14/21 : 267 lb (121.1 kg)      Telemetry: SR  General: Sedated/no apparent distress. HEENT: ET tube in place   neck:  Raman over the last few months with the patient ultimately presenting with heart failure. However, despite aggressive diuresis and over 15 L of net output, patient's chest x-ray really has not improved that much.   In fact, this morning's chest x-ray reveals

## 2021-11-16 NOTE — PROGRESS NOTES
11/15/21 5862   Clinical Encounter Type   Referral From Other (Comment)  (Responded to code blue)   Family not present at this time. Spiritual care remains available at pager [phone].

## 2021-11-16 NOTE — PROGRESS NOTES
[address] Patient Status:  Inpatient    1955 MRN [id number]   [facility] 6NE-A Attending Amanda Arias MD   [address] Day # 15 PCP Santiago Lyle DO     Critical Care Progress Note     Date of Admission: 11/3/2021 infusion,  mcg/hr, Intravenous, Continuous PRN  •  polyethylene glycol (PEG 3350) (MIRALAX) powder packet 17 g, 17 g, Oral, Daily PRN  •  senna (SENOKOT) syrup 17.6 mg, 10 mL, Oral, Nightly PRN  •  bisacodyl (DULCOLAX) rectal suppository 10 mg, 10 mg SpO2 100%   BMI 37.14 kg/m²      Vent Mode: VC+  FiO2 (%):  [100 %] 100 %  S RR:  [16] 16  S VT:  [550 mL] 550 mL  PEEP/CPAP (cm H2O):  [5 cm H20] 5 cm H20  MAP (cm H2O):  [7.6-12] 12      Wt Readings from Last 3 Encounters:  11/16/21 : 297 lb 2.9 oz (134 after cardiac interventions are performed today. - Obtaining TBBx increases the  risk of bleeding. can consider empiric steroids; but would hold off on this given pt's underlying DM. - repeat CXR shows persistent infiltrates R>L  2.  Cardiac arrest- with

## 2021-11-16 NOTE — OCCUPATIONAL THERAPY NOTE
Occupational therapy consult requested on 11/15/21 for functional mobility. Pt with cardiopulmonary arrest evening of 11/15/21. Pt currently intubated and sedated with medical plan for Impella placement this afternoon. Will sign off for now.  Please re-cons

## 2021-11-16 NOTE — PHYSICAL THERAPY NOTE
Physical therapy consult requested on 11/15/21 for functional mobility. Pt with cardiopulmonary arrest evening of 11/15/21. Pt currently intubated and sedated with medical plan for Impella placement this afternoon. Will sign off for now.   Please re-cons

## 2021-11-17 ENCOUNTER — APPOINTMENT (OUTPATIENT)
Dept: CV DIAGNOSTICS | Facility: HOSPITAL | Age: 66
DRG: 215 | End: 2021-11-17
Attending: INTERNAL MEDICINE
Payer: MEDICARE

## 2021-11-17 ENCOUNTER — APPOINTMENT (OUTPATIENT)
Dept: GENERAL RADIOLOGY | Facility: HOSPITAL | Age: 66
DRG: 215 | End: 2021-11-17
Attending: INTERNAL MEDICINE
Payer: MEDICARE

## 2021-11-17 ENCOUNTER — HOSPITAL ENCOUNTER (INPATIENT)
Dept: GENERAL RADIOLOGY | Facility: HOSPITAL | Age: 66
Discharge: HOME | DRG: 215 | End: 2021-11-17
Attending: HOSPITALIST
Payer: MEDICARE

## 2021-11-17 ENCOUNTER — APPOINTMENT (OUTPATIENT)
Dept: INTERVENTIONAL RADIOLOGY/VASCULAR | Facility: HOSPITAL | Age: 66
DRG: 215 | End: 2021-11-17
Attending: INTERNAL MEDICINE
Payer: MEDICARE

## 2021-11-17 PROCEDURE — 5A1D90Z PERFORMANCE OF URINARY FILTRATION, CONTINUOUS, GREATER THAN 18 HOURS PER DAY: ICD-10-PCS | Performed by: INTERNAL MEDICINE

## 2021-11-17 PROCEDURE — 0B9D8ZX DRAINAGE OF RIGHT MIDDLE LUNG LOBE, VIA NATURAL OR ARTIFICIAL OPENING ENDOSCOPIC, DIAGNOSTIC: ICD-10-PCS | Performed by: INTERNAL MEDICINE

## 2021-11-17 PROCEDURE — 71045 X-RAY EXAM CHEST 1 VIEW: CPT | Performed by: INTERNAL MEDICINE

## 2021-11-17 PROCEDURE — 93306 TTE W/DOPPLER COMPLETE: CPT | Performed by: INTERNAL MEDICINE

## 2021-11-17 PROCEDURE — 05HM33Z INSERTION OF INFUSION DEVICE INTO RIGHT INTERNAL JUGULAR VEIN, PERCUTANEOUS APPROACH: ICD-10-PCS | Performed by: RADIOLOGY

## 2021-11-17 PROCEDURE — 71045 X-RAY EXAM CHEST 1 VIEW: CPT | Performed by: HOSPITALIST

## 2021-11-17 RX ORDER — VANCOMYCIN 2 GRAM/500 ML IN 0.9 % SODIUM CHLORIDE INTRAVENOUS
25 ONCE
Status: COMPLETED | OUTPATIENT
Start: 2021-11-17 | End: 2021-11-17

## 2021-11-17 RX ORDER — LIDOCAINE HYDROCHLORIDE 10 MG/ML
INJECTION, SOLUTION INFILTRATION; PERINEURAL
Status: COMPLETED
Start: 2021-11-17 | End: 2021-11-17

## 2021-11-17 RX ORDER — HEPARIN SODIUM 5000 [USP'U]/ML
INJECTION, SOLUTION INTRAVENOUS; SUBCUTANEOUS
Status: COMPLETED
Start: 2021-11-17 | End: 2021-11-17

## 2021-11-17 RX ORDER — BUMETANIDE 0.25 MG/ML
2 INJECTION, SOLUTION INTRAMUSCULAR; INTRAVENOUS ONCE
Status: COMPLETED | OUTPATIENT
Start: 2021-11-17 | End: 2021-11-17

## 2021-11-17 RX ORDER — MIDAZOLAM HYDROCHLORIDE 1 MG/ML
2 INJECTION INTRAMUSCULAR; INTRAVENOUS ONCE
Status: COMPLETED | OUTPATIENT
Start: 2021-11-17 | End: 2021-11-17

## 2021-11-17 RX ORDER — ALBUMIN (HUMAN) 12.5 G/50ML
25 SOLUTION INTRAVENOUS EVERY 6 HOURS
Status: COMPLETED | OUTPATIENT
Start: 2021-11-17 | End: 2021-11-17

## 2021-11-17 RX ORDER — MIDAZOLAM HYDROCHLORIDE 1 MG/ML
INJECTION INTRAMUSCULAR; INTRAVENOUS
Status: COMPLETED
Start: 2021-11-17 | End: 2021-11-17

## 2021-11-17 RX ORDER — POTASSIUM CHLORIDE 14.9 MG/ML
20 INJECTION INTRAVENOUS ONCE
Status: COMPLETED | OUTPATIENT
Start: 2021-11-17 | End: 2021-11-17

## 2021-11-17 RX ORDER — HEPARIN SODIUM 1000 [USP'U]/ML
1.5 INJECTION, SOLUTION INTRAVENOUS; SUBCUTANEOUS AS NEEDED
Status: DISCONTINUED | OUTPATIENT
Start: 2021-11-17 | End: 2021-11-18

## 2021-11-17 RX ORDER — VANCOMYCIN HYDROCHLORIDE
15
Status: DISCONTINUED | OUTPATIENT
Start: 2021-11-19 | End: 2021-11-17

## 2021-11-17 RX ORDER — VANCOMYCIN HYDROCHLORIDE
15 EVERY 24 HOURS
Status: DISCONTINUED | OUTPATIENT
Start: 2021-11-18 | End: 2021-11-19

## 2021-11-17 NOTE — DIETARY NOTE
[facility]    NUTRITION ASSESSMENT    Pt does not meet malnutrition criteria. NUTRITION INTERVENTION:   1.  Enteral Nutrition - Recommend starting Vital HP 1.0 at 25 ml/hour advancing 10 ml/hour q 4 hours to goal rate of 65 ml/hour.   o This will pr Controlled: 1800 kcal/60 grams; Sodium Restriction: 2 GM NA; Fluid Restriction: 1500 ml; Is Patient on Accuchecks?  Yes     Oral Supplements: None      Percent Meals Eaten (last 3 days)     Date/Time Percent Meals Eaten (%)    11/14/21 0941 100 %    11/14/2

## 2021-11-17 NOTE — PROGRESS NOTES
[facility] Cardiology Progress Note - Misael Daniels Patient Status:  Inpatient    1955 MRN [id number]   [facility] 6NE-A Attending Jaylan Conklin MD   [facility] Day # 15 PCP Amaya Urias DO     Subjective:  Pt intua following, plan CRRT    - PAD (s/p R foot amputation in past)    Patient Active Problem List:     Traumatic amputation of foot (complete) (partial), unilateral, complicated ([address]     Cardiomyopathy ([facility]     Obesity     Diabetes mellitus due to underlying disease ([facility]     Cardiac arrest ([facility]      Objective:   Temp: 100.4 °F (38 °C)  Pulse: 58  Resp: 16  BP: 91/61  AO: 102/53  FiO2 (%): 100 %    Intake/Output:     Intake/Output Summary (Last 24 hours) at 11/17/2021 0900  Last data filed at 11/17/2021 0500 11/15/21  0610 11/16/21  0455 11/17/21  0409    139 140 141 140   K 4.2 4.0 3.5 4.3 3.7   CL 97* 97* 96* 95* 99   CO2 33.0* 36.0* 39.0* 42.0* 37.0*   BUN 45* 43* 50* 63* 69*   CREATSERUM 1.54* 1.48* 1.54* 2.22* 2.43*   CA 9.6 9.8 9.7 9.3 8.3*   MG 2. infusion 1,000 mg, 1,000 mg, Intravenous, Q6H PRN  fentanyl (SUBLIMAZE) 1000 mcg/100 ml NS premix infusion,  mcg/hr, Intravenous, Continuous PRN  polyethylene glycol (PEG 3350) (MIRALAX) powder packet 17 g, 17 g, Oral, Daily PRN  senna (SENOKOT) syru Or  dextrose 50 % injection 50 mL, 50 mL, Intravenous, Q15 Min PRN   Or  glucose (DEX4) oral liquid 30 g, 30 g, Oral, Q15 Min PRN   Or  glucose-vitamin C (DEX-4) chewable tab 8 tablet, 8 tablet, Oral, Q15 Min PRN      ROS:  General Health: otherwise feels

## 2021-11-17 NOTE — PLAN OF CARE
Assumed patient care at [address] [place] [address]. Patient resting in bed, vented, sedated on propofol and precedex, amio, and heparin, and levo infusing, impella in place. OG and leyva intact, draining. Patient able to follow commands bilaterally, pupils equal and reactive.  R prescribed range  Description: INTERVENTIONS:  - Monitor Blood Glucose as ordered  - Assess for signs and symptoms of hyperglycemia and hypoglycemia  - Administer ordered medications to maintain glucose within target range  - Assess barriers to adequate nu

## 2021-11-17 NOTE — RESPIRATORY THERAPY NOTE
Assisted Dr. Linette Mckay with bedside bronchoscopy. 100ml bronchial wash with 40ml pulled back for lab sample. Samples labeled and walked to lab. 10 min after procedure was completed, patient had increasing peak pressures and desaturated to 71%.  Lungs- Dorothea Cons

## 2021-11-17 NOTE — PLAN OF CARE
Assumed care of pt at 0700. Pt intubated and sedated. See MAR. Impella maintained in left groin. Impella at 102 cm, site clean, dry, intact. Palpable pulses. ACT's Q2 hr. Goal 160-180. Bedside bronchoalveolar lavage performed at 0730.  Pt hypoxic and hypote

## 2021-11-17 NOTE — PROGRESS NOTES
Progress Note    Asked to see pt for persistent resp failure. Arrived bedside  ABG reviewed, PaO2 has improved but PaCO2 has not and pH remains low.   Vent changes made, trading oxygenation for ventilation  X-ray reviewed- right lung is nearly gonzález out,

## 2021-11-17 NOTE — PROGRESS NOTES
Received pt vented, VC+ 16/550/60%/+5, tolerating well. No changes made. Will continue to monitor closely.       11/17/21 0303   Vent Information   $ RT Standby Charge (per 15 min) 1   $ Vent Care / Non-Invasive Subsequent Day Yes   Ventilator Initiation 11

## 2021-11-17 NOTE — PROGRESS NOTES
[facility] Hospitalist Progress Note     Lashonda Poole Patient Status:  Inpatient    1955 MRN [id number]   [facility] 2NE-A Attending Amanda Arias MD   [facility] Day # 15 PCP Santiago Lyle DO     Chief Complaint: Patient p COVID19 Not Detected 11/07/2021    COVID19 Not Detected 11/05/2021       Pro-Calcitonin  Recent Labs   Lab 11/13/21  0801   PCT 0.52*       Cardiac  Recent Labs   Lab 11/16/21  0737   TROP 0.069*       Creatinine Kinase  No results for input(s): CK in the increasing, titration per intensivist   - pulmonary consulted, transferred to ICU     Acute Respiratory Failure with Hypoxia   - O2 requirements stable, weaning as able  - possibly multifactorial between below infectious & cardiac etiologies   - AI serolog FULL  · Bettencourt: n/a  · Central line: n/a  · If COVID testing is negative, may discontinue isolation: yes     Estimated date of discharge: >2 midnights, pt is critically ill  Discharge is dependent on: further cardiac interventions, clinical improvement

## 2021-11-17 NOTE — PROGRESS NOTES
[address] Patient Status:  Inpatient    1955 MRN [id number]   [facility] 6NE-A Attending Benny Castaneda MD   [address] Day # 15 PCP Roldan Shaw DO     Critical Care Progress Note     Date of Admission: 11/3/2021 (MIRALAX) powder packet 17 g, 17 g, Oral, Daily PRN  •  senna (SENOKOT) syrup 17.6 mg, 10 mL, Oral, Nightly PRN  •  bisacodyl (DULCOLAX) rectal suppository 10 mg, 10 mg, Rectal, Daily PRN  •  Fleet Enema (FLEET) 7-19 GM/118ML enema 133 mL, 1 enema, Rectal, **OR** glucose-vitamin C (DEX-4) chewable tab 8 tablet, 8 tablet, Oral, Q15 Min PRN     OBJECTIVE:  BP 91/61   Pulse 54   Temp 100.4 °F (38 °C) (Pulmonary Artery)   Resp 18   Ht 6' 3\" (1.905 m)   Wt 298 lb 1 oz (135.2 kg)   SpO2 95%   BMI 37.26 kg/m² can perform BAL to r/o atypical infection and some inflammatory processes. Proceed with bronch today. - wean FiO2 as tolerated  - not ready for SBT  - diuresis per cards  2.  Cardiac arrest- with ROSC  - triggered by ventricular arrhythmia following poss

## 2021-11-17 NOTE — PLAN OF CARE
Assumed care of pt at 0700. Pt intubated and sedated. See MAR. OG tube inserted and connected to low intermittent suction, level at 26. Bettencourt catheter inserted. Pt taken down to cath lab at 1130 on amio, precedex, levo.  [facility]

## 2021-11-17 NOTE — PROGRESS NOTES
[facility]    Nephrology Progress Note    Delano Mcelroy Attending:  Dana Truong MD       SUBJECTIVE:     He underwent bronchoscopy this morning. He is hypotensive and pressors are being adjusted for this. Impella remains in place.   His urine o 24.2 (L) 11/15/2021    MCHC 29.6 (L) 11/15/2021    RDW 16.6 11/15/2021    NEPRELIM 5.72 11/05/2021    NEPERCENT 73.5 11/05/2021    LYPERCENT 14.5 11/05/2021    MOPERCENT 8.3 11/05/2021    EOPERCENT 2.2 11/05/2021    BAPERCENT 1.0 11/05/2021    NE 5.72 11/0 PRN  acetaminophen (TYLENOL) tab 650 mg, 650 mg, Oral, Q4H PRN   Or  acetaminophen (TYLENOL) 160 MG/5ML oral liquid 650 mg, 650 mg, Oral, Q4H PRN   Or  acetaminophen (Tylenol) rectal suppository 650 mg, 650 mg, Rectal, Q4H PRN   Or  acetaminophen (OFIRMEV) Units, 1,000 Units, Oral, Daily  fenofibrate micronized (LOFIBRA) cap 134 mg, 134 mg, Oral, Daily with breakfast  glucose (DEX4) oral liquid 15 g, 15 g, Oral, Q15 Min PRN   Or  glucose-vitamin C (DEX-4) chewable tab 4 tablet, 4 tablet, Oral, Q15 Min PRN or concerns.         Nidia Martines MD  [address] Nephrology  [facility] [address]  [facility]  [place], [address]    11/17/2021  11:00 AM

## 2021-11-17 NOTE — OPERATIVE REPORT
Bronchoscopy Procedure Report     Preop diagnosis:       CHF, acute hypoxic resp failure  Postop diagnosis:      same  Procedure performed: Bronchoscopy, Diagnostic  Bronchoalveolar lavage, BAL     Sedation used:          2 mg of versed, propofol gtt, prec

## 2021-11-17 NOTE — CONSULTS
[facility] Dosing Service    Initial Pharmacokinetic Consult for Vancomycin Dosing     Worsening respiratory status and renal function today. Bronchoscopy was done this morning at the bedside.   Patient desaturated afterwards and CXR was concerning for A

## 2021-11-17 NOTE — PROGRESS NOTES
[facility]    Nephrology Progress Note    Discussed with pulmonary medicine. Difficulty ventilating and oxygenating on maximal ventilatory support. Patient received 2 mg IV bumex and had approx 75 ml UOP.   Discussed with patient's wife regarding CVV

## 2021-11-17 NOTE — PROCEDURES
[facility]  Procedure Note    Americamorgan Renee Patient Status:  Inpatient    1955 MRN [id number]   [facility] 6NE-A Attending Curtis Garcia MD   [facility] Day # 15 PCP Deniz Dixon DO     Procedure: temporary HD catheter placement

## 2021-11-17 NOTE — PROGRESS NOTES
Cards    1. Echo with EF 40-45% range with apical hypokinesis but overall EF improved from prior study. 2. Impella in appropriate location. 3. CO unchanged with impella from P4->P2 with no change in PA diastolic pressure.   However NE requirement for SBP>

## 2021-11-18 ENCOUNTER — APPOINTMENT (OUTPATIENT)
Dept: INTERVENTIONAL RADIOLOGY/VASCULAR | Facility: HOSPITAL | Age: 66
DRG: 215 | End: 2021-11-18
Attending: INTERNAL MEDICINE
Payer: MEDICARE

## 2021-11-18 ENCOUNTER — APPOINTMENT (OUTPATIENT)
Dept: GENERAL RADIOLOGY | Facility: HOSPITAL | Age: 66
DRG: 215 | End: 2021-11-18
Attending: INTERNAL MEDICINE
Payer: MEDICARE

## 2021-11-18 ENCOUNTER — APPOINTMENT (OUTPATIENT)
Dept: WOUND CARE | Facility: HOSPITAL | Age: 66
End: 2021-11-18
Attending: NURSE PRACTITIONER
Payer: MEDICARE

## 2021-11-18 PROCEDURE — 71045 X-RAY EXAM CHEST 1 VIEW: CPT | Performed by: INTERNAL MEDICINE

## 2021-11-18 PROCEDURE — 02PA3RZ REMOVAL OF SHORT-TERM EXTERNAL HEART ASSIST SYSTEM FROM HEART, PERCUTANEOUS APPROACH: ICD-10-PCS | Performed by: INTERNAL MEDICINE

## 2021-11-18 RX ORDER — HEPARIN SODIUM 5000 [USP'U]/ML
INJECTION, SOLUTION INTRAVENOUS; SUBCUTANEOUS
Status: COMPLETED
Start: 2021-11-18 | End: 2021-11-18

## 2021-11-18 RX ORDER — HEPARIN SODIUM 1000 [USP'U]/ML
1.5 INJECTION, SOLUTION INTRAVENOUS; SUBCUTANEOUS AS NEEDED
Status: DISCONTINUED | OUTPATIENT
Start: 2021-11-18 | End: 2021-12-06

## 2021-11-18 RX ORDER — LIDOCAINE HYDROCHLORIDE 10 MG/ML
INJECTION, SOLUTION EPIDURAL; INFILTRATION; INTRACAUDAL; PERINEURAL
Status: COMPLETED
Start: 2021-11-18 | End: 2021-11-18

## 2021-11-18 NOTE — PROGRESS NOTES
[facility] Cardiology Progress Note - Yue Santana Patient Status:  Inpatient    1955 MRN [id number]   [facility] 6NE-A Attending Yokasta Marino MD   [facility] Day # 13 PCP Gwendolyn Chaudhari DO     Subjective:  Asleep o amputation stump ([address]     Type II or unspecified type diabetes mellitus with renal manifestations, uncontrolled(250.42)     Obesity (BMI 30-39. 9)     Encounter for long-term (current) use of insulin (HCC)     Gastrocnemius recession and Peroneus longus te oz (138.4 kg)  11/11/21 0555 : (!) 307 lb 1.6 oz (139.3 kg)  11/10/21 0757 : (!) 308 lb 12.8 oz (140.1 kg)  11/09/21 0515 : 289 lb 14.5 oz (131.5 kg)  11/09/21 0500 : 289 lb 14.4 oz (131.5 kg)  11/03/21 1731 : (!) 325 lb 13.4 oz (147.8 kg)  11/03/21 1238 : Recent Labs   Lab 11/16/21  0737   TROP 0.069*       Allergies:     Ativan [Lorazepam]      CARDIAC ARREST    Comment:Had asystolic cardiac arrest shortly after ativan.              Unclear if this was sole cause but was temporaily             related syrup 17.6 mg, 10 mL, Oral, Nightly PRN  bisacodyl (DULCOLAX) rectal suppository 10 mg, 10 mg, Rectal, Daily PRN  Fleet Enema (FLEET) 7-19 GM/118ML enema 133 mL, 1 enema, Rectal, Once PRN  Chlorhexidine Gluconate (PERIDEX) 0.12 % solution 15 mL, 15 mL, Courtney well, weight stable  Constitutiona: no recent fevers  Skin: denies any unusual skin lesions or rashes  Eyes: no visual complaints or deficits  HEENT: denies nasal congestion, sinus pain; hearing loss negative  Respiratory: denies shortness of breath, wheez

## 2021-11-18 NOTE — PROGRESS NOTES
[facility] Hospitalist Progress Note     Roro Hudson Patient Status:  Inpatient    1955 MRN [id number]   [facility] 2NE-A Attending Eriberto Pacheco MD   [facility] Day # 13 PCP Edna White DO     Chief Complaint: Patient p COVID19 Not Detected 11/11/2021    COVID19 Not Detected 11/07/2021    COVID19 Not Detected 11/05/2021       Pro-Calcitonin  Recent Labs   Lab 11/13/21  0801   PCT 0.52*       Cardiac  Recent Labs   Lab 11/16/21  0737   TROP 0.069*       Creatinine Kinase titration per intensivist   - pulmonary consulted, transferred to ICU     Acute Respiratory Failure with Hypoxia   - possibly multifactorial between below infectious & cardiac etiologies   - AI serologies unremarkable  - pulmonary following  - 11/14 slight date of discharge: >2 midnights, pt is critically ill  Discharge is dependent on: further cardiac interventions, clinical improvement     N Shea Elder MD  [address] [place] [address]

## 2021-11-18 NOTE — PLAN OF CARE
Assumed patient care at [address] [place] [address]. Patient resting in bed, vented, sedated on propofol. Precedex off d/t bradycardia. Amiodarone and heparin infusing, levophed weaned off. Impella in place. P4 setting, ACTs within goal overnight.  Manual CO performed per Susana Elias in Incentive Spirometry  - Assess the need for suctioning and perform as needed  - Assess and instruct to report SOB or any respiratory difficulty  - Respiratory Therapy support as indicated  - Manage/alleviate anxiety  - Monitor for signs/symptoms of CO2 ret

## 2021-11-18 NOTE — PROGRESS NOTES
patient remained on ventilator overnight. no RT changes were made. will continue to wean FiO2 as tolerated. will continue to monitor.      11/18/21 0402   Vent Information   $ RT Standby Charge (per 15 min) 1   Ventilator Initiation 11/16/21   Ventilation D

## 2021-11-18 NOTE — PROGRESS NOTES
[facility]    Nephrology Progress Note    Carroll Lopez Attending:  Minor Severe, MD       SUBJECTIVE:     Impella remains in place. Off pressors. Tolerating CVVH with net UF 50 ml/hr but PA pressures remain elevated. Intubated and sedated. 11/18/2021     11/18/2021    CO2 31.0 11/18/2021    CO2 31.0 11/18/2021     Lab Results   Component Value Date    WBC 10.3 11/18/2021    RBC 3.25 (L) 11/18/2021    HGB 7.9 (L) 11/18/2021    HCT 26.2 (L) 11/18/2021    .0 11/18/2021    MCV 80.6 (LOPRESSOR) tab 100 mg, 100 mg, Per G Tube, 2x Daily(Beta Blocker)  amiodarone (PACERONE) tab 200 mg, 200 mg, Per G Tube, BID with meals  atorvastatin (LIPITOR) tab 40 mg, 40 mg, Per G Tube, Nightly  Amiodarone HCl (CORDARONE) 450 mg in dextrose 5 % 250 mL TID  polyethylene glycol (PEG 3350) (MIRALAX) powder packet 17 g, 17 g, Oral, Daily  insulin detemir (LEVEMIR) 100 UNIT/ML flextouch 40 Units, 40 Units, Subcutaneous, [email or web address]  melatonin tab 3 mg, 3 mg, Oral, Nightly  dilTIAZem 100mg/100ml in NaCl (ca cardiology following    Respiratory failure requiring mechanical ventilator:  -- per pulmonary    Critical care time > 35 min. Discussed with nursing. Will follow. Thank you for allowing me to participate in the care of this patient.  Please do not hes

## 2021-11-18 NOTE — VASCULAR ACCESS
Touched base with RNDell. CRRT getting restarted. Per RN, patient is getting weaned off some drips and has adequate access at the moment. VAT will re-assess patient in the morning to determine if line placement still needed.

## 2021-11-18 NOTE — PROGRESS NOTES
Cards    Impella removed. Turned to P1.  BP unchanged. Pulled across valve. Then off. Then removed over wire. Perclose sutures tied. Still had bleeding. Added an 8F angioseal.  Dry groin. BP unchanged.   Light fem-stop since he is a larger habitus

## 2021-11-18 NOTE — PROGRESS NOTES
[address] Patient Status:  Inpatient    1955 MRN [id number]   [facility] 6NE-A Attending Yokasta Marino MD   [facility] Day # 13 PCP Gwendolyn Chaudhari DO     Pulm / Critical Care Progress Note     S: remains intubated, glucose-vitamin C       OBJECTIVE:   11/18/21  0400 11/18/21  0500 11/18/21  0600 11/18/21  0700   BP: 105/54 115/53 120/56 121/54   BP Location: Right arm      Pulse: 56 60 62 61   Resp: (!) 28 16 25 20   Temp: 97.3 °F (36.3 °C) 97.2 °F (36.2 °C) 97 °F (3 ]    Recent Labs   Lab 11/18/21  0359   ALT 27   AST 39*         Cx:  ngtd       ASSESSMENT/PLAN:    1.  Acute respiratory failure - due to cardiac arrest in assn ventricular arrhythmia.   - Intubated for airway protection  - has underlying pulmonary thelma

## 2021-11-18 NOTE — PLAN OF CARE
Assumed care of patient at 0730. Patient intubated and sedated on propofol. NSR/SB on monitor. Amio gtt. Infusing. SBP normotensive. Impella to left groin, c/d/I. Heparin gtt per protocol. Marcin CO/CI WNL. PA pressures remain upper 60's  to 70s.  Doppler Progressing  Goal: Absence of cardiac arrhythmias or at baseline  Description: INTERVENTIONS:  - Continuous cardiac monitoring, monitor vital signs, obtain 12 lead EKG if indicated  - Evaluate effectiveness of antiarrhythmic and heart rate control medicati repeat lab results as appropriate  - Fluid restriction as ordered  - Instruct patient on fluid and nutrition restrictions as appropriate  Outcome: Progressing

## 2021-11-18 NOTE — PROGRESS NOTES
[facility]                INFECTIOUS DISEASE PROGRESS NOTE    Janie Hanson Patient Status:  Inpatient    1955 MRN [id number]   [facility] 2NE-A Attending Dorrine Hammans, MD   [facility] Day # 13 PCP Alexa Lemus DO     Antibiotics 34*   GFRNAA 30* 27* 29*  29*   CA 9.3 8.3* 8.2*  8.2*   ALB 2.3* 1.9* 2.3*  2.3*    140 136  136   K 4.3 3.7 4.1  4.1   CL 95* 99 101  101   CO2 42.0* 37.0* 31.0  31.0   ALKPHO  --   --  48   AST  --   --  39*   ALT  --   --  27   BILT  --   --  1.1 neurological manifestations, uncontrolled(250.62)     Hyponatremia     Anemia     Hyperglycemia     Cellulitis of foot     Debridement of open wound, 4th MT head resection, wound VAC application left foot- Sx 12/21/17  GLENN 03/21/18     Osteomyelitis ([facility] [address]

## 2021-11-19 PROCEDURE — 05HY33Z INSERTION OF INFUSION DEVICE INTO UPPER VEIN, PERCUTANEOUS APPROACH: ICD-10-PCS | Performed by: HOSPITALIST

## 2021-11-19 PROCEDURE — 5A1D70Z PERFORMANCE OF URINARY FILTRATION, INTERMITTENT, LESS THAN 6 HOURS PER DAY: ICD-10-PCS | Performed by: INTERNAL MEDICINE

## 2021-11-19 RX ORDER — HEPARIN SODIUM 5000 [USP'U]/ML
5000 INJECTION, SOLUTION INTRAVENOUS; SUBCUTANEOUS EVERY 8 HOURS SCHEDULED
Status: DISCONTINUED | OUTPATIENT
Start: 2021-11-19 | End: 2021-12-03

## 2021-11-19 RX ORDER — ASPIRIN 81 MG/1
81 TABLET, CHEWABLE ORAL DAILY
Status: DISCONTINUED | OUTPATIENT
Start: 2021-11-20 | End: 2021-12-06

## 2021-11-19 NOTE — PROGRESS NOTES
[facility] Hospitalist Progress Note     Janie Hanson Patient Status:  Inpatient    1955 MRN [id number]   [facility] 2NE-A Attending Sugey Razo MD   [facility] Day # 12 PCP Alexa Lemus DO     Chief Complaint: Patient p 11/19/21  0409   PTP 18.4*   INR 1.50*            COVID-19 Lab Results    COVID-19  Lab Results   Component Value Date    COVID19 Not Detected 11/11/2021    COVID19 Not Detected 11/07/2021    COVID19 Not Detected 11/05/2021       Pro-Calcitonin  Recent Lab edema/respiratory insufficiency & external sedation (received diazepam shortly prior to event)   - off pressors now, cont to have elevated PA pressure  - pulmonary consulted, transferred to ICU -intubated and sedarted  -s/pTEE/CV and impella, impella now r to 40 units BID with good control-cont ucrrent regime for now     HTN   - holding home meds given above     Severe Obesity BMI > 35  - op follow up pending resolution of acute issues     Supplementary Documentation:     Quality:  · DVT Prophylaxis: heparin

## 2021-11-19 NOTE — PLAN OF CARE
Assumed pt care at 0730. Pt intubated and sedated on propofol and fentanyl gtt. VSS. NSR. R fem swan intact at 88cm. Manual CO per order. Palpable pulses. Tube feedings via og. Bettencourt intact with minimal u/o. CRRT clotted at 1020. HD started around 1200.  3L

## 2021-11-19 NOTE — PROCEDURES
[facility]    PATIENT'S NAME: Valerie Rocha   ATTENDING PHYSICIAN: Reena Capone MD   OPERATING PHYSICIAN: Ines Garza.  Yesy Austin MD   PATIENT ACCOUNT#:   [id number]    LOCATION:  [address] [place] [address]  MEDICAL RECORD #:   [id number]       DATE OF BIRTH: and his cardiac output is in the normal range. The patient will follow under the direction of Dr. Christopher Lilly. Dictated By Joon Oseguera MD  d: 11/18/2021 15:09:43  t: 11/18/2021 21:57:34  [facility] [id number]  DFT/

## 2021-11-19 NOTE — PROGRESS NOTES
[facility]    Nephrology Progress Note    Becki Hardy Attending:  Devin Rodríguez MD       SUBJECTIVE:     impella was removed. Remains intubated, but off pressors. Tolerating CVVH however dialysate is leaking from machine.     PHYSICAL EXAM: 11/19/2021     11/19/2021    CO2 28.0 11/19/2021    CO2 28.0 11/19/2021     Lab Results   Component Value Date    WBC 11.1 (H) 11/19/2021    RBC 3.32 (L) 11/19/2021    HGB 7.8 (L) 11/19/2021    HCT 27.9 (L) 11/19/2021    .0 11/19/2021    MCV 8 Daily  metoprolol tartrate (LOPRESSOR) tab 100 mg, 100 mg, Per G Tube, 2x Daily(Beta Blocker)  amiodarone (PACERONE) tab 200 mg, 200 mg, Per G Tube, BID with meals  atorvastatin (LIPITOR) tab 40 mg, 40 mg, Per G Tube, Nightly  Amiodarone HCl (CORDARONE) 45 [email or web address]  melatonin tab 3 mg, 3 mg, Oral, Nightly  dilTIAZem 100mg/100ml in NaCl (cardIZEM) 1 mg/mL IVPB add-vantage, 2.5-20 mg/hr, Intravenous, Continuous  Insulin Aspart Pen (NOVOLOG) 100 UNIT/ML flexpen 2-10 Units, 2-10 Units, Subcutaneous, TID CC allowing me to participate in the care of this patient. Please do not hesitate to call with questions or concerns.         Matthew Waller MD  [address] Nephrology  [address] [place] [address]    11/19/2021  10:40 AM

## 2021-11-19 NOTE — PROGRESS NOTES
Received patient on below vent settings. ABG drawn and reported at beginning of shift, no changes. Plat pressures high overnight, gradually lowering, currently 27 with a PIP of 33. BS rhonchi, suctioning moderate secretions.        11/19/21 2753   Vent Info

## 2021-11-19 NOTE — PROGRESS NOTES
[facility]                INFECTIOUS DISEASE PROGRESS NOTE    Alexus Vanegas Patient Status:  Inpatient    1955 MRN [id number]   [facility] 6NE-A Attending Kaiser Roger MD   [facility] Day # 12 PCP DO Hannah Duarte 2.3*    136  136 136  136   K 3.7 4.1  4.1 4.1  4.1   CL 99 101  101 101  101   CO2 37.0* 31.0  31.0 28.0  28.0   ALKPHO  --  48 53   AST  --  39* 37   ALT  --  27 31   BILT  --  1.1 0.9   TP  --  6.3* 6.8       Vancomycin Trough (ug/mL)   Date Valu Hyperglycemia     Cellulitis of foot     Debridement of open wound, 4th MT head resection, wound VAC application left foot- Sx 12/21/17  GLENN 03/21/18     Osteomyelitis ([address]     Cellulitis     Long term (current) use of insulin (HCC)     Hypoglycemia     H

## 2021-11-19 NOTE — PROGRESS NOTES
[facility] Cardiology Progress Note - Blease Higinio Patient Status:  Inpatient    1955 MRN [id number]   [facility] 6NE-A Attending Michelle Hawkins MD   [address] Day # 12 PCP Cassy Reece DO     Subjective:  Pt intua NSR))   -PO Amio, stopping Amiodarone gtt   - Resume BB    - HEATHER on CKD stage III (baseline Cr 1.5 - 1.8)   - Cr stable from yest (1.53 from 2.23)   - U/O 360cc/24h.   - PAP 60/20.     - Nephrology following and transitioned to HD today    - PAD (s/p R pia Acute congestive heart failure, unspecified [facility] failure type [facility]     Pneumonia of both lungs due to infectious organism, unspecified part of lung     Chronic systolic heart failure (HCC)     Stage 3 chronic kidney disease ([facility]     Cardiac arrest ([facility] Laboratory/Data:    Labs:       Recent Labs   Lab 11/12/21  1217 11/15/21  1305 11/18/21  0359 11/18/21  1225 11/19/21  0409   WBC 11.6* 10.1 10.3 15.9* 11.1*   HGB 11.0* 10.7* 7.9* 8.5* 7.8*   MCV 79.8* 81.9 80.6 83.3 84.0   .0* 459.0* 247.0 32 Daily  metoprolol tartrate (LOPRESSOR) tab 100 mg, 100 mg, Per G Tube, 2x Daily(Beta Blocker)  amiodarone (PACERONE) tab 200 mg, 200 mg, Per G Tube, BID with meals  atorvastatin (LIPITOR) tab 40 mg, 40 mg, Per G Tube, Nightly  Amiodarone HCl (CORDARONE) 45 [email or web address]  melatonin tab 3 mg, 3 mg, Oral, Nightly  dilTIAZem 100mg/100ml in NaCl (cardIZEM) 1 mg/mL IVPB add-vantage, 2.5-20 mg/hr, Intravenous, Continuous  Insulin Aspart Pen (NOVOLOG) 100 UNIT/ML flexpen 2-10 Units, 2-10 Units, Subcutaneous, TID CC

## 2021-11-19 NOTE — PLAN OF CARE
CRRT now stopped. Plan for iHD. Zosyn adjusted to 4.5 grams IV every 12 hours per protocol.     Ashley Reilly, PharmD  11/19/21 1:24 PM

## 2021-11-19 NOTE — DIETARY NOTE
[facility]    NUTRITION ASSESSMENT    Pt does not meet malnutrition criteria. NUTRITION INTERVENTION:   1.  Enteral Nutrition - Recommend change TF to Vital AF 1.2 at 65 ml/hour advancing 10 ml/hour q 4 hours to goal rate of 90 ml/hour.   o This saroj 1517 (!) 140.1 kg (308 lb 12.8 oz)   11/09/21 0515 131.5 kg (289 lb 14.5 oz)   11/09/21 0500 131.5 kg (289 lb 14.4 oz)       Wt Readings from Last 10 Encounters:  11/19/21 : (!) 139.3 kg (307 lb 1.6 oz)  10/12/21 : (!) 149.7 kg (330 lb)  05/14/21 : 121.1 k

## 2021-11-19 NOTE — PROGRESS NOTES
[address] Patient Status:  Inpatient    1955 MRN [id number]   [facility] 6NE-A Attending Yaniv Peters MD   [facility] Day # 12 PCP Marshal Cartwright DO     Pulm / Critical Care Progress Note     S: pt remains intubate 125/56 141/61 133/59   BP Location:  Right arm     Pulse: 71 70 66 62   Resp: (!) 31 24 (!) 29 (!) 34   Temp:  96.8 °F (36 °C)  96.8 °F (36 °C)   TempSrc:  Pulmonary Artery  Pulmonary Artery   SpO2: 97% 96% 96% 96%   Weight:  (!) 307 lb 1.6 oz (139.3 kg) ASSESSMENT/PLAN:    1.  Acute respiratory failure - due to cardiac arrest in assn ventricular arrhythmia.   - Intubated for airway protection  - has underlying pulmonary edema along with intraparenchymal process/ILD.  Recent wedge done intraoperativel

## 2021-11-20 RX ORDER — ALBUMIN (HUMAN) 12.5 G/50ML
100 SOLUTION INTRAVENOUS AS NEEDED
Status: DISCONTINUED | OUTPATIENT
Start: 2021-11-20 | End: 2021-12-11

## 2021-11-20 NOTE — PROGRESS NOTES
Received patient on VC+ 34/450/50%/+5. Changed vent settings to VC+ 24/550/40%/+5, per MD. Suctioning moderate amounts of thick secretions. Will continue to monitor.       11/20/21 8753   Vent Information   $ RT Standby Charge (per 15 min) 1   Ventilator In

## 2021-11-20 NOTE — PROGRESS NOTES
[facility]                INFECTIOUS DISEASE PROGRESS NOTE    Lashonda Poole Patient Status:  Inpatient    1955 MRN [id number]   [facility] 6NE-A Attending Neeta Moura MD   [facility] Day # 16 PCP DO Hannah Faustin 136 136  136 132*  132*   K 4.1  4.1 4.1  4.1 4.1  4.1     101 101  101 98  98   CO2 31.0  31.0 28.0  28.0 29.0  29.0   ALKPHO 48 53 56   AST 39* 37 21   ALT 27 31 25   BILT 1.1 0.9 0.8   TP 6.3* 6.8 6.5       Vancomycin Trough (ug/mL)   Date Value Hyperglycemia     Cellulitis of foot     Debridement of open wound, 4th MT head resection, wound VAC application left foot- Sx 12/21/17  GLENN 03/21/18     Osteomyelitis ([address]     Cellulitis     Long term (current) use of insulin (HCC)     Hypoglycemia     H

## 2021-11-20 NOTE — PROGRESS NOTES
Ninonena Hayley Patient Status:  Inpatient    1955 MRN [id number]   [facility] 6NE-A Attending Devni Rodríguez MD   [facility] Day # 16 PCP Rianna Andres DO     Critical Care Progress Note      Assessment/Plan:  1.  Acute respiratory fail Mouth/Throat [email or web address]   • famoTIDine  20 mg Intravenous Daily   • [Held by provider] bumetanide  2 mg Intravenous TID   • polyethylene glycol (PEG 3350)  17 g Oral Daily   • insulin detemir  40 Units Subcutaneous [email or web address]   • melatonin  3 mg Oral 39* 37 21   ALT 27 31 25   BILT 1.1 0.9 0.8   TP 6.3* 6.8 6.5     Recent Labs   Lab 11/20/21  1009   ABGPHT 7.23*   [id number] 74*   [id number] 79*   ABGHCO3 30.1*   ABGBE 1.7   TEMP 23.9   EVELIA Not Applicable   SITE Arterial Line   DEV  adult   THGB 8.3*

## 2021-11-20 NOTE — PROGRESS NOTES
[facility]    Nephrology Progress Note    Ezekiel Jiménez Attending:  Rosalva Osler, MD       SUBJECTIVE:     Ezekiel Jiménez remains intubated and sedated. Hemodynamically stable off pressors. Tolerated HD well 11/19. Oliguric.      PHYSICAL EXAM: 11/20/2021    CL 98 11/20/2021    CO2 29.0 11/20/2021    CO2 29.0 11/20/2021     Lab Results   Component Value Date    WBC 10.7 11/20/2021    RBC 3.24 (L) 11/20/2021    HGB 7.9 (L) 11/20/2021    HCT 27.0 (L) 11/20/2021    .0 11/20/2021    MCV 83.3 1 (ZITHROMAX) 500 mg in sodium chloride 0.9% 250 mL IVPB, 500 mg, Intravenous, Q24H  clopidogrel (PLAVIX) tab 75 mg, 75 mg, Per G Tube, Daily  metoprolol tartrate (LOPRESSOR) tab 100 mg, 100 mg, Per G Tube, 2x Daily(Beta Blocker)  amiodarone (PACERONE) tab 2 Units, 40 Units, Subcutaneous, [email or web address]  melatonin tab 3 mg, 3 mg, Oral, Nightly  Vitamin D3 (Cholecalciferol) (VITAMIN D3) tab 1,000 Units, 1,000 Units, Oral, Daily  fenofibrate micronized (LOFIBRA) cap 134 mg, 134 mg, Oral, Daily with breakfast  glu

## 2021-11-20 NOTE — PROGRESS NOTES
patient remained on ventilator overnight. no RT changes were made. will continue to wean FiO2 as tolerated. will continue to monitor.      11/20/21 0340   Vent Information   $ RT Standby Charge (per 15 min) 1   Ventilator Initiation 11/16/21   Ventilation D

## 2021-11-20 NOTE — PROGRESS NOTES
2000- received pt intubated, sedated. Vss. Propofol and fentanyl infusing as ordered. Pt tolerating vent. No apparent distress noted.

## 2021-11-20 NOTE — PROGRESS NOTES
[facility] Cardiology Progress Note - Rona Stratton Patient Status:  Inpatient    1955 MRN [id number]   [facility] 6NE-A Attending Tiesha Muse MD   [facility] Day # 16 PCP Lev Workman DO     Subjective:  Pt intua   11/19    - PAD (s/p R foot amputation in past)    Patient Active Problem List:     Traumatic amputation of foot (complete) (partial), unilateral, complicated ([facility]     Cardiomyopathy ([facility]     Obesity     Diabetes mellitus due to underlying condition w [facility])     Cardiac arrest ([facility]      Objective:   Temp: 98.8 °F (37.1 °C)  Pulse: 71  Resp: 34  AO: 147/52  FiO2 (%): 50 %    Intake/Output:     Intake/Output Summary (Last 24 hours) at 11/20/2021 0714  Last data filed at 11/20/2021 0600  Gross per 24 hour 83.3 84.0 83.3   .0* 247.0 322.0 273.0 284.0   INR  --   --   --  1.50* 1.33*       Recent Labs   Lab 11/16/21  0455 11/17/21  0409 11/18/21  0359 11/19/21  0409 11/20/21  0518    140 136  136 136  136 132*  132*   K 4.3 3.7 4.1  4.1 4.1  4.1 MG/ML injection 25 mcg, 25 mcg, Intravenous, Q30 Min PRN   Or  fentaNYL citrate (SUBLIMAZE) 0.05 MG/ML injection 50 mcg, 50 mcg, Intravenous, Q30 Min PRN  acetaminophen (TYLENOL) tab 650 mg, 650 mg, Oral, Q4H PRN   Or  acetaminophen (TYLENOL) 160 MG/5ML or Q15 Min PRN   Or  glucose-vitamin C (DEX-4) chewable tab 4 tablet, 4 tablet, Oral, Q15 Min PRN   Or  dextrose 50 % injection 50 mL, 50 mL, Intravenous, Q15 Min PRN   Or  glucose (DEX4) oral liquid 30 g, 30 g, Oral, Q15 Min PRN   Or  glucose-vitamin C (DEX-

## 2021-11-21 ENCOUNTER — APPOINTMENT (OUTPATIENT)
Dept: GENERAL RADIOLOGY | Facility: HOSPITAL | Age: 66
DRG: 215 | End: 2021-11-21
Attending: INTERNAL MEDICINE
Payer: MEDICARE

## 2021-11-21 PROCEDURE — 71045 X-RAY EXAM CHEST 1 VIEW: CPT | Performed by: INTERNAL MEDICINE

## 2021-11-21 NOTE — PROGRESS NOTES
[facility]                INFECTIOUS DISEASE PROGRESS NOTE    Harry Gregg Patient Status:  Inpatient    1955 MRN [id number]   [facility] 6NE-A Attending Angel Benton MD   [facility] Day # 25 PCP DO Hannah Napoles 23* 23*  23* 32*   CREATSERUM 2.23*  2.23*   < > 1.53*  1.53* 2.25*  2.25* 2.89*   GFRAA 34*  34*   < > 54*  54* 34*  34* 25*   GFRNAA 29*  29*   < > 46*  46* 29*  29* 21*   CA 8.2*  8.2*   < > 8.7  8.7 8.4*  8.4* 8.7   ALB 2.3*  2.3*   < > 2.3*  2.3* 2.2 layer exposed ([facility]     Chronic osteomyelitis of right foot with draining sinus (HCC)     Diabetic ulcer of right foot associated with type 2 diabetes mellitus ([facility]     Diabetic polyneuropathy associated with type 2 diabetes mellitus ([address]     Combined fo

## 2021-11-21 NOTE — PROGRESS NOTES
[facility] Cardiology Progress Note - Lee Bailey Patient Status:  Inpatient    1955 MRN [id number]   [facility] 6NE-A Attending Minor Severe, MD   [facility] Day # 25 PCP Dillon Kraus DO     Subjective:  -intubat -PO Amiodarone   - continue BB.  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, unspecified part of lung     Chronic systolic heart failure (HCC)     Stage 3 chronic kidney disease (HCC)     Cardiac arrest (HCC)      Objective:   Temp: 98.6 °F (37 °C)  Pulse: 68  Resp: 24  AO: 139/54  FiO2 (%) Laboratory/Data:    Labs:       Recent Labs   Lab 11/15/21  1305 11/18/21  0359 11/18/21  1225 11/19/21  0409 11/20/21  0518   WBC 10.1 10.3 15.9* 11.1* 10.7   HGB 10.7* 7.9* 8.5* 7.8* 7.9*   MCV 81.9 80.6 83.3 84.0 83.3   .0* 247.0 322.0 273.0 tab 75 mg, 75 mg, Per G Tube, Daily  metoprolol tartrate (LOPRESSOR) tab 100 mg, 100 mg, Per G Tube, 2x Daily(Beta Blocker)  amiodarone (PACERONE) tab 200 mg, 200 mg, Per G Tube, BID with meals  atorvastatin (LIPITOR) tab 40 mg, 40 mg, Per G Tube, Nightly (Cholecalciferol) (VITAMIN D3) tab 1,000 Units, 1,000 Units, Oral, Daily  fenofibrate micronized (LOFIBRA) cap 134 mg, 134 mg, Oral, Daily with breakfast  glucose (DEX4) oral liquid 15 g, 15 g, Oral, Q15 Min PRN   Or  glucose-vitamin C (DEX-4) chewable tab

## 2021-11-21 NOTE — PROGRESS NOTES
11/21/21 0302   Vent Information   $ RT Standby Charge (per 15 min) 1   Ventilator Initiation 11/16/21   Ventilation Day(s) 6   Interface Invasive   Vent Type PB   Vent ID 2   Vent Mode VC+   Settings   FiO2 (%) 45 %   Resp Rate (Set) 24   Vt (Set, mL)

## 2021-11-21 NOTE — PROGRESS NOTES
Assumed care at 0730. VSS. Dialysis ordered for today. 150 mls urine out total.  Bradycardic to 48 after evening dose of metoprolol. Cardiology notified. States to continue to monitor and if persists to hold next dose of metoprolol.

## 2021-11-21 NOTE — PROGRESS NOTES
[facility] Hospitalist Progress Note     Alexus Vanegas Patient Status:  Inpatient    1955 MRN [id number]   [facility] 2NE-A Attending Eloy Mercedes MD   [facility] Day # 25 PCP Iza Padilla DO     Chief Complaint: Patient p 28.0  28.0 29.0  29.0 29.0   ALKPHO 48  --  53 56  --    AST 39*  --  37 21  --    ALT 27  --  31 25  --    BILT 1.1  --  0.9 0.8  --    TP 6.3*  --  6.8 6.5  --     < > = values in this interval not displayed.        Estimated Creatinine Clearance: 29.6 mL Acute HFrEF Exacerbation   Afib/AFL with RVR   Possible Gram Negative Pneumonia  CKD3  DM2  HTN  Severe Obesity BMI > 35    Plan    Cardiopulmonary Arrest  Cardiogenic Shock   - Cardiac arrest 11/15/21 s/p ROSC following DCCV  - likely multifactorial due PRINCESS/DCCV  - oral amiodarone per cardiology   - on  Heparin subcu  -BB on hold for bradycardia    Possible Gram Negative Pneumonia  - ID consulted  - empiric antibiotics completed 11/12/21-[id number] on zosyn  - zosyn X 7 days total   -BAL cx neg    HEATHER  CK

## 2021-11-21 NOTE — PROGRESS NOTES
2000- received pt inutabed, sedated. Pt did attempt to open eyes to verbal stimuli. No following of commands or spontaneous movement seen. Vss. Propofol and fentanyl infusing as ordered. No apparent distress noted.

## 2021-11-21 NOTE — PROGRESS NOTES
Patient is on vent settings noted below. Suctioning moderate amounts of thick secretions. Patient failed SBT this am, not following commands and low volumes. Placed patient back on full support. Increased FiO2 to 50% due to desaturation.  Will continue to m

## 2021-11-21 NOTE — PROGRESS NOTES
[facility]    Nephrology Progress Note    Carroll John Attending:  Rocio Begum MD       SUBJECTIVE:     Carroll Lopez remains intubated. Tolerated HD well yesterday.      PHYSICAL EXAM:     Vital Signs: /60   Pulse 73   Temp 98.2 °F (3 11/20/2021    LYPERCENT 8.0 11/20/2021    MOPERCENT 7.8 11/20/2021    EOPERCENT 2.3 11/20/2021    BAPERCENT 0.5 11/20/2021    NE 8.46 (H) 11/20/2021    LYMABS 0.86 (L) 11/20/2021    MOABSO 0.84 11/20/2021    EOABSO 0.25 11/20/2021    BAABSO 0.05 11/20/2021 PRN  acetaminophen (TYLENOL) tab 650 mg, 650 mg, Oral, Q4H PRN   Or  acetaminophen (TYLENOL) 160 MG/5ML oral liquid 650 mg, 650 mg, Oral, Q4H PRN   Or  acetaminophen (Tylenol) rectal suppository 650 mg, 650 mg, Rectal, Q4H PRN   Or  acetaminophen (OFIRMEV) (DEX-4) chewable tab 8 tablet, 8 tablet, Oral, Q15 Min PRN        ASSESSMENT/PLAN:        79 yo M with history of CKD stage 3 with albuminuria, long standing diabetes complicated by R foot amputation, CHF, HTN, HL presented with dyspnea on exertion x 2 wee

## 2021-11-21 NOTE — PROGRESS NOTES
[facility] Room Patient Status:  Inpatient    1955 MRN [id number]   [facility] 6NE-A Attending Yesy Harris MD   [facility] Day # 25 PCP Amaya Urias DO     Critical Care Progress Note      Assessment/Plan:  1.  Acute respiratory fail with meals   • atorvastatin  40 mg Per G Tube Nightly   • Chlorhexidine Gluconate  15 mL Mouth/Throat Jelly@[employer]   • famoTIDine  20 mg Intravenous Daily   • [Held by provider] bumetanide  2 mg Intravenous TID   • polyethylene glycol (PEG 3350)  17 g Ora K 4.1  4.1   < > 4.1  4.1 4.1  4.1 3.8     101   < > 101  101 98  98 98   CO2 31.0  31.0   < > 28.0  28.0 29.0  29.0 29.0   ALKPHO 48  --  53 56  --    AST 39*  --  37 21  --    ALT 27  --  31 25  --    BILT 1.1  --  0.9 0.8  --    TP 6.3*  --  6.8

## 2021-11-21 NOTE — PROGRESS NOTES
[facility] Hospitalist Progress Note     Carroll Lopez Patient Status:  Inpatient    1955 MRN [id number]   [facility] 2NE-A Attending Minor Severe, MD   [facility] Day # 16 PCP Dillon Kraus DO     Chief Complaint: Patient p mg/dL (H)).     Recent Labs   Lab 11/19/21  0409 11/20/21  0518   PTP 18.4* 16.8*   INR 1.50* 1.33*            COVID-19 Lab Results    COVID-19  Lab Results   Component Value Date    COVID19 Not Detected 11/11/2021    COVID19 Not Detected 11/07/2021    COVI failure, worsening pulmonary edema/respiratory insufficiency & external sedation (received diazepam shortly prior to event)   - off pressors now, cont HD for fluid removal   -s/pTEE/CV and impella, impella now removed   -Cardiac indices reviewed, bradycard nephrology consulted-was on CRRT now will be on intermitted HD       DM2, poorly controlled   - accuchecks, SSI - high   - BG uncontrolled, home 65u levemir at bedtime changed to 40 units BID with good control-cont ucrrent regime for now     HTN   - holdin

## 2021-11-22 RX ORDER — FENOFIBRATE 67 MG/1
67 CAPSULE ORAL NIGHTLY
Status: DISCONTINUED | OUTPATIENT
Start: 2021-11-22 | End: 2021-12-28

## 2021-11-22 NOTE — PROGRESS NOTES
11/22/21 0321   Vent Information   $ RT Standby Charge (per 15 min) 1   Ventilator Initiation 11/16/21   Ventilation Day(s) 7   Interface Invasive   Vent Type PB   Vent ID 2   Vent Mode VC+   Settings   FiO2 (%) 50 %   Resp Rate (Set) 24   Vt (Set, mL)

## 2021-11-22 NOTE — PROGRESS NOTES
[facility]    Nephrology Progress Note    Cletis  Attending:  Asha Beebe MD       SUBJECTIVE:     Cletis  remains intubated.  UOP 400cc/24hr     PHYSICAL EXAM:     Vital Signs: /56 (BP Location: Left arm)   Pulse 66   Temp 11/20/2021    NEPERCENT 79.0 11/20/2021    LYPERCENT 8.0 11/20/2021    MOPERCENT 7.8 11/20/2021    EOPERCENT 2.3 11/20/2021    BAPERCENT 0.5 11/20/2021    NE 8.46 (H) 11/20/2021    LYMABS 0.86 (L) 11/20/2021    MOABSO 0.84 11/20/2021    EOABSO 0.25 11/20/2 Nightly  fentaNYL citrate (SUBLIMAZE) 0.05 MG/ML injection 25 mcg, 25 mcg, Intravenous, Q30 Min PRN   Or  fentaNYL citrate (SUBLIMAZE) 0.05 MG/ML injection 50 mcg, 50 mcg, Intravenous, Q30 Min PRN  acetaminophen (TYLENOL) tab 650 mg, 650 mg, Oral, Q4H PRN Or  glucose-vitamin C (DEX-4) chewable tab 8 tablet, 8 tablet, Oral, Q15 Min PRN        ASSESSMENT/PLAN:        79 yo M with history of CKD stage 3 with albuminuria, long standing diabetes complicated by R foot amputation, CHF, HTN, HL presented with dyspn

## 2021-11-22 NOTE — PROGRESS NOTES
Assumed care at 0730. Patient BPs up and down. PA pressures improved. Tolerating tube feedings. +BM. Opening eyes, but not following commands or moving any extremities. Turns head toward voices and blinks to threat.   Off propofol, but required increa

## 2021-11-22 NOTE — PROGRESS NOTES
[address] [place] [address] Cardiology  Progress Note    Maxime Ayoub Patient Status:  Inpatient    1955 MRN [id number]   [facility] 6NE-A Attending Lakisha Dominguez MD   [facility] Day # 23 PCP Fabby Lara DO     Subjective: Intubated.  Sed Enema, glucose **OR** glucose-vitamin C **OR** dextrose **OR** glucose **OR** glucose-vitamin C    Intake/Output:     Intake/Output Summary (Last 24 hours) at 11/22/2021 1004  Last data filed at 11/22/2021 0723  Gross per 24 hour   Intake 2826.55 ml   Outp 34*   < > 54*  54*   < > 34*  34* 25* 15*   GFRNAA 29*  29*   < > 46*  46*   < > 29*  29* 21* 13*   CA 8.2*  8.2*   < > 8.7  8.7   < > 8.4*  8.4* 8.7 8.7   ALB 2.3*  2.3*   < > 2.3*  2.3*   < > 2.2*  2.2* 2.1* 1.9*     136   < > 136  136   < > 132* support  7. ATBx  8.   Discontinue [place] catheter      Zak Coker MD  11/22/2021  10:04 AM

## 2021-11-22 NOTE — PROGRESS NOTES
Critical Care Progress Note        NAME: Lashonda Doctor - ROOM: 6-L - MRN: [id number] - Age: 78year old - : 1955  Date of Admission: 11/3/2021  1:06 PM  Admission Diagnosis: Hypoglycemia [E16.2]  Hypoxia [R09.02]  Pneumonia of both lungs du [Held by provider] bumetanide  2 mg Intravenous TID   • polyethylene glycol (PEG 3350)  17 g Oral Daily   • melatonin  3 mg Oral Nightly   • cholecalciferol  1,000 Units Oral Daily   • fenofibrate micronized  134 mg Oral Daily with breakfast     Continuous failure, atrial flutter  - cardiology following  - s/p PRINCESS/DCCV + cardiac cath + impella; impella removed 11/18  -diuresis per cards /renal  5. Acute on CKD  - nephrology following. 6. Anemia: transfuse for hgb < 7   7.  F/E/N- TF's, lytes prn, minimize IV

## 2021-11-22 NOTE — PLAN OF CARE
Assumed patient care at [address] [place] [address]. Patient resting in bed, intubated, on fentanyl gtt. No pressors and no sedation at this time. Patient now withdrawing to pain in all four extremities, spontaneously moving UE and LLE at times.  Patient also able to wiggle toes Progressing

## 2021-11-22 NOTE — PROGRESS NOTES
[facility]                INFECTIOUS DISEASE PROGRESS NOTE    Ladan Ashley Patient Status:  Inpatient    1955 MRN [id number]   [facility] 6NE-A Attending Andressa Costello MD   [facility] Day # 23 PCP DO Hannah Jorgensen *  125*   < > 168*  168*   < > 277*  277* 258* 256*   BUN 51*  51*   < > 23*  23*   < > 23*  23* 32* 58*   CREATSERUM 2.23*  2.23*   < > 1.53*  1.53*   < > 2.25*  2.25* 2.89* 4.29*   GFRAA 34*  34*   < > 54*  54*   < > 34*  34* 25* 15*   GFRNAA 29 Gastrocnemius recession and Peroneus longus tendon lengthening, L lower extremity.  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

## 2021-11-22 NOTE — PROGRESS NOTES
[facility] Hospitalist Progress Note     Kimmy Lozano Patient Status:  Inpatient    1955 MRN [id number]   [facility] 2NE-A Attending Jaxson Orozco MD   [facility] Day # 23 PCP Gabriel Meadows DO     Chief Complaint: Patient p CA 8.2*  8.2*   < > 8.7  8.7   < > 8.4*  8.4* 8.7 8.7   ALB 2.3*  2.3*   < > 2.3*  2.3*   < > 2.2*  2.2* 2.1* 1.9*     136   < > 136  136   < > 132*  132* 133* 135*   K 4.1  4.1   < > 4.1  4.1   < > 4.1  4.1 3.8 4.3     101   < > 101  101   < • polyethylene glycol (PEG 3350)  17 g Oral Daily   • melatonin  3 mg Oral Nightly   • cholecalciferol  1,000 Units Oral Daily       Assessment & Plan:      Becki Hardy Is a 78year old male who presented with acute systolic CHF exacerbation     Prob Exacerbation   CAD  Dilated Cardiomyopathy   - Cardiology consulted, plan discussed   - diuresis per Cardiology   - has mid-proximal LAD lesion, plan for PCI Monday 11/15 -> postponed due to worsening respiratory status   - 11/16: plan for [facility] with impella

## 2021-11-22 NOTE — PLAN OF CARE
Assumed care at 0730. Pt still intubated with Fentanyl drip for comfort with ventilator. Pulm rounded this morning with decision not proceed with weaning trial. Pt desat to upper 80's while lying flat. Required temporary increased oxygen requirement.   Ca

## 2021-11-22 NOTE — CM/SW NOTE
Care Progression Note:  Active Acute Medical Issue:   Hypoglycemia   Acute HF exacerbation  Dilated cardiomyopathy- C w/ impella 11/16  CVVH 11/17  Impella removed 11/18  HD initiated 11/20     Other Contributing Medical Factors/Dx:  HLD, HTN, renal insu

## 2021-11-23 RX ORDER — DEXTROSE MONOHYDRATE 25 G/50ML
50 INJECTION, SOLUTION INTRAVENOUS
Status: DISCONTINUED | OUTPATIENT
Start: 2021-11-23 | End: 2021-12-28

## 2021-11-23 RX ORDER — METHYLPREDNISOLONE SODIUM SUCCINATE 125 MG/2ML
125 INJECTION, POWDER, LYOPHILIZED, FOR SOLUTION INTRAMUSCULAR; INTRAVENOUS EVERY 6 HOURS
Status: DISCONTINUED | OUTPATIENT
Start: 2021-11-23 | End: 2021-11-27

## 2021-11-23 NOTE — PROGRESS NOTES
11/23/21 0307   Vent Information   $ RT Standby Charge (per 15 min) 1   Ventilator Initiation 11/16/21   Ventilation Day(s) 8   Interface Invasive   Vent Type PB   Vent ID 2   Vent Mode VC+   Settings   FiO2 (%) 50 %   Resp Rate (Set) 24   Vt (Set, mL)

## 2021-11-23 NOTE — PROGRESS NOTES
[facility]                INFECTIOUS DISEASE PROGRESS NOTE    Lacy Antonio Patient Status:  Inpatient    1955 MRN [id number]   [facility] 2NE-A Attending Tash Du MD   [address] Day # 21 PCP Carlos Cho DO     Antibiotics 8.46*  --   --    WBC 10.7   < > 9.3   .0   < > 283.0    < > = values in this interval not displayed.          Recent Labs   Lab 11/18/21  0359 11/18/21  0359 11/19/21  0409 11/19/21  0409 11/20/21  0518 11/20/21  0518 11/21/21  0606 11/22/21  0421 1 [facility]     Type 2 diabetes mellitus with foot ulcer, with long-term current use of insulin ([facility]     Hyperlipidemia with target LDL less than 100     Mild renal insufficiency     Hypertension     Peripheral neuropathy     Non-healing wound of amputation marissa mucous plugging bilaterally, cx with normal respiratory jarett. 2. S/p cardiac arrest 11/15   3. p A.fib; CAD, Impella removed 11/18  4. HEATHER on CKD III, HD as per renal  5. DM II  6. Abnormal UA with pyuria. Bettencourt replaced 11/22.     PLAN:  - continue IV zo

## 2021-11-23 NOTE — PROGRESS NOTES
[address] Patient Status:  Inpatient    1955 MRN [id number]   [facility] 6NE-A Attending Nela Mcdonough MD   [address] Day # 21 PCP Conchita Villa DO     Critical Care Progress Note     Date of Admission: 11/3/202 acetaminophen (TYLENOL) tab 650 mg, 650 mg, Oral, Q4H PRN **OR** acetaminophen (TYLENOL) 160 MG/5ML oral liquid 650 mg, 650 mg, Oral, Q4H PRN **OR** acetaminophen (Tylenol) rectal suppository 650 mg, 650 mg, Rectal, Q4H PRN **OR** acetaminophen (OFIRMEV) i 99.5 °F (37.5 °C) (Temporal)   Resp 24   Ht 6' 3\" (1.905 m)   Wt (!) 306 lb 3.5 oz (138.9 kg)   SpO2 96%   BMI 38.27 kg/m²      Vent Mode: VC+  FiO2 (%):  [40 %-60 %] 50 %  S RR:  [24] 24  S VT:  [550 mL] 550 mL  PEEP/CPAP (cm H2O):  [5 cm H20] 5 cm H20 yesterday.  -intubated on 11/15  -daily SBTs when vent requirements improve, remains hypercapnic  - see how he does in next few days and determine needs for trach soon  2. ?PNA  - Bronch cx negative  -finished payal 11/17-11/20  -zosyn (11/17-   )  3.  Pr

## 2021-11-23 NOTE — PROGRESS NOTES
[facility] [facility] Cardiology  Progress Note    Angelito Atkinson Patient Status:  Inpatient    1955 MRN [id number]   [facility] 6NE-A Attending Nela Mcdonough MD   Hosp Day # 21 PCP Conchita Villa DO     Subjective: Intubated.  Mor citrate, acetaminophen **OR** acetaminophen **OR** acetaminophen **OR** acetaminophen, fentanyl, polyethylene glycol (PEG 3350), senna, bisacodyl, Fleet Enema    Intake/Output:     Intake/Output Summary (Last 24 hours) at 11/23/2021 1432  Last data filed a 23*   < > 32* 58* 45*   CREATSERUM 2.23*  2.23*   < > 1.53*  1.53*   < > 2.25*  2.25*   < > 2.89* 4.29* 3.78*   GFRAA 34*  34*   < > 54*  54*   < > 34*  34*   < > 25* 15* 18*   GFRNAA 29*  29*   < > 46*  46*   < > 29*  29*   < > 21* 13* 16*   CA 8.2*  8.2 PVD   -S/P foot amputation in the past  9. Acute on chronic CHF   -PAD 24  10. Anemia    Plan:   1. Amiodarone to maintain SR  2. ASA /Plavix  3.  BB  4.  Statin  5. Volume optimization   -HD per renal  6. Vent support  7. ATBx  8.   Tele monitor

## 2021-11-23 NOTE — PLAN OF CARE
Assumed patient care at 1. Patient resting in bed, vented, on fentanyl gtt. Tolerated HD well. Neuro assessment similar to last night, patient able to follow commands on LLE. Still spontaneously moving UE against gravity, not following commands with UE. Progressing

## 2021-11-23 NOTE — PROGRESS NOTES
[facility]    Nephrology Progress Note    Harry Marysol Attending:  Angel Benton MD       SUBJECTIVE:     Harry Gregg remains intubated. Sp HD yesterday w 3L UF, tolerated well.  UOP 320cc/24hr     PHYSICAL EXAM:     Vital Signs: /64 ( 11/21/2021    NEPRELIM 8.46 (H) 11/20/2021    NEPERCENT 79.0 11/20/2021    LYPERCENT 8.0 11/20/2021    MOPERCENT 7.8 11/20/2021    EOPERCENT 2.3 11/20/2021    BAPERCENT 0.5 11/20/2021    NE 8.46 (H) 11/20/2021    LYMABS 0.86 (L) 11/20/2021    MOABSO 0.84 [address]  Piperacillin Sod-Tazobactam So (ZOSYN) 4.5 g in dextrose 5% 100 mL IVPB-ADDV, 4.5 g, Intravenous, Q12H  aspirin chewable tab 81 mg, 81 mg, Per G Tube, Daily  heparin sodium 1000 UNIT/ML injection 1,500 Units, 1.5 mL, Intracatheter, PRN  alteplase (ACT injection 20 mg, 20 mg, Intravenous, Daily  [Held by provider] bumetanide (BUMEX) 0.25 MG/ML injection 2 mg, 2 mg, Intravenous, TID  polyethylene glycol (PEG 3350) (MIRALAX) powder packet 17 g, 17 g, Oral, Daily  melatonin tab 3 mg, 3 mg, Oral, Nightly  Vi

## 2021-11-23 NOTE — PROGRESS NOTES
[facility] Hospitalist Progress Note     Janie Hanson Patient Status:  Inpatient    1955 MRN [id number]   [facility] 2NE-A Attending Sugey Razo MD   Hosp Day # 21 PCP Alexa Lemus DO     Chief Complaint: Patient p < > 46*  46*   < > 29*  29*   < > 21* 13* 16*   CA 8.2*  8.2*   < > 8.7  8.7   < > 8.4*  8.4*   < > 8.7 8.7 8.5   ALB 2.3*  2.3*   < > 2.3*  2.3*   < > 2.2*  2.2*   < > 2.1* 1.9* 2.0*     136   < > 136  136   < > 132*  132*   < > 133* 135* 134*   K 4 atorvastatin  40 mg Per G Tube Nightly   • Chlorhexidine Gluconate  15 mL Mouth/Throat [email or web address]   • famoTIDine  20 mg Intravenous Daily   • [Held by provider] bumetanide  2 mg Intravenous TID   • polyethylene glycol (PEG 3350)  17 g Oral Daily   • ayad status-does not withdraw to pain, opens eyes not following commands    11/22-improvement in neuro status today , still not following commands, SBT per pulm, cont HD- again today for volume removal    11/23- started on IV steroids and IV insulin gtt for hyp Estimated date of discharge: >2 midnights, pt is critically ill  Discharge is dependent on: further cardiac interventions, clinical improvement     Windsor Schlatter MD   [address] [place] [address]

## 2021-11-24 ENCOUNTER — APPOINTMENT (OUTPATIENT)
Dept: GENERAL RADIOLOGY | Facility: HOSPITAL | Age: 66
DRG: 215 | End: 2021-11-24
Attending: INTERNAL MEDICINE
Payer: MEDICARE

## 2021-11-24 PROCEDURE — 71045 X-RAY EXAM CHEST 1 VIEW: CPT | Performed by: INTERNAL MEDICINE

## 2021-11-24 NOTE — PROGRESS NOTES
11/24/21 1539   Vent Information   $ RT Standby Charge (per 15 min) 1   Ventilator Initiation 11/16/21   Ventilation Day(s) 9   Interface Invasive   Vent Type PB   Vent    Vent Mode VC+   Settings   FiO2 (%) 45 %   Resp Rate (Set) 24   Vt (Set, mL

## 2021-11-24 NOTE — PROGRESS NOTES
[facility] [place] Hospitalist Progress Note     Maxime Ayoub Patient Status:  Inpatient    1955 MRN [id number]   [facility] 2NE-A Attending Tori Bethea MD   [facility] Day # 21 PCP Fabby Lara DO     Chief Complaint: Patient p 132*  132*   < > 135* 134* 138   K 4.1  4.1   < > 4.1  4.1   < > 4.1  4.1   < > 4.3 4.2 4.6     101   < > 101  101   < > 98  98   < > 99 100 102   CO2 31.0  31.0   < > 28.0  28.0   < > 29.0  29.0   < > 28.0 30.0 27.0   ALKPHO 48  --  53  --  56  -- Clemencia Knutson Is a 78year old male who presented with acute systolic CHF exacerbation     Problem List / Diagnoses    Cardiopulmonary Arrest   Acute Respiratory Failure with Hypoxia   Acute HFrEF Exacerbation   Afib/AFL with RVR   Possible Gram Negative Pneumonia today     Acute HFrEF Exacerbation   CAD  Dilated Cardiomyopathy   - Cardiology consulted, plan discussed   - diuresis per Cardiology   - has mid-proximal LAD lesion, plan for PCI Monday 11/15 -> postponed due to worsening respiratory status   - 11/16: romaine

## 2021-11-24 NOTE — PROGRESS NOTES
11/24/21 0924   Respiratory Assessment   Assessment Type Assess only   Respiratory Pattern Regular   Chest Assessment Chest expansion symmetrical   Bilateral Breath Sounds Diminished   Airways   Airway LDA ETT   ETT   Placement Date/Time: 11/15/21 (c) 2

## 2021-11-24 NOTE — DIETARY NOTE
[facility]    NUTRITION ASSESSMENT    Pt does not meet malnutrition criteria. NUTRITION INTERVENTION:   1. Enteral Nutrition -  Vital AF 1.2 at 90 ml/hour x 24h.    o This will provide 2592 kcal, 162 grams protein, 1750 ml total free water, and 100% 11/21/21 0500 (!) 136.8 kg (301 lb 9.4 oz)   11/20/21 0500 (!) 137.7 kg (303 lb 9.2 oz)   11/19/21 0400 (!) 139.3 kg (307 lb 1.6 oz)   11/17/21 0400 135.2 kg (298 lb 1 oz)   11/16/21 0015 134.8 kg (297 lb 2.9 oz)   11/15/21 0548 135.6 kg (298 lb 14.4 oz) Insulin Drip, Solumedrol, Miralax, D3, Fentanyl    LABS:  BUN 82; Cr 5.37; -170    Pt is at High nutrition risk    FOLLOW-UP DATE: 11/26    Sudeep Malloy RD, LDN  Clinical Dietitian  Phone [phone]  Pager [phone]

## 2021-11-24 NOTE — PROGRESS NOTES
Pulmonary Progress Note     Assessment / Plan:  1. Acute respiratory failure - due to cardiac arrest. Has pulmonary edema and likely underlying ILD.  Intubated 11/15  - continue full vent support; vent mechanics are acceptable  - daily SBT as appropriate  - independently visualized all relevant chest imaging in PACS and agree with radiology interpretation except where noted.

## 2021-11-24 NOTE — PROGRESS NOTES
[facility]                INFECTIOUS DISEASE PROGRESS NOTE    Shelbi Mccauley Patient Status:  Inpatient    1955 MRN [id number]   [facility] 2NE-A Attending Eleanor Lopez MD   [facility] Day # 21 PCP Thierry Ansari, DO     Antibiotics 28.3*   RDW 17.2   < > 17.3   NEPRELIM 8.46*  --   --    WBC 10.7   < > 9.3   .0   < > 283.0    < > = values in this interval not displayed.          Recent Labs   Lab 11/18/21  0359 11/18/21  0359 11/19/21  0409 11/19/21  0409 11/20/21  0518 11/21/2 Traumatic amputation of foot (complete) (partial), unilateral, complicated ([address]     Cardiomyopathy ([address]     Obesity     Diabetes mellitus due to underlying condition with diabetic autonomic neuropathy, with long-term current use of insulin ([address]     Ty hypoxia. · CXR with extensive opacities in R lung but some improvement on CXR; ? multifactorial- edema/possible pna; presumed ILD per pulm. Completed course of abx.   · S/p bronchoscopy 11/17, moderate secretions of thick mucoid nature from all lobes with

## 2021-11-24 NOTE — PROGRESS NOTES
Current vent settings noted below.  BS have been rhonchi, suctioning large amounts of thick white secretions via ETT     11/24/21 0308   Vent Information   $ RT Standby Charge (per 15 min) 1  (vent)   Ventilator Initiation 11/16/21   Ventilation Day(s) 9

## 2021-11-24 NOTE — PROGRESS NOTES
[facility] Group Cardiology  Progress Note    Brittney Arrant Patient Status:  Inpatient    1955 MRN [id number]   [facility] 6NE-A Attending Gabbie Fischer MD   Hosp Day # 21 PCP Marc Mcguire DO     Subjective: Intubated.   Op citrate, acetaminophen **OR** acetaminophen **OR** acetaminophen **OR** acetaminophen, fentanyl, polyethylene glycol (PEG 3350), senna, bisacodyl, Fleet Enema    Intake/Output:     Intake/Output Summary (Last 24 hours) at 11/24/2021 0546  Last data filed a CREATSERUM 2.23*  2.23*   < > 1.53*  1.53*   < > 2.25*  2.25*   < > 4.29* 3.78* 5.37*   GFRAA 34*  34*   < > 54*  54*   < > 34*  34*   < > 15* 18* 12*   GFRNAA 29*  29*   < > 46*  46*   < > 29*  29*   < > 13* 16* 10*   CA 8.2*  8.2*   < > 8.7  8.7   < > Amio  7. KEN on CRI   -S/P HD  8. PVD   -S/P foot amputation in the past  9. Acute on chronic CHF   -PAD 24  10. Anemia   -Hb 8.2 on ABG    Plan:   1. Amiodarone to maintain SR  2. ASA /Plavix  3.  BB  4.  Statin  5.   Volume optimization   -HD per ned

## 2021-11-24 NOTE — PLAN OF CARE
Late entry      Assumed care of the pt at approx. 1930 pm. Opens eyes spontaneously, intermittently follows some commands. PERRL. Able to move all extremities. Fentanyl gtt & IVP PRN. Vented, suctioned for moderate amount of tan, thick secretions.  Afebrile

## 2021-11-24 NOTE — PROGRESS NOTES
[facility]    Nephrology Progress Note    Lashonda Doctor Attending:  Neeta Moura MD       SUBJECTIVE:     Lashonda Doctor remains intubated.  UOP 450cc/24hr     PHYSICAL EXAM:     Vital Signs: /68 (BP Location: Left arm)   Pulse 79   Temp NEPERCENT 79.0 11/20/2021    LYPERCENT 8.0 11/20/2021    MOPERCENT 7.8 11/20/2021    EOPERCENT 2.3 11/20/2021    BAPERCENT 0.5 11/20/2021    NE 8.46 (H) 11/20/2021    LYMABS 0.86 (L) 11/20/2021    MOABSO 0.84 11/20/2021    EOABSO 0.25 11/20/2021    BAABSO PRN  alteplase (ACTIVASE) 2 mg in Sterile Water for Injection 2.2 mL IV push, 2 mg, Intracatheter, Once PRN  clopidogrel (PLAVIX) tab 75 mg, 75 mg, Per G Tube, Daily  metoprolol tartrate (LOPRESSOR) tab 100 mg, 100 mg, Per G Tube, 2x Daily(Beta Blocker)  a Oral, Nightly  Vitamin D3 (Cholecalciferol) (VITAMIN D3) tab 1,000 Units, 1,000 Units, Oral, Daily        ASSESSMENT/PLAN:   77 yo M with history of CKD stage 3 with albuminuria, long standing diabetes complicated by R foot amputation, CHF, HTN, HL present

## 2021-11-24 NOTE — PLAN OF CARE
Assumed care at 0730. Pt remains intubated with Fentanyl drip and ventilator. Able to follow some commands. No signs of pain or discomfort. Pulm started on Solu-Medrol and Insulin gtt. HD tomorrow per Nephro. Continue on tube feeding.   Sg denis

## 2021-11-25 RX ORDER — AMIODARONE HYDROCHLORIDE 200 MG/1
200 TABLET ORAL 3 TIMES DAILY
Status: DISCONTINUED | OUTPATIENT
Start: 2021-11-25 | End: 2021-11-27

## 2021-11-25 NOTE — PROGRESS NOTES
[facility]    Nephrology Progress Note    Mary Lou Gum Attending:  Hudson Cat MD       SUBJECTIVE:     Mary Lou Gum remains intubated. UOP 650cc/24hr .  Sp HD yesterday w 3L removed    PHYSICAL EXAM:     Vital Signs: /72 (BP Location 12.25 (H) 11/25/2021    NEPERCENT 91.9 11/25/2021    LYPERCENT 3.2 11/25/2021    MOPERCENT 1.8 11/25/2021    EOPERCENT 0.0 11/25/2021    BAPERCENT 0.2 11/25/2021    NE 12.25 (H) 11/25/2021    LYMABS 0.42 (L) 11/25/2021    MOABSO 0.24 11/25/2021    EOABSO 0 Daily  heparin sodium 1000 UNIT/ML injection 1,500 Units, 1.5 mL, Intracatheter, PRN  alteplase (ACTIVASE) 2 mg in Sterile Water for Injection 2.2 mL IV push, 2 mg, Intracatheter, Once PRN  clopidogrel (PLAVIX) tab 75 mg, 75 mg, Per G Tube, Daily  metoprol mg, 3 mg, Oral, Nightly  Vitamin D3 (Cholecalciferol) (VITAMIN D3) tab 1,000 Units, 1,000 Units, Oral, Daily        ASSESSMENT/PLAN:   77 yo M with history of CKD stage 3 with albuminuria, long standing diabetes complicated by R foot amputation, CHF, HTN,

## 2021-11-25 NOTE — PROGRESS NOTES
[address] Patient Status:  Inpatient    1955 MRN [id number]   [facility] 6NE-A Attending Huong Sosa, [address] [facility] Day # 25 PCP Carlos Cho DO     Pulm / Critical Care Progress Note     S: remains intubated 550/ FIO2 45/PEEP 5      Ppk 31   Ppl 27      Wt Readings from Last 3 Encounters:  11/25/21 : 294 lb 1.5 oz (133.4 kg)  10/12/21 : (!) 330 lb (149.7 kg)  05/14/21 : 267 lb (121.1 kg)       I/O last 3 completed shifts: In: 6132.5 [P.O.:240; I.V.:1227.5;  Ot of zosyn and azithromycin  3. Presumed ILD - autoimmune workup unremarkable. ESR and CRP are significantly elevated  - started solumedrol 125 mg Q6H on 11/23, continue today. 4. DM  - insulin gtt  5. Cardiac arrest - with ROSC.  Triggered by ventricular a

## 2021-11-25 NOTE — DIETARY NOTE
[facility]    NUTRITION ASSESSMENT    Pt does not meet malnutrition criteria. NUTRITION INTERVENTION:   1. Enteral Nutrition -  Vital AF 1.2 at 90 ml/hour x 24h.    o This will provide 2592 kcal, 162 grams protein, 1750 ml total free water, and 100% kg/m².  IBW: 89 kg      WEIGHT HISTORY:   Patient Weight(s) for the past 336 hrs:   Weight   11/25/21 0636 133.4 kg (294 lb 1.5 oz)   11/24/21 0648 134.5 kg (296 lb 8.3 oz)   11/22/21 0400 (!) 138.9 kg (306 lb 3.5 oz)   11/21/21 0500 (!) 136.8 kg (301 lb 9 GOALS:  1. Weight stable within 1 to 2 lbs during admission - Met, continues  2.  Tolerate alternative nutrition at 100% of goal - Met, continues      MEDICATIONS:  Pepcid, Insulin Drip, Solumedrol, Miralax, D3, Fentanyl    LABS:  BUN 75; Cr 4.26; -1

## 2021-11-25 NOTE — PROGRESS NOTES
[facility] [place] Hospitalist Progress Note     Oscar Ruiz Patient Status:  Inpatient    1955 MRN [id number]   [facility] 2NE-A Attending Neto Antonio MD   Hosp Day # 25 PCP Benny Smith DO     Chief Complaint: Patient p ALKPHO 53  --  56  --   --   --   --    AST 37  --  21  --   --   --   --    ALT 31  --  25  --   --   --   --    BILT 0.9  --  0.8  --   --   --   --    TP 6.8  --  6.5  --   --   --   --     < > = values in this interval not displayed.        Estimated Obesity BMI > 35    Plan    Cardiopulmonary Arrest  Cardiogenic Shock   - Cardiac arrest 11/15/21 s/p ROSC following DCCV  - likely multifactorial due to strain from respiratory failure, worsening pulmonary edema/respiratory insufficiency & external sedati respiratory status   - 11/16: plan for [facility] with impella placement & [place] placement   - 11/17: CVVH started per nephrology  -  11/18: impella removed, well toleraetd   11/20-transitioned to HD and toleraing well     Afib/AFL with RVR  - resolved   - now in

## 2021-11-25 NOTE — PLAN OF CARE
Assumed care of the pt at approx. 1930 pm. Opens eyes spontaneously, does not follows commands. Occasionally tracks with his eyes when called by his name. PERRL. Able to move all extremities spontaneously. Fentanyl gtt for pain & comfort.  Tegan Faith

## 2021-11-25 NOTE — PROGRESS NOTES
[address] [facility] Cardiology  Progress Note    Mark Graves Patient Status:  Inpatient    1955 MRN [id number]   [facility] 6NE-A Attending Romel Ely MD   Hosp Day # 25 PCP Robin Rouse DO     Subjective: Intubated.   Op Fleet Enema    Intake/Output:     Intake/Output Summary (Last 24 hours) at 11/25/2021 0748  Last data filed at 11/25/2021 0718  Gross per 24 hour   Intake 3390.85 ml   Output 4105 ml   Net -714.15 ml       Wt Readings from Last 3 Encounters:  11/25/21 : 29 < > 8.7 8.5 9.1   ALB 2.3*  2.3*   < > 2.2*  2.2*   < > 1.9* 2.0* 2.1*     136   < > 132*  132*   < > 135* 134* 138   K 4.1  4.1   < > 4.1  4.1   < > 4.3 4.2 4.6     101   < > 98  98   < > 99 100 102   CO2 28.0  28.0   < > 29.0  29.0   < > 28. 0 renal  6. Vent support  7. ATBx  8. Tele monitor  9. Solumedrol per pulmonary / critical care  10. F/U CBC - will need to consider further anticoagulation if AF persists.       Carina Thomas MD  11/25/2021

## 2021-11-26 ENCOUNTER — APPOINTMENT (OUTPATIENT)
Dept: WOUND CARE | Facility: HOSPITAL | Age: 66
End: 2021-11-26
Attending: NURSE PRACTITIONER
Payer: MEDICARE

## 2021-11-26 ENCOUNTER — APPOINTMENT (OUTPATIENT)
Dept: GENERAL RADIOLOGY | Facility: HOSPITAL | Age: 66
DRG: 215 | End: 2021-11-26
Attending: INTERNAL MEDICINE
Payer: MEDICARE

## 2021-11-26 PROCEDURE — 71045 X-RAY EXAM CHEST 1 VIEW: CPT | Performed by: INTERNAL MEDICINE

## 2021-11-26 NOTE — PROGRESS NOTES
[address] Patient Status:  Inpatient    1955 MRN [id number]   [facility] 6NE-A Attending Huong Sosa, DO   Hosp Day # 21 PCP Carlos Cho DO     Pulm / Critical Care Progress Note     S: pt remains intuba 24// FIO2 45/PEEP 5      Ppk 26   Ppl 21      Wt Readings from Last 3 Encounters:  11/26/21 : 294 lb 15.6 oz (133.8 kg)  10/12/21 : (!) 330 lb (149.7 kg)  05/14/21 : 267 lb (121.1 kg)       I/O last 3 completed shifts: In: 4701.5 [P.O.:240;  I.V.:993 11/24/2021 5.37 (H)   10/12/2021 1.52 (H)   10/16/2017 1.52 (H)   05/14/2016 1.78 (H)   ]         ASSESSMENT/PLAN:  1. Acute respiratory failure - due to cardiac arrest. Has pulmonary edema and likely underlying ILD, vs ards.  Intubated 11/15  - continue

## 2021-11-26 NOTE — PROGRESS NOTES
[address] Group Cardiology  Progress Note    Oscar Lowing Patient Status:  Inpatient    1955 MRN [id number]   [facility] 6NE-A Attending Javier Rosa MD   Hosp Day # 21 PCP Benny Smith DO     Subjective: Intubated.   Se fentanyl, polyethylene glycol (PEG 3350), senna, bisacodyl, Fleet Enema    Intake/Output:     Intake/Output Summary (Last 24 hours) at 11/26/2021 1247  Last data filed at 11/26/2021 0800  Gross per 24 hour   Intake 2767.5 ml   Output 650 ml   Net 2117.5 ml 132*   < > 138 139 140   K 4.1  4.1   < > 4.6 4.0 4.8   CL 98  98   < > 102 101 103   CO2 29.0  29.0   < > 27.0 24.0 25.0   ALKPHO 56  --   --   --   --    AST 21  --   --   --   --    ALT 25  --   --   --   --    BILT 0.8  --   --   --   --    TP 6.5  -- clinical stability.       Carina Thomas MD  11/26/2021

## 2021-11-26 NOTE — PROGRESS NOTES
[facility]    Nephrology Progress Note    Brittney Arrant Attending:  Gabbie Fischer MD       SUBJECTIVE:     Brittney Arrant remains intubated. UOP 500cc/24hr .  Afib, BPs lower    PHYSICAL EXAM:     Vital Signs: /74   Pulse 97   Temp 96.8 °F 11/26/2021    LYPERCENT 4.9 11/26/2021    MOPERCENT 5.5 11/26/2021    EOPERCENT 0.0 11/26/2021    BAPERCENT 0.0 11/26/2021    NE 6.74 11/26/2021    LYMABS 0.38 (L) 11/26/2021    MOABSO 0.42 11/26/2021    EOABSO 0.00 11/26/2021    BAABSO 0.00 11/26/2021 Units, 1.5 mL, Intracatheter, PRN  alteplase (ACTIVASE) 2 mg in Sterile Water for Injection 2.2 mL IV push, 2 mg, Intracatheter, Once PRN  clopidogrel (PLAVIX) tab 75 mg, 75 mg, Per G Tube, Daily  metoprolol tartrate (LOPRESSOR) tab 100 mg, 100 mg, Per G T (VITAMIN D3) tab 1,000 Units, 1,000 Units, Oral, Daily        ASSESSMENT/PLAN:   79 yo M with history of CKD stage 3 with albuminuria, long standing diabetes complicated by R foot amputation, CHF, HTN, HL presented with dyspnea on exertion x 2 weeks.   Pro

## 2021-11-26 NOTE — PROGRESS NOTES
[facility]                INFECTIOUS DISEASE PROGRESS NOTE    Carroll Lopez Patient Status:  Inpatient    1955 MRN [id number]   [facility] 6NE-A Attending Rocio Begum MD   [facility] Day # 21 PCP DO Hannah Santiago > 9.1 9.1 8.5   ALB 2.2*  2.2*   < > 2.1* 2.5* 2.3*   *  132*   < > 138 139 140   K 4.1  4.1   < > 4.6 4.0 4.8   CL 98  98   < > 102 101 103   CO2 29.0  29.0   < > 27.0 24.0 25.0   ALKPHO 56  --   --   --   --    AST 21  --   --   --   --    ALT 25 with draining sinus ([address]     Diabetic ulcer of right foot associated with type 2 diabetes mellitus ([address]     Diabetic polyneuropathy associated with type 2 diabetes mellitus ([address]     Combined form of age-related cataract, left eye     Type II or unspecif

## 2021-11-26 NOTE — PLAN OF CARE
Assumed patient care at [address] [place] [address]. Patient resting in bed, vented, insulin and fentanyl gtt. Throughout night patient increasingly restless, moving extremities with great strength, asynchrony on ventilator. PRN fentanyl pushes given and gtt rate increased.  Karen retention  Outcome: Progressing

## 2021-11-26 NOTE — PROGRESS NOTES
[facility] [place] Hospitalist Progress Note     Suyapa Coronado Patient Status:  Inpatient    1955 MRN [id number]   [facility] 2NE-A Attending Laquita Jaimes MD   [address] [facility] Day # 21 PCP Mayra Lockett DO     Chief Complaint: Patient p 5.26 mg/dL (H)).     Recent Labs   Lab 11/20/21  0518   PTP 16.8*   INR 1.33*            COVID-19 Lab Results    COVID-19  Lab Results   Component Value Date    COVID19 Not Detected 11/11/2021    COVID19 Not Detected 11/07/2021    COVID19 Not Detected 11/05 removal -HD plan for today  -s/pTEE/CV and impella, impella now removed   -Cardiac indices reviewed, bradycardia has improved   -removed swan    Acute Respiratory Failure with Hypoxia   - possibly multifactorial between below infectious & cardiac etiologie amiodarone per cardiology   - on  Heparin subcu  -BB on hold for bradycardia    Possible Gram Negative Pneumonia/ UTI  - ID consulted  - empiric antibiotics completed 11/12/21-currently on zosyn  -BAL cx neg  -leyva catheter in place with discharge around

## 2021-11-27 RX ORDER — METOPROLOL TARTRATE 50 MG/1
50 TABLET, FILM COATED ORAL
Status: DISCONTINUED | OUTPATIENT
Start: 2021-11-27 | End: 2021-12-01

## 2021-11-27 RX ORDER — SEVELAMER CARBONATE 800 MG/1
800 TABLET, FILM COATED ORAL 3 TIMES DAILY
Status: DISCONTINUED | OUTPATIENT
Start: 2021-11-27 | End: 2021-11-28

## 2021-11-27 RX ORDER — METHYLPREDNISOLONE SODIUM SUCCINATE 125 MG/2ML
125 INJECTION, POWDER, LYOPHILIZED, FOR SOLUTION INTRAMUSCULAR; INTRAVENOUS EVERY 8 HOURS
Status: DISCONTINUED | OUTPATIENT
Start: 2021-11-27 | End: 2021-11-30

## 2021-11-27 RX ORDER — AMIODARONE HYDROCHLORIDE 200 MG/1
200 TABLET ORAL 2 TIMES DAILY
Status: DISCONTINUED | OUTPATIENT
Start: 2021-11-27 | End: 2021-12-17

## 2021-11-27 NOTE — PROGRESS NOTES
Patient received on full ventilator support as documented. No changes made at this time. Will continue to monitor closely.        11/26/21 2309   Vent Information   $ RT Standby Charge (per 15 min) 1   Ventilator Initiation 11/16/21   Ventilation Day(s) 11

## 2021-11-27 NOTE — PROGRESS NOTES
[facility] [place] Hospitalist Progress Note     Susanstar Mejiavert Patient Status:  Inpatient    1955 MRN [id number]   [facility] 2NE-A Attending Alicia Yee MD   Hosp Day # 25 PCP Annie Marc DO     Chief Complaint: Patient p COVID19 Not Detected 11/05/2021       Pro-Calcitonin  No results for input(s): PCT in the last 168 hours. Cardiac  No results for input(s): TROP, PBNP in the last 168 hours.     Creatinine Kinase  Recent Labs   Lab 11/22/21  0421   CK 21*       Inflammat multifactorial between below infectious & cardiac etiologies along with possible ILD  -currently on HD for pulmonary edema.  HD per renal  -completed abx for pneumonia  -currently on solumedrol per pulm  -may need to consider trach if unable to extubated

## 2021-11-27 NOTE — PLAN OF CARE
Received patient at 07:30 ventilated and sedated. Following simple commands. Anxious at times, increased precedex for anxiety. Asked if short of breath patient nods head yes. No to pain. Breathing trail for 60 minutes.   Tube feeds continue, with liquid

## 2021-11-27 NOTE — PROGRESS NOTES
[facility]    Nephrology Progress Note    Mary Lou Gum Attending:  Hudson Cat MD       SUBJECTIVE:     Mary Lou Gum remains intubated. UOP 375cc/24hr .  Sp HD yesterday w only 500cc removed due to low BPs    PHYSICAL EXAM:     Vital Signs: 11/27/2021    CO2 25.0 11/27/2021    CO2 25.0 11/27/2021     Lab Results   Component Value Date    WBC 9.9 11/27/2021    RBC 3.08 (L) 11/27/2021    HGB 7.5 (L) 11/27/2021    HCT 25.0 (L) 11/27/2021    .0 11/27/2021    MCV 81.2 11/27/2021    MCH 24. 4 PRN  Insulin Regular Human (NOVOLIN R) 100 Units in sodium chloride 0.9% 100 mL infusion, 1-32 Units/hr, Intravenous, Continuous  fenofibrate micronized (LOFIBRA) cap 67 mg, 67 mg, Oral, Nightly  Albumin Human (ALBUMINAR) 25 % solution 100 mL, 100 mL, Intr (LEVOPHED) 4 mg/250 ml premix infusion, 0.5-30 mcg/min, Intravenous, Continuous  famoTIDine (PEPCID) injection 20 mg, 20 mg, Intravenous, Daily  [Held by provider] bumetanide (BUMEX) 0.25 MG/ML injection 2 mg, 2 mg, Intravenous, TID  polyethylene glycol (P

## 2021-11-27 NOTE — PLAN OF CARE
Assumed care of the pt at approx. 1930 pm. Sedated with Precedex & Fentanyl gtts. Opens his eyes spontaneously, follows commands and nods his head to questions appropriately when sedation was lowered. Moves all extremities spontaneously as well. JULIUS Esparza

## 2021-11-27 NOTE — PROGRESS NOTES
S: Pt is intubated, but nods head to questions, follows commands.      Meds:  • amiodarone  200 mg Per G Tube BID   • metoprolol tartrate  50 mg Per G Tube 2x Daily(Beta Blocker)   • MethylPREDNISolone Sodium Succ  125 mg Intravenous Q6H   • fenofib palpation   Extremities - No cyanosis, clubbing, edema appreciated. R foot amputation noted. Neuro - follows simple commands. Labs:  Recent Labs   Lab 11/25/21  0723 11/26/21  0411 11/27/21  0458   WBC 13.3* 7.7 9.9   HGB 8.4* 7.4* 7.5*   . 0 continue solumedrol 125 mg ( started 11/23) - change from q6 to q8 today and gradually taper  4. DM  - monitor accuchecks  - insulin gtt  5. Cardiac arrest - 11/16, with ROSC.  Triggered by ventricular arrhythmia following poss resp arrest in assn with real

## 2021-11-27 NOTE — PROGRESS NOTES
[address] [facility] Cardiology  Progress Note    Hannah Cárdenas Patient Status:  Inpatient    1955 MRN [id number]   [facility] 6NE-A Attending Yovani Hernandez, [address] [facility] Day # 25 PCP Tipmamie Arshad DO     Assessment:    1.   Respirato : (!) 330 lb (149.7 kg)  05/14/21 : 267 lb (121.1 kg)      General: Resting quietly; in no acute distress  Neck: No jugulovenous distention; no carotid bruits  Cardiac: Regular rate and irregular rhythm; no murmurs/rubs are appreciated  Lungs: Clear to [employer] (TYLENOL) tab 650 mg, 650 mg, Oral, Q4H PRN   Or  acetaminophen (TYLENOL) 160 MG/5ML oral liquid 650 mg, 650 mg, Oral, Q4H PRN   Or  acetaminophen (Tylenol) rectal suppository 650 mg, 650 mg, Rectal, Q4H PRN   Or  acetaminophen (OFIRMEV) infusion 1,000 mg, K 4.6 11/27/2021    K 4.6 11/27/2021     11/27/2021     11/27/2021    CO2 25.0 11/27/2021    CO2 25.0 11/27/2021    BUN 81 11/27/2021    BUN 81 11/27/2021    CREATSERUM 3.87 11/27/2021    CREATSERUM 3.87 11/27/2021    GLU 91 11/27/2021    GL

## 2021-11-28 RX ORDER — SEVELAMER CARBONATE 800 MG/1
1600 TABLET, FILM COATED ORAL 3 TIMES DAILY
Status: DISCONTINUED | OUTPATIENT
Start: 2021-11-28 | End: 2021-12-02

## 2021-11-28 NOTE — PROGRESS NOTES
[facility] [place] Hospitalist Progress Note     Noel Collet Patient Status:  Inpatient    1955 MRN [id number]   [facility] 2NE-A Attending Ji Mckeon MD   Hosp Day # 22 PCP Levy Sun DO     Chief Complaint: Patient p COVID19 Not Detected 11/05/2021       Pro-Calcitonin  No results for input(s): PCT in the last 168 hours. Cardiac  No results for input(s): TROP, PBNP in the last 168 hours.     Creatinine Kinase  Recent Labs   Lab 11/22/21  0421   CK 21*       Inflammat multifactorial between below infectious & cardiac etiologies along with possible ILD  -currently on HD for pulmonary edema.  HD per renal  -completed abx for pneumonia  -currently on solumedrol per pulm  -may need to consider trach if unable to extubated

## 2021-11-28 NOTE — PLAN OF CARE
Received patient at 07:30 sedated and ventilated. Precedex decreased and fentanly hold during weaning trial.  Following simple commands weak squeeze of hands wiggle toes and left. Nods head appro. ABG called to Dr. Dimas Alcantara.   Orders to change tube feeds to

## 2021-11-28 NOTE — PROGRESS NOTES
11/28/21 1751   Vent Information   Ventilator Initiation 11/16/21   Ventilation Day(s) 13   Interface Invasive   Vent Type PB   Vent  2   Vent Mode VC+   Settings   FiO2 (%) 40 %   Resp Rate (Set) 24   Vt (Set, mL) 550 mL   Waveform Decelerating r

## 2021-11-28 NOTE — PROGRESS NOTES
[facility]  Progress Note    Lashonda Poole Patient Status:  Inpatient    1955 MRN [id number]   [facility] 6NE-A Attending Marcela Cloud, *   Hosp Day # 25 PCP Santiago Lyle DO       Assessment:    1. Karlos Bucio kg)  05/14/21 : 267 lb (121.1 kg)      General: Resting quietly; in no acute distress  Neck: No jugulovenous distention; no carotid bruits  Cardiac: Regular rate and rhythm; no murmurs/rubs/gallops are appreciated  Lungs: Clear to auscultation bilaterally Q30 Min PRN  acetaminophen (TYLENOL) tab 650 mg, 650 mg, Oral, Q4H PRN   Or  acetaminophen (TYLENOL) 160 MG/5ML oral liquid 650 mg, 650 mg, Oral, Q4H PRN   Or  acetaminophen (Tylenol) rectal suppository 650 mg, 650 mg, Rectal, Q4H PRN   Or  acetaminophen ( 11/28/2021    K 5.5 11/28/2021     11/28/2021    CO2 23.0 11/28/2021     11/28/2021    CREATSERUM 5.06 11/28/2021    GLU 74 11/28/2021    CA 7.7 11/28/2021    MG 2.8 11/28/2021       Telemetry: AF    Please feel free to contact me if you have

## 2021-11-28 NOTE — PROGRESS NOTES
S: Pt remains intubated. Pt was on a PS trial this am but was not waking up per RT; ABG after CPAP trial showed hypercapnea (ph 7.29/pCO2 48) so was returned to full vent support. Pt is currently awake, follows commands.      Meds:  • Sevelamer  1,6 Gen - Alert, follows commands  Lungs - CTAB  CV - regular rate & rhythm. Normal S1, S2. No murmurs, rubs, or gallops appreciated. Abdomen - soft, nontender to palpation   Extremities - No cyanosis, clubbing, edema appreciated. R foot amputation noted. antibiotics   3. Presumed ILD - autoimmune workup unremarkable. ESR and CRP were significantly elevated; alternatively this may be ards. - continue solumedrol q8( started 11/23); gradually taper  4. DM  - monitor accuchecks  - continue insulin gtt  5.  CV

## 2021-11-28 NOTE — PROGRESS NOTES
11/28/21 0515   Vent Information   $ RT Standby Charge (per 15 min) 1   Ventilator Initiation 11/16/21   Ventilation Day(s) 13   Interface Invasive   Vent Type PB   Vent -2   Vent Mode VC+   Settings   FiO2 (%) 45 %   Resp Rate (Set) 24   Vt (Set,

## 2021-11-28 NOTE — PROGRESS NOTES
[facility]    Nephrology Progress Note    Becki Hardy Attending:  Devin Rodríguez MD       SUBJECTIVE:     Becki Hardy remains intubated.  UOP 430cc/24hr     PHYSICAL EXAM:     Vital Signs: /87   Pulse 98   Temp 98.7 °F (37.1 °C) (Tempo LYPERCENT 3.4 11/28/2021    MOPERCENT 4.9 11/28/2021    EOPERCENT 0.0 11/28/2021    BAPERCENT 0.0 11/28/2021    NE 7.58 11/28/2021    LYMABS 0.29 (L) 11/28/2021    MOABSO 0.41 11/28/2021    EOABSO 0.00 11/28/2021    BAABSO 0.00 11/28/2021     Lab Result Units, Subcutaneous, Q8H [address]  aspirin chewable tab 81 mg, 81 mg, Per G Tube, Daily  heparin sodium 1000 UNIT/ML injection 1,500 Units, 1.5 mL, Intracatheter, PRN  alteplase (ACTIVASE) 2 mg in Sterile Water for Injection 2.2 mL IV push, 2 mg, Intracatheter, Oral, Nightly  Vitamin D3 (Cholecalciferol) (VITAMIN D3) tab 1,000 Units, 1,000 Units, Oral, Daily        ASSESSMENT/PLAN:   77 yo M with history of CKD stage 3 with albuminuria, long standing diabetes complicated by R foot amputation, CHF, HTN, HL present

## 2021-11-28 NOTE — PLAN OF CARE
Patient on full vent support. FIO2 45% . O2 sat>90%. Arousable to voice. Follows some commands. He is Afib on tele. HR controlled. Afebrile. Bettencourt intact. Due meds given. Meds given per OGT. Tubefeeds via OGT. Minimal residuals.  Precedex and fentanyl drips ordered medications to maintain glucose within target range  - Assess barriers to adequate nutritional intake and initiate nutrition consult as needed  - Instruct patient on self management of diabetes  Outcome: Progressing  Goal: Electrolytes maintained w needs   - Reorient and redirection as needed  - Assess for the need to continue restraints  Outcome: Progressing     Problem: Delirium  Goal: Minimize duration of delirium  Description: Interventions:  - Encourage use of hearing aids, eye glasses  - Promot

## 2021-11-28 NOTE — DIETARY NOTE
[address] [facility]    Received call from [facility] regarding TF formula change. Per report, nephrology requesting to change TF to Nepro d/t hyperphosphatemia (7.6) and hyperkalemia (5.5). Pt was tolerating Vital AF at 90 ml/hr.     Nutrition

## 2021-11-28 NOTE — RESPIRATORY THERAPY NOTE
Patient received on full support vent settings. All vital signs stable.  Weaning trial done this am.  Patient tolerated 1 hr CPAP trial well,  At the beginning of the trial patient was following most commands, however at the end of trial patient was not re

## 2021-11-29 ENCOUNTER — APPOINTMENT (OUTPATIENT)
Dept: GENERAL RADIOLOGY | Facility: HOSPITAL | Age: 66
DRG: 215 | End: 2021-11-29
Attending: INTERNAL MEDICINE
Payer: MEDICARE

## 2021-11-29 PROCEDURE — 71045 X-RAY EXAM CHEST 1 VIEW: CPT | Performed by: INTERNAL MEDICINE

## 2021-11-29 NOTE — PROGRESS NOTES
[facility]    Nephrology Progress Note    Alexus Vanegas Attending:  Leticia Delgado, *       SUBJECTIVE:     Awake, intubated.     PHYSICAL EXAM:     Vital Signs: /77 (BP Location: Left arm)   Pulse 94   Temp 97.2 °F (36.2 °C) (Temporal) BAPERCENT 0.0 11/28/2021    NE 7.58 11/28/2021    LYMABS 0.29 (L) 11/28/2021    MOABSO 0.41 11/28/2021    EOABSO 0.00 11/28/2021    BAABSO 0.00 11/28/2021     Lab Results   Component Value Date    MALBP 3.71 11/08/2021    CREUR 46.20 11/08/2021    CREUR 46 Sodium (Porcine) 5000 UNIT/ML injection 5,000 Units, 5,000 Units, Subcutaneous, Q8H [facility]  aspirin chewable tab 81 mg, 81 mg, Per G Tube, Daily  heparin sodium 1000 UNIT/ML injection 1,500 Units, 1.5 mL, Intracatheter, PRN  alteplase (ACTIVASE) 2 mg in [place] Oral, Nightly  Vitamin D3 (Cholecalciferol) (VITAMIN D3) tab 1,000 Units, 1,000 Units, Oral, Daily        ASSESSMENT/PLAN:       77 yo M with history of CKD stage 3 with albuminuria, long standing diabetes complicated by R foot amputation, CHF, HTN, HL pre

## 2021-11-29 NOTE — DIETARY NOTE
[facility]    NUTRITION ASSESSMENT    Pt does not meet malnutrition criteria. NUTRITION INTERVENTION:   1. Enteral Nutrition -  Nepro 1.8 at 55 ml/hour x 24h.    o This will provide 2592 kcal, 162 grams protein, 964 ml total free water, and 100% of reported. No N/V/D. Dietitian will continue to monitor TF. TF + Propofol providing 100% estimated needs. ANTHROPOMETRICS:  Ht: 190.5 cm (6' 3\")  Wt: 132.9 kg (292 lb 15.9 oz). This is 149% of IBW  BMI: Body mass index is 36.62 kg/m².   IBW: 89 kg respiratory process or complication of therapy which results in mechanical ventilation as evidenced by need for NPO status      MONITOR AND EVALUATE/NUTRITION GOALS:  1. Weight stable within 1 to 2 lbs during admission - Met, continues  2.  Tolerate alterna

## 2021-11-29 NOTE — PLAN OF CARE
Patient VSS. Vented more alert and responsive. Continues on Insulin, Precedex and Fent gtt. Bettencourt and Flexiseal intact. A-line intact. Patient secretions managed. Plan of care reviewed with rocky.

## 2021-11-29 NOTE — PROGRESS NOTES
[facility] Hospitalist Progress Note     Hannah Cárdenas Patient Status:  Inpatient    1955 MRN [id number]   [facility] 2NE-A Attending Jai Martines MD   [facility] Day # 32 PCP Tip Arshad DO     Chief Complaint: Patient p Not Detected 11/05/2021       Pro-Calcitonin  No results for input(s): PCT in the last 168 hours. Cardiac  No results for input(s): TROP, PBNP in the last 168 hours. Creatinine Kinase  No results for input(s): CK in the last 168 hours.     Inflammator cardiac etiologies along with possible ILD  -currently on HD for pulmonary edema.  HD per renal  -completed abx for pneumonia  -currently on solumedrol per pulm  -plan for trach, ENT/interventional pulm c/s    Acute HFrEF Exacerbation   CAD  Dilated Cardiom

## 2021-11-29 NOTE — PROGRESS NOTES
11/29/21 0501   Vent Information   $ RT Standby Charge (per 15 min) 1   Ventilator Initiation 11/16/21   Ventilation Day(s) 14   Interface Invasive   Vent Type PB   Vent -2   Vent Mode VC+   Settings   FiO2 (%) 40 %   Resp Rate (Set) 24   Vt (Set,

## 2021-11-29 NOTE — PROGRESS NOTES
[address] Patient Status:  Inpatient    1955 MRN [id number]   [facility] 6NE-A Attending Jack Barajas, *   Hosp Day # 32 PCP Rianna Andres DO     Critical Care Progress Note     Date of Admission:  Per G Tube, Daily  •  atorvastatin (LIPITOR) tab 40 mg, 40 mg, Per G Tube, Nightly  •  fentaNYL citrate (SUBLIMAZE) 0.05 MG/ML injection 25 mcg, 25 mcg, Intravenous, Q30 Min PRN **OR** fentaNYL citrate (SUBLIMAZE) 0.05 MG/ML injection 50 mcg, 50 mcg, Intra BMI 36.62 kg/m²      Ventilator Settings: Vent Mode: VC+  FiO2 (%):  [40 %-45 %] 40 %  S RR:  [24] 24  S VT:  [550 mL] 550 mL  PEEP/CPAP (cm H2O):  [5 cm H20] 5 cm H20  MAP (cm H2O):  [8-17] 13        Wt Readings from Last 3 Encounters:  11/28/21 : 292 lb cardiology is in agreement and feels he is optimized from cardiac standpoint to go through with surgery. - will consult ENT   - continue empiric steroids as below  - HD for volume management  2. Possible PNA - s/p bronch 11/17. Cultures with no growth.

## 2021-11-29 NOTE — PLAN OF CARE
Assumed care of pt at 0700. Pt hemodynamically stable with controlled afib. Vented. ETT at 27 cm. Pt on insulin, precedex, fentanyl. See MAR. Pt failed SBT in AM. ENT consulted for gavin, per [facility]. HD catheter in right JV and deaccessed.  Site clean, dry,

## 2021-11-29 NOTE — CM/SW NOTE
Care Progression Note:  Active Acute Medical Issue:   Hypoglycemia     Other Contributing Medical Factors/Dx:  DM2, HTN, CKD ( on hemodialysis 11/19), CAD, Cardiac Arrest (PEA 11/16), s/p cath /stent, aflutter, partial rt foot amputation 2008, Chronic Oste

## 2021-11-29 NOTE — PROCEDURES
[facility]    PATIENT'S NAME: Carlos Kauffman   ATTENDING PHYSICIAN: Janny Hackett. Lopez aGrza MD   OPERATING PHYSICIAN: Jaxson Hale.  Anthony Nash MD   PATIENT ACCOUNT#:   [id number]    LOCATION:  [address] [place] [address]  MEDICAL RECORD #:   [id number]       DATE OF BIRTH: re-exchanged for a [employer] wire. We subsequently stented with a 3.0 x 38 mm Keny drug-eluting stent and we postdilated to 3.5 mm with a noncompliant balloon and then we used a 4.5 mm noncompliant more proximally up to 20 atmospheres.   We had full expansion an This correlated with both the invasive and the noninvasive pressures. CONCLUSIONS:  A 78year-old with shock and advanced coronary disease. We treated the LAD with rotational atherectomy and a drug-eluting stent. We had an excellent result.   We alexander

## 2021-11-29 NOTE — CONSULTS
[facility]  Report of Consultation  Interventional Pulmonology    Ladan Ramirez Patient Status:  Inpatient    1955 MRN [id number]   [facility] 6NE-A Attending David Marrero, *   Hosp Day # 32 PCP Roldan Shaw DO [facility] TARSAL/METATARSAL  11/9/09    Performed by Jennifer Morales at [address] [place] [address]   • PART [facility] TARSAL/METATARSAL Right 12/22/2014    Procedure: EXOSTECTOMY FOOT/TOE;   Surgeon: Heather Sagastume DPM;  Location: [address] [place] [address] injection 1,500 Units, 1.5 mL, Intracatheter, PRN  •  alteplase (ACTIVASE) 2 mg in Sterile Water for Injection 2.2 mL IV push, 2 mg, Intracatheter, Once PRN  •  clopidogrel (PLAVIX) tab 75 mg, 75 mg, Per G Tube, Daily  •  atorvastatin (LIPITOR) tab 40 mg, D3) tab 1,000 Units, 1,000 Units, Oral, Daily    Review of Systems:   Unable to obtain    Vital signs in last 24 hours:  Patient Vitals for the past 24 hrs:   BP Temp Temp src Pulse Resp SpO2   11/29/21 1300 126/69 — — 76 16 99 %   11/29/21 1200 113/71 Yolanda Olvera ) trachea midline, no adenopathy, no thyromegaly. Lungs: coarse breath sounds bilaterally   Chest wall: No tenderness or deformity. Heart: Regular rate and rhythm, normal S1S2.    Abdomen: soft, non-tender, non-distended, no masses, no guarding, no     re Markers  Recent Labs   Lab 11/23/21  0938   CRP 5.17*     Cultures:   Sputum: ngtd    Blood: ngtd    Urine:candida 11/22    Radiology:  CXR 11/29- persistent patchy infiltrates and vascular cephalization    Assessment:  · Acute, now chronic, respiratory fa

## 2021-11-30 RX ORDER — METHYLPREDNISOLONE SODIUM SUCCINATE 125 MG/2ML
80 INJECTION, POWDER, LYOPHILIZED, FOR SOLUTION INTRAMUSCULAR; INTRAVENOUS EVERY 8 HOURS
Status: DISCONTINUED | OUTPATIENT
Start: 2021-11-30 | End: 2021-12-02

## 2021-11-30 NOTE — PROGRESS NOTES
[facility]    Nephrology Progress Note    Critical access hospital Attending: Ofelia Bowen MD       SUBJECTIVE:     Underwent HD today, tolerated 2 L ultrafiltration but needed albumin support with dialysis per nursing.   He is awake on the ventilator, NEPERCENT 90.0 11/28/2021    LYPERCENT 3.4 11/28/2021    MOPERCENT 4.9 11/28/2021    EOPERCENT 0.0 11/28/2021    BAPERCENT 0.0 11/28/2021    NE 7.58 11/28/2021    LYMABS 0.29 (L) 11/28/2021    MOABSO 0.41 11/28/2021    EOABSO 0.00 11/28/2021    BAABSO 0 cap 67 mg, 67 mg, Oral, Nightly  Albumin Human (ALBUMINAR) 25 % solution 100 mL, 100 mL, Intravenous, PRN  Heparin Sodium (Porcine) 5000 UNIT/ML injection 5,000 Units, 5,000 Units, Subcutaneous, Q8H [facility]  aspirin chewable tab 81 mg, 81 mg, Per G Tube, Daily Intravenous, TID  polyethylene glycol (PEG 3350) (MIRALAX) powder packet 17 g, 17 g, Oral, Daily  melatonin tab 3 mg, 3 mg, Oral, Nightly  Vitamin D3 (Cholecalciferol) (VITAMIN D3) tab 1,000 Units, 1,000 Units, Oral, Daily        ASSESSMENT/PLAN:       78

## 2021-11-30 NOTE — PROGRESS NOTES
[facility] Cardiology Progress Note - Rona Stratton Patient Status:  Inpatient    1955 MRN [id number]   [facility] 6NE-A Attending Marielena Patel MD   [facility] Day # 32 PCP Lev Workman DO     Subjective:    A recession and Peroneus longus tendon lengthening, L lower extremity.  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 layer exposed ([address]      2.9 oz (134.8 kg)  11/15/21 0548 : 298 lb 14.4 oz (135.6 kg)  11/14/21 0505 : 290 lb (131.5 kg)  11/13/21 0641 : (!) 304 lb 10.8 oz (138.2 kg)  11/12/21 0600 : (!) 305 lb 3.2 oz (138.4 kg)  11/11/21 0555 : (!) 307 lb 1.6 oz (139.3 kg)  11/10/21 0757 : Bernie Cade 132* 169*       Recent Labs   Lab 11/26/21  0411 11/27/21  0458 11/28/21  0430 11/29/21  0413 11/30/21  0459   ALT  --  44  --   --   --    AST  --  49*  --   --   --    ALB 2.3* 3.1*  3.1* 2.8* 3.2* 3.3*       No results for input(s): TROP in the last 168 (SUBLIMAZE) 0.05 MG/ML injection 25 mcg, 25 mcg, Intravenous, Q30 Min PRN   Or  fentaNYL citrate (SUBLIMAZE) 0.05 MG/ML injection 50 mcg, 50 mcg, Intravenous, Q30 Min PRN  acetaminophen (TYLENOL) tab 650 mg, 650 mg, Oral, Q4H PRN   Or  acetaminophen (TYLEN cough   Cardiovascular:  See HPI  GI: denies nausea, vomiting,   Genital/: no dysuria,   Musculoskeletal: no joint complaints upper or lower extremities  Neuro: no sensory or motor complaint  Psyche: no symptoms of depression or anxiety  Hematology: scott

## 2021-11-30 NOTE — PROGRESS NOTES
Interventional Pulmonology    Trach and PEG scheduled 12/6 at 0900 if pt not extubated prior to that date. Last dose plavix 11/29. On cangrelor drip. Will plan to hold 1-2 hours prior to procedure. Consent orders placed.     Additional orders to be plac

## 2021-11-30 NOTE — PLAN OF CARE
Received patient at 07:30 ventilated and sedated. On dialysis, removed 2 L. Weaned precedex and turned off fentanly for weaning trial.  1400 patient agitated increase in RR increased precedex to 1 mcg/kg/min and restarted fentanly.   Continue plan of care

## 2021-11-30 NOTE — PROGRESS NOTES
[address] Patient Status:  Inpatient    1955 MRN [id number]   [facility] 6NE-A Attending Mendoza Keita, *   Hosp Day # 32 PCP Grey Stinson, DO     Critical Care Progress Note     Date of Admission:  mg in Sterile Water for Injection 2.2 mL IV push, 2 mg, Intracatheter, Once PRN  •  atorvastatin (LIPITOR) tab 40 mg, 40 mg, Per G Tube, Nightly  •  fentaNYL citrate (SUBLIMAZE) 0.05 MG/ML injection 25 mcg, 25 mcg, Intravenous, Q30 Min PRN **OR** fentaNYL 3\" (1.905 m)   Wt 292 lb 15.9 oz (132.9 kg)   SpO2 99%   BMI 36.62 kg/m²      Ventilator Settings: Vent Mode: VC+  FiO2 (%):  [40 %] 40 %  S RR:  [24] 24  S VT:  [550 mL] 550 mL  PEEP/CPAP (cm H2O):  [5 cm H20] 5 cm H20  MAP (cm H2O):  [9.4-12] 12 bridging with cangrelor, plan 12/6 at the earliest  - continue empiric steroids as below  - HD for volume management  2. Possible PNA - s/p bronch 11/17. Cultures with no growth. Completed course of zosyn and azithromycin  -monitor off antibiotics   3.  Pre

## 2021-11-30 NOTE — PROGRESS NOTES
11/29/21 1923   Vent Information   $ RT Standby Charge (per 15 min) 1   Ventilator Initiation 11/16/21   Ventilation Day(s) 14   Interface Invasive   Vent Type PB   Vent -2   Vent Mode VC+   Settings   FiO2 (%) 40 %   Resp Rate (Set) 24   Vt (Set,

## 2021-12-01 RX ORDER — METOPROLOL TARTRATE 5 MG/5ML
5 INJECTION INTRAVENOUS EVERY 6 HOURS
Status: DISCONTINUED | OUTPATIENT
Start: 2021-12-01 | End: 2021-12-02

## 2021-12-01 RX ORDER — ALBUMIN (HUMAN) 12.5 G/50ML
100 SOLUTION INTRAVENOUS AS NEEDED
Status: DISCONTINUED | OUTPATIENT
Start: 2021-12-01 | End: 2021-12-28

## 2021-12-01 RX ORDER — CISATRACURIUM BESYLATE 2 MG/ML
0.2 INJECTION INTRAVENOUS ONCE
Status: DISCONTINUED | OUTPATIENT
Start: 2021-12-06 | End: 2021-12-03 | Stop reason: ALTCHOICE

## 2021-12-01 RX ORDER — CEFAZOLIN SODIUM/WATER 2 G/20 ML
2 SYRINGE (ML) INTRAVENOUS ONCE
Status: DISCONTINUED | OUTPATIENT
Start: 2021-12-06 | End: 2021-12-03 | Stop reason: ALTCHOICE

## 2021-12-01 NOTE — PROGRESS NOTES
[facility] Hospitalist Progress Note     Delano Mcelroy Patient Status:  Inpatient    1955 MRN [id number]   [facility] 2NE-A Attending Dana Truong MD   [facility] Day # 29 PCP Lorna Arguelles DO     Chief Complaint: Patient p 168 hours.          COVID-19 Lab Results    COVID-19  Lab Results   Component Value Date    COVID19 Not Detected 11/11/2021    COVID19 Not Detected 11/07/2021    COVID19 Not Detected 11/05/2021       Pro-Calcitonin  No results for input(s): PCT in the last external sedation (received diazepam shortly prior to event)   - off pressors now, cont HD for fluid removal -HD per renal  -s/pTEE/CV and LHC, impella, and stenting to LAD and stent to RPL- impella now removed   - dc'ed a-line  -holding plavix bridging wi suspect reactive and d/t CKD.  IV Fe per renal      Quality:  · DVT Prophylaxis: heparinSC  · CODE status: FULL    DISPO: ICU       D/w RN Emily Aguilar hospitalist to resume care in AM, will discuss plan with them    Jose Antonio Jarvis MD  [facility]

## 2021-12-01 NOTE — PROGRESS NOTES
[facility]    Nephrology Progress Note    Mark Graves Attending: Nithin Vail MD       SUBJECTIVE:     Pt extubated today. Low urine output noted. No pain.     PHYSICAL EXAM:     Vital Signs: /69 (BP Location: Left arm)   Pulse 82 EOPERCENT 0.0 11/28/2021    BAPERCENT 0.0 11/28/2021    NE 7.58 11/28/2021    LYMABS 0.29 (L) 11/28/2021    MOABSO 0.41 11/28/2021    EOABSO 0.00 11/28/2021    BAABSO 0.00 11/28/2021     Lab Results   Component Value Date    MALBP 3.71 11/08/2021    KATH g, Oral, Q15 Min PRN   Or  glucose-vitamin C (DEX-4) chewable tab 8 tablet, 8 tablet, Oral, Q15 Min PRN  fenofibrate micronized (LOFIBRA) cap 67 mg, 67 mg, Oral, Nightly  Albumin Human (ALBUMINAR) 25 % solution 100 mL, 100 mL, Intravenous, PRN  Heparin Sod Continuous  famoTIDine (PEPCID) injection 20 mg, 20 mg, Intravenous, Daily  [Held by provider] bumetanide (BUMEX) 0.25 MG/ML injection 2 mg, 2 mg, Intravenous, TID  polyethylene glycol (PEG 3350) (MIRALAX) powder packet 17 g, 17 g, Oral, Daily  melatonin t questions or concerns.         Claude Bang, MD  [address] Nephrology  Counts include 234 beds at the [facility]  [facility]  [place], [address]    12/1/2021  1:26 PM

## 2021-12-01 NOTE — PROGRESS NOTES
S: Pt remains intubated. Nods head to questions and follows commands.      Meds:  • [START ON 12/6/2021] cisatracurium besylate  0.2 mg/kg (Adjusted) Intravenous Once   • [START ON 12/6/2021] ceFAZolin  2 g Intravenous Once   • MethylPREDNISolone So Height:           Vent Mode: VC+  FiO2 (%):  [40 %] 40 %  S RR:  [24] 24  S VT:  [550 mL] 550 mL  PEEP/CPAP (cm H2O):  [5 cm H20] 5 cm H20  MAP (cm H2O):  [7.8-12] 12       Gen - intubated, nods head to questions, follows commands  Lungs - CTAB  CV - reg management  2. Possible PNA - s/p bronch 11/17. Cultures with no growth. Completed course of zosyn and azithromycin  -monitor off antibiotics   3. Presumed ILD - autoimmune workup unremarkable.  ESR and CRP were significantly elevated; alternatively this ma

## 2021-12-01 NOTE — PROGRESS NOTES
Patient received on full ventilator support as documented. No changes made at this time. Routine care given.         12/01/21 0451   Vent Information   $ RT Standby Charge (per 15 min) 1   Ventilator Initiation 11/16/21   Ventilation Day(s) 16   Interface I

## 2021-12-01 NOTE — CM/SW NOTE
Updates sent to [facility]. Trach/Peg insertion planned for 12/6/2021.     Seymour Dandy, RN, BSN   [phone]

## 2021-12-01 NOTE — PROGRESS NOTES
[facility] Cardiology Progress Note - Paco Han Patient Status:  Inpatient    1955 MRN [id number]   [facility] 6NE-A Attending Natalee Warner MD   [facility] Day # 28 PCP Gracie Bolanos DO     Subjective:  Louie Knapp uncontrolled(250.62)     Hyponatremia     Anemia     Hyperglycemia     Cellulitis of foot     Debridement of open wound, 4th MT head resection, wound VAC application left foot- Sx 12/21/17  GLENN 03/21/18     Osteomyelitis ([address]     Cellulitis     Long term and oriented x 3. No apparent distress. No respiratory or constitutional distress. HEENT: Normocephalic, anicteric sclera, neck supple, no thyromegaly or adenopathy. Neck: No JVD, carotids 2+, no bruits. Cardiac: Regular rate and rhythm.  S1, S2 normal. Units/hr, Intravenous, Continuous  [START ON 12/6/2021] cisatracurium besylate (NIMBEX) 2 MG/ML injection 20.86 mg, 0.2 mg/kg (Adjusted), Intravenous, Once  [START ON 12/6/2021] ceFAZolin (ANCEF/KEFZOL) 2 GM/20ML premix IV syringe 2 g, 2 g, Intravenous, On suppository 650 mg, 650 mg, Rectal, Q4H PRN   Or  acetaminophen (OFIRMEV) infusion 1,000 mg, 1,000 mg, Intravenous, Q6H PRN  fentanyl (SUBLIMAZE) 1000 mcg/100 ml NS premix infusion,  mcg/hr, Intravenous, Continuous PRN  polyethylene glycol (PEG 3350) allergies    Mardene Goodpasture, MD  12/1/2021  3:02 PM

## 2021-12-01 NOTE — PLAN OF CARE
Received patient at 07:30 ventilated and awake. Following simple commands. Up in chair position of bed. Weaning trial for 1 hour. Orders to extubate at 11:00. Insulin drip per protocol. Cangrelor at 0.75 mcg/kg/min.   Wife at bedside discussed plan of

## 2021-12-01 NOTE — PROGRESS NOTES
[facility] Hospitalist Progress Note     Emil Herron Patient Status:  Inpatient    1955 MRN [id number]   [facility] 2NE-A Attending Michelle Hawkins MD   [address] Day # 32 PCP Cassy Reece DO     Chief Complaint: Patient p Creatinine Clearance: 16.6 mL/min (A) (based on SCr of 5.15 mg/dL (H)). No results for input(s): PTP, INR in the last 168 hours.          COVID-19 Lab Results    COVID-19  Lab Results   Component Value Date    COVID19 Not Detected 11/11/2021    COVID19 N external sedation (received diazepam shortly prior to event)   - off pressors now, cont HD for fluid removal -HD per renal  -s/pTEE/CV and LHC, impella, and stenting to LAD and stent to RPL- impella now removed   - dc'ed a-line  -holding plavix bridging wi

## 2021-12-01 NOTE — PLAN OF CARE
Assumed care of the pt at approx. 1930 pm. Able to follow simple commands, nods appropriately to questions. Sedated with Precedex gtt but still awake & restless at times. Fentanyl gtt for pain, titrated PRN.  Vented, suctioned for small amount of tan, thick

## 2021-12-02 ENCOUNTER — APPOINTMENT (OUTPATIENT)
Dept: GENERAL RADIOLOGY | Facility: HOSPITAL | Age: 66
DRG: 215 | End: 2021-12-02
Attending: INTERNAL MEDICINE
Payer: MEDICARE

## 2021-12-02 PROCEDURE — 71045 X-RAY EXAM CHEST 1 VIEW: CPT | Performed by: INTERNAL MEDICINE

## 2021-12-02 RX ORDER — METOPROLOL TARTRATE 5 MG/5ML
5 INJECTION INTRAVENOUS EVERY 6 HOURS PRN
Status: DISCONTINUED | OUTPATIENT
Start: 2021-12-02 | End: 2021-12-08

## 2021-12-02 RX ORDER — LABETALOL HYDROCHLORIDE 5 MG/ML
10 INJECTION, SOLUTION INTRAVENOUS ONCE
Status: DISCONTINUED | OUTPATIENT
Start: 2021-12-02 | End: 2021-12-28

## 2021-12-02 RX ORDER — IPRATROPIUM BROMIDE AND ALBUTEROL SULFATE 2.5; .5 MG/3ML; MG/3ML
3 SOLUTION RESPIRATORY (INHALATION)
Status: DISCONTINUED | OUTPATIENT
Start: 2021-12-02 | End: 2021-12-16

## 2021-12-02 RX ORDER — GUAIFENESIN 600 MG
600 TABLET, EXTENDED RELEASE 12 HR ORAL 2 TIMES DAILY
Status: DISCONTINUED | OUTPATIENT
Start: 2021-12-02 | End: 2021-12-02

## 2021-12-02 RX ORDER — SEVELAMER CARBONATE 800 MG/1
2400 TABLET, FILM COATED ORAL 3 TIMES DAILY
Status: DISCONTINUED | OUTPATIENT
Start: 2021-12-02 | End: 2021-12-05

## 2021-12-02 RX ORDER — METHYLPREDNISOLONE SODIUM SUCCINATE 125 MG/2ML
60 INJECTION, POWDER, LYOPHILIZED, FOR SOLUTION INTRAMUSCULAR; INTRAVENOUS EVERY 8 HOURS
Status: DISCONTINUED | OUTPATIENT
Start: 2021-12-02 | End: 2021-12-03

## 2021-12-02 NOTE — DIETARY NOTE
[facility]    NUTRITION ASSESSMENT    Pt does not meet malnutrition criteria. NUTRITION INTERVENTION:   1. Enteral Nutrition -  Nepro 1.8 at 55 ml/hour x 24h.    o This will provide 2592 kcal, 162 grams protein, 964 ml total free water, and 100% of at 35.5 mL. Providing 937.2 kcals. 11/17 Pt is a 78 yr old M. PHM of DM2, CKD III, HF, Osteomyelitis. Chart reviewed d/t tube feeding recommendations. Pt had cardiac arrest. Pt is intubated and sedated. No BM reported. No N/V/D.  Dietitian will continue t or per MD discretion    NUTRITION DIAGNOSIS/PROBLEM:  Predicted suboptimal energy intake related to respiratory process or complication of therapy which results in mechanical ventilation as evidenced by need for NPO status      MONITOR AND EVALUATE/NUTRITI

## 2021-12-02 NOTE — PLAN OF CARE
Assumed care of the pt at approx. 1930 pm. Awake & following commands. Oriented to self & place, gets confused at times. Weak. Remains on 3 L NC with sats in high 90s. Lots of oral secretions. A-fib, VSS. Continues with Kengreal gtt.  NPO, mouth swabs provi

## 2021-12-02 NOTE — PROGRESS NOTES
[facility] Cardiology Progress Note - Alaina Sarahi Patient Status:  Inpatient    1955 MRN [id number]   [facility] 6NE-A Attending Aundria Libman, MD   [facility] Day # 34 SHAR Brar DO     Subjective:   Interm [facility])     Diabetic ulcer of right foot associated with type 2 diabetes mellitus [facility]     Diabetic polyneuropathy associated with type 2 diabetes mellitus ([facility].)     Combined form of age-related cataract, left eye     Type II or unspecified type diabetes me 289 lb 14.5 oz (131.5 kg)  11/09/21 0500 : 289 lb 14.4 oz (131.5 kg)  11/03/21 1731 : (!) 325 lb 13.4 oz (147.8 kg)  11/03/21 1238 : 289 lb (131.1 kg)  10/12/21 0921 : (!) 330 lb (149.7 kg)  05/14/21 0813 : 267 lb (121.1 kg)      Tele: NSR    Physical Exam results for input(s): TROP in the last 168 hours. Allergies:     Ativan [Lorazepam]      CARDIAC ARREST    Comment:Had asystolic cardiac arrest shortly after ativan.              Unclear if this was sole cause but was temporaily             related    Me UNIT/ML injection 1,500 Units, 1.5 mL, Intracatheter, PRN  alteplase (ACTIVASE) 2 mg in Sterile Water for Injection 2.2 mL IV push, 2 mg, Intracatheter, Once PRN  atorvastatin (LIPITOR) tab 40 mg, 40 mg, Per G Tube, Nightly  fentaNYL citrate (SUBLIMAZE) 0. stable  Constitutiona: no recent fevers  Skin: denies any unusual skin lesions or rashes  Eyes: no visual complaints or deficits  HEENT: denies nasal congestion, sinus pain; hearing loss negative  Respiratory: denies shortness of breath, wheezing or cough

## 2021-12-02 NOTE — PROGRESS NOTES
[facility]    Nephrology Progress Note    Ladan Ramirez Attending:  Andressa Costello MD       SUBJECTIVE:     Seen on HD. Tolerating treatment well. No complaints.     PHYSICAL EXAM:     Vital Signs: BP (!) 165/88   Pulse 95   Temp 97.6 °F (36.4 °C) NE 7.58 11/28/2021    LYMABS 0.29 (L) 11/28/2021    MOABSO 0.41 11/28/2021    EOABSO 0.00 11/28/2021    BAABSO 0.00 11/28/2021     Lab Results   Component Value Date    MALBP 3.71 11/08/2021    CREUR 46.20 11/08/2021    CREUR 46.70 11/08/2021     Lab Resul Or  glucose-vitamin C (DEX-4) chewable tab 4 tablet, 4 tablet, Oral, Q15 Min PRN   Or  dextrose 50 % injection 50 mL, 50 mL, Intravenous, Q15 Min PRN   Or  glucose (DEX4) oral liquid 30 g, 30 g, Oral, Q15 Min PRN   Or  glucose-vitamin C (DEX-4) chewable ta Continuous  propofol (DIPRIVAN) infusion, 5-100 mcg/kg/min (Dosing Weight), Intravenous, Continuous  norepinephrine (LEVOPHED) 4 mg/250 ml premix infusion, 0.5-30 mcg/min, Intravenous, Continuous  famoTIDine (PEPCID) injection 20 mg, 20 mg, Intravenous, Da MD  [address] [facility] Nephrology  [address]  [address] [place] [address]  Ronnie, [address] Juan Townsend    12/2/2021  12:35 PM

## 2021-12-02 NOTE — PHYSICAL THERAPY NOTE
PHYSICAL THERAPY EVALUATION - INPATIENT     Room Number: 8815/3747-Y  Evaluation Date: 12/2/2021  Type of Evaluation: Initial  Physician Order: PT Eval and Treat    Presenting Problem: Hypoglycemia, acute respiratory failure, cardiac arrest  Co-Morbi (Obs): 3-5x/week  Number of Visits to Meet Established Goals: 7      CURRENT GOALS    Goal #1 Patient is able to demonstrate supine - sit EOB @ level: maximum assistance     Goal #2 Patient is able to dangle on eob w/ mod a of 1 to maintain balance     [facility] Standing: Not tested    ADDITIONAL TESTS                                    ACTIVITY TOLERANCE  Pulse: 89  Heart Rate Source: Monitor  Resp: 21  BP: (!) 165/96 (Chair mode of bed)  BP Location: Left arm  BP Method: Automatic  Patient Position: Lying    O2 rotation to both right and left through trunk and ue's w/ max a of 2. Again noting mild core and ue engagement. Pt answering yes to commands throughout session, but is unable to actually complete the task. Pt able to answer some questions accurately.   Pt

## 2021-12-02 NOTE — CM/SW NOTE
Patient has been extubated. Updates sent to [facility], however we will have to follow up see if patients discharge needs change.

## 2021-12-02 NOTE — PROGRESS NOTES
[address] Patient Status:  Inpatient    1955 MRN [id number]   [facility] 6NE-A Attending Brice Mayfield MD   [facility] Day # 34 PCP Addy Sanders DO     Critical Care Progress Note     Date of Admission: 11/3/ PRN  •  fenofibrate micronized (LOFIBRA) cap 67 mg, 67 mg, Oral, Nightly  •  Albumin Human (ALBUMINAR) 25 % solution 100 mL, 100 mL, Intravenous, PRN  •  Heparin Sodium (Porcine) 5000 UNIT/ML injection 5,000 Units, 5,000 Units, Subcutaneous, Q8H [facility]  •  as Intravenous, Daily  •  [Held by provider] bumetanide (BUMEX) 0.25 MG/ML injection 2 mg, 2 mg, Intravenous, TID  •  polyethylene glycol (PEG 3350) (MIRALAX) powder packet 17 g, 17 g, Oral, Daily  •  melatonin tab 3 mg, 3 mg, Oral, Nightly  •  Vitamin D3 (Ch ASSESSMENT/PLAN:  1.  Acute hypoxemic / hypercapnic respiratory failure - intubated 11/16 due to cardiac arrest. Has pulmonary edema and possible underlying ILD vs ards.    - extubated 12/1  - wean O2 as able, currently on 3L  - check ABG to ensure he is

## 2021-12-02 NOTE — SLP NOTE
ADULT SWALLOWING EVALUATION    ASSESSMENT    ASSESSMENT/OVERALL IMPRESSION:  Patient seen for swallowing evaluation post extubation. He was admitted due to SOB with exertion x2 weeks prior to ED presentation 11/3/21.   He was intubated 11/15/21 and extubat [facility])      Past Medical History  Past Medical History:   Diagnosis Date   • Cataract 2/21/2017    right eye   • H/O foot surgery In 2009    s/p partial right foot amputation in 2008   • Hyperlipidemia LDL goal < 100    • Hypertension    • Mild renal insuff evaluated clinically.  Videofluoroscopic Swallow Study is required to rule-out silent aspiration.)    Esophageal Phase of Swallow: No complaints consistent with possible esophageal involvement            GOALS  Goal #1 Patient will participate in reassessme

## 2021-12-02 NOTE — PROGRESS NOTES
[facility] Hospitalist Progress Note     Lc Jennings Patient Status:  Inpatient    1955 MRN [id number]   [facility] 2NE-A Attending Radha Salter MD   [facility] Day # 34 PCP Gracie Bolanos DO     Chief Complaint: Patient p COVID-19 Lab Results    COVID-19  Lab Results   Component Value Date    COVID19 Not Detected 11/11/2021    COVID19 Not Detected 11/07/2021    COVID19 Not Detected 11/05/2021       Pro-Calcitonin  No results for input(s): PCT in the last 168 hours.     C s/p ROSC following DCCV  - likely multifactorial due to strain from respiratory failure, worsening pulmonary edema/respiratory insufficiency & external sedation (received diazepam shortly prior to event)   - off pressors now, cont HD for fluid removal -HD extubated and can wean off TFs, cont insulin gtt sicne unable tos wallwo , will be on TF for now     HTN   -on BB    Severe Obesity BMI > 35  - op follow up pending resolution of acute issues    **acute on chronic anemia- suspect reactive and d/t CKD.  IV F

## 2021-12-03 ENCOUNTER — APPOINTMENT (OUTPATIENT)
Dept: INTERVENTIONAL RADIOLOGY/VASCULAR | Facility: HOSPITAL | Age: 66
DRG: 215 | End: 2021-12-03
Attending: INTERNAL MEDICINE
Payer: MEDICARE

## 2021-12-03 PROCEDURE — B513YZA FLUOROSCOPY OF RIGHT JUGULAR VEINS USING OTHER CONTRAST, GUIDANCE: ICD-10-PCS | Performed by: RADIOLOGY

## 2021-12-03 PROCEDURE — 0JH63XZ INSERTION OF TUNNELED VASCULAR ACCESS DEVICE INTO CHEST SUBCUTANEOUS TISSUE AND FASCIA, PERCUTANEOUS APPROACH: ICD-10-PCS | Performed by: RADIOLOGY

## 2021-12-03 PROCEDURE — 05HM33Z INSERTION OF INFUSION DEVICE INTO RIGHT INTERNAL JUGULAR VEIN, PERCUTANEOUS APPROACH: ICD-10-PCS | Performed by: RADIOLOGY

## 2021-12-03 RX ORDER — HEPARIN SODIUM 5000 [USP'U]/ML
5000 INJECTION, SOLUTION INTRAVENOUS; SUBCUTANEOUS EVERY 8 HOURS SCHEDULED
Status: DISCONTINUED | OUTPATIENT
Start: 2021-12-03 | End: 2021-12-06

## 2021-12-03 RX ORDER — HEPARIN SODIUM 5000 [USP'U]/ML
INJECTION, SOLUTION INTRAVENOUS; SUBCUTANEOUS
Status: COMPLETED
Start: 2021-12-03 | End: 2021-12-03

## 2021-12-03 RX ORDER — LIDOCAINE HYDROCHLORIDE 10 MG/ML
INJECTION, SOLUTION INFILTRATION; PERINEURAL
Status: COMPLETED
Start: 2021-12-03 | End: 2021-12-03

## 2021-12-03 RX ORDER — METHYLPREDNISOLONE SODIUM SUCCINATE 125 MG/2ML
60 INJECTION, POWDER, LYOPHILIZED, FOR SOLUTION INTRAMUSCULAR; INTRAVENOUS EVERY 12 HOURS
Status: COMPLETED | OUTPATIENT
Start: 2021-12-03 | End: 2021-12-05

## 2021-12-03 RX ORDER — CEFAZOLIN SODIUM 1 G/3ML
INJECTION, POWDER, FOR SOLUTION INTRAMUSCULAR; INTRAVENOUS
Status: COMPLETED
Start: 2021-12-03 | End: 2021-12-03

## 2021-12-03 RX ORDER — MIDAZOLAM HYDROCHLORIDE 1 MG/ML
INJECTION INTRAMUSCULAR; INTRAVENOUS
Status: COMPLETED
Start: 2021-12-03 | End: 2021-12-03

## 2021-12-03 NOTE — PLAN OF CARE
Pt has been hemodynamically stable. Pt on NC. Pt with congested non-productive cough requiring deep suctioning on occasion. Cangrelor stopped at 0915. Consent obtained via phone from wife. Pt taken to IR lab [phone] for permacath HD placement.   Pt return

## 2021-12-03 NOTE — PROGRESS NOTES
[facility] Hospitalist Progress Note     Susanstar Liang Patient Status:  Inpatient    1955 MRN [id number]   [facility] 2NE-A Attending Alicia Yee MD   [facility] Day # 27 PCP Annie Marc DO     Chief Complaint: Patient p COVID19 Not Detected 11/07/2021    COVID19 Not Detected 11/05/2021       Pro-Calcitonin  No results for input(s): PCT in the last 168 hours. Cardiac  No results for input(s): TROP, PBNP in the last 168 hours.     Creatinine Kinase  Recent Labs   Lab 1 -HD per renal  -s/pTEE/CV and LHC, impella, and stenting to LAD and stent to RPL- impella now removed   - dc'ed a-line  -holding plavix bridging with cangrelor for planned trach; now extbuated, transition back to plavix when no further procedures  -s/p danika heparinSC  · CODE status: FULL    DISPO: ICU    Jeanine Stafford MD  [facility]  Internal Medicine, Hospitalist  Answering service: [phone]

## 2021-12-03 NOTE — PLAN OF CARE
Alert, Ox3, generalized weakness, but able to move all extremities. Congested cough, improved with medinebs. Afib  with freq PVC, Dr. Jamila Randolph notififed on rounds, amio adjusted.  Cangrelor gtt continued, plan to hold 1 hour prior to HD cath procedure

## 2021-12-03 NOTE — PROGRESS NOTES
[facility]    Nephrology Progress Note    Becki Hardy Attending:  Katarina Lynn MD       SUBJECTIVE:     Underwent tunnelled HD catheter placement today and tolerated procedure. Breathing ok.     PHYSICAL EXAM:     Vital Signs: /71   Pulse LYPERCENT 3.4 11/28/2021    MOPERCENT 4.9 11/28/2021    EOPERCENT 0.0 11/28/2021    BAPERCENT 0.0 11/28/2021    NE 7.58 11/28/2021    LYMABS 0.29 (L) 11/28/2021    MOABSO 0.41 11/28/2021    EOABSO 0.00 11/28/2021    BAABSO 0.00 11/28/2021     Lab Results tetrasodium (KENGREAL) 50 mg in sodium chloride 0.9% 250 mL IVPB - BRIDGING/MAINTENANCE dose, 0.75 mcg/kg/min, Intravenous, Continuous  amiodarone (PACERONE) tab 200 mg, 200 mg, Per G Tube, BID  glucose (DEX4) oral liquid 15 g, 15 g, Oral, Q15 Min PRN   Or enema, Rectal, Once PRN  Chlorhexidine Gluconate (PERIDEX) 0.12 % solution 15 mL, 15 mL, Mouth/Throat, [email or web address]  Dexmedetomidine HCl (PRECEDEX) 800 mcg in sodium chloride 0.9% 100 mL infusion, 0.2-1.5 mcg/kg/hr (Dosing Weight), Intravenous, Continuous nepro     Cardiogenic shock with systolic CHF, aflutter, CAD:  -- Per Cardiology    Will follow. Discussed with nursing. Called patient's wife to update.       Thank you for allowing me to participate in the care of this patient.  Please do not hesitate t

## 2021-12-03 NOTE — PROGRESS NOTES
[facility] Cardiology Progress Note - Carlitosmoriahestefania Elisaar Patient Status:  Inpatient    1955 MRN [id number]   [facility] 6NE-A Attending Derek Cali MD   [facility] Day # 27 PCP Micah Lyles DO     Subjective:  Confused mellitus with neurological manifestations, uncontrolled(250.62)     Hyponatremia     Anemia     Hyperglycemia     Cellulitis of foot     Debridement of open wound, 4th MT head resection, wound VAC application left foot- Sx 12/21/17  GLENN 03/21/18     Osteom (121.1 kg)      Tele: NSR    Physical Exam:    General: Alert and oriented x 3. No apparent distress. No respiratory or constitutional distress. HEENT: Normocephalic, anicteric sclera, neck supple, no thyromegaly or adenopathy.   Neck: No JVD, carotids 2+, injection 60 mg, 60 mg, Intravenous, Q12H  Heparin Sodium (Porcine) 5000 UNIT/ML injection 5,000 Units, 5,000 Units, Subcutaneous, Q8H [address]  ipratropium-albuterol (DUONEB) nebulizer solution 3 mL, 3 mL, Nebulization, Q6H [place]  guaiFENesin (ROBITUSSIN) 100mg/5 50 mcg, Intravenous, Q30 Min PRN  acetaminophen (TYLENOL) tab 650 mg, 650 mg, Oral, Q4H PRN   Or  acetaminophen (TYLENOL) 160 MG/5ML oral liquid 650 mg, 650 mg, Oral, Q4H PRN   Or  acetaminophen (Tylenol) rectal suppository 650 mg, 650 mg, Rectal, Q4H PRN lower extremities  Neuro: no sensory or motor complaint  Psyche: no symptoms of depression or anxiety  Hematology: denies bruising or excessive bleeding  Endocrine: no history of diabetes  Allergy: see above drug allergies    Shannan Avendaño MD  12/3/2021

## 2021-12-03 NOTE — PLAN OF CARE
Assumed care of the pt at approx. 1930 pm. Oriented to self & place, follows commands. Denies SOB, no respiratory distress noted. On 3 L NC, sats in upper 90s. Occassional wet cough, less secretions. Getting neb treatments. A-fib, VSS.  Continues with Cangr

## 2021-12-03 NOTE — SLP NOTE
SPEECH DAILY NOTE - INPATIENT    ASSESSMENT & PLAN   ASSESSMENT  Patient seen to assess for improvement in swallow function. Patient alert and up in bed, much improved verbal responses today. Voice is stronger yet not baseline.   He continues to Kaila French

## 2021-12-03 NOTE — PROGRESS NOTES
[address] 63 Patient Status:  Inpatient    1955 MRN [id number]   [facility] 6NE-A Attending Belkis Vaughn MD   [facility] Day # 27 PCP Wang Corral DO     Critical Care Progress Note     Date of Admission: 11/3/ glucose-vitamin C (DEX-4) chewable tab 4 tablet, 4 tablet, Oral, Q15 Min PRN **OR** dextrose 50 % injection 50 mL, 50 mL, Intravenous, Q15 Min PRN **OR** glucose (DEX4) oral liquid 30 g, 30 g, Oral, Q15 Min PRN **OR** glucose-vitamin C (DEX-4) chewable tab mcg/kg/hr (Dosing Weight), Intravenous, Continuous  •  propofol (DIPRIVAN) infusion, 5-100 mcg/kg/min (Dosing Weight), Intravenous, Continuous  •  norepinephrine (LEVOPHED) 4 mg/250 ml premix infusion, 0.5-30 mcg/min, Intravenous, Continuous  •  famoTIDine (H) 05/03/2014    INR 1.33 (H) 11/20/2021    INR 1.50 (H) 11/19/2021    INR 1.46 (H) 11/03/2021           Imaging: I have independently visualized all relevant chest imaging in PACS. I agree with the radiology interpretation except where noted.        ZIYAD

## 2021-12-03 NOTE — PHYSICAL THERAPY NOTE
PHYSICAL THERAPY TREATMENT NOTE - INPATIENT    Room Number: 3313/4941-W     Session: 1    Number of Visits to Meet Established Goals: 7    Presenting Problem: Hypoglycemia, acute respiratory failure, cardiac arrest  Co-Morbidities : CHF, CKD3, DM, osteo RESTRICTION  Weight Bearing Restriction: None                PAIN ASSESSMENT   Ratin          BALANCE                                                                                                                       Static Sitting: Not tested  Anna Marie clearing back from bed. THERAPEUTIC EXERCISES  Lower Extremity Ankle pumps  Hip AB/AD  Heel slides  LAQ  SAQ  L le did not complete ankle pumps(pt does not have range), active assisted for hip ab/ad and heel slides. Active for all others.    Upper Extr

## 2021-12-04 RX ORDER — ATORVASTATIN CALCIUM 80 MG/1
80 TABLET, FILM COATED ORAL NIGHTLY
Status: DISCONTINUED | OUTPATIENT
Start: 2021-12-04 | End: 2021-12-17

## 2021-12-04 RX ORDER — CLOPIDOGREL BISULFATE 75 MG/1
300 TABLET ORAL ONCE
Status: COMPLETED | OUTPATIENT
Start: 2021-12-04 | End: 2021-12-04

## 2021-12-04 RX ORDER — CLOPIDOGREL BISULFATE 75 MG/1
75 TABLET ORAL DAILY
Status: DISCONTINUED | OUTPATIENT
Start: 2021-12-05 | End: 2021-12-20

## 2021-12-04 RX ORDER — METOPROLOL TARTRATE 50 MG/1
50 TABLET, FILM COATED ORAL
Status: DISCONTINUED | OUTPATIENT
Start: 2021-12-04 | End: 2021-12-04

## 2021-12-04 RX ORDER — METOPROLOL TARTRATE 50 MG/1
50 TABLET, FILM COATED ORAL
Status: DISCONTINUED | OUTPATIENT
Start: 2021-12-04 | End: 2021-12-08

## 2021-12-04 NOTE — SLP NOTE
SPEECH DAILY NOTE - INPATIENT    ASSESSMENT & PLAN   ASSESSMENT  Pt seen for po trials. Pt's RN aware and agreeable to give po trials. Per the pt's RN, pt's speech is more intelligible.  Pt was able to expectorate and swallow minimal secretions this morning GOALS  Goal #1 SLP to reassess.   In Progress     FOLLOW UP  Follow Up Needed (Documentation Required): Yes  SLP Follow-up Date: 12/05/21  Number of Visits to Meet Established Goals: 8        If you have any questions, please contact VERA Flores

## 2021-12-04 NOTE — PROGRESS NOTES
Pulmonary Progress Note        NAME: Lc Jennings - ROOM: 1459/0922-C - MRN: [id number] - Age: 78year old - : 1955        Last 24hrs: No events overnight, needed increased O2 overnight as he is a mouth breather, denies complaints at this time insulin regular Stopped (12/04/21 1125)   • fentanyl Stopped (12/01/21 0900)   • dexmedetomidine Stopped (12/01/21 1100)   • propofol Stopped (11/29/21 0739)   • norepinephrine Stopped (11/18/21 0200)       Lungs: clear to auscultation bilaterally  Heart:

## 2021-12-04 NOTE — PLAN OF CARE
Insulin gtt and Cangrelor gtt continued. Please see eMAR. Pt opens eyes and follows commands. speech not clear most of the time due to dry mouth and congested nonproductive cough, unable to expectorate. Suctioned oropharyngeally for thick tan secretion.  Pt

## 2021-12-04 NOTE — PROGRESS NOTES
[facility] Cardiology Progress Note - Erlene Slot Patient Status:  Inpatient    1955 MRN [id number]   [facility] 6NE-A Attending Myla Holland, MD [facility] Day # 32 PCP Grey Stinson DO     Subjective:  Confused age-related cataract, left eye     Type II or unspecified type diabetes mellitus with neurological manifestations, uncontrolled(250.62)     Hyponatremia     Anemia     Hyperglycemia     Cellulitis of foot     Debridement of open wound, 4th MT head resectio 1731 : (!) 325 lb 13.4 oz (147.8 kg)  11/03/21 1238 : 289 lb (131.1 kg)  10/12/21 0921 : (!) 330 lb (149.7 kg)  05/14/21 0813 : 267 lb (121.1 kg)      Tele: NSR    Physical Exam:    General: Alert and oriented x 3. No apparent distress.  No respiratory or c ativan.              Unclear if this was sole cause but was temporaily             related    Medications:  methylPREDNISolone Sodium Succ (Solu-MEDROL) injection 60 mg, 60 mg, Intravenous, Q12H  Heparin Sodium (Porcine) 5000 UNIT/ML injection 5,000 Units, Nightly  fentaNYL citrate (SUBLIMAZE) 0.05 MG/ML injection 25 mcg, 25 mcg, Intravenous, Q30 Min PRN   Or  fentaNYL citrate (SUBLIMAZE) 0.05 MG/ML injection 50 mcg, 50 mcg, Intravenous, Q30 Min PRN  acetaminophen (TYLENOL) tab 650 mg, 650 mg, Oral, Q4H PRN shortness of breath, wheezing or cough   Cardiovascular:  See HPI  GI: denies nausea, vomiting,   Genital/: no dysuria,   Musculoskeletal: no joint complaints upper or lower extremities  Neuro: no sensory or motor complaint  Psyche: no symptoms of depres

## 2021-12-04 NOTE — PROGRESS NOTES
[facility]    Nephrology Progress Note    Noel Collet Attending:  Trey Stauffer MD       SUBJECTIVE:     Laying comfortably in bed  Creatinine rising off of iHD.     PHYSICAL EXAM:     Vital Signs: /78 (BP Location: Left arm)   Pulse 105   T 0.8 12/04/2021    MOPERCENT 2.1 12/04/2021    EOPERCENT 0.0 12/04/2021    BAPERCENT 0.1 12/04/2021    NE 14.20 (H) 12/04/2021    LYMABS 0.12 (L) 12/04/2021    MOABSO 0.31 12/04/2021    EOABSO 0.00 12/04/2021    BAABSO 0.01 12/04/2021     Lab Results   Comp Regular Human (NOVOLIN R) 100 Units in sodium chloride 0.9% 100 mL infusion, 1-32 Units/hr, Intravenous, Continuous  Albumin Human (ALBUMINAR) 25 % solution 100 mL, 100 mL, Intravenous, PRN  amiodarone (PACERONE) tab 200 mg, 200 mg, Per G Tube, BID  glucos Kacey@[employer]  Dexmedetomidine HCl (PRECEDEX) 800 mcg in sodium chloride 0.9% 100 mL infusion, 0.2-1.5 mcg/kg/hr (Dosing Weight), Intravenous, Continuous  propofol (DIPRIVAN) infusion, 5-100 mcg/kg/min (Dosing Weight), Intravenous, Continuous  norepinephri to participate in the care of this patient. Please do not hesitate to call with questions or concerns.       MD Angy Kearns and [facility]  Nephrology

## 2021-12-05 RX ORDER — METHYLPREDNISOLONE SODIUM SUCCINATE 40 MG/ML
40 INJECTION, POWDER, LYOPHILIZED, FOR SOLUTION INTRAMUSCULAR; INTRAVENOUS EVERY 12 HOURS
Status: COMPLETED | OUTPATIENT
Start: 2021-12-06 | End: 2021-12-07

## 2021-12-05 NOTE — PROGRESS NOTES
Pulmonary Progress Note        NAME: Mayela Lynch - ROOM: 1419/0854-V - MRN: [id number] - Age: 78year old - : 1955        Last 24hrs: No events overnight, sitting up in chair, breathing is stable, still on insulin gtt    OBJECTIVE:   21  07 auscultation bilaterally  Heart: regular rate and rhythm  Abdomen: soft, non-tender; bowel sounds normal; no masses,  no organomegaly  Extremities: extremities normal, atraumatic, no cyanosis or edema    Labs reviewed as noted below      ASSESSMENT/PLAN:

## 2021-12-05 NOTE — PHYSICAL THERAPY NOTE
PHYSICAL THERAPY TREATMENT NOTE - INPATIENT    Room Number: 3310/4784-K     Session: 2   Number of Visits to Meet Established Goals: 7     History related to current admission: Patient is a 78year old male admitted on 11/3/2021 from home for sob.   Pt nanette LONGUS TENDON;  Surgeon: Bro Montana DPM;  Location: [address] [place] [address]   • OTHER SURGICAL HISTORY  2009    amputation R forefoot   • PART REMV OTHR TARSAL/METATARSAL  11/9/09    Performed by Makayla Zapien at [address] [place] [address] Road [facility] Assessment  Gait Assistance: Not tested  Distance (ft): 0  Stairs: (NT)    Skilled Therapy Provided: Patient received up in recliner via lift with nursing staff.   Patient alert throughout session however increased response time noted and not always accura actively complete 5 reps of le rom ex   Goal #4     Goal #5     Goal #6     Goal Comments: Goals established on 12/2/2021 12/5/2021 all goals ongoing     Therapist with goggles gloves and mask

## 2021-12-05 NOTE — PROGRESS NOTES
[facility]    Nephrology Progress Note    Mckinley Press Attending:  Issa Conde MD       SUBJECTIVE:     Laying comfortably in bed  S/P HD overnight without any issues    PHYSICAL EXAM:     Vital Signs: /63 (BP Location: Left arm)   Pulse 1 12/04/2021    LYPERCENT 0.8 12/04/2021    MOPERCENT 2.1 12/04/2021    EOPERCENT 0.0 12/04/2021    BAPERCENT 0.1 12/04/2021    NE 14.20 (H) 12/04/2021    LYMABS 0.12 (L) 12/04/2021    MOABSO 0.31 12/04/2021    EOABSO 0.00 12/04/2021    BAABSO 0.01 12/04/202 PRN  amiodarone (PACERONE) tab 200 mg, 200 mg, Per G Tube, BID  glucose (DEX4) oral liquid 15 g, 15 g, Oral, Q15 Min PRN   Or  glucose-vitamin C (DEX-4) chewable tab 4 tablet, 4 tablet, Oral, Q15 Min PRN   Or  dextrose 50 % injection 50 mL, 50 mL, Intraven premix infusion, 0.5-30 mcg/min, Intravenous, Continuous  famoTIDine (PEPCID) injection 20 mg, 20 mg, Intravenous, Daily  [Held by provider] bumetanide (BUMEX) 0.25 MG/ML injection 2 mg, 2 mg, Intravenous, TID  polyethylene glycol (PEG 3350) (MIRALAX) powd

## 2021-12-05 NOTE — PLAN OF CARE
Alert, oriented to person, place, time, needs reinforcement about situation. Weakly moves all extremities. Denies SOB, thick tan secretions decreasing, NC 4L.  Afib, rate increased to 140 at 1630,  notified, metoprolol increased with improvement

## 2021-12-05 NOTE — PROGRESS NOTES
[facility] Hospitalist Progress Note     Rorosilas Hudson Patient Status:  Inpatient    1955 MRN [id number]   [facility] 2NE-A Attending Eriberto Pacheco MD   [facility] Day # 28 PCP Edna White DO     Chief Complaint: Patient p Imaging data reviewed in Epic.     Medications:   • [START ON 12/6/2021] MethylPREDNISolone Sodium Succ  40 mg Intravenous Q12H   • atorvastatin  80 mg Per G Tube Nightly   • clopidogrel  75 mg Oral Daily   • metoprolol tartrate  50 mg Per NG Tube 2x Daily( solumedrol per pulm-tapering off steroids -plan for 40 mg BID itzel  -extubated, chest PT, flutter valve at bedside, pulm toileting       Acute HFrEF Exacerbation   CAD  Dilated Cardiomyopathy   - Cardiology consulted, appreciate  -  Diuresis currently on ho

## 2021-12-05 NOTE — PLAN OF CARE
Assumed care of Don at [address]. Insulin drip infusing . Alert/ confused/oriented x 2-3. Denies dyspnea or chest discomfort. Supplemental O2 at 4-5L/NC during the night w/ sats >92%. Able to cough up secretion several times. Afib, 's. Tolerating TF.  With

## 2021-12-05 NOTE — SLP NOTE
SPEECH DAILY NOTE - INPATIENT    ASSESSMENT & PLAN   ASSESSMENT  Pt seen for po trials. Significant time spent with this pt encouraging pt to cough to attempt to more efficiently expectorate secretions.  Pt continues to demonstrate difficulty managing secre

## 2021-12-05 NOTE — PROGRESS NOTES
[facility] Cardiology Progress Note - Misael Daniels Patient Status:  Inpatient    1955 MRN [id number]   [facility] 6NE-A Attending Shawna Phillip MD   [facility] Day # 28 PCP Amaya Urias DO     Subjective:  Mentation or unspecified type diabetes mellitus with neurological manifestations, uncontrolled(250.62)     Hyponatremia     Anemia     Hyperglycemia     Cellulitis of foot     Debridement of open wound, 4th MT head resection, wound VAC application left foot- Sx 12/2 lb 13.4 oz (147.8 kg)  11/03/21 1238 : 289 lb (131.1 kg)  10/12/21 0921 : (!) 330 lb (149.7 kg)  05/14/21 0813 : 267 lb (121.1 kg)      Tele: NSR    Physical Exam:    General: Alert and oriented x 3. No apparent distress.  No respiratory or constitutional d was temporaily             related    Medications:  atorvastatin (LIPITOR) tab 80 mg, 80 mg, Per G Tube, Nightly  clopidogrel (PLAVIX) tab 75 mg, 75 mg, Oral, Daily  metoprolol tartrate (LOPRESSOR) tab 50 mg, 50 mg, Per NG Tube, 2x Daily(Beta Blocker)  met (TYLENOL) tab 650 mg, 650 mg, Oral, Q4H PRN   Or  acetaminophen (TYLENOL) 160 MG/5ML oral liquid 650 mg, 650 mg, Oral, Q4H PRN   Or  acetaminophen (Tylenol) rectal suppository 650 mg, 650 mg, Rectal, Q4H PRN   Or  acetaminophen (OFIRMEV) infusion 1,000 mg, excessive bleeding  Endocrine: no history of diabetes  Allergy: see above drug allergies    Yousif Valdivia MD  12/3/2021  3:42 PM

## 2021-12-05 NOTE — PROGRESS NOTES
[facility] Hospitalist Progress Note     Oscar Nilaytavia Patient Status:  Inpatient    1955 MRN [id number]   [facility] 2NE-A Attending Neto Antonio MD   [facility] Day # 32 PCP Benny Smith DO     Chief Complaint: Patient p Epic.    Medications:   • atorvastatin  80 mg Per G Tube Nightly   • [START ON 12/5/2021] clopidogrel  75 mg Oral Daily   • metoprolol tartrate  50 mg Per NG Tube 2x Daily(Beta Blocker)   • MethylPREDNISolone Sodium Succ  60 mg Intravenous Q12H   • Heparin ILD  -currently on HD for pulmonary edema.  HD per renal  -completed abx for pneumonia  -currently on solumedrol per pulm  -initial plan for trach, ENT/interventional pulm c/s, earliest 12/6, now extubated 12/1    Acute HFrEF Exacerbation   CAD  Dilated Car

## 2021-12-06 ENCOUNTER — APPOINTMENT (OUTPATIENT)
Dept: GENERAL RADIOLOGY | Facility: HOSPITAL | Age: 66
DRG: 215 | End: 2021-12-06
Attending: INTERNAL MEDICINE
Payer: MEDICARE

## 2021-12-06 PROCEDURE — 71045 X-RAY EXAM CHEST 1 VIEW: CPT | Performed by: INTERNAL MEDICINE

## 2021-12-06 NOTE — DIETARY NOTE
[facility]    NUTRITION ASSESSMENT    Pt does not meet malnutrition criteria. NUTRITION INTERVENTION:   1. Enteral Nutrition -  Nepro 1.8 at 55 ml/hour x 24h.    o This will provide 2576 kcal, 141 grams protein, 964 ml total free water, and 100% of now receiving HD w/ UF. Declining UOP. Hyperglycemia, insulin adjusted per Hospitalist. Hyperphosphatemia noted, current TF recs providing ~1.8g Phos. 11/19 Pt receiving TF at 45 mL/hr. Worsening Pulmonary Edema. HEATHER to CKD stage 3.  Remains intubated and SYSTEM REVIEW: WNL    NUTRITION RELATED PHYSICAL FINDINGS:     1. Body Fat/Muscle Mass: ERIKA     2.  Fluid Accumulation: generalized edema     NUTRITION PRESCRIPTION:   1483-0976 calories (15-20 calories per kg)   Protein: 135-178 grams protein/day (1.5-2.0

## 2021-12-06 NOTE — SLP NOTE
SPEECH DAILY NOTE - INPATIENT    ASSESSMENT & PLAN   ASSESSMENT  Patient seen to assess for improvement in swallow function. He is alert and interactive.   Voice and cough are stronger but his cough is not consistently productive and his voice remains hoar

## 2021-12-06 NOTE — PLAN OF CARE
Alert, confused but able to make needs known and follow directions, anxious at times, denies pain. Moderately large amt thick tan secretions, needs assist at times to clear, flutter valve introduced. NC 4L. Afib .  Tolerating tube feeds without residu

## 2021-12-06 NOTE — PROGRESS NOTES
[facility] Hospitalist Progress Note     Oscar Nilaytavia Patient Status:  Inpatient    1955 MRN [id number]   [facility] 2NE-A Attending Neto Antonio MD   [facility] Day # 35 PCP Benny Smith DO     Chief Complaint: Patient p Pro-Calcitonin  No results for input(s): PCT in the last 168 hours. Cardiac  No results for input(s): TROP, PBNP in the last 168 hours.     Creatinine Kinase  Recent Labs   Lab 12/02/21  0615   CK 43       Inflammatory Markers  No results for input renal  -s/pTEE/CV and LHC, impella, and stenting to LAD and stent to RPL- impella now removed   - dc'ed a-line  -s/p tunneled catheter placement 12/3  -transitioned to plavix       Acute Respiratory Failure with Hypoxia   - possibly multifactorial between however still having difficulty swallowing.  Cont to monitor  -may not need PEG at ths time  Cont to make progress, DW SLP    HTN   -on BB    Positive fecal occult  -Had an episode of dark stools yesterday with positive fecal occult  -Hemoglobin noted to be

## 2021-12-06 NOTE — PROGRESS NOTES
[facility] Cardiology Progress Note - Rama Boateng Patient Status:  Inpatient    1955 MRN [id number]   [facility] 6NE-A Attending Hudson Cat MD   [facility] Day # 35 PCP Jefferson Wylie DO     Subjective:  Sergei Diabetic polyneuropathy associated with type 2 diabetes mellitus ([facility] [address]     Combined form of age-related cataract, left eye     Type II or unspecified type diabetes mellitus with neurological manifestations, uncontrolled(250.62)     Hyponatremia     Anemi 0555 : (!) 307 lb 1.6 oz (139.3 kg)  11/10/21 0757 : (!) 308 lb 12.8 oz (140.1 kg)  11/09/21 0515 : 289 lb 14.5 oz (131.5 kg)  11/09/21 0500 : 289 lb 14.4 oz (131.5 kg)  11/03/21 1731 : (!) 325 lb 13.4 oz (147.8 kg)  11/03/21 1238 : 289 lb (131.1 kg)  10/1 12/06/21  0354   ALB 3.5 3.4 3.1* 3.1* 3.1*       No results for input(s): TROP in the last 168 hours. Allergies:     Ativan [Lorazepam]      CARDIAC ARREST    Comment:Had asystolic cardiac arrest shortly after ativan.              Unclear if this was so 1.5 mL, Intracatheter, PRN  alteplase (ACTIVASE) 2 mg in Sterile Water for Injection 2.2 mL IV push, 2 mg, Intracatheter, Once PRN  fentaNYL citrate (SUBLIMAZE) 0.05 MG/ML injection 25 mcg, 25 mcg, Intravenous, Q30 Min PRN   Or  fentaNYL citrate (SUBLIMAZE negative  Respiratory: denies shortness of breath, wheezing or cough   Cardiovascular:  See HPI  GI: denies nausea, vomiting,   Genital/: no dysuria,   Musculoskeletal: no joint complaints upper or lower extremities  Neuro: no sensory or motor complaint

## 2021-12-06 NOTE — PLAN OF CARE
Assumed care of patient at [address]. Patient is alert and oriented to self, place, situation, and month. Unable to state the year. Denies SOB, nausea, or pain. Insulin drip per open heart protocol. Tolerating tube feeds via Dobhoff.  Patient weakly moves all ex

## 2021-12-06 NOTE — PROGRESS NOTES
[address] Patient Status:  Inpatient    1955 MRN [id number]   [facility] 6NE-A Attending Rocio Begum MD   [facility] Day # 35 PCP Dillon Kraus DO     Critical Care Progress Note     Date of Admission: 11/3/202 30 g, Oral, Q15 Min PRN **OR** glucose-vitamin C (DEX-4) chewable tab 8 tablet, 8 tablet, Oral, Q15 Min PRN  •  fenofibrate micronized (LOFIBRA) cap 67 mg, 67 mg, Oral, Nightly  •  Albumin Human (ALBUMINAR) 25 % solution 100 mL, 100 mL, Intravenous, PRN  • powder packet 17 g, 17 g, Oral, Daily  •  melatonin tab 3 mg, 3 mg, Oral, Nightly  •  Vitamin D3 (Cholecalciferol) (VITAMIN D3) tab 1,000 Units, 1,000 Units, Oral, Daily     OBJECTIVE:  /80   Pulse 93   Temp 97.6 °F (36.4 °C) (Temporal)   Resp 15   H HD for volume management- has not required since Saturday and making urine  2. Possible PNA - s/p bronch 11/17. Cultures with no growth.  Completed course of zosyn and azithromycin  -monitor off antibiotics   - repeat CXR given recent possible aspiration of

## 2021-12-06 NOTE — PROGRESS NOTES
[facility]    Nephrology Progress Note    Brittney Landon Attending:  Ansley Pham MD       SUBJECTIVE:     dobhoff came out and reinserted  UOP increasing 1.5L/24hrs    PHYSICAL EXAM:     Vital Signs: /70 (BP Location: Left arm)   Pulse 102 12/06/2021    LYPERCENT 2.3 12/06/2021    MOPERCENT 3.5 12/06/2021    EOPERCENT 0.0 12/06/2021    BAPERCENT 0.0 12/06/2021    NE 12.40 (H) 12/06/2021    LYMABS 0.31 (L) 12/06/2021    MOABSO 0.47 12/06/2021    EOABSO 0.00 12/06/2021    BAABSO 0.00 12/06/202 mg, 200 mg, Per G Tube, BID  glucose (DEX4) oral liquid 15 g, 15 g, Oral, Q15 Min PRN   Or  glucose-vitamin C (DEX-4) chewable tab 4 tablet, 4 tablet, Oral, Q15 Min PRN   Or  dextrose 50 % injection 50 mL, 50 mL, Intravenous, Q15 Min PRN   Or  glucose (DEX mg, 20 mg, Intravenous, Daily  [Held by provider] bumetanide (BUMEX) 0.25 MG/ML injection 2 mg, 2 mg, Intravenous, TID  polyethylene glycol (PEG 3350) (MIRALAX) powder packet 17 g, 17 g, Oral, Daily  melatonin tab 3 mg, 3 mg, Oral, Nightly  Vitamin D3 (Cho MD  [facility]  Nephrology

## 2021-12-07 RX ORDER — FAMOTIDINE 20 MG/1
20 TABLET ORAL DAILY
Status: DISCONTINUED | OUTPATIENT
Start: 2021-12-08 | End: 2021-12-28

## 2021-12-07 NOTE — PROGRESS NOTES
[facility] Hospitalist Progress Note     Shelbi Mccauley Patient Status:  Inpatient    1955 MRN [id number]   [facility] 2NE-A Attending Cheryl Bradley MD   [facility] Day # 29 PCP Thierry Ansari DO     Chief Complaint: Patient p input(s): PCT in the last 168 hours. Cardiac  No results for input(s): TROP, PBNP in the last 168 hours.     Creatinine Kinase  Recent Labs   Lab 12/02/21  0615   CK 43       Inflammatory Markers  No results for input(s): CRP, ZHANG, LDH, DDIMER in the las stenting to LAD and stent to RPL- impella now removed   - dc'ed a-line  -s/p tunneled catheter placement 12/3  -transitioned to plavix, Eliquis      Acute Respiratory Failure with Hypoxia   - possibly multifactorial between below infectious & cardiac etiol make progress, DW SLP    HTN   -on BB    Positive fecal occult  -Hemoglobin stable no further episodes hold off on GI consult  -Watch on Eliquis    Severe Obesity BMI > 35  - op follow up pending resolution of acute issues          Quality:  · DVT Prophyla

## 2021-12-07 NOTE — CM/SW NOTE
Care Progression Note:  Active Acute Medical Issue:   Hypoglycemia,S/p Cardiopulmonary Arrest  Cardiogenic Shock 11/16/2021 s/p ROSC , acute systolic CHF exacerbation   Extubated 12/1- on 4L n/c  Other Contributing Medical Factors/Dx:  CKD stage 3 w/albumi

## 2021-12-07 NOTE — PROGRESS NOTES
[address] Patient Status:  Inpatient    1955 MRN [id number]   [facility] 6NE-A Attending Eliseo Zapata MD   [address] Day # 29 PCP Altonastephie Reece DO     SUBJECTIVE: no acute events overnight.  insulin gtt turned o glucose (DEX4) oral liquid 30 g, 30 g, Oral, Q15 Min PRN **OR** glucose-vitamin C (DEX-4) chewable tab 8 tablet, 8 tablet, Oral, Q15 Min PRN  •  fenofibrate micronized (LOFIBRA) cap 67 mg, 67 mg, Oral, Nightly  •  Albumin Human (ALBUMINAR) 25 % solution 10 age and cooperative  Lungs: clear to auscultation bilaterally  Heart: irregularly irregular rhythm  Abdomen: soft, non-tender; bowel sounds normal; no masses,  no organomegaly  Extremities: edema trace     Lab Results   Component Value Date    WBC 13.7 12/ 11/18. Also with atrial flutter s/p PRINCESS/DCCV   - cardiology following   6. Renal - HEATHER on CKD - now on intermittent hemodialysis  - nephrology following; HD per renal   7. Anemia - stable  - transfuse for hgb < 7   8. Nutrition   -TF, speech following   9.

## 2021-12-07 NOTE — PLAN OF CARE
Afib/flutter c HR 's. 4L NC, stronger cough effort and less secretions today. Afebrile. No HD today, had 650 mL UOP this shift. 1 small liquid BM, dark brown in color c no evidence of blood. Pt mostly a&ox4, at times restless and irritable or drowsy.

## 2021-12-07 NOTE — OCCUPATIONAL THERAPY NOTE
OCCUPATIONAL THERAPY TREATMENT NOTE - INPATIENT     Room Number: 2060/8848-X  Session: 1   Number of Visits to Meet Established Goals: 9    History: Patient is a 78year old male admitted on 11/3/2021 with Presenting Problem: hypoglycemia.  Pt diagnosed wit (clapping hands, folding fingers together, and raising both hands up over head several times). Pt required hand over hand guidance/demonstration initially prior to repeating action at [facility] level.      Education provided: Role of OT, Safety with ADL and zuñiga coordination activities; Compensatory technique education;Continued evaluation  Rehab Potential : Good  Frequency (Obs): 3x/week    OT Goals:   ADL Goals   Patient will perform grooming: with max assist  Patient will perform upper body dressing:  with max a

## 2021-12-07 NOTE — PROGRESS NOTES
[facility]    Nephrology Progress Note    Harry Gregg Attending:  Shilpi Rivera MD       SUBJECTIVE:     More alert, remains on o2  UOP increasing 1.45L/24hrs.  but already had 1.2L out this am!    PHYSICAL EXAM:     Vital Signs: BP (!) 140/113 LYPERCENT 1.8 12/07/2021    MOPERCENT 2.3 12/07/2021    EOPERCENT 0.0 12/07/2021    BAPERCENT 0.0 12/07/2021    NE 13.05 (H) 12/07/2021    LYMABS 0.25 (L) 12/07/2021    MOABSO 0.31 12/07/2021    EOABSO 0.00 12/07/2021    BAABSO 0.00 12/07/2021     Lab Resu % solution 100 mL, 100 mL, Intravenous, PRN  amiodarone (PACERONE) tab 200 mg, 200 mg, Per G Tube, BID  glucose (DEX4) oral liquid 15 g, 15 g, Oral, Q15 Min PRN   Or  glucose-vitamin C (DEX-4) chewable tab 4 tablet, 4 tablet, Oral, Q15 Min PRN   Or  dextro MAP>60  -- CVVH started 11/17 transitioned to [facility] 11/19  -- azotemia due to renal failure + steroids  -- tunnelled HD catheter placement 12/3  -- HD per TTS schedule. Last HD Sat.  UOP increasing, Cr uptrended but very minimally, may be showing some start o

## 2021-12-07 NOTE — PROGRESS NOTES
[facility] Cardiology Progress Note - Starleen Car Patient Status:  Inpatient    1955 MRN [id number]   [facility] 6NE-A Attending Jad Romero MD   [facility] Day # 29 PCP Marshal Cartwright DO     Subjective:  Doing of right foot with draining sinus ([address]     Diabetic ulcer of right foot associated with type 2 diabetes mellitus ([address]     Diabetic polyneuropathy associated with type 2 diabetes mellitus ([address]     Combined form of age-related cataract, left eye     Type oz (135.6 kg)  11/14/21 0505 : 290 lb (131.5 kg)  11/13/21 0641 : (!) 304 lb 10.8 oz (138.2 kg)  11/12/21 0600 : (!) 305 lb 3.2 oz (138.4 kg)  11/11/21 0555 : (!) 307 lb 1.6 oz (139.3 kg)  11/10/21 0757 : (!) 308 lb 12.8 oz (140.1 kg)  11/09/21 0515 : 289 < > 8.2*   < > 8.2* 8.7 8.6 9.0 8.8   MG 2.7*  --  3.0*  --  2.5  --  2.4 2.6  --    PHOS 8.8*   < > 9.3*   < > 5.7* 5.4* 3.9 4.5 4.3   *   < > 102*   < > 93 122* 166* 138* 327*    < > = values in this interval not displayed.        Recent Labs   Lab liquid 30 g, 30 g, Oral, Q15 Min PRN   Or  glucose-vitamin C (DEX-4) chewable tab 8 tablet, 8 tablet, Oral, Q15 Min PRN  fenofibrate micronized (LOFIBRA) cap 67 mg, 67 mg, Oral, Nightly  Albumin Human (ALBUMINAR) 25 % solution 100 mL, 100 mL, Intravenous,

## 2021-12-07 NOTE — PLAN OF CARE
Pepcid 20 mg daily changed from IV to PO/NG per protocol.     Yolanda Cintron, PharmD  12/07/21 4:29 PM

## 2021-12-07 NOTE — SLP NOTE
SPEECH DAILY NOTE - INPATIENT    ASSESSMENT & PLAN   ASSESSMENT  Pt seen to assess for improvement in swallow function. His voice remains hoarse but it is stronger than yesterday. SLP presented trials of ice chips, puree and thin liquids by tsp.   Patient

## 2021-12-07 NOTE — PLAN OF CARE
Assumed care at [address]. Alert/oriented x 3-4. Denies dyspnea or chest discomfort . Afib, w/ 's. VSS. Tolerating TF. Ice chips given per pt's request without sign of aspiration. POC discussed at the beginning of the shift w/ both pt & spouse.  During the n

## 2021-12-08 ENCOUNTER — APPOINTMENT (OUTPATIENT)
Dept: GENERAL RADIOLOGY | Facility: HOSPITAL | Age: 66
DRG: 215 | End: 2021-12-08
Attending: HOSPITALIST
Payer: MEDICARE

## 2021-12-08 RX ORDER — HEPARIN SODIUM 1000 [USP'U]/ML
2000 INJECTION, SOLUTION INTRAVENOUS; SUBCUTANEOUS
Status: DISCONTINUED | OUTPATIENT
Start: 2021-12-08 | End: 2021-12-28

## 2021-12-08 RX ORDER — METOPROLOL TARTRATE 50 MG/1
50 TABLET, FILM COATED ORAL
Status: DISCONTINUED | OUTPATIENT
Start: 2021-12-08 | End: 2021-12-14 | Stop reason: SDUPTHER

## 2021-12-08 RX ORDER — HEPARIN SODIUM 5000 [USP'U]/ML
1500 INJECTION, SOLUTION INTRAVENOUS; SUBCUTANEOUS ONCE
Status: DISCONTINUED | OUTPATIENT
Start: 2021-12-08 | End: 2021-12-08

## 2021-12-08 RX ORDER — QUETIAPINE 25 MG/1
25 TABLET, FILM COATED ORAL NIGHTLY
Status: DISCONTINUED | OUTPATIENT
Start: 2021-12-08 | End: 2021-12-09

## 2021-12-08 RX ORDER — METOPROLOL TARTRATE 5 MG/5ML
5 INJECTION INTRAVENOUS EVERY 4 HOURS PRN
Status: DISCONTINUED | OUTPATIENT
Start: 2021-12-08 | End: 2021-12-28

## 2021-12-08 NOTE — PROGRESS NOTES
[facility] Hospitalist Progress Note     [facility] Patient Status:  Inpatient    1955 MRN [id number]   [facility] 2NE-A Attending Janina Goltz, MD   [facility] Day # 28 PCP Louis Rivas DO     Chief Complaint: Patient p Kinase  Recent Labs   Lab 12/02/21  0615   CK 43       Inflammatory Markers  No results for input(s): CRP, ZHANG, LDH, DDIMER in the last 168 hours. Imaging: Imaging data reviewed in Epic.     Medications:   • Insulin Aspart Pen  7 Units Subcutaneous TID C 12/3  -transitioned to plavix, Eliquis, BB      Acute Respiratory Failure with Hypoxia   -resolvign   - possibly multifactorial between below infectious & cardiac etiologies along with possible ILD  -currently on HD for pulmonary edema.  HD per renal  -comp BB    Positive fecal occult  -Hemoglobin stable no further episodes hold off on GI consult  -Watch on Eliquis    Severe Obesity BMI > 35  - op follow up pending resolution of acute issues    Agitation-ICU delirium   -started on seroquel        Quality:  ·

## 2021-12-08 NOTE — SLP NOTE
SPEECH DAILY NOTE - INPATIENT    ASSESSMENT & PLAN   ASSESSMENT  Patient brought down to radiology suite to complete VFSS. Patient in mammography chair, lethargic but alert with eyes open. Patient RR in the high 20's and low 30's throughout.  He is primar

## 2021-12-08 NOTE — PROGRESS NOTES
Pulmonary Progress Note     Assessment / Plan:  1.  Acute hypoxemic / hypercapnic respiratory failure - intubated 11/16 due to cardiac arrest. Had pulmonary edema and possible underlying ILD vs ARDS  - extubated 12/1  - wean O2 as able  - metanebs to help w edema or cyanosis  Psych: interactive, answering questions appropriately, appropriate affect    Medications:  Reviewed in EMR    Lab Data:  Reviewed in EMR    Imaging:  I independently visualized all relevant chest imaging in PACS and agree with radiology

## 2021-12-08 NOTE — PROGRESS NOTES
[facility]    Nephrology Progress Note    Roro Hudson Attending:  Sven Valdez MD       SUBJECTIVE:     Was restless overnight   Pt w Afib   Was not able to do video swallow  On 3L NC    PHYSICAL EXAM:     Vital Signs: /74   Pulse 106   Te 3.3 12/08/2021    MOPERCENT 3.2 12/08/2021    EOPERCENT 0.0 12/08/2021    BAPERCENT 0.1 12/08/2021    NE 12.61 (H) 12/08/2021    LYMABS 0.45 (L) 12/08/2021    MOABSO 0.44 12/08/2021    EOABSO 0.00 12/08/2021    BAABSO 0.01 12/08/2021     Lab Results   Comp Once  Albumin Human (ALBUMINAR) 25 % solution 100 mL, 100 mL, Intravenous, PRN  amiodarone (PACERONE) tab 200 mg, 200 mg, Per G Tube, BID  glucose (DEX4) oral liquid 15 g, 15 g, Oral, Q15 Min PRN   Or  glucose-vitamin C (DEX-4) chewable tab 4 tablet, 4 tab to [facility] [place] 11/19  -- azotemia due to renal failure + steroids  -- tunnelled HD catheter placement 12/3  -- HD per TTS schedule. Last HD Sat.  UOP increasing -- 1.9L/24hrs, Cr uptrended but very minimally, may be showing some start of renal recovery, but clearan

## 2021-12-08 NOTE — PLAN OF CARE
Assumed care of Sherif Chang since 1930. Alert/oriented x 3, has been restless over night with patient yelling, pulling things attatched to him (tele leads, pulse ox, his gown) & again reaching towards his Dubhoff tube; restraints re-applied. Denies pain.  Afib; HR

## 2021-12-08 NOTE — PROGRESS NOTES
[facility] Cardiology Progress Note - Angeline Lawler Patient Status:  Inpatient    1955 MRN [id number]   [facility] 6NE-A Attending Asha Beebe MD   [facility] Day # 28 PCP Beth Pleitez DO     Subjective:  More c polyneuropathy associated with type 2 diabetes mellitus ([facility] [address]     Combined form of age-related cataract, left eye     Type II or unspecified type diabetes mellitus with neurological manifestations, uncontrolled(250.62)     Hyponatremia     Anemia     Hyper kg)  11/12/21 0600 : (!) 305 lb 3.2 oz (138.4 kg)  11/11/21 0555 : (!) 307 lb 1.6 oz (139.3 kg)  11/10/21 0757 : (!) 308 lb 12.8 oz (140.1 kg)  11/09/21 0515 : 289 lb 14.5 oz (131.5 kg)  11/09/21 0500 : 289 lb 14.4 oz (131.5 kg)  11/03/21 1731 : (!) 325 lb CA 8.2*   < > 8.2*   < > 8.6   < > 9.0 8.8 9.1 8.6 8.9   MG 3.0*  --  2.5  --  2.4  --  2.6  --   --   --   --    PHOS 9.3*   < > 5.7*   < > 3.9   < > 4.5 4.3 3.5 3.1 3.0   *   < > 93   < > 166*   < > 138* 327* 200* 322* 148*    < > = values in th chewable tab 4 tablet, 4 tablet, Oral, Q15 Min PRN   Or  dextrose 50 % injection 50 mL, 50 mL, Intravenous, Q15 Min PRN   Or  glucose (DEX4) oral liquid 30 g, 30 g, Oral, Q15 Min PRN   Or  glucose-vitamin C (DEX-4) chewable tab 8 tablet, 8 tablet, Oral, Q1

## 2021-12-08 NOTE — PLAN OF CARE
Assumed care of pt at 0700. A. Fib HR 110s-130s. Cardiology notified. See MAR. Pt more lethargic today. Oriented X2-3. Pt unable to participate in video swallow in AM due to lethargy. Plan is for hemodialysis tonight and SPT re-evaluation tomorrow.  Wife at

## 2021-12-08 NOTE — DIETARY NOTE
[facility]    NUTRITION ASSESSMENT    Pt does not meet malnutrition criteria. NUTRITION INTERVENTION:   1. Enteral Nutrition -  Nepro 1.8 at 55 ml/hour x 24h.    o This will provide 2576 kcal, 141 grams protein, 964 ml total free water, and 100% of closely. 11/22 - Pt remains intubated. Off propofol, but not yet ready for extubation. Previously tolerating Vital HP @ goal rate. No n/v/d. Last BM 11/22. Needs re-assessed & TF adjusted due to change in sedation status. Off CRRT, now receiving HD w/ UF. Encounter      NPO     Oral Supplements: None      Percent Meals Eaten (last 3 days)     None          FOOD/NUTRITION RELATED HISTORY:   Appetite:   Intake: 0%  Intake Meeting Needs: TF meeting needs   Food Allergies: No  Cultural/Ethnic/Quaker Preferen

## 2021-12-08 NOTE — CM/SW NOTE
12/08/21 1600   CM/SW Referral Data   Referral Source    Reason for Referral Discharge planning   Informant Spouse/Significant Other   Patient [address]   Patient lives with Spouse/Significant other   Patient [address]

## 2021-12-09 ENCOUNTER — NURSE ONLY (OUTPATIENT)
Dept: ELECTROPHYSIOLOGY | Facility: HOSPITAL | Age: 66
DRG: 215 | End: 2021-12-09
Attending: INTERNAL MEDICINE
Payer: MEDICARE

## 2021-12-09 ENCOUNTER — APPOINTMENT (OUTPATIENT)
Dept: CT IMAGING | Facility: HOSPITAL | Age: 66
DRG: 215 | End: 2021-12-09
Attending: INTERNAL MEDICINE
Payer: MEDICARE

## 2021-12-09 ENCOUNTER — APPOINTMENT (OUTPATIENT)
Dept: GENERAL RADIOLOGY | Facility: HOSPITAL | Age: 66
DRG: 215 | End: 2021-12-09
Attending: INTERNAL MEDICINE
Payer: MEDICARE

## 2021-12-09 PROCEDURE — 70450 CT HEAD/BRAIN W/O DYE: CPT | Performed by: INTERNAL MEDICINE

## 2021-12-09 PROCEDURE — 99292 CRITICAL CARE ADDL 30 MIN: CPT | Performed by: INTERNAL MEDICINE

## 2021-12-09 PROCEDURE — 99291 CRITICAL CARE FIRST HOUR: CPT | Performed by: INTERNAL MEDICINE

## 2021-12-09 PROCEDURE — 95816 EEG AWAKE AND DROWSY: CPT | Performed by: OTHER

## 2021-12-09 PROCEDURE — 71045 X-RAY EXAM CHEST 1 VIEW: CPT | Performed by: INTERNAL MEDICINE

## 2021-12-09 RX ORDER — VERAPAMIL HYDROCHLORIDE 40 MG/1
40 TABLET ORAL EVERY 8 HOURS SCHEDULED
Status: DISCONTINUED | OUTPATIENT
Start: 2021-12-09 | End: 2021-12-11

## 2021-12-09 RX ORDER — DILTIAZEM HYDROCHLORIDE 5 MG/ML
10 INJECTION INTRAVENOUS ONCE
Status: COMPLETED | OUTPATIENT
Start: 2021-12-09 | End: 2021-12-09

## 2021-12-09 RX ORDER — QUETIAPINE 50 MG/1
50 TABLET, FILM COATED ORAL NIGHTLY
Status: DISCONTINUED | OUTPATIENT
Start: 2021-12-09 | End: 2021-12-28

## 2021-12-09 RX ORDER — PREDNISONE 20 MG/1
20 TABLET ORAL
Status: DISCONTINUED | OUTPATIENT
Start: 2021-12-10 | End: 2021-12-12

## 2021-12-09 NOTE — PROGRESS NOTES
[facility] Cardiology Progress Note - Diana Means Patient Status:  Inpatient    1955 MRN [id number]   [facility] 6NE-A Attending Crystal Brody MD   [facility] Day # 39 PCP Micah Lyles DO     Subjective:  [facility])     Diabetic ulcer of right foot associated with type 2 diabetes mellitus ([facility]     Diabetic polyneuropathy associated with type 2 diabetes mellitus ([facility]     Combined form of age-related cataract, left eye     Type II or unspecified type diabetes me 14.4 oz (135.6 kg)  11/14/21 0505 : 290 lb (131.5 kg)  11/13/21 0641 : (!) 304 lb 10.8 oz (138.2 kg)  11/12/21 0600 : (!) 305 lb 3.2 oz (138.4 kg)  11/11/21 0555 : (!) 307 lb 1.6 oz (139.3 kg)  11/10/21 0757 : (!) 308 lb 12.8 oz (140.1 kg)  11/09/21 0515 : 138* 143* 140* 95*   CREATSERUM 2.88*   < > 2.59*   < > 2.84*   < > 3.14* 3.22* 3.29* 3.27* 2.72*   CA 8.2*   < > 8.6   < > 9.0   < > 9.1 8.6 8.9 8.7 8.3*   MG 2.5  --  2.4  --  2.6  --   --   --   --   --   --    PHOS 5.7*   < > 3.9   < > 4.5   < > 3.5 3. mg, Per NG Tube, Q4H While awake  Labetalol HCl (TRANDATE) injection 10 mg, 10 mg, Intravenous, Once  Albumin Human (ALBUMINAR) 25 % solution 100 mL, 100 mL, Intravenous, PRN  amiodarone (PACERONE) tab 200 mg, 200 mg, Per G Tube, BID  glucose (DEX4) oral l Musculoskeletal: no joint complaints upper or lower extremities  Neuro: no sensory or motor complaint  Psyche: no symptoms of depression or anxiety  Hematology: denies bruising or excessive bleeding  Endocrine: no history of diabetes  Allergy: see above

## 2021-12-09 NOTE — CONSULTS
[facility]  Neurocritical Care Consult Note    Claremorevíctor Matiasdavid Patient Status:  Inpatient    1955 MRN [id number]   [facility] 6NE-A Attending Gracie Pineda MD   [facility] Day # 39 PCP Wang Corral DO       Reason fo OTHR TARSAL/METATARSAL Right 12/22/2014    Procedure: EXOSTECTOMY FOOT/TOE; Surgeon: Ang Wynne DPM;  Location: [facility] SURGICAL [place]       aspirin EC 81 MG Oral Tab EC, Take 81 mg by mouth daily. , Disp: , Rfl: , 11/3/2021 at 0900  fenofibrate 14 Never Used    Vaping Use      Vaping Use: Never used    Substance and Sexual Activity      Alcohol use: No      Drug use: No    Family History   Problem Relation Age of Onset   • Heart Disorder Father         CAD   • Other (Other) Father    • Diabetes Neg Intravenous, Q15 Min PRN   Or  glucose (DEX4) oral liquid 30 g, 30 g, Oral, Q15 Min PRN   Or  glucose-vitamin C (DEX-4) chewable tab 8 tablet, 8 tablet, Oral, Q15 Min PRN  fenofibrate micronized (LOFIBRA) cap 67 mg, 67 mg, Oral, Nightly  Albumin Human (ALB kg/m².     Intake/Output:    Intake/Output Summary (Last 24 hours) at 12/9/2021 1438  Last data filed at 12/9/2021 0517  Gross per 24 hour   Intake 2070 ml   Output 1000 ml   Net 1070 ml       Physical Examination:   General- No acute distress  CV- RRR  Res metop, verapamil  Pulmonary:  Stable on NC- management per pulm  Renal:  HEATHER- HD per renal  GI:  TFs  Heme/ID:  Afebrile, trend leukocytosis  Endocrine/Rheum:  DM2- monitor glucose, sliding scale insulin prn  DVT Prophylaxis:  SCD’s, eliquis    Goals of th

## 2021-12-09 NOTE — PROGRESS NOTES
[facility] Hospitalist Progress Note     Brittney Landon Patient Status:  Inpatient    1955 MRN [id number]   [facility] 2NE-A Attending Emily Vincent MD   [facility] Day # 39 PCP Marc Mcguire DO     Chief Complaint: Patient p COVID19 Not Detected 11/11/2021    COVID19 Not Detected 11/07/2021    COVID19 Not Detected 11/05/2021       Pro-Calcitonin  No results for input(s): PCT in the last 168 hours. Cardiac  No results for input(s): TROP, PBNP in the last 168 hours.     Fiorella Marni from respiratory failure, worsening pulmonary edema/respiratory insufficiency & external sedation (received diazepam shortly prior to event)   - off pressors now, cont HD for fluid removal -HD per renal  -s/pTEE/CV and LHC, impella, and stenting to LAD and nephrology last HD was yesterday creatinine this morning it is 2.72    #Hypernatremia   -Na today at 149   -increased FWF    DM2, poorly controlled   - accuchecks,  decreasing steroids   -We will transition patient off insulin drip at this time  -Increased

## 2021-12-09 NOTE — PROGRESS NOTES
[address] Patient Status:  Inpatient    1955 MRN [id number]   [facility] 6NE-A Attending Leo Pineda MD   [address] Day # 39 PCP Deniz Dixon DO     SUBJECTIVE: no new events.   Still has some difficulty swall Albumin Human (ALBUMINAR) 25 % solution 100 mL, 100 mL, Intravenous, PRN  •  amiodarone (PACERONE) tab 200 mg, 200 mg, Per G Tube, BID  •  glucose (DEX4) oral liquid 15 g, 15 g, Oral, Q15 Min PRN **OR** glucose-vitamin C (DEX-4) chewable tab 4 tablet, 4 ta  12/09/2021    CO2 31.0 12/09/2021    BUN 95 12/09/2021    CREATSERUM 2.72 12/09/2021     12/09/2021    CA 8.3 12/09/2021    ALB 2.8 12/09/2021     Lab Results   Component Value Date    PT 14.9 (H) 05/03/2014    INR 1.33 (H) 11/20/2021    I

## 2021-12-09 NOTE — PLAN OF CARE
Patient continues in A-fib, SBP within normal parameters. Dialysis done today (12/8) 500cc removed and tolerated. PRN Lopressor given for HR >110.

## 2021-12-09 NOTE — PROGRESS NOTES
[facility]    Nephrology Progress Note    Ezekiel Jiménez Attending:  Stanton Salcedo MD       SUBJECTIVE:     Was restless overnight   Pt w Afib   Was not able to do video swallow  On 3L NC    PHYSICAL EXAM:     Vital Signs: /74   Pulse 105   Te 12/08/2021    MOPERCENT 3.2 12/08/2021    EOPERCENT 0.0 12/08/2021    BAPERCENT 0.1 12/08/2021    NE 12.61 (H) 12/08/2021    LYMABS 0.45 (L) 12/08/2021    MOABSO 0.44 12/08/2021    EOABSO 0.00 12/08/2021    BAABSO 0.01 12/08/2021     Lab Results   Componen (LIPITOR) tab 80 mg, 80 mg, Per G Tube, Nightly  clopidogrel (PLAVIX) tab 75 mg, 75 mg, Oral, Daily  ipratropium-albuterol (DUONEB) nebulizer solution 3 mL, 3 mL, Nebulization, Q6H WA  guaiFENesin (ROBITUSSIN) 100mg/5ml LIQUID 200 mg, 200 mg, Per NG Tube, on exertion x 2 weeks.   Prolonged hospital course complicated by PEA arrest, respiratory failure requiring ventilatory support, now extubated.     HEATHER on CKD stage 3 with albuminuria: baseline creatinine ~1.5 mg/dl  -- in the setting of cardiac arrest and

## 2021-12-09 NOTE — PROCEDURES
ELECTROENCEPHALOGRAM REPORT      Patient Name: Cl Mcqueen  Chart ID: [id number]  Ordering Physician: Dr Irena Nolen Date of Test: 12/9/2021  Referring Physician:   Patient Diagnosis: AMS    HISTORY  Patient is a 78year old male with history of HTN, DM, CKD

## 2021-12-09 NOTE — PHYSICAL THERAPY NOTE
PHYSICAL THERAPY TREATMENT NOTE - INPATIENT    Room Number: 5943/0492-I     Session: 3  Number of Visits to Meet Established Goals: 7     History related to current admission: Patient is a 78year old male admitted on 11/3/2021 from home for sob.   Pt diag REPAIR  PERONEUS LONGUS TENDON;  Surgeon: Chanel Duran DPM;  Location: [address] [place] [address]   • OTHER SURGICAL HISTORY  2009    amputation R forefoot   • PART REMV OTHR TARSAL/METATARSAL  11/9/09    Performed by Vicky Membreno at [address] [place] [address] Drive (G-Code): CN    FUNCTIONAL ABILITY STATUS  Functional Mobility/Gait Assessment  Gait Assistance: Not tested  Distance (ft): 0  Stairs (read only): (NT)    Skilled Therapy Provided: Patient approached for therapy lying supine in bed.   Pt completed supine<> Goal #3 Patient is able to actively complete 5 reps of le rom ex. ..in progress 12/9/21   Goal #4     Goal #5     Goal #6     Goal Comments: Goals established on 12/2/2021 12/9/2021 all goals ongoing     Therapist with goggles gloves and mask, Rehab a

## 2021-12-09 NOTE — SLP NOTE
SPEECH DAILY NOTE - INPATIENT    ASSESSMENT & PLAN   ASSESSMENT  Patient seen to assess for readiness for po/participation in VFSS. Patient alert and able to communicate better than yesterday though he is still a bit tachypneic at times.   SLP rendered ora

## 2021-12-10 PROCEDURE — 99291 CRITICAL CARE FIRST HOUR: CPT | Performed by: INTERNAL MEDICINE

## 2021-12-10 NOTE — PROGRESS NOTES
[facility] Cardiology Progress Note - Rona Stratton Patient Status:  Inpatient    1955 MRN [id number]   [facility] 6NE-A Attending Rosalva Osler, MD   [facility] Day # 40 PCP Lev Workman DO     Subjective:  [facility] sinus ([address]     Diabetic ulcer of right foot associated with type 2 diabetes mellitus ([address]     Diabetic polyneuropathy associated with type 2 diabetes mellitus ([address]     Combined form of age-related cataract, left eye     Type II or unspecified type diabe kg)  11/15/21 0548 : 298 lb 14.4 oz (135.6 kg)  11/14/21 0505 : 290 lb (131.5 kg)  11/13/21 0641 : (!) 304 lb 10.8 oz (138.2 kg)  11/12/21 0600 : (!) 305 lb 3.2 oz (138.4 kg)  11/11/21 0555 : (!) 307 lb 1.6 oz (139.3 kg)  11/10/21 0757 : (!) 308 lb 12.8 oz 31.0 30.0  30.0   BUN 76*   < > 102*   < > 138* 143* 140* 95* 126*  126*   CREATSERUM 2.59*   < > 2.84*   < > 3.22* 3.29* 3.27* 2.72* 3.46*  3.46*   CA 8.6   < > 9.0   < > 8.6 8.9 8.7 8.3* 8.4*  8.4*   MG 2.4  --  2.6  --   --   --   --   --   --    PHOS 3 Nightly  clopidogrel (PLAVIX) tab 75 mg, 75 mg, Oral, Daily  ipratropium-albuterol (DUONEB) nebulizer solution 3 mL, 3 mL, Nebulization, Q6H WA  guaiFENesin (ROBITUSSIN) 100mg/5ml LIQUID 200 mg, 200 mg, Per NG Tube, Q4H While awake  Labetalol HCl (TRANDATE congestion, sinus pain; hearing loss negative  Respiratory: denies shortness of breath, wheezing or cough   Cardiovascular:  See HPI  GI: denies nausea, vomiting,   Genital/: no dysuria,   Musculoskeletal: no joint complaints upper or lower extremities

## 2021-12-10 NOTE — OCCUPATIONAL THERAPY NOTE
OCCUPATIONAL THERAPY TREATMENT NOTE - INPATIENT     Room Number: 6716/8469-W  Session: 2   Number of Visits to Meet Established Goals: 9    History: Patient is [id number] year old male admitted on 11/3/2021 with Presenting Problem: hypoglycemia. Pt diagnosed wit step, 2 step, and 3 step sequence of motor actions utilizing BUE (clapping hands, folding fingers together, and raising both hands up over head several times).  Pt required hand over hand guidance/demonstration initially prior to repeating action at [employer] 3x/week    OT Goals:   ADL Goals   Patient will perform grooming: with max assist  Patient will perform upper body dressing:  with max assist  Patient will perform lower body dressing:  with max assist  Patient will perform toileting: with max assist     F

## 2021-12-10 NOTE — CM/SW NOTE
Care Progression Note:  Active Acute Medical Issue:   Hypoglycemia     Other Contributing Medical Factors/Dx:  Hypoglycemia,S/p Cardiopulmonary Arrest  Cardiogenic Shock 11/16/2021 s/p ROSC , acute systolic CHF exacerbation   Extubated 12/1    Length of st

## 2021-12-10 NOTE — PLAN OF CARE
Assumed care of patient at [address] A/Oxself. VSS. Patient currently in A.Fib. On 4L of O2. CT scan done. Family not at bedside this evening. Discussed plan of care with patient. Reinforcement when LOC increases.

## 2021-12-10 NOTE — PHYSICAL THERAPY NOTE
PHYSICAL THERAPY TREATMENT NOTE - INPATIENT    Room Number: 4433/0835-U     Session: 4  Number of Visits to Meet Established Goals: 7     History related to current admission: Patient is a 78year old male admitted on 11/3/2021 from home for sob.   Pt diag REPAIR  PERONEUS LONGUS TENDON;  Surgeon: Jalyn Sneed DPM;  Location: [address] [place] [address]   • OTHER SURGICAL HISTORY  2009    amputation R forefoot   • PART REMV OTHR TARSAL/METATARSAL  11/9/09    Performed by Karl Givens at [address] [place] [address] Drive position with le in dependent position. LAQ performed, pt didn't respond to cues. Eyes open during session, able to turn his head R<>L with cues. Changed trunk position from supported to unsupported sitting with Max A x2.  Cues provided to correct trunk pos

## 2021-12-10 NOTE — PROGRESS NOTES
Shweta  Neurocritical Care Progress Note     [facility] Patient Status:  Inpatient    1955 MRN [id number]   [facility] 6NE-A Attending Lucie Story MD   [facility] Day # 40 PCP DO Lg Reyes TID CC and HS  apixaban (ELIQUIS) tab 2.5 mg, 2.5 mg, Per NG Tube, BID  atorvastatin (LIPITOR) tab 80 mg, 80 mg, Per G Tube, Nightly  clopidogrel (PLAVIX) tab 75 mg, 75 mg, Oral, Daily  ipratropium-albuterol (DUONEB) nebulizer solution 3 mL, 3 mL, Nebuliza Bilat  Neuro-  · Mental status- awake and alert, regards and follows commands, oriented x3, speech slow but appropriate  · Cranial nerves- pupils equally round and reactive to light, extraocular muscles intact, face symmetric, visual fields full  · Motor- infectious process) vs less likely neurogenic. Eeg neg for seizures and ct head no acute changes. Recommend correct underlying metabolic derangements as able, cont neurochecks/pt/ot.  No further neuro w/u at this time, will follow peripherally please call

## 2021-12-10 NOTE — PLAN OF CARE
Pt has been hemodynamically stable throughout the day. Pt remains in afib with elevated HR up to 130's. Normotensive bp. Pt on nasal canula with saturations in low to mid 90's. Pt continues to mouth breathe. Providing frequent oral care.   Pt toleratin

## 2021-12-10 NOTE — DIETARY NOTE
[facility]    NUTRITION ASSESSMENT    Pt does not meet malnutrition criteria. NUTRITION INTERVENTION:   1. Enteral Nutrition -  Nepro 1.8 at 55 ml/hour x 24h.    o This will provide 2576 kcal, 141 grams protein, 964 ml total free water, and 100% of drip.  Loose stools noted, rectal tube in place. No n/v. Continue TF at current rate. If loose stools progress, or cause any electrolyte abnormalities, can consider use of fiber supplement. RD will follow closely. 11/22 - Pt remains intubated.  Off propo lb)  03/09/18 : 133.4 kg (294 lb)  12/16/17 : 132.4 kg (291 lb 14.4 oz)       NUTRITION:  Diet: Orders Placed This Encounter      NPO     Oral Supplements: None      Percent Meals Eaten (last 3 days)     None          FOOD/NUTRITION RELATED HISTORY:   Appe

## 2021-12-10 NOTE — PROGRESS NOTES
[facility]    Nephrology Progress Note    Maxime Ayoub Attending:  Misty Rodarte MD       SUBJECTIVE:     Sp 500cc bolus today given possible concern for sepsis, started meropenem, ID reconsulted  UOP dropping off   Responds appropriately to quest 12/10/2021     12/10/2021    CO2 30.0 12/10/2021    CO2 30.0 12/10/2021     Lab Results   Component Value Date    WBC 10.2 12/10/2021    RBC 3.33 (L) 12/10/2021    HGB 8.3 (L) 12/10/2021    HCT 28.8 (L) 12/10/2021    .0 (L) 12/10/2021    MCV 8 Blocker/Cardiac  metoprolol Tartrate (LOPRESSOR) injection 5 mg, 5 mg, Intravenous, Q4H PRN  heparin sodium 1000 UNIT/ML injection 2,000 Units, 2,000 Units, Intravenous, PRN Dialysis  heparin sodium 1000 UNIT/ML injection 2,000 Units, 2,000 Units, Intraven mL, 1 enema, Rectal, Once PRN  [Held by provider] bumetanide (BUMEX) 0.25 MG/ML injection 2 mg, 2 mg, Intravenous, TID  polyethylene glycol (PEG 3350) (MIRALAX) powder packet 17 g, 17 g, Oral, Daily  melatonin tab 3 mg, 3 mg, Oral, Nightly  Vitamin D3 (Cho Repeat Na level     D/W RN     Thank you for allowing me to participate in the care of this patient. Please do not hesitate to call with questions or concerns.       Anamika Boo MD  [facility]  Nephrology

## 2021-12-10 NOTE — PROGRESS NOTES
[address] Patient Status:  Inpatient    1955 MRN [id number]   [facility] 6NE-A Attending Curtis Girard MD   [facility] Day # 40 PCP Gwendolyn Chaudhari DO     SUBJECTIVE: remains confused, a little tachypneic.   Respond Labetalol HCl (TRANDATE) injection 10 mg, 10 mg, Intravenous, Once  •  Albumin Human (ALBUMINAR) 25 % solution 100 mL, 100 mL, Intravenous, PRN  •  amiodarone (PACERONE) tab 200 mg, 200 mg, Per G Tube, BID  •  glucose (DEX4) oral liquid 15 g, 15 g, Oral, Q WBC 10.2 12/10/2021    RBC 3.33 12/10/2021    HGB 8.3 12/10/2021    HCT 28.8 12/10/2021    MCV 86.5 12/10/2021    MCH 24.9 12/10/2021    MCHC 28.8 12/10/2021    RDW 24.1 12/10/2021    .0 12/10/2021     Lab Results   Component Value Date     12 prednisone taper  - OP work up  6. DM  - levemir and novolog  7. CV - cardiac arrest - 11/16. Due to ventricular arrhythmia following poss resp arrest in assn with sedation. S/p cardiac cath/stent. Impella removed 11/18.  Also with atrial flutter s/p PRINCESS/DC

## 2021-12-10 NOTE — SLP NOTE
SPEECH DAILY NOTE - INPATIENT    ASSESSMENT & PLAN   ASSESSMENT  Patient seen to assess for improvement in swallow function. He is alert and interactive though quite weak.   Voice is weak and unable to generate much force with volitional or spontaneous cou

## 2021-12-11 RX ORDER — FLUCONAZOLE 2 MG/ML
200 INJECTION, SOLUTION INTRAVENOUS EVERY 24 HOURS
Status: DISCONTINUED | OUTPATIENT
Start: 2021-12-11 | End: 2021-12-13

## 2021-12-11 RX ORDER — VERAPAMIL HYDROCHLORIDE 80 MG/1
80 TABLET ORAL EVERY 8 HOURS SCHEDULED
Status: DISCONTINUED | OUTPATIENT
Start: 2021-12-11 | End: 2021-12-28

## 2021-12-11 NOTE — PROGRESS NOTES
[facility]    Nephrology Progress Note    Oscar Ruiz Attending:  Rachael Adrian MD       SUBJECTIVE:     Seen on HD, tolerated well  No other complaints  Lethargic but responds    PHYSICAL EXAM:     Vital Signs: /70 (BP Location: Left arm) LYPERCENT 5.4 12/10/2021    MOPERCENT 3.1 12/10/2021    EOPERCENT 0.3 12/10/2021    BAPERCENT 0.1 12/10/2021    NE 9.27 (H) 12/10/2021    LYMABS 0.55 (L) 12/10/2021    MOABSO 0.32 12/10/2021    EOABSO 0.03 12/10/2021    BAABSO 0.01 12/10/2021     Lab Re HS  apixaban (ELIQUIS) tab 2.5 mg, 2.5 mg, Per NG Tube, BID  atorvastatin (LIPITOR) tab 80 mg, 80 mg, Per G Tube, Nightly  clopidogrel (PLAVIX) tab 75 mg, 75 mg, Oral, Daily  ipratropium-albuterol (DUONEB) nebulizer solution 3 mL, 3 mL, Nebulization, Q6H W on exertion x 2 weeks.   Prolonged hospital course complicated by PEA arrest, respiratory failure requiring ventilatory support, now extubated.     HEATHER on CKD stage 3 with albuminuria: baseline creatinine ~1.5 mg/dl  -- in the setting of cardiac arrest and

## 2021-12-11 NOTE — PROGRESS NOTES
[facility] Hospitalist Progress Note     Harry Gregg Patient Status:  Inpatient    1955 MRN [id number]   [facility] 2NE-A Attending Toribio Mcknight MD   [facility] Day # 40 PCP Wang Corral DO     Chief Complaint: Patient p 0.7 0.6   TP  --   --   --  6.5 6.3*    < > = values in this interval not displayed. Estimated Creatinine Clearance: 24.8 mL/min (A) (based on SCr of 3.46 mg/dL (H)). No results for input(s): PTP, INR in the last 168 hours.          COVID-19 Lab Re Failure with Hypoxia   Acute HFrEF Exacerbation   Afib/AFL with RVR   Possible Gram Negative Pneumonia  CKD3  DM2  HTN  Severe Obesity BMI > 35    Plan  S/p Cardiopulmonary Arrest  Cardiogenic Shock -improved, off pressors  - Cardiac arrest 11/15/21 s/p RO multifactorial secondary to shock, possible ATN or JULIA  - holding ACE given above   -Currently getting HD however UOP steady but declinng  - s/p tunneled HD catheter placement 12/3,  -cont to have poor clearanace and azotemia noted  -HD per nephrology last

## 2021-12-11 NOTE — PROGRESS NOTES
[address] Patient Status:  Inpatient    1955 MRN [id number]   [facility] 6NE-A Attending Yesy Harris MD   [facility] Day # 45 PCP Amaya Urias DO     Pulm / Critical Care Progress Note     S: lethargic though O2: 4L nc      Wt Readings from Last 3 Encounters:  12/08/21 : 274 lb 7.6 oz (124.5 kg)  10/12/21 : (!) 330 lb (149.7 kg)  05/14/21 : 267 lb (121.1 kg)       I/O last 3 completed shifts:   In: 1499 [NG/GT:3790]  Out: 1425 [Urine:1425]  I/O this riley / hypercapnic respiratory failure - intubated 11/16 due to cardiac arrest. Had pulmonary edema and possible underlying ILD vs ARDS  - extubated 12/1; O2 needs slowly improving, on 4L when seen.    - wean O2 as able  - metanebs to help with airway clearance

## 2021-12-11 NOTE — PROGRESS NOTES
[facility] Cardiology Progress Note - Jag Kwokjeanna Patient Status:  Inpatient    1955 MRN [id number]   [facility] 6NE-A Attending Dangelo Ness MD   [facility] Day # 45 PCP Tipmamie Arshad DO     Subjective:  Jeanie Quezada age-related cataract, left eye     Type II or unspecified type diabetes mellitus with neurological manifestations, uncontrolled(250.62)     Hyponatremia     Anemia     Hyperglycemia     Cellulitis of foot     Debridement of open wound, 4th MT head resectio 0555 : (!) 307 lb 1.6 oz (139.3 kg)  11/10/21 0757 : (!) 308 lb 12.8 oz (140.1 kg)  11/09/21 0515 : 289 lb 14.5 oz (131.5 kg)  11/09/21 0500 : 289 lb 14.4 oz (131.5 kg)  11/03/21 1731 : (!) 325 lb 13.4 oz (147.8 kg)  11/03/21 1238 : 289 lb (131.1 kg)  10/1 > 4.5   < > 3.0 3.2 2.6 4.6 5.1*   *   < > 138*   < > 148* 169* 253* 204*  204* 184*    < > = values in this interval not displayed.        Recent Labs   Lab 12/08/21  1449 12/09/21  0439 12/09/21  1630 12/10/21  0424 12/11/21  0507   ALT  --   --  5 10 mg, 10 mg, Intravenous, Once  Albumin Human (ALBUMINAR) 25 % solution 100 mL, 100 mL, Intravenous, PRN  amiodarone (PACERONE) tab 200 mg, 200 mg, Per G Tube, BID  glucose (DEX4) oral liquid 15 g, 15 g, Oral, Q15 Min PRN   Or  glucose-vitamin C (DEX-4) c anxiety  Hematology: denies bruising or excessive bleeding  Endocrine: no history of diabetes  Allergy: see above drug allergies    Shannan Avendaño MD  12/10/2021  4:07 PM

## 2021-12-11 NOTE — PLAN OF CARE
Assumed care of patient at 1. A/Oxself. VSS. On 3L of O2. Family at bedside this evening. Discussed plan of care with patient and family.  All questions answered;

## 2021-12-11 NOTE — SLP NOTE
SPEECH DAILY NOTE - INPATIENT    ASSESSMENT & PLAN   ASSESSMENT  Patient seen to assess for improvement in swallow function. He is awake and alert; 1 eye open. Nodding head yes/no appropriately at times, some questions with no response with head nod.   Priscilla Jimenez Recommendations/Plan: Undetermined    Treatment Plan  Treatment Plan/Recommendations: SLP to reassess;Videofluoroscopic swallow study (when appropriate)    Interdisciplinary Communication: Discussed with RN          GOALS  Goal #1 Patient will participate

## 2021-12-12 ENCOUNTER — APPOINTMENT (OUTPATIENT)
Dept: GENERAL RADIOLOGY | Facility: HOSPITAL | Age: 66
DRG: 215 | End: 2021-12-12
Attending: INTERNAL MEDICINE
Payer: MEDICARE

## 2021-12-12 PROCEDURE — 71045 X-RAY EXAM CHEST 1 VIEW: CPT | Performed by: INTERNAL MEDICINE

## 2021-12-12 NOTE — PLAN OF CARE
VSS, 4-6 L NC. SBP within parameters after 500 mL bolus this am. Levo weaned off. BP meds held per order. Afebrile. Pt more alert this am and able to verbalize his name and several other words.  Has been drowsy the past few hours, but still following comman

## 2021-12-12 NOTE — PROGRESS NOTES
[address] Patient Status:  Inpatient    1955 MRN [id number]   [facility] 6NE-A Attending Elvi Long MD   [address] Day # 44 PCP Mayra Lockett DO     Pulm / Critical Care Progress Note     S: overnight hypo BP: (!) 86/51 99/50 100/71 94/56   BP Location:       Pulse: 86 82 87 88   Resp: 18 18 14 18   Temp:       TempSrc:       SpO2: 100% 100% 100% 100%   Weight:       Height:               Wt Readings from Last 3 Encounters:  12/08/21 : 274 lb 7.6 oz (124.5 Creatinine (mg/dL)   Date Value   12/12/2021 3.24 (H)   12/12/2021 3.24 (H)   12/11/2021 3.83 (H)   10/12/2021 1.52 (H)   10/16/2017 1.52 (H)   05/14/2016 1.78 (H)   ]        Cx: urine cx with candida      ASSESSMENT/PLAN:    1.  Tommy Jasmine / maribell than: 35 minutes      Aylin Morel M.D.   [facility]  Pulmonary / Critical care  12/12/2021  6:38 AM

## 2021-12-12 NOTE — SLP NOTE
SPEECH DAILY NOTE - INPATIENT    ASSESSMENT & PLAN   ASSESSMENT  Patient seen to assess for improvement in swallow function. He is awake/alert with both eyes open. Gains today both with increased verbalizations.  Stated name \"Don\", \"ice chips\" with alexandria • Spoke with RN Jaya aTmez.           Diet Recommendations - Solids: NPO  Diet Recommendations - Liquids: NPO          Medication Administration Recommendations: Non-oral    Patient Experiencing Pain: No                Discharge Recommendations  Discharge Rec

## 2021-12-12 NOTE — PROGRESS NOTES
[facility]                INFECTIOUS DISEASE PROGRESS NOTE    Oscar Joseph Patient Status:  Inpatient    1955 MRN [id number]   [facility] 6NE-A Attending Javier Rosa MD   [facility] Day # 44 PCP DO Hannah Mathew GFRNAA   < >  --  17*  17* 15* 19*  19*   CA   < >  --  8.4*  8.4* 8.9 8.3*  8.3*   ALB   < > 3.0* 2.7*  2.7* 2.9* 2.4*  2.4*   NA   < >  --  144  144 147* 142  142   K   < >  --  4.9  4.9 5.0 5.0  5.0   CL   < >  --  108  108 111 105  105   CO2   < >  - other part of right foot with fat layer exposed ([address]     Chronic osteomyelitis of right foot with draining sinus ([address]     Diabetic ulcer of right foot associated with type 2 diabetes mellitus ([address]     Diabetic polyneuropathy associated with type 2 diabe

## 2021-12-12 NOTE — PROGRESS NOTES
[facility] Hospitalist Progress Note     Brian Gibbs Patient Status:  Inpatient    1955 MRN [id number]   [facility] 2NE-A Attending Juvenal Coleman MD   [facility] Day # 44 PCP Beth Pleitez DO     Chief Complaint: Patient p Estimated Creatinine Clearance: 26.4 mL/min (A) (based on SCr of 3.24 mg/dL (H)).     Recent Labs   Lab 12/12/21  0432   PTP 20.0*   INR 1.66*            COVID-19 Lab Results    COVID-19  Lab Results   Component Value Date    COVID19 Not Detected 11/1 Respiratory Failure with Hypoxia   Acute HFrEF Exacerbation   Afib/AFL with RVR   Possible Gram Negative Pneumonia  CKD3  DM2  HTN  Severe Obesity BMI > 35    Plan  S/p Cardiopulmonary Arrest  Cardiogenic Shock -improved, off pressors  - Cardiac arrest 11/ ~1.5, was at baseline, increased to 2.2 following LHC  - likely multifactorial secondary to shock, possible ATN or JULIA  - holding ACE given above   -Currently getting HD however UOP steady but declinng  - s/p tunneled HD catheter placement 12/3,  -cont to

## 2021-12-12 NOTE — PROGRESS NOTES
[facility]  Cardiology Progress Note    Angelito Atkinson Patient Status:  Inpatient    1955 MRN [id number]   [facility] 6NE-A Attending Sandra Sanchez MD   [address] Day # 44 PCP Conchita Villa DO       Subjective:   - Lethargic, b tab 80 mg, 80 mg, Per G Tube, Nightly  clopidogrel (PLAVIX) tab 75 mg, 75 mg, Oral, Daily  ipratropium-albuterol (DUONEB) nebulizer solution 3 mL, 3 mL, Nebulization, Q6H WA  guaiFENesin (ROBITUSSIN) 100mg/5ml LIQUID 200 mg, 200 mg, Per NG Tube, Q4H While with long-term current use of insulin ([facility]     Type 2 diabetes mellitus with foot ulcer, with long-term current use of insulin ([facility])     Hyperlipidemia with target LDL less than 100     Mild renal insufficiency     Hypertension     Peripheral neuropathy Net 855 ml       Last 3 Weights  12/08/21 0400 : 274 lb 7.6 oz (124.5 kg)  12/07/21 0500 : 276 lb 0.3 oz (125.2 kg)  12/06/21 0600 : 275 lb 12.7 oz (125.1 kg)  12/05/21 0500 : 283 lb 11.7 oz (128.7 kg)  12/04/21 0600 : 284 lb 13.4 oz (129.2 kg)  12/03/21 12/08/21  1449 12/09/21  0439 12/10/21  0424 12/11/21  0507 12/12/21  0432      < > 147* 143 144  144 147* 142  142   K 4.5   < > 5.3* 4.5 4.9  4.9 5.0 5.0  5.0      < > 116* 108 108  108 111 105  105   CO2 29.0   < > 29.0 31.0 30.0  30.0 31.0

## 2021-12-12 NOTE — PLAN OF CARE
Assumed care of patient at 1. A/Oxself, lethargic. At 2145 patient became hypotensive. Pulm notified, gave 1L bolus with minimal effect and added Norepi for pressure support. ID re-consulted. Family at bedside this evening.  Discussed plan of care with f

## 2021-12-12 NOTE — PROGRESS NOTES
[facility]    Nephrology Progress Note    Patel Marie Attending:  Derek Cali MD       SUBJECTIVE:     Sp HD yesterday, tolerated well  More awake and alert  Got hypotensive overnight after metoprolol and verapamil   now on pressor, Sp 1L bolus 12/12/2021     12/12/2021     12/12/2021    CO2 30.0 12/12/2021    CO2 30.0 12/12/2021     Lab Results   Component Value Date    WBC 9.6 12/12/2021    RBC 3.00 (L) 12/12/2021    HGB 7.7 (L) 12/12/2021    HCT 26.7 (L) 12/12/2021    .0 (L) HS  insulin detemir (LEVEMIR) 100 UNIT/ML flextouch 50 Units, 50 Units, Subcutaneous, Daily  QUEtiapine (SEROQUEL) tab 50 mg, 50 mg, Oral, Nightly  [Held by provider] metoprolol tartrate (LOPRESSOR) tab 50 mg, 50 mg, Per NG Tube, TID Beta Blocker/Cardiac (SENOKOT) syrup 17.6 mg, 10 mL, Oral, Nightly PRN  bisacodyl (DULCOLAX) rectal suppository 10 mg, 10 mg, Rectal, Daily PRN  Fleet Enema (FLEET) 7-19 GM/118ML enema 133 mL, 1 enema, Rectal, Once PRN  [Held by provider] bumetanide (BUMEX) 0.25 MG/ML injectio int units daily  -- Hyperphosphatemia Nepro, discontinue sevelemer given low phos now.     Cardiogenic shock with systolic CHF, aflutter, CAD:  -- Per Cardiology    Hypernatremia  -- FWF 200q4.  Monitor Na level     D/W RN     Thank you for allowing me to p

## 2021-12-12 NOTE — PROGRESS NOTES
[facility] Hospitalist Progress Note     [facility] Patient Status:  Inpatient    1955 MRN [id number]   [facility] 2NE-A Attending Janina Goltz, MD   [facility] Day # 45 PCP Louis Rivas DO     Chief Complaint: Patient p the last 168 hours.          COVID-19 Lab Results    COVID-19  Lab Results   Component Value Date    COVID19 Not Detected 11/11/2021    COVID19 Not Detected 11/07/2021    COVID19 Not Detected 11/05/2021       Pro-Calcitonin  No results for input(s): PCT in pressors  - Cardiac arrest 11/15/21 s/p ROSC following DCCV  - likely multifactorial due to strain from respiratory failure, worsening pulmonary edema/respiratory insufficiency & external sedation (received diazepam shortly prior to event)   - off pressors and azotemia noted  -HD per nephrology last HD was yesterday creatinine this morning is 3.83    #Hypernatremia   --resolved      DM2, poorly controlled   - accuchecks,  decreasing steroids   -transitioned off insulin gtt   -Increased to 50 units of Levemir

## 2021-12-13 PROCEDURE — 30233N1 TRANSFUSION OF NONAUTOLOGOUS RED BLOOD CELLS INTO PERIPHERAL VEIN, PERCUTANEOUS APPROACH: ICD-10-PCS | Performed by: HOSPITALIST

## 2021-12-13 RX ORDER — SODIUM CHLORIDE 9 MG/ML
INJECTION, SOLUTION INTRAVENOUS ONCE
Status: COMPLETED | OUTPATIENT
Start: 2021-12-13 | End: 2021-12-13

## 2021-12-13 RX ORDER — PREDNISONE 20 MG/1
40 TABLET ORAL
Status: DISCONTINUED | OUTPATIENT
Start: 2021-12-13 | End: 2021-12-16

## 2021-12-13 NOTE — PROGRESS NOTES
Pulmonary Progress Note        NAME: Emil Herron - ROOM: 1021/8425-N - MRN: [id number] - Age: 78year old - : 1955        Last 24hrs: No events overnight, feels a little anxious this AM but otherwise denies complaints    OBJECTIVE:   21  08 Oral Daily     Continuous Infusing Medication:  • norepinephrine Stopped (12/12/21 1200)       Lungs: clear to auscultation bilaterally  Heart: S1, S2 normal, no murmur, click, rub or gallop, regular rate and rhythm  Abdomen: soft, non-tender; bowel sounds today  10. Nutrition   - TFs, speech following   11. Proph  - eliquis  12.  Dispo  - full code  - if BP tolerates would benefit from fluid removal given his Is and Os and his chest x-ray    [address] [facility]  Pulmonary and Critical Care

## 2021-12-13 NOTE — PLAN OF CARE
Assumed care of patient at 1. A/Oxself. VSS. On 3L O2. Straight cathed for 350 of urine. Blood protocol started in AM for hgb of 6.9. Family member at bedside at beginning of shift. Discussed plan of care with family and patient. All questions answered.

## 2021-12-13 NOTE — PROGRESS NOTES
[facility]                INFECTIOUS DISEASE PROGRESS NOTE    Brittney Arrant Patient Status:  Inpatient    1955 MRN [id number]   [facility] 6NE-A Attending Gabbie Fischer MD   [facility] Day # 36 PCP DO Hannah Prado 12/13/21  0438   GLU   < >  --  204*  204*   < > 184* 186*  186* 202*   BUN   < >  --  126*  126*   < > 155* 108*  108* 139*   CREATSERUM   < >  --  3.46*  3.46*   < > 3.83* 3.24*  3.24* 3.44*   GFRAA   < >  --  20*  20*   < > 18* 22*  22* 20*   GFRNAA   < ([facility]     Type II or unspecified type diabetes mellitus with renal manifestations, uncontrolled(250.42)     Obesity (BMI 30-39. 9)     Encounter for long-term (current) use of insulin (HCC)     Gastrocnemius recession and Peroneus longus tendon lengthening, completing 5 days if no compelling evidence for source of infection  - follow O2 requirements    4. HEATHER on CKD  - HD per renal    Discussed case with Dr. Cecilio Wells. Анна Vela PA-C

## 2021-12-13 NOTE — PROGRESS NOTES
[facility]    Nephrology Progress Note    Lacy Antonio Attending: Sung Salazar MD       SUBJECTIVE:     No urinary complaints. Voiding on his own. No cp, sob. Received blood transfusion this morning for Hb 6.9.     PHYSICAL EXAM:     Vit NEPRELIM 6.76 12/13/2021    NEPERCENT 93.1 12/13/2021    LYPERCENT 4.0 12/13/2021    MOPERCENT 2.2 12/13/2021    EOPERCENT 0.0 12/13/2021    BAPERCENT 0.0 12/13/2021    NE 6.76 12/13/2021    LYMABS 0.29 (L) 12/13/2021    MOABSO 0.16 12/13/2021    EOABSO injection 5 mg, 5 mg, Intravenous, Q4H PRN  heparin sodium 1000 UNIT/ML injection 2,000 Units, 2,000 Units, Intravenous, PRN Dialysis  heparin sodium 1000 UNIT/ML injection 2,000 Units, 2,000 Units, Intravenous, PRN Dialysis  famotidine (PEPCID) tab 20 mg, TID  polyethylene glycol (PEG 3350) (MIRALAX) powder packet 17 g, 17 g, Oral, Daily  melatonin tab 3 mg, 3 mg, Oral, Nightly  Vitamin D3 (Cholecalciferol) (VITAMIN D3) tab 1,000 Units, 1,000 Units, Oral, Daily        ASSESSMENT/PLAN:     77 yo M with CKD s

## 2021-12-13 NOTE — PROGRESS NOTES
[facility] Cardiology Progress Note - Yue Santana Patient Status:  Inpatient    1955 MRN [id number]   [facility] 6NE-A Attending Samson Mendez MD   [facility] Day # 36 PCP Gwendolyn Chaudhari DO     Subjective:  Mor Diabetic polyneuropathy associated with type 2 diabetes mellitus ([facility]     Combined form of age-related cataract, left eye     Type II or unspecified type diabetes mellitus with neurological manifestations, uncontrolled(250.62)     Hyponatremia     Anemia (!) 304 lb 10.8 oz (138.2 kg)  11/12/21 0600 : (!) 305 lb 3.2 oz (138.4 kg)  11/11/21 0555 : (!) 307 lb 1.6 oz (139.3 kg)  11/10/21 0757 : (!) 308 lb 12.8 oz (140.1 kg)  11/09/21 0515 : 289 lb 14.5 oz (131.5 kg)  11/09/21 0500 : 289 lb 14.4 oz (131.5 kg) 3.24*  3.24* 3.44*   CA 8.3* 8.4*  8.4* 8.9 8.3*  8.3* 8.6   MG  --   --   --  2.3 2.5   PHOS 2.6 4.6 5.1* 6.2* 6.8*   * 204*  204* 184* 186*  186* 202*       Recent Labs   Lab 12/09/21  1630 12/10/21  0424 12/11/21  0507 12/12/21  0432 12/13/21  04 2.5 mg, 2.5 mg, Per NG Tube, BID  atorvastatin (LIPITOR) tab 80 mg, 80 mg, Per G Tube, Nightly  clopidogrel (PLAVIX) tab 75 mg, 75 mg, Oral, Daily  ipratropium-albuterol (DUONEB) nebulizer solution 3 mL, 3 mL, Nebulization, Q6H WA  guaiFENesin (ROBITUSSIN) denies nasal congestion, sinus pain; hearing loss negative  Respiratory: denies shortness of breath, wheezing or cough   Cardiovascular:  See HPI  GI: denies nausea, vomiting,   Genital/: no dysuria,   Musculoskeletal: no joint complaints upper or lower

## 2021-12-13 NOTE — PHYSICAL THERAPY NOTE
PHYSICAL THERAPY TREATMENT NOTE - INPATIENT    Room Number: 4054/4124-N     Session: 5  Number of Visits to Meet Established Goals: 7     History related to current admission: Patient is a 78year old male admitted on 11/3/2021 from home for sob.   Pt diag REPAIR  PERONEUS LONGUS TENDON;  Surgeon: Thais Hong DPM;  Location: [address] [place] [address]   • OTHER SURGICAL HISTORY  2009    amputation R forefoot   • PART REMV OTHR TARSAL/METATARSAL  11/9/09    Performed by Urszula Dubon at [address] [place] [address] Drive bed.  Pt completed supine>sit to EOB with Max A. Pt sat upright at EOB x20 minutes with SBA for 15 minutes, Min A for 5 minutes. Pt remains alert, but becomes less verbally responsive sitting upright at EOB.   Pt appears anxious, shaking head and saying, supine - sit EOB @ level: maximum assistance      Goal #2 Patient is able to dangle on eob w/ mod a of 1 to maintain balance. .. MET 12/9/21     Upthgthrthathdtheth:th th6th minute with SBA. ..achieved 12/13     Goal #3 Patient is able to actively complete 5 reps of le rom ex.

## 2021-12-13 NOTE — SLP NOTE
SPEECH DAILY NOTE - INPATIENT    ASSESSMENT & PLAN   ASSESSMENT  Patient seen to assess for improvement in swallow function. Patient alert, voice is much improved as is cough though both remain below baseline.   Patient following commands and able to parti Yes  SLP Follow-up Date: 12/14/21  Number of Visits to Meet Established Goals: 6    Session: 3 of 8    If you have any questions, please contact Melford Hives [address]  Pager [phone]    Prior to entering room, SLP donned appropriate PPE

## 2021-12-13 NOTE — PROGRESS NOTES
[facility] Hospitalist Progress Note     Brittney Landon Patient Status:  Inpatient    1955 MRN [id number]   [facility] 2NE-A Attending Emily Vincent MD   [address] Day # 36 PCP Marc Mcguire DO     Chief Complaint: Patient p < > 111 105  105 105   CO2   < >  --  30.0  30.0   < > 31.0 30.0  30.0 28.0   ALKPHO  --  50 44*  --   --  41*  --    AST  --  34 25  --   --  31  --    ALT  --  52 43  --   --  37  --    BILT  --  0.7 0.6  --   --  0.6  --    TP  --  6.5 6.3*  --   -- by provider] bumetanide  2 mg Intravenous TID   • polyethylene glycol (PEG 3350)  17 g Oral Daily   • melatonin  3 mg Oral Nightly   • cholecalciferol  1,000 Units Oral Daily       Assessment & Plan:      Patel Marie Is a 78year old male who presented per renal  -s/p LHC with impella assisted stent ot LAD and RPL  -Continues to have an increased urine output   -Further HD per nephrology    Anemia  - likely 2/2 chronic conditions and CKD  - 1u prbc 12/13 for hgb 6.9 --> 7.5       Afib/AFL with RVR    -Cu MD Ghanshyam Bowen  [phone]  12/13/2021  2:00 PM

## 2021-12-14 RX ORDER — HEPARIN SODIUM 1000 [USP'U]/ML
1.5 INJECTION, SOLUTION INTRAVENOUS; SUBCUTANEOUS
Status: DISCONTINUED | OUTPATIENT
Start: 2021-12-14 | End: 2021-12-28

## 2021-12-14 RX ORDER — ALBUMIN (HUMAN) 12.5 G/50ML
100 SOLUTION INTRAVENOUS AS NEEDED
Status: DISCONTINUED | OUTPATIENT
Start: 2021-12-14 | End: 2021-12-28

## 2021-12-14 NOTE — PROGRESS NOTES
[facility]                INFECTIOUS DISEASE PROGRESS NOTE    Cl Mcqueen Patient Status:  Inpatient    1955 MRN [id number]   [facility] 6NE-A Attending Darral Osler, MD   [facility] Day # 39 PCP DO Abi Mitchell-m 12/14/21  0445   GLU   < >  --  204*  204*   < > 186*  186* 202* 145*   BUN   < >  --  126*  126*   < > 108*  108* 139* 70*   CREATSERUM   < >  --  3.46*  3.46*   < > 3.24*  3.24* 3.44* 2.15*   GFRAA   < >  --  20*  20*   < > 22*  22* 20* 36*   GFRNAA   < ([facility] [address]     Type II or unspecified type diabetes mellitus with renal manifestations, uncontrolled(250.42)     Obesity (BMI 30-39. 9)     Encounter for long-term (current) use of insulin (HCC)     Gastrocnemius recession and Peroneus longus tendon lengthening, 5 day course of meropenem today. - follow O2 requirements    4. HEATHER on CKD  - HD per renal    Discussed case with RN. Marimar Philippe PA-C    ID ATTENDING ADDENDUM     Pt seen an examined independently. Chart reviewed. Agree with above.  Note has

## 2021-12-14 NOTE — SLP NOTE
SPEECH DAILY NOTE - INPATIENT    ASSESSMENT & PLAN   ASSESSMENT  Patient seen to assess for improvement in swallow function. Patient alert and up in bed. Breathing is better and his oral mucosa is less dry.   He is able to participate in conversation and [id number] [facility]-SLP  Pager [phone]    Prior to entering room, SLP donned appropriate PPE for Patient level of isolation including gloves, eyewear, surgical mask. Patient was not masked.

## 2021-12-14 NOTE — PROGRESS NOTES
[facility] [place] Hospitalist Progress Note     Shelbi Mccauley Patient Status:  Inpatient    1955 MRN [id number]   [facility] 2NE-A Attending Cheryl Bradley MD   [facility] Day # 39 PCP Thierry Ansari DO     Chief Complaint: Patient p 25  --  31  --   --    ALT  --  52 43  --  37  --   --    BILT  --  0.7 0.6  --  0.6  --   --    TP  --  6.5 6.3*  --  5.9*  --   --     < > = values in this interval not displayed. Estimated Creatinine Clearance: 39.8 mL/min (A) (based on SCr of 2. 1 Assessment & Plan:      Ezekiel Jiménez Is a 78year old male who presented with acute systolic CHF exacerbation     Problem List / Diagnoses  Cardiopulmonary Arrest   Acute Respiratory Failure with Hypoxia   Acute HFrEF Exacerbation   Afib/AFL with nephrology    Anemia  - likely 2/2 chronic conditions and CKD  - 1u prbc 12/13 for hgb 6.9 --> 7.5       Afib/AFL with RVR    - heart rate improved   - oral amiodarone BID started on verapamil by cards, stopped latter and now low dose bb  -Transitioned to MD Ghanshyam Bowen  [phone]  12/14/2021  2:49 PM

## 2021-12-14 NOTE — PROGRESS NOTES
Pulmonary Progress Note        NAME: Patel Marie - ROOM: 6164/6841-W - MRN: [id number] - Age: 78year old - : 1955        Last 24hrs: No events overnight. Stable SOB.  No cough    OBJECTIVE:   21  0500 21  0600 21  0622 21 Infusing Medication:  • norepinephrine Stopped (12/12/21 1200)       Lungs: clear to auscultation bilaterally  Heart: S1, S2 normal, no murmur, click, rub or gallop, regular rate and rhythm  Abdomen: soft, non-tender; bowel sounds normal; no masses,  no or

## 2021-12-14 NOTE — PROGRESS NOTES
[facility] Cardiology Progress Note - Fracisco Donahue Patient Status:  Inpatient    1955 MRN [id number]   [facility] 6NE-A Attending Eugenia Hernández MD   [facility] Day # 39 PCP Levy Sun DO     Subjective:  Castillo Mandujano form of age-related cataract, left eye     Type II or unspecified type diabetes mellitus with neurological manifestations, uncontrolled(250.62)     Hyponatremia     Anemia     Hyperglycemia     Cellulitis of foot     Debridement of open wound, 4th MT head 0600 : (!) 305 lb 3.2 oz (138.4 kg)  11/11/21 0555 : (!) 307 lb 1.6 oz (139.3 kg)  11/10/21 0757 : (!) 308 lb 12.8 oz (140.1 kg)  11/09/21 0515 : 289 lb 14.5 oz (131.5 kg)  11/09/21 0500 : 289 lb 14.4 oz (131.5 kg)  11/03/21 1731 : (!) 325 lb 13.4 oz (147. Recent Labs   Lab 12/09/21  1630 12/09/21  1630 12/10/21  0424 12/11/21  0507 12/12/21  0432 12/13/21  0438 12/14/21  0445   ALT 52  --  43  --  37  --   --    AST 34  --  25  --  31  --   --    ALB 3.0*   < > 2.7*  2.7* 2.9* 2.4*  2.4* 2.3* 2.5* Daily  ipratropium-albuterol (DUONEB) nebulizer solution 3 mL, 3 mL, Nebulization, Q6H WA  guaiFENesin (ROBITUSSIN) 100mg/5ml LIQUID 200 mg, 200 mg, Per NG Tube, Q4H While awake  Labetalol HCl (TRANDATE) injection 10 mg, 10 mg, Intravenous, Once  Albumin H HPI  GI: denies nausea, vomiting,   Genital/: no dysuria,   Musculoskeletal: no joint complaints upper or lower extremities  Neuro: no sensory or motor complaint  Psyche: no symptoms of depression or anxiety  Hematology: denies bruising or excessive blee

## 2021-12-14 NOTE — PLAN OF CARE
Pt A&Ox4, appropriate to conversation and following commands at first assessment this am. VSS. Afebrile. Received 1 unit PRBC for Hgb 6.9. Around 1230, pt had c/o feeling \"panicky\". Had HD from 1300 to 1700, 1L off.  Pt began moaning and yelling for help

## 2021-12-14 NOTE — CM/SW NOTE
Care Progression Note:  Active Acute Medical Issue:   Hypoglycemia , acute hypoxemic/hypercapnic resp failure, intubated 11/16 d/t cardiac arrest. Extubated 12/1,     Other Contributing Medical Factors/Dx:   DM, CAD, CKD, weak/deconditioned    Length of st

## 2021-12-14 NOTE — PROGRESS NOTES
[facility]    Nephrology Progress Note    Patel Marie Attending: Ruth Briceño MD       SUBJECTIVE:     Feels tired. Complains dialysis was too long but otherwise tolerated procedure. Still making some urine he states.     PHYSICAL EXAM: NEPRELIM 6.76 12/13/2021    NEPERCENT 93.1 12/13/2021    LYPERCENT 4.0 12/13/2021    MOPERCENT 2.2 12/13/2021    EOPERCENT 0.0 12/13/2021    BAPERCENT 0.0 12/13/2021    NE 6.76 12/13/2021    LYMABS 0.29 (L) 12/13/2021    MOABSO 0.16 12/13/2021    EOABSO 2,000 Units, Intravenous, PRN Dialysis  heparin sodium 1000 UNIT/ML injection 2,000 Units, 2,000 Units, Intravenous, PRN Dialysis  famotidine (PEPCID) tab 20 mg, 20 mg, Oral, Daily  Insulin Aspart Pen (NOVOLOG) 100 UNIT/ML flexpen 4-20 Units, 4-20 Units, S mg, 3 mg, Oral, Nightly  Vitamin D3 (Cholecalciferol) (VITAMIN D3) tab 1,000 Units, 1,000 Units, Oral, Daily        ASSESSMENT/PLAN:     77 yo M with CKD stage 3, albuminuria, DM c/b R forefoot amputation, HTN, HL, CHF presented with CHAVEZ.   Prolonged hospit

## 2021-12-15 ENCOUNTER — APPOINTMENT (OUTPATIENT)
Dept: GENERAL RADIOLOGY | Facility: HOSPITAL | Age: 66
DRG: 215 | End: 2021-12-15
Attending: INTERNAL MEDICINE
Payer: MEDICARE

## 2021-12-15 PROCEDURE — 74230 X-RAY XM SWLNG FUNCJ C+: CPT | Performed by: INTERNAL MEDICINE

## 2021-12-15 RX ORDER — LORAZEPAM 1 MG/1
0.5 TABLET ORAL EVERY 4 HOURS PRN
Status: DISCONTINUED | OUTPATIENT
Start: 2021-12-15 | End: 2021-12-15

## 2021-12-15 RX ORDER — LORAZEPAM 2 MG/ML
1 INJECTION INTRAMUSCULAR EVERY 6 HOURS PRN
Status: DISCONTINUED | OUTPATIENT
Start: 2021-12-15 | End: 2021-12-15

## 2021-12-15 RX ORDER — HYDROXYZINE HYDROCHLORIDE 25 MG/1
25 TABLET, FILM COATED ORAL 3 TIMES DAILY PRN
Status: DISCONTINUED | OUTPATIENT
Start: 2021-12-15 | End: 2021-12-28

## 2021-12-15 RX ORDER — LORAZEPAM 1 MG/1
1 TABLET ORAL EVERY 4 HOURS PRN
Status: DISCONTINUED | OUTPATIENT
Start: 2021-12-15 | End: 2021-12-15

## 2021-12-15 RX ORDER — LORAZEPAM 2 MG/ML
0.5 INJECTION INTRAMUSCULAR EVERY 6 HOURS PRN
Status: DISCONTINUED | OUTPATIENT
Start: 2021-12-15 | End: 2021-12-15

## 2021-12-15 NOTE — OCCUPATIONAL THERAPY NOTE
OCCUPATIONAL THERAPY TREATMENT NOTE - INPATIENT     Room Number: 3514/3519-X  Session: 3  Number of Visits to Meet Established Goals: 9    History: Patient is [id number] year old male admitted on 11/3/2021 with Presenting Problem: hypoglycemia. Pt diagnosed with increased fatigue was min assist   Sit to Stand: Dependent assistance with sit to stand lift equipment; pt completing about 15 % of work; attempted to complete transfer without lift equipment and pt declined despite max verbal encouragement     Chair trans 22.86    PLAN  OT Treatment Plan: Balance activities; Energy conservation/work simplification techniques;ADL training;IADL training;Visual perceptual training;Functional transfer training;UE strengthening/ROM; Endurance training;Cognitive reorientation;Patie

## 2021-12-15 NOTE — PROGRESS NOTES
[facility] Hospitalist Progress Note     [facility] Patient Status:  Inpatient    1955 MRN [id number]   [facility] 2NE-A Attending Janina Goltz, MD   [facility] Day # 43 PCP Louis Rivas DO     Chief Complaint: Patient p 30.0   < > 28.0 26.0 28.0   ALKPHO  --  50 44*  --  41*  --   --   --   --    AST  --  34 25  --  31  --   --   --   --    ALT  --  52 43  --  37  --   --   --   --    BILT  --  0.7 0.6  --  0.6  --   --   --   --    TP  --  6.5 6.3*  --  5.9*  --   --   - provider] bumetanide  2 mg Intravenous TID   • polyethylene glycol (PEG 3350)  17 g Oral Daily   • melatonin  3 mg Oral Nightly   • cholecalciferol  1,000 Units Oral Daily       Assessment & Plan:      Shelbi Mccauley Is a 78year old male who presented wi hold (was on hold for hypotension)- HD per renal  -s/p LHC with impella assisted stent ot LAD and RPL  -Continues to have an increased urine output   -Further HD per nephrology    Anemia  - likely 2/2 chronic conditions and CKD  - 1u prbc 12/13 for hgb 6. 9 seroquel  -mental status c improvement 12/13  -neurology signeed off , CTH done and reviewed-without any acute changes    Anxiety  - allergy to ativan, try atarax      Quality:  · DVT Prophylaxis: Eliquis  · CODE status: FULL    DISPO: ICU, ok to tx to nida

## 2021-12-15 NOTE — PHYSICAL THERAPY NOTE
PHYSICAL THERAPY TREATMENT NOTE - INPATIENT    Room Number: 3094/6917-C     Session: 6  Number of Visits to Meet Established Goals: 7     History related to current admission: Patient is a 78year old male admitted on 11/3/2021 from home for sob.   Pt diag REPAIR  PERONEUS LONGUS TENDON;  Surgeon: Felecia Hardwick DPM;  Location: [address] [place] [address]   • OTHER SURGICAL HISTORY  2009    amputation R forefoot   • PART REMV OTHR TARSAL/METATARSAL  11/9/09    Performed by Paras Alvarez at [address] [place] [address] supine>sit to EOB with Max A. Pt sat upright at EOB x20 minutes with SBA for 15 minutes, Min A for 5 minutes. Pt provided total assist to don left shoe and and RLE shoe and prosthetic. Pt encouraged to attempt to stand, but declined, \"No, no.  I can't.

## 2021-12-15 NOTE — PLAN OF CARE
Assumed care of pt at 1. A/O x4. More verbal/interactive. Denies c/o C/P SOB or discomfort. Lungs cl NC 2L, mouth breather desats to 87 on 1L during sleep. Monitor AF more controlled rate , beta blocker per orders.  Normal-tensive SBP  no pre
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
## 2021-12-15 NOTE — PROGRESS NOTES
[facility]    Nephrology Progress Note    Oscar Ruiz Attending: Baldo Ortiz MD       SUBJECTIVE:     HD held today. Denies shortness of breath. States he is making more urine. Per nursing, had 950 ml on straight cath over night.     P (L) 12/13/2021    RDW 23.5 12/13/2021    NEPRELIM 6.76 12/13/2021    NEPERCENT 93.1 12/13/2021    LYPERCENT 4.0 12/13/2021    MOPERCENT 2.2 12/13/2021    EOPERCENT 0.0 12/13/2021    BAPERCENT 0.0 12/13/2021    NE 6.76 12/13/2021    LYMABS 0.29 (L) 12/13/20 Units, 10 Units, Subcutaneous, TID CC and HS  QUEtiapine (SEROQUEL) tab 50 mg, 50 mg, Oral, Nightly  metoprolol Tartrate (LOPRESSOR) injection 5 mg, 5 mg, Intravenous, Q4H PRN  heparin sodium 1000 UNIT/ML injection 2,000 Units, 2,000 Units, Intravenous, OR enema 133 mL, 1 enema, Rectal, Once PRN  [Held by provider] bumetanide (BUMEX) 0.25 MG/ML injection 2 mg, 2 mg, Intravenous, TID  polyethylene glycol (PEG 3350) (MIRALAX) powder packet 17 g, 17 g, Oral, Daily  melatonin tab 3 mg, 3 mg, Oral, Nightly  Vitam

## 2021-12-15 NOTE — CM/SW NOTE
Difficulty noted in finding accepting [facility] facility with CKD 3. CM spoke with Dr Tapan Monroe (nephrology) who states patient made 1L of urine overnight, thus 24 hour urine test is in progress. Pending results of 24 hour urine, patient may not need dialysis.

## 2021-12-15 NOTE — DIETARY NOTE
[facility]    NUTRITION ASSESSMENT    Pt does not meet malnutrition criteria. NUTRITION INTERVENTION:   1. Enteral Nutrition -  Nepro 1.8 at 55 ml/hour x 24h.    o This will provide 2576 kcal, 141 grams protein, 964 ml total free water, and 100% of yesterday. Wt changes noted. 11/25 - Pt tolerating TF at goal rate. S/p HD last PM, 3L out. Wt down as expected. Loose stools noted - 250ml out yesterday via rectal tube. Labs noted .  Continue current TF.   11/24 - Pt remains intubated, sedated on fentanyl Wt Readings from Last 10 Encounters:  12/15/21 : 126.8 kg (279 lb 8.7 oz)  10/12/21 : (!) 149.7 kg (330 lb)  05/14/21 : 121.1 kg (267 lb)  11/18/19 : (!) 146.1 kg (322 lb)  01/17/19 : 124.3 kg (274 lb)  12/19/18 : 129.7 kg (286 lb)  09/18/18 : 135.6

## 2021-12-15 NOTE — PLAN OF CARE
Assumed care of pt @ 0730. A/Ox 4. Anxious at times requiring reassurance. QUIÑONEZ. Follows commands. N/C. A fib per tele. HD cancelled per Renal. 24 urine clearance needs to be started. Diminished urine output. Tube feeds. NG tube. Tynelol given per for pain.

## 2021-12-15 NOTE — PROGRESS NOTES
[address]  [facility] Cardiology Progress Note - Fracisco Donahue Patient Status:  Inpatient    1955 MRN [id number]   [facility] 6NE-A Attending Eugenia Hernández MD   [facility] Day # 43 PCP Levy Sun DO     Subjective:  Com manifestations, uncontrolled(250.62)     Hyponatremia     Anemia     Hyperglycemia     Cellulitis of foot     Debridement of open wound, 4th MT head resection, wound VAC application left foot- Sx 12/21/17  GLENN 03/21/18     Osteomyelitis ([address]     Celluliti 1.6 oz (139.3 kg)  11/10/21 0757 : (!) 308 lb 12.8 oz (140.1 kg)  11/09/21 0515 : 289 lb 14.5 oz (131.5 kg)  11/09/21 0500 : 289 lb 14.4 oz (131.5 kg)  11/03/21 1731 : (!) 325 lb 13.4 oz (147.8 kg)  11/03/21 1238 : 289 lb (131.1 kg)  10/12/21 0921 : (!) 33 --  43  --   --  37  --   --   --    AST 34  --  25  --   --  31  --   --   --    ALB 3.0*   < > 2.7*  2.7*   < > 2.9* 2.4*  2.4* 2.3* 2.5* 2.4*    < > = values in this interval not displayed. No results for input(s): TROP in the last 168 hours.     A mg, 2.5 mg, Per NG Tube, BID  atorvastatin (LIPITOR) tab 80 mg, 80 mg, Per G Tube, Nightly  clopidogrel (PLAVIX) tab 75 mg, 75 mg, Oral, Daily  ipratropium-albuterol (DUONEB) nebulizer solution 3 mL, 3 mL, Nebulization, Q6H WA  guaiFENesin (ROBITUSSIN) 100 nasal congestion, sinus pain; hearing loss negative  Respiratory: denies shortness of breath, wheezing or cough   Cardiovascular:  See HPI  GI: denies nausea, vomiting,   Genital/: no dysuria,   Musculoskeletal: no joint complaints upper or lower extremi

## 2021-12-15 NOTE — VIDEO SWALLOW STUDY NOTE
ADULT VIDEOFLUOROSCOPIC SWALLOWING STUDY    Admission Date: 11/3/2021  Evaluation Date: 12/15/21  Radiologist: Dr. Rosalinda Moreno: Darrius  Diet Recommendations - Liquids: Nectar thick liquids/ Mildly thick    Further other etiologies not entirely excluded. Kandee Patella correlation recommended.  Interval development of small bilateral pleural effusion.                 Dictated by (CST): Carlitos Mayorga MD on 12/12/2021 at 8:39 AM       Finalized by (CST): TRISTA Ortiz Presentation: Cup (presented by SLP)  Oral Phase of Swallow (VFSS - Nectar Thick Liquids):  Within Functional Limits  Triggered at: Base of tongue  Residue Severity, Location: Mild;Valleculae (reduced residue from puree and solids)  Laryngeal Penetration: N function will improve. GOALS  Goal #1 The patient will tolerate puree consistency and mildly thick liquids without overt signs or symptoms of aspiration with 100 % accuracy over 2-3 session(s).   In Progress   Goal #2 The patient/family/caregiv

## 2021-12-15 NOTE — PROGRESS NOTES
[facility] Pulmonary, Critical Care and Sleep    Roro Hudson Patient Status:  Inpatient    1955 MRN [id number]   [facility] 6NE-A Attending Tati Chaudhary MD   [facility] Day # 43 PCP Edna White DO       Date of Admission: 11/3/202 **OR** glucose **OR** glucose-vitamin C, acetaminophen **OR** acetaminophen **OR** acetaminophen **OR** [DISCONTINUED] acetaminophen, polyethylene glycol (PEG 3350), senna, bisacodyl, Fleet Enema       OBJECTIVE:  /65 (BP Location: Left arm)   Pulse < >  --  108  108   < > 105  105   < > 105 99 97*   CO2   < >  --  30.0  30.0   < > 30.0  30.0   < > 28.0 26.0 28.0   AST  --  34 25  --  31  --   --   --   --    ALT  --  52 43  --  37  --   --   --   --    ALB   < > 3.0* 2.7*  2.7*   < > 2.4*  2.4*   < Qdaily   - OP work up  6. DM  - levemir and novolog  7. CV - cardiac arrest - 11/16. Due to ventricular arrhythmia following poss resp arrest in assn with sedation. S/p cardiac cath/stent. Impella removed 11/18.  Also with atrial flutter s/p PRINCESS/DCCV  - car

## 2021-12-16 RX ORDER — IPRATROPIUM BROMIDE AND ALBUTEROL SULFATE 2.5; .5 MG/3ML; MG/3ML
3 SOLUTION RESPIRATORY (INHALATION)
Status: DISCONTINUED | OUTPATIENT
Start: 2021-12-16 | End: 2021-12-17

## 2021-12-16 NOTE — PROGRESS NOTES
Pulmonary Progress Note        NAME: [facility] Room - ROOM: 5184/9308-N - MRN: [id number] - Age: 78year old - : 1955        Last 24hrs: No events overnight, transferred to the floor    OBJECTIVE:   12/15/21  2200 12/16/21  0000 21  0200  Nightly   • cholecalciferol  1,000 Units Oral Daily     Continuous Infusing Medication:  • norepinephrine Stopped (12/12/21 1200)       Lungs: clear to auscultation bilaterally  Heart: S1, S2 normal, no murmur, click, rub or gallop, regular rate and rhythm code    [facility]  Pulmonary and Critical Care

## 2021-12-16 NOTE — SLP NOTE
SPEECH DAILY NOTE - INPATIENT    ASSESSMENT & PLAN   ASSESSMENT  Pt seen for dysphagia tx to assess tolerance with recommended diet, ensure proper utilization of aspiration precautions and provide pt/family education. Patient alert and up in bed.   He is l patient/family/caregiver will demonstrate understanding and implementation of aspiration precautions and swallow strategies independently over 2-3 session(s).      In Progress   Goal #3 The patient will tolerate trial upgrade of soft/solid consistency and t

## 2021-12-16 NOTE — PROGRESS NOTES
[facility]    Nephrology Progress Note    Kimmy Lozano Attending:  Duane Lathe, MD       SUBJECTIVE:     Breathing ok. No urinary complaints. Denies needing straight cath recently. 24 hour urine collection ongoing.     PHYSICAL EXAM:     Vit 12/16/2021    NEPRELIM 6.76 12/13/2021    NEPERCENT 93.1 12/13/2021    LYPERCENT 4.0 12/13/2021    MOPERCENT 2.2 12/13/2021    EOPERCENT 0.0 12/13/2021    BAPERCENT 0.0 12/13/2021    NE 6.76 12/13/2021    LYMABS 0.29 (L) 12/13/2021    MOABSO 0.16 12/13/202 [address]  norepinephrine (LEVOPHED) 4 mg/250 ml premix infusion, 0.5-30 mcg/min, Intravenous, Continuous  Insulin Aspart Pen (NOVOLOG) 100 UNIT/ML flexpen 10 Units, 10 Units, Subcutaneous, TID CC and HS  QUEtiapine (SEROQUEL) tab 50 mg, 50 mg, Oral, Nightly  me suppository 10 mg, 10 mg, Rectal, Daily PRN  Fleet Enema (FLEET) 7-19 GM/118ML enema 133 mL, 1 enema, Rectal, Once PRN  [Held by provider] bumetanide (BUMEX) 0.25 MG/ML injection 2 mg, 2 mg, Intravenous, TID  polyethylene glycol (PEG 3350) (MIRALAX) powder

## 2021-12-16 NOTE — PROGRESS NOTES
[facility] Cardiology Progress Note - Cristaljer Kwokjeanna Patient Status:  Inpatient    1955 MRN [id number]   [facility] 2NE-A Attending Dangelo Ness MD   [facility] Day # 37 PCP Tipmamie Arshad DO     Subjective:  Roxane Curling Debridement of open wound, 4th MT head resection, wound VAC application left foot- Sx 12/21/17  GLENN 03/21/18     Osteomyelitis ([facility] [address]     Cellulitis     Long term (current) use of insulin (HCC)     Hypoglycemia     Hypoxia     Acute congestive heart failure kg)  11/09/21 0515 : 289 lb 14.5 oz (131.5 kg)  11/09/21 0500 : 289 lb 14.4 oz (131.5 kg)  11/03/21 1731 : (!) 325 lb 13.4 oz (147.8 kg)  11/03/21 1238 : 289 lb (131.1 kg)  10/12/21 0921 : (!) 330 lb (149.7 kg)  05/14/21 0813 : 267 lb (121.1 kg)      Tele: --   --    AST 34  --  25  --  31  --   --   --   --    ALB 3.0*   < > 2.7*  2.7*   < > 2.4*  2.4* 2.3* 2.5* 2.4* 2.5*    < > = values in this interval not displayed. No results for input(s): TROP in the last 168 hours.     Allergies:     Atrodrigo Malave tab 80 mg, 80 mg, Per G Tube, Nightly  clopidogrel (PLAVIX) tab 75 mg, 75 mg, Oral, Daily  ipratropium-albuterol (DUONEB) nebulizer solution 3 mL, 3 mL, Nebulization, Q6H WA  guaiFENesin (ROBITUSSIN) 100mg/5ml LIQUID 200 mg, 200 mg, Per NG Tube, Q4H While negative  Respiratory: denies shortness of breath, wheezing or cough   Cardiovascular:  See HPI  GI: denies nausea, vomiting,   Genital/: no dysuria,   Musculoskeletal: no joint complaints upper or lower extremities  Neuro: no sensory or motor complaint

## 2021-12-16 NOTE — CM/SW NOTE
[facility] referrals pending in [place] w/o onsite HD. Anticipate pt is not going to require HD at hospital discharge.  &  to remain available and supportive for discharge planning needs.     Rudy Tapia MSW, LSW   / Evelia Tovar

## 2021-12-16 NOTE — PROGRESS NOTES
[facility] Hospitalist Progress Note     Oscar Ruiz Patient Status:  Inpatient    1955 MRN [id number]   [facility] 2NE-A Attending Neto Antonio MD   [facility] Day # 37 PCP Benny Smith DO     Chief Complaint: Patient p --  30.0  30.0   < > 30.0  30.0   < > 26.0 28.0 26.0   ALKPHO 50  --  44*  --  41*  --   --   --   --    AST 34  --  25  --  31  --   --   --   --    ALT 52  --  43  --  37  --   --   --   --    BILT 0.7  --  0.6  --  0.6  --   --   --   --    TP 6.5  -- fenofibrate micronized  67 mg Oral Nightly   • [Held by provider] bumetanide  2 mg Intravenous TID   • polyethylene glycol (PEG 3350)  17 g Oral Daily   • melatonin  3 mg Oral Nightly   • cholecalciferol  1,000 Units Oral Daily       Assessment & Plan: Cardiomyopathy   -resolved and close to being euvolemic   - Cardiology consulted, appreciate  -  Diuresis currently on hold (was on hold for hypotension)- HD per renal-HD being held for now since UOP picking up  -s/p C with impella assisted stent ot LAD nutiricrystal     HTN   -off verapmil, on bb    Positive fecal occult  -Hemoglobin stable no further episodes hold off on GI consult  -Watch on Eliquis    Severe Obesity BMI > 35  - op follow up pending resolution of acute issues    Agitation-ICU delirium ence

## 2021-12-17 RX ORDER — IPRATROPIUM BROMIDE AND ALBUTEROL SULFATE 2.5; .5 MG/3ML; MG/3ML
3 SOLUTION RESPIRATORY (INHALATION) EVERY 4 HOURS PRN
Status: DISCONTINUED | OUTPATIENT
Start: 2021-12-17 | End: 2021-12-28

## 2021-12-17 RX ORDER — ATORVASTATIN CALCIUM 80 MG/1
80 TABLET, FILM COATED ORAL NIGHTLY
Status: DISCONTINUED | OUTPATIENT
Start: 2021-12-17 | End: 2021-12-22

## 2021-12-17 RX ORDER — AMIODARONE HYDROCHLORIDE 200 MG/1
200 TABLET ORAL 2 TIMES DAILY
Status: DISCONTINUED | OUTPATIENT
Start: 2021-12-17 | End: 2021-12-20

## 2021-12-17 NOTE — CONSULTS
[facility]  Inpatient Wound Care Contact Note    Maru Barragan Patient Status:  Inpatient    1955 MRN [id number]   [facility] 2NE-A Attending Jan Escobar MD   [facility] Day # 40 PCP Thelma Tapia DO     Attempted to see patient

## 2021-12-17 NOTE — PLAN OF CARE
Assumed pt care at 0730. A&Ox4, intermittent confusion. Received on 2L--> weaned to RA, denies chest pain/SOB, lung sounds clear, VSS, Afib on tele. Voids, decreased u/o, 24 hr urine collected, incontinent of bowel. Up with sit to stand.  Sacral wounds cove rate control medications as ordered  - Initiate emergency measures for life threatening arrhythmias  - Monitor electrolytes and administer replacement therapy as ordered  Outcome: Progressing     Problem: RESPIRATORY - ADULT  Goal: Achieves optimal ventila patient's stated pain goal  Description: INTERVENTIONS:  - Encourage pt to monitor pain and request assistance  - Assess pain using appropriate pain scale  - Administer analgesics based on type and severity of pain and evaluate response  - Implement non-ph

## 2021-12-17 NOTE — PROGRESS NOTES
[facility] Cardiology Progress Note - Rona Stratton Patient Status:  Inpatient    1955 MRN [id number]   [facility] 2NE-A Attending Rosalva Osler, MD   [facility] Day # 40 PCP Lev Workman DO     Subjective:  Marbin Duran type diabetes mellitus with neurological manifestations, uncontrolled(250.62)     Hyponatremia     Anemia     Hyperglycemia     Cellulitis of foot     Debridement of open wound, 4th MT head resection, wound VAC application left foot- Sx 12/21/17  GLENN 03/21 kg)  11/12/21 0600 : (!) 305 lb 3.2 oz (138.4 kg)  11/11/21 0555 : (!) 307 lb 1.6 oz (139.3 kg)  11/10/21 0757 : (!) 308 lb 12.8 oz (140.1 kg)  11/09/21 0515 : 289 lb 14.5 oz (131.5 kg)  11/09/21 0500 : 289 lb 14.4 oz (131.5 kg)  11/03/21 1731 : (!) 325 lb ALT 37  --   --   --   --    AST 31  --   --   --   --    ALB 2.4*  2.4* 2.3* 2.5* 2.4* 2.5*       No results for input(s): TROP in the last 168 hours.     Allergies:     Ativan [Lorazepam]      CARDIAC ARREST    Comment:Had asystolic cardiac arrest short Daily  Insulin Aspart Pen (NOVOLOG) 100 UNIT/ML flexpen 4-20 Units, 4-20 Units, Subcutaneous, TID CC and HS  clopidogrel (PLAVIX) tab 75 mg, 75 mg, Oral, Daily  Labetalol HCl (TRANDATE) injection 10 mg, 10 mg, Intravenous, Once  Albumin Human (ALBUMINAR) 2 joint complaints upper or lower extremities  Neuro: no sensory or motor complaint  Psyche: no symptoms of depression or anxiety  Hematology: denies bruising or excessive bleeding  Endocrine: no history of diabetes  Allergy: see above drug allergies    Jamilah Smith

## 2021-12-17 NOTE — PHYSICAL THERAPY NOTE
Attempted to see pt for PT earlier, PCT getting ready to wash up and transfer pt to the chair. Will Re try if time permits otherwise, pt will be seen at a later date.

## 2021-12-17 NOTE — CM/SW NOTE
MSW had a detailed conversation by phone with the patient's wife Jenna Mathews about [facility]. Per Rn report the is morning, the patient is no longer needing HD. MSW expanded the referral search in [place] to include SARs that do not have onsite HD.   Awaiting response

## 2021-12-17 NOTE — PROGRESS NOTES
[facility]    Nephrology Progress Note    Donaldo Pena Attending:  Jad Romero MD       SUBJECTIVE:     No complaints, feels better    PHYSICAL EXAM:     Vital Signs: /53 (BP Location: Left arm)   Pulse 94   Temp 97.6 °F (36.4 °C) (Axil 4.0 12/13/2021    MOPERCENT 2.2 12/13/2021    EOPERCENT 0.0 12/13/2021    BAPERCENT 0.0 12/13/2021    NE 6.76 12/13/2021    LYMABS 0.29 (L) 12/13/2021    MOABSO 0.16 12/13/2021    EOABSO 0.00 12/13/2021    BAABSO 0.00 12/13/2021     Lab Results   Component flexpen 10 Units, 10 Units, Subcutaneous, TID CC and HS  QUEtiapine (SEROQUEL) tab 50 mg, 50 mg, Oral, Nightly  metoprolol Tartrate (LOPRESSOR) injection 5 mg, 5 mg, Intravenous, Q4H PRN  heparin sodium 1000 UNIT/ML injection 2,000 Units, 2,000 Units, Intr (BUMEX) 0.25 MG/ML injection 2 mg, 2 mg, Intravenous, TID  polyethylene glycol (PEG 3350) (MIRALAX) powder packet 17 g, 17 g, Oral, Daily  melatonin tab 3 mg, 3 mg, Oral, Nightly  Vitamin D3 (Cholecalciferol) (VITAMIN D3) tab 1,000 Units, 1,000 Units, Oral

## 2021-12-17 NOTE — PROGRESS NOTES
[facility] Hospitalist Progress Note     Mayela Horta Patient Status:  Inpatient    1955 MRN [id number]   [facility] 2[facility]A Attending Sehllie Corral MD   [facility] Day # 40 PCP Lucy Navarro DO     Chief Complaint: Patient p 2.31 mg/dL (H)).     Recent Labs   Lab 12/12/21  0432 12/13/21  0527   PTP 20.0* 19.4*   INR 1.66* 1.60*            COVID-19 Lab Results    COVID-19  Lab Results   Component Value Date    COVID19 Not Detected 11/11/2021    COVID19 Not Detected 11/07/2021 Possible Gram Negative Pneumonia  CKD3  DM2  HTN  Severe Obesity BMI > 35    Plan  S/p Cardiopulmonary Arrest  Cardiogenic Shock -improved, off pressors  - Cardiac arrest 11/15/21 s/p ROSC following DCCV  - likely multifactorial due to strain from [employer] the 24-hour urine collection      Anemia  - likely 2/2 chronic conditions and CKD  - 1u prbc 12/13 for hgb 6.9 --> 7.5   -hgb stable at 8.0      Afib/AFL with RVR    - heart rate improved   - oral amiodarone BID started on verapamil by cards, stopped latte seroquel  -mental status c improvement 12/13  -neurology signeed off , CTH done and reviewed-without any acute changes    Anxiety  - allergy to ativan, try atarax      Quality:  · DVT Prophylaxis: Eliquis  · CODE status: FULL    DISPO: ICU, ok to tx to nida

## 2021-12-18 NOTE — PROGRESS NOTES
Pulmonary Progress Note     Assessment / Plan:  1.  Acute hypoxemic / hypercapnic respiratory failure - intubated 11/16 due to cardiac arrest. Had pulmonary edema and possible underlying ILD vs ARDS  - extubated 12/1; O2 needs slowly improving  - maria g thompson clear bilaterally, normal respiratory effort, no accessory muscle use  Extremities: no edema or cyanosis  Psych: interactive, answering questions appropriately, appropriate affect    Medications:  Reviewed in EMR    Lab Data:  Reviewed in EMR    Imaging:

## 2021-12-18 NOTE — PROGRESS NOTES
[facility] Hospitalist Progress Note     Delano Mcelroy Patient Status:  Inpatient    1955 MRN [id number]   [facility] 2NE-A Attending Dana Truong MD   [facility] Day # 39 PCP Lorna Arguelles DO     Chief Complaint: Patient p --   --   --   --    AST 31  --   --   --   --    ALT 37  --   --   --   --    BILT 0.6  --   --   --   --    TP 5.9*  --   --   --   --     < > = values in this interval not displayed.        Estimated Creatinine Clearance: 41.2 mL/min (A) (based on SCr o exacerbation     Problem List / Diagnoses  Cardiopulmonary Arrest   Acute Respiratory Failure with Hypoxia   Acute HFrEF Exacerbation   Afib/AFL with RVR   Possible Gram Negative Pneumonia  CKD3  DM2  HTN  Severe Obesity BMI > 35    Plan  S/p Cardiopulmona RPL  -Continues to have an increased urine output   -No further HD as of right now, remove PermCath tomorrow      Anemia  - likely 2/2 chronic conditions and CKD  - 1u prbc 12/13 for hgb 6.9 --> 7.5   -hgb stable at 8.0      Afib/AFL with RVR    - oral ami FULL    DISPO: On the floor remove PermCath awaiting nutrition and speech eval for advancing diet  -D/w RN.  [facility] hospitalist to resume care in AM, will discuss plan with them      Slava Bryant MD   [facility]  [phone]  12/18/202

## 2021-12-18 NOTE — PROGRESS NOTES
[facility] Cardiology Progress Note - Misael Daniels Patient Status:  Inpatient    1955 MRN [id number]   [facility] 2NE-A Attending Yesy Harris MD   [facility] Day # 39 PCP Amaya Urias DO     Subjective:  [facility] age-related cataract, left eye     Type II or unspecified type diabetes mellitus with neurological manifestations, uncontrolled(250.62)     Hyponatremia     Anemia     Hyperglycemia     Cellulitis of foot     Debridement of open wound, 4th MT head resectio 290 lb (131.5 kg)  11/13/21 0641 : (!) 304 lb 10.8 oz (138.2 kg)  11/12/21 0600 : (!) 305 lb 3.2 oz (138.4 kg)  11/11/21 0555 : (!) 307 lb 1.6 oz (139.3 kg)  11/10/21 0757 : (!) 308 lb 12.8 oz (140.1 kg)  11/09/21 0515 : 289 lb 14.5 oz (131.5 kg)  11/09/21 12/12/21  0432 12/13/21  0438 12/14/21  0445 12/15/21  0522 12/16/21  0452   ALT 37  --   --   --   --    AST 31  --   --   --   --    ALB 2.4*  2.4* 2.3* 2.5* 2.4* 2.5*       No results for input(s): TROP in the last 168 hours.     Allergies:     Ativan [L (PEPCID) tab 20 mg, 20 mg, Oral, Daily  Insulin Aspart Pen (NOVOLOG) 100 UNIT/ML flexpen 4-20 Units, 4-20 Units, Subcutaneous, TID CC and HS  clopidogrel (PLAVIX) tab 75 mg, 75 mg, Oral, Daily  Labetalol HCl (TRANDATE) injection 10 mg, 10 mg, Intravenous, no dysuria,   Musculoskeletal: no joint complaints upper or lower extremities  Neuro: no sensory or motor complaint  Psyche: no symptoms of depression or anxiety  Hematology: denies bruising or excessive bleeding  Endocrine: no history of diabetes  Allergy

## 2021-12-18 NOTE — PLAN OF CARE
Assumed care around 0730. A/Ox4 w/ intermittent confusion. Was on RA, but started dipping below 90, so now on 2L with sats>90. Monitor shows a-fib. Incontinent of bowel, voids in urinal. Last BM was 12/17.  Reports back and bottom pain, but declines medicat threatening arrhythmias  - Monitor electrolytes and administer replacement therapy as ordered  Outcome: Progressing     Problem: RESPIRATORY - ADULT  Goal: Achieves optimal ventilation and oxygenation  Description: INTERVENTIONS:  - Assess for changes in r pain and request assistance  - Assess pain using appropriate pain scale  - Administer analgesics based on type and severity of pain and evaluate response  - Implement non-pharmacological measures as appropriate and evaluate response  - Consider cultural an

## 2021-12-18 NOTE — PROGRESS NOTES
[facility]    Nephrology Progress Note    Brittney Landon Attending:  Gabbie Fischer MD       SUBJECTIVE:     Complaints he is tired but denies urinary complaints, shortness of breath or leg swelling.     PHYSICAL EXAM:     Vital Signs: /79 (B 93.1 12/13/2021    LYPERCENT 4.0 12/13/2021    MOPERCENT 2.2 12/13/2021    EOPERCENT 0.0 12/13/2021    BAPERCENT 0.0 12/13/2021    NE 6.76 12/13/2021    LYMABS 0.29 (L) 12/13/2021    MOABSO 0.16 12/13/2021    EOABSO 0.00 12/13/2021    BAABSO 0.00 12/13/202 suspension 10 mL, 10 mL, Oral, TID AC and HS  [Held by provider] verapamil (CALAN) tab 80 mg, 80 mg, Per NG Tube, Q8H [address]  norepinephrine (LEVOPHED) 4 mg/250 ml premix infusion, 0.5-30 mcg/min, Intravenous, Continuous  QUEtiapine (SEROQUEL) tab 50 mg, 50 m packet 17 g, 17 g, Oral, Daily  melatonin tab 3 mg, 3 mg, Oral, Nightly  Vitamin D3 (Cholecalciferol) (VITAMIN D3) tab 1,000 Units, 1,000 Units, Oral, Daily        ASSESSMENT/PLAN:     79 yo M with CKD stage 3, albuminuria, DM c/b R forefoot amputation, HT

## 2021-12-18 NOTE — PROGRESS NOTES
Pulmonary Progress Note        NAME: Janie Hanson - ROOM: 1358/1721-E - MRN: [id number] - Age: 78year old - : 1955        Last 24hrs: No events overnight, feels tired this afternoon, denies other complaints    OBJECTIVE:   21 norepinephrine Stopped (12/12/21 1200)       Lungs: clear to auscultation bilaterally  Heart: S1, S2 normal, no murmur, click, rub or gallop, regular rate and rhythm  Abdomen: soft, non-tender; bowel sounds normal; no masses,  no organomegaly  Extremities:

## 2021-12-18 NOTE — PHYSICAL THERAPY NOTE
PHYSICAL THERAPY TREATMENT NOTE - INPATIENT    Room Number: 3920/6618-L     Session: 7  Number of Visits to Meet Established Goals: 7     History related to current admission: Patient is a 78year old male admitted on 11/3/2021 from home for sob.   Pt diag REPAIR  PERONEUS LONGUS TENDON;  Surgeon: Mika Fernandez DPM;  Location: [address] [place] [address]   • OTHER SURGICAL HISTORY  2009    amputation R forefoot   • PART REMV OTHR TARSAL/METATARSAL  11/9/09    Performed by Courtney French at [address] [place] [address] Drive include mask, gloves, and goggles. Pt without mask donned during session. Tube feeding placed on hold, per RN. Max encouragement provided to pt to participate in PT today. Pt with reports of increased fatigue.  Pt sleepy and with minimal participation today rehabilitation     PLAN  PT Treatment Plan: Bed mobility; Energy conservation; Endurance; Patient education; Family education;Range of motion;Strengthening;Balance training;Transfer training  Rehab Potential : Fair  Frequency (Obs): 3-5x/week    CURRENT GOALS

## 2021-12-18 NOTE — PLAN OF CARE
Received patient drowsy, awakened when his name was called. On room air, breath sounds diminished with occasional productive cough with clear phlegm. On tele, A fib. Declined medication for pain.  NG with Nepro infusing at goal of 55 ml/h with 150 ml water

## 2021-12-19 RX ORDER — PREDNISONE 20 MG/1
20 TABLET ORAL
Status: DISCONTINUED | OUTPATIENT
Start: 2021-12-20 | End: 2021-12-28

## 2021-12-19 NOTE — PLAN OF CARE
Assumed care of pt at 1. A/ox4, rm air during day, 2L NC at night. afib w/ rates in 80s-90s on tele. No reports of pain at this time. Voice hoarse, mucous membranes dry. Pt reports non-productive cough.  Non-pitting edema present in BLE and feet, scrotum vital signs, obtain 12 lead EKG if indicated  - Evaluate effectiveness of antiarrhythmic and heart rate control medications as ordered  - Initiate emergency measures for life threatening arrhythmias  - Monitor electrolytes and administer replacement therap appropriate  Outcome: Progressing     Problem: PAIN - ADULT  Goal: Verbalizes/displays adequate comfort level or patient's stated pain goal  Description: INTERVENTIONS:  - Encourage pt to monitor pain and request assistance  - Assess pain using appropriate

## 2021-12-19 NOTE — PROGRESS NOTES
Pulmonary Progress Note        NAME: Nelson Bass - ROOM: 5918/6393-J - MRN: [id number] - Age: 78year old - : 1955        Last 24hrs: No events overnight, on O2 this morning, not sure why, satting 100% on 2L    OBJECTIVE:   21 Continuous Infusing Medication:  • norepinephrine Stopped (12/12/21 1200)       Lungs: clear to auscultation bilaterally  Heart: S1, S2 normal, no murmur, click, rub or gallop, regular rate and rhythm  Abdomen: soft, non-tender; bowel sounds normal; no

## 2021-12-19 NOTE — PROGRESS NOTES
[facility]    Nephrology Progress Note    Jolene Lundberg Attending:  Fco Barraza MD       SUBJECTIVE:     Endorses urinating well. No leg swelling. Breathing ok. Still with dobhoff.     PHYSICAL EXAM:     Vital Signs: /71 (BP Location: LYPERCENT 4.0 12/13/2021    MOPERCENT 2.2 12/13/2021    EOPERCENT 0.0 12/13/2021    BAPERCENT 0.0 12/13/2021    NE 6.76 12/13/2021    LYMABS 0.29 (L) 12/13/2021    MOABSO 0.16 12/13/2021    EOABSO 0.00 12/13/2021    BAABSO 0.00 12/13/2021     Lab Results mL, Oral, TID AC and HS  [Held by provider] verapamil (CALAN) tab 80 mg, 80 mg, Per NG Tube, Q8H [address]  norepinephrine (LEVOPHED) 4 mg/250 ml premix infusion, 0.5-30 mcg/min, Intravenous, Continuous  QUEtiapine (SEROQUEL) tab 50 mg, 50 mg, Oral, Nightly  met Oral, Daily  melatonin tab 3 mg, 3 mg, Oral, Nightly  Vitamin D3 (Cholecalciferol) (VITAMIN D3) tab 1,000 Units, 1,000 Units, Oral, Daily        ASSESSMENT/PLAN:       77 yo M with CKD stage 3, albuminuria, DM c/b R forefoot amputation, HTN, HL, CHF presen

## 2021-12-19 NOTE — SLP NOTE
Per RN notes, patient upset about only being able to drink thinkened liquids and not regular fluids. Had recent VFSS 12/15 which recommended modified diet of puree and nectar-thick liquids. Orders received for re-evaluation.  Attempted to re-evaluate patien

## 2021-12-19 NOTE — PROGRESS NOTES
[facility] Cardiology Progress Note - Charm Severs Patient Status:  Inpatient    1955 MRN [id number]   [facility] 2NE-A Attending Neeta Moura MD   [address] Day # 55 PCP Santiago Lyle DO     Subjective:  Clinic Hyponatremia     Anemia     Hyperglycemia     Cellulitis of foot     Debridement of open wound, 4th MT head resection, wound VAC application left foot- Sx 12/21/17  GLENN 03/21/18     Osteomyelitis ([address]     Cellulitis     Long term (current) use of insulin 1.6 oz (139.3 kg)  11/10/21 0757 : (!) 308 lb 12.8 oz (140.1 kg)  11/09/21 0515 : 289 lb 14.5 oz (131.5 kg)  11/09/21 0500 : 289 lb 14.4 oz (131.5 kg)  11/03/21 1731 : (!) 325 lb 13.4 oz (147.8 kg)  11/03/21 1238 : 289 lb (131.1 kg)  10/12/21 0921 : (!) 33 cardiac arrest shortly after ativan.              Unclear if this was sole cause but was temporaily             related    Medications:  ipratropium-albuterol (DUONEB) nebulizer solution 3 mL, 3 mL, Nebulization, Q4H PRN  amiodarone (PACERONE) tab 200 mg, 2 Intravenous, PRN  glucose (DEX4) oral liquid 15 g, 15 g, Oral, Q15 Min PRN   Or  glucose-vitamin C (DEX-4) chewable tab 4 tablet, 4 tablet, Oral, Q15 Min PRN   Or  dextrose 50 % injection 50 mL, 50 mL, Intravenous, Q15 Min PRN   Or  glucose (DEX4) oral liq

## 2021-12-19 NOTE — PROGRESS NOTES
[facility] Hospitalist Progress Note     Cheral Room Patient Status:  Inpatient    1955 MRN [id number]   [facility] 2NE-A Attending Jaylan Conklin MD   [facility] Day # 55 PCP Amaya Urias DO     Chief Complaint: Patient p Markers  Recent Labs   Lab 12/14/21  0445   ZHANG 485.7       Imaging: Imaging data reviewed in Epic.     Medications:   • [START ON 12/20/2021] predniSONE  20 mg Oral Daily with breakfast   • amiodarone  200 mg Oral BID   • apixaban  2.5 mg Oral BID   • ator to plavix, Eliquis, and cont for now -stable     Concern for sepsis   -resolve jazzy completed all abx    Acute Respiratory Failure with Hypoxia   -resolving and now onminimal O2, wean off as tolerated   - possibly multifactorial between below infectious & accuchecks,  decreasing steroids   -transitioned off insulin gtt   -Glucose much better improved on current insulin regimen  -mild hyperglycemoa noted on the evening     Dysphagia  - video swallow 12/15, Diet Recommendations - Solids: Puree Diet Recommenda

## 2021-12-19 NOTE — DIETARY NOTE
Nutrition short note    Received consult for nutritional needs prior to rehab. Pt on TF- Nepro with 2 packets of Prostat pureed, nectar thick diet. Noted elevated BUN  > 100, pt off HD .    RD will order calorie count and spoke with RN about keeping trac

## 2021-12-19 NOTE — PLAN OF CARE
Assumed patient care at 0700. AOx4. Delayed responses. Cooperative. Up with max. assist.  A. fib on cardiac monitor. Adequate saturation on RA. Lung sounds diminished. Denies SOB, chest discomfort, N/V. Denies pain. Voiding without difficulty.  LBM 1217/202 baseline  Description: INTERVENTIONS:  - Continuous cardiac monitoring, monitor vital signs, obtain 12 lead EKG if indicated  - Evaluate effectiveness of antiarrhythmic and heart rate control medications as ordered  - Initiate emergency measures for life t ordered  - Instruct patient on fluid and nutrition restrictions as appropriate  Outcome: Progressing     Problem: PAIN - ADULT  Goal: Verbalizes/displays adequate comfort level or patient's stated pain goal  Description: INTERVENTIONS:  - Encourage pt to m

## 2021-12-20 ENCOUNTER — APPOINTMENT (OUTPATIENT)
Dept: CV DIAGNOSTICS | Facility: HOSPITAL | Age: 66
DRG: 215 | End: 2021-12-20
Attending: INTERNAL MEDICINE
Payer: MEDICARE

## 2021-12-20 PROCEDURE — 93307 TTE W/O DOPPLER COMPLETE: CPT | Performed by: INTERNAL MEDICINE

## 2021-12-20 RX ORDER — AMIODARONE HYDROCHLORIDE 200 MG/1
200 TABLET ORAL DAILY
Status: DISCONTINUED | OUTPATIENT
Start: 2021-12-21 | End: 2021-12-28

## 2021-12-20 RX ORDER — DEXTROSE MONOHYDRATE 50 MG/ML
INJECTION, SOLUTION INTRAVENOUS CONTINUOUS
Status: DISCONTINUED | OUTPATIENT
Start: 2021-12-20 | End: 2021-12-22

## 2021-12-20 NOTE — CONSULTS
[facility]  Report of Inpatient Wound Care Consultation    Marudayami Barragan Patient Status:  Inpatient    1955 MRN [id number]   [facility] 2NE-A Attending Jan Escobar MD   [facility] Day # 52 PCP Thelma Tapia, DO     Reason for Con [place]ilango      reports that he has never smoked. He has never used smokeless tobacco. He reports that he does not drink alcohol and does not use drugs.       Allergies:  @ALLERGY    Laboratory Data:    Recent Labs   Lab 12/16/21  0452 12/16/21  0839 12/18 aware of this. Thank you for this consultation and for allowing me to participate in the care of your patient. Please page me at #[phone] if you have any questions about this consultation and plan of care. Time Spent 45 Minutes.     Thank you,  Primo Godinez

## 2021-12-20 NOTE — PROGRESS NOTES
[facility]    Nephrology Progress Note    Becki Hardy Attending:  Devin Rodríguez MD       SUBJECTIVE:     Non-oliguric. No urinary complaints or shortness of breath.     PHYSICAL EXAM:     Vital Signs: /77 (BP Location: Left arm)   Pulse 2.2 12/13/2021    EOPERCENT 0.0 12/13/2021    BAPERCENT 0.0 12/13/2021    NE 6.76 12/13/2021    LYMABS 0.29 (L) 12/13/2021    MOABSO 0.16 12/13/2021    EOABSO 0.00 12/13/2021    BAABSO 0.00 12/13/2021     Lab Results   Component Value Date    MALBP 3.71 11 MOUTHWASH) suspension 10 mL, 10 mL, Oral, TID AC and HS  [Held by provider] verapamil (CALAN) tab 80 mg, 80 mg, Per NG Tube, Q8H [address]  norepinephrine (LEVOPHED) 4 mg/250 ml premix infusion, 0.5-30 mcg/min, Intravenous, Continuous  QUEtiapine (SEROQUEL) tab Daily  melatonin tab 3 mg, 3 mg, Oral, Nightly  Vitamin D3 (Cholecalciferol) (VITAMIN D3) tab 1,000 Units, 1,000 Units, Oral, Daily        ASSESSMENT/PLAN:     77 yo M with CKD stage 3, albuminuria, DM c/b R forefoot amputation, HTN, HL, CHF presented with

## 2021-12-20 NOTE — PROGRESS NOTES
[address] Patient Status:  Inpatient    1955 MRN [id number]   [facility] 2NE-A Attending Maryjane Pineda MD   [facility] Day # 52 PCP Barry Brar DO     SUBJECTIVE: Pt states breathing is comfortable, wants to wo Continuous  •  QUEtiapine (SEROQUEL) tab 50 mg, 50 mg, Oral, Nightly  •  metoprolol Tartrate (LOPRESSOR) injection 5 mg, 5 mg, Intravenous, Q4H PRN  •  heparin sodium 1000 UNIT/ML injection 2,000 Units, 2,000 Units, Intravenous, PRN Dialysis  •  heparin so mg, 3 mg, Oral, Nightly  •  Vitamin D3 (Cholecalciferol) (VITAMIN D3) tab 1,000 Units, 1,000 Units, Oral, Daily      Physical Exam:                          General: alert, cooperative, in NAD                          HEENT: oropharynx clear without erythe then developed episode of hypotension so was briefly placed on stress dose steroids, -tapered to 30mg starting 12/17, will taper to 20 today  - OP work up  4. Nutrition   - TFs, speech following   5. Proph  - Eliquis  6.  Dispo  - will follow     Luis Enrique Chu

## 2021-12-20 NOTE — PROGRESS NOTES
[facility] Hospitalist Progress Note     Becki Hardy Patient Status:  Inpatient    1955 MRN [id number]   [facility] 2NE-A Attending Kevin Abdalla MD   [facility] Day # 52 PCP Rianna Andres DO     Chief Complaint: Patient p 11/05/2021       Pro-Calcitonin  No results for input(s): PCT in the last 168 hours. Cardiac  No results for input(s): TROP, PBNP in the last 168 hours. Creatinine Kinase  No results for input(s): CK in the last 168 hours.     Inflammatory Markers  Re DOAC after removal  -We will discuss with IR if it is safe to remove permacath by holding just the DOAC  -Continue monitor for now    S/p Cardiopulmonary Arrest  Cardiogenic Shock -improved, off pressors  - Cardiac arrest 11/15/21 s/p ROSC following DCCV improve and no need for dialysis okay to remove permacath however challenges as patient is on Plavix and DOAC      #Hypernatremia   --resolved-    -hyperglycemia secondary to diabetes type 2 and now hypoglycemia  -Glucose this morning at 34, stop all insul

## 2021-12-20 NOTE — PROGRESS NOTES
[facility] Cardiology Progress Note - Stephanie Tabor Patient Status:  Inpatient    1955 MRN [id number]   [facility] 2NE-A Attending Andressa Costello MD   [facility] Day # 52 PCP Roldan Shaw DO     Subjective:      As diabetes mellitus with renal manifestations, uncontrolled(250.42)     Obesity (BMI 30-39. 9)     Encounter for long-term (current) use of insulin (HCC)     Gastrocnemius recession and Peroneus longus tendon lengthening, L lower extremity.  Sx 12/22/14 GLENN 3/ kg)  12/04/21 0600 : 284 lb 13.4 oz (129.2 kg)  12/03/21 0419 : 280 lb 10.3 oz (127.3 kg)  12/01/21 0453 : 295 lb 3.1 oz (133.9 kg)  11/28/21 0300 : 292 lb 15.9 oz (132.9 kg)  11/27/21 0510 : 293 lb 3.4 oz (133 kg)  11/26/21 0400 : 294 lb 15.6 oz (133.8 kg HGB 7.5* 8.0*   MCV  --  82.1   PLT  --  131.0*       Recent Labs   Lab 12/15/21  0522 12/16/21  0452 12/18/21  0718 12/19/21  0639 12/20/21  0831   * 135* 138 139 138   K 4.2 4.1 3.8 3.9 4.2   CL 97* 98 101 102 106   CO2 28.0 26.0 29.0 28.0 23.0 Tube, Q8H [address]  norepinephrine (LEVOPHED) 4 mg/250 ml premix infusion, 0.5-30 mcg/min, Intravenous, Continuous  QUEtiapine (SEROQUEL) tab 50 mg, 50 mg, Oral, Nightly  metoprolol Tartrate (LOPRESSOR) injection 5 mg, 5 mg, Intravenous, Q4H PRN  heparin sodium (VITAMIN D3) tab 1,000 Units, 1,000 Units, Oral, Daily        ROS:  General Health: otherwise feels well, weight stable  Constitutiona: no recent fevers  Skin: denies any unusual skin lesions or rashes  Eyes: no visual complaints or deficits  HEENT: denies

## 2021-12-20 NOTE — PLAN OF CARE
Assumed patient care at 0700. Aox4. Calm, cooperative. Up with max. assist.  A. fib on cardiac monitor. Adequate saturation on RA. Lung sounds diminished. Denies SOB, chest discomfort, N/V. Denies pain. Voiding without difficulty. LBM yesterday.  Plan is to antiarrhythmic and heart rate control medications as ordered  - Initiate emergency measures for life threatening arrhythmias  - Monitor electrolytes and administer replacement therapy as ordered  Outcome: Progressing     Problem: RESPIRATORY - ADULT  Goal: adequate comfort level or patient's stated pain goal  Description: INTERVENTIONS:  - Encourage pt to monitor pain and request assistance  - Assess pain using appropriate pain scale  - Administer analgesics based on type and severity of pain and evaluate re

## 2021-12-20 NOTE — SLP NOTE
SPEECH DAILY NOTE - INPATIENT    ASSESSMENT & PLAN   ASSESSMENT  Pt seen for dysphagia tx to assess tolerance with recommended diet, ensure proper utilization of aspiration precautions and provide pt/family education. Patient alert and up in bed.   His voi Goal #2 The patient/family/caregiver will demonstrate understanding and implementation of aspiration precautions and swallow strategies independently over 2-3 session(s).     In Progress   Goal #3 The patient will tolerate trial upgrade of soft/solid con

## 2021-12-20 NOTE — PROGRESS NOTES
Cardiology follow-up  Case discussed with Dr. Napoleon Brown from renal service. She feels the tunneled catheter should be removed. It poses a risk of catheter related infection if left in place is not needed for HD now and therefore should be removed.     Therefore

## 2021-12-21 RX ORDER — DEXTROSE MONOHYDRATE 50 MG/ML
INJECTION, SOLUTION INTRAVENOUS CONTINUOUS
Status: DISCONTINUED | OUTPATIENT
Start: 2021-12-21 | End: 2021-12-22

## 2021-12-21 RX ORDER — METOPROLOL TARTRATE 50 MG/1
50 TABLET, FILM COATED ORAL
Status: DISCONTINUED | OUTPATIENT
Start: 2021-12-21 | End: 2021-12-28

## 2021-12-21 NOTE — DIETARY NOTE
[facility]    NUTRITION ASSESSMENT    Pt does not meet malnutrition criteria. NUTRITION INTERVENTION:   1. Enteral Nutrition -  TF discontinued d/t DHT removal.  Nepro 1.8 at 55 ml/hour x 24h.    o This will provide 2576 kcal, 141 grams protein, 964 Tolerating well without n/v/d. + BM 12/14. Wt trending up, along with hyponatremia suggestive of fluid o/l. Flushes adjusted for TF. Plan for HD today, then VFSS to determine if able to tolerate PO intake.   Suspect pt will need supplemental TF to meet ne Pt remains intubated. Off propofol, but not yet ready for extubation. Previously tolerating Vital HP @ goal rate. No n/v/d. Last BM 11/22. Needs re-assessed & TF adjusted due to change in sedation status. Off CRRT, now receiving HD w/ UF. Declining UOP.  Hy Cup at dinner     Percent Meals Eaten (last 3 days)     Date/Time Percent Meals Eaten (%)    12/18/21 0845 30 %    12/18/21 1200 40 %    12/19/21 1400 100 %    12/20/21 1100 100 %    12/20/21 1400 75 %    12/20/21 1904 90 %    12/21/21 0930 60 %        MARRY

## 2021-12-21 NOTE — PLAN OF CARE
Assumed care of patient at 1. Pt A/Ox 4, anxious at times. O2 sats maintained on room air. Atrial fibrillation on tele, HR between , but does not sustain. Last BM 12/20. Voiding without difficulty, urinal held with assistance. Pt denies any pain.

## 2021-12-21 NOTE — PROGRESS NOTES
[facility]    Nephrology Progress Note    Janie [place] Attending:  Steffany Delgado MD       SUBJECTIVE:     On D5 due to hypoglycemia per nursing. He is in good spirits since EF improved and diet is going to be advanced.   Non-oliguric and no uri MCHC 31.1 12/16/2021    RDW 24.1 12/16/2021    NEPRELIM 6.76 12/13/2021    NEPERCENT 93.1 12/13/2021    LYPERCENT 4.0 12/13/2021    MOPERCENT 2.2 12/13/2021    EOPERCENT 0.0 12/13/2021    BAPERCENT 0.0 12/13/2021    NE 6.76 12/13/2021    LYMABS 0.29 (L) 12 Daily  Insulin Aspart Pen (NOVOLOG) 100 UNIT/ML flexpen 10 Units, 10 Units, Subcutaneous, See Admin Instructions (RX holding)  hydrOXYzine (ATARAX) tab 25 mg, 25 mg, Oral, TID PRN  Albumin Human (ALBUMINAR) 25 % solution 100 mL, 100 mL, Intravenous, PRN  h PRN  senna (SENOKOT) syrup 17.6 mg, 10 mL, Oral, Nightly PRN  bisacodyl (DULCOLAX) rectal suppository 10 mg, 10 mg, Rectal, Daily PRN  Fleet Enema (FLEET) 7-19 GM/118ML enema 133 mL, 1 enema, Rectal, Once PRN  [Held by provider] bumetanide (BUMEX) 0.25 MG/ patient. Please do not hesitate to call with questions or concerns.         Ivory Cárdenas MD  [address] [facility]  [address]  [address] [place] [address]  [address]    12/21/2021  11:41 AM

## 2021-12-21 NOTE — PHYSICAL THERAPY NOTE
PHYSICAL THERAPY TREATMENT NOTE - INPATIENT    Room Number: 7782/7110-G     Session: 8  Number of Visits to Meet Established Goals: 7     Re-eval and added more visits: 7 as of 12/21/2021    History related to current admission: Patient is a 78year old m LEG/ANKL TENDON,SINGLE Left 12/22/2014    Procedure: REPAIR  PERONEUS LONGUS TENDON;  Surgeon: Luis Calhoun DPM;  Location: [address] [place] [address]   • OTHER SURGICAL HISTORY  2009    amputation R forefoot   • PART [facility] OTHR TARSAL/METATARSAL  11/9/09 Assessment  Gait Assistance:  (Pt is non- amb at this time)  Distance (ft): 0  Stairs (read only): (NT)    Skilled Therapy Provided: Chart reviewed and RN approved PT session. PT with proper PPE donned to include mask, gloves, and goggles.  Pt without mask exes for B LE/UE  AROM and balance. Left on bed and made comfortable with nursing aware of this visit. Patient End of Session: In bed;Needs met;Call light within reach;RN aware of session/findings;Bracing education provided per handout; All patient qu

## 2021-12-21 NOTE — PROGRESS NOTES
[facility] Cardiology Progress Note - Kendra Gloria Patient Status:  Inpatient    1955 MRN [id number]   [facility] 2NE-A Attending Norberto Guo MD   [facility] Day # 50 PCP Lucy Navarro DO     Subjective:  Contin extremity.  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 layer exposed ([address]     Chronic osteomyelitis of right foot with draining sinus ([address] lb 15.6 oz (133.8 kg)  11/25/21 0636 : 294 lb 1.5 oz (133.4 kg)  11/24/21 0648 : 296 lb 8.3 oz (134.5 kg)  11/22/21 0400 : (!) 306 lb 3.5 oz (138.9 kg)  11/21/21 0500 : (!) 301 lb 9.4 oz (136.8 kg)  11/20/21 0500 : (!) 303 lb 9.2 oz (137.7 kg)  11/19/21 04 106* 94* 90*   CREATSERUM 2.31* 2.08* 1.87* 1.74* 1.70*   CA 9.0 9.8 9.3 8.9 8.7   MG 2.3  --   --   --   --    PHOS 4.3 3.5 4.0 4.1 3.3   GLU 78 109* 96 58* 98       Recent Labs   Lab 12/16/21  0452 12/18/21  0718 12/19/21  0639 12/20/21  0831 12/21/21  0 Intravenous, Q4H PRN  heparin sodium 1000 UNIT/ML injection 2,000 Units, 2,000 Units, Intravenous, PRN Dialysis  heparin sodium 1000 UNIT/ML injection 2,000 Units, 2,000 Units, Intravenous, PRN Dialysis  famotidine (PEPCID) tab 20 mg, 20 mg, Oral, Daily  I sinus pain; hearing loss negative  Respiratory: denies shortness of breath, wheezing or cough   Cardiovascular:  See HPI  GI: denies nausea, vomiting,   Genital/: no dysuria,   Musculoskeletal: no joint complaints upper or lower extremities  Neuro: no se

## 2021-12-21 NOTE — SLP NOTE
SPEECH DAILY NOTE - INPATIENT    ASSESSMENT & PLAN   ASSESSMENT  Pt seen for dysphagia tx to assess tolerance with recommended diet, ensure proper utilization of aspiration precautions and provide pt/family education. Patient alert and up in bed.   He repo The patient will tolerate trial upgrade of regular consistency and NA liquids without overt signs or symptoms of aspiration with 100 % accuracy over 1-2 session(s).  In Progress-updated 12/21/21     FOLLOW UP  Follow Up Needed (Documentation Required):  Yes

## 2021-12-21 NOTE — PROGRESS NOTES
[facility] Hospitalist Progress Note     Chas Christian Patient Status:  Inpatient    1955 MRN [id number]   [facility] 2NE-A Attending Rosaura Morrison MD   [facility] Day # 50 PCP Addy Sanders DO     Chief Complaint: Patient p hours.    Imaging: Imaging data reviewed in Epic.     Medications:   • metoprolol tartrate  50 mg Oral 2x Daily(Beta Blocker)   • amiodarone  200 mg Oral Daily   • predniSONE  20 mg Oral Daily with breakfast   • apixaban  2.5 mg Oral BID   • atorvastatin  8 strain from respiratory failure, worsening pulmonary edema/respiratory insufficiency & external sedation (received diazepam shortly prior to event)   - off pressors now, cont HD for fluid removal -HD per renal  -s/pTEE/CV and LHC, impella, and stenting to occult  -Hemoglobin stable no further episodes hold off on GI consult  -Watch on Eliquis    Severe Obesity BMI > 35  - op follow up pending resolution of acute issues    Agitation-likely residual urine  Resolved and alert and oriented x4  Anxiety  - allerg

## 2021-12-21 NOTE — PLAN OF CARE
Assumed patient care at 0730 this AM. Patient A&O x4. Anxious at times, PRN Atarax available. SPO2 maintained on Ra. Pt has non-productive cough, receiving Robitussin Q4. Afib on tele, HR 80s-110s, metoprolol increased per cardiology.  Pt to have HD cathete ADULT  Goal: Maintains optimal cardiac output and hemodynamic stability  Description: INTERVENTIONS:  - Monitor vital signs, rhythm, and trends  - Monitor for bleeding, hypotension and signs of decreased cardiac output  - Evaluate effectiveness of vasoacti symptoms of hyperglycemia and hypoglycemia  - Administer ordered medications to maintain glucose within target range  - Assess barriers to adequate nutritional intake and initiate nutrition consult as needed  - Instruct patient on self management of diabet Progressing

## 2021-12-21 NOTE — OCCUPATIONAL THERAPY NOTE
OCCUPATIONAL THERAPY TREATMENT NOTE - INPATIENT     Room Number: 2627/2627-A  Session: 4   Number of Visits to Meet Established Goals: 9    History: Patient is [id number] 45 [id number] year old male admitted on 11/3/2021 with Presenting Problem: hypoglycemia. Pt diagnosed wit Scapular retraction     Shoulder rolls     Shoulder flexion 10    Shoulder abduction     Shoulder internal/external rotation     Forward punch     Elbow flexion 10    Elbow extension     Forearm pronation/supination     Wrist flexion/extension     Gross perform grooming: with max assist  Patient will perform upper body dressing:  with max assist  Patient will perform lower body dressing:  with max assist  Patient will perform toileting: with max assist     Functional Transfer Goals  Patient will transfer

## 2021-12-22 RX ORDER — ATORVASTATIN CALCIUM 40 MG/1
40 TABLET, FILM COATED ORAL NIGHTLY
Status: DISCONTINUED | OUTPATIENT
Start: 2021-12-22 | End: 2021-12-28

## 2021-12-22 RX ORDER — DILTIAZEM HYDROCHLORIDE 180 MG/1
180 CAPSULE, EXTENDED RELEASE ORAL DAILY
Status: DISCONTINUED | OUTPATIENT
Start: 2021-12-22 | End: 2021-12-28

## 2021-12-22 NOTE — SLP NOTE
SPEECH DAILY NOTE - INPATIENT    ASSESSMENT & PLAN   ASSESSMENT  Pt seen for dysphagia tx to assess tolerance with recommended diet, ensure proper utilization of aspiration precautions and provide pt/family education. Patient alert and up in bed.   His voi Established Goals: 5    Session: 4 of 5    If you have any questions, please contact Pasty Morning MS GALINA-SLP  Pager [phone]    Prior to entering room, SLP donned appropriate PPE for Patient level of isolation including gloves, surgical m

## 2021-12-22 NOTE — PROGRESS NOTES
[facility] Cardiology Progress Note        Kimmy ovidio Patient Status:  Inpatient    1955 MRN [id number]   [facility] 2NE-A Attending Duane Lathe, MD   [facility] Day # 52 PCP [place] DO Abdiel     Subjective: -370 ml     Wt Readings from Last 3 Encounters:  12/15/21 : 282 lb 6.6 oz (128.1 kg)  10/12/21 : (!) 330 lb (149.7 kg)  05/14/21 : 267 lb (121.1 kg)      Physical Exam:         12/21/21  2030 12/21/21  2339 12/22/21  0600 12/22/21  0833   BP: 116/78 122/59 hours.         Lab Results   Component Value Date    TROP 0.069 ([place]) 11/16/2021    TROP <0.045 11/03/2021    TROP <0.046 02/24/2017         Invalid input(s): PBNPML                       Diagnostics:   Telemetry: atrial flutter 110-120/min    EKG, 12/22/202

## 2021-12-22 NOTE — PROGRESS NOTES
[facility] Hospitalist Progress Note     Chas Christian Patient Status:  Inpatient    1955 MRN [id number]   [facility] 2NE-A Attending Rosaura Morrison MD   [address] Day # 52 PCP Addy [place] DO     Chief Complaint: Patient p the last 168 hours. Imaging: Imaging data reviewed in Epic.     Medications:   • [START ON 12/23/2021] insulin detemir  20 Units Subcutaneous Daily   • metoprolol tartrate  50 mg Oral 2x Daily(Beta Blocker)   • amiodarone  200 mg Oral Daily   • predniSON multifactorial due to strain from respiratory failure, worsening pulmonary edema/respiratory insufficiency & external sedation (received diazepam shortly prior to event)   - off pressors now, cont HD for fluid removal -HD per renal  -s/pTEE/CV and LHC, imp op follow up pending resolution of acute issues    Agitation-likely residual urine  Resolved and alert and oriented x4      Quality:  · DVT Prophylaxis: Eliquis  · CODE status: FULL    DISPO: Advancing diet, remove PermCath 12/27/2021 hyperglycemia adjust

## 2021-12-22 NOTE — PLAN OF CARE
Assumed care of patient at [address] [facility]. Pt A/Ox 4, anxious and confused at times at night. PRN atarax given for anxiety. O2 sats maintained on room air. Atrial fibrillation on tele. Last BM 12/21, incontinent. Voiding into urinal with help.  Pt moans frequently, b

## 2021-12-22 NOTE — PROGRESS NOTES
[facility]    Nephrology Progress Note    Kimmy Lozano Attending:  Duane Lathe, MD       SUBJECTIVE:     Feels well. No complaints.     PHYSICAL EXAM:     Vital Signs: /79 (BP Location: Left arm)   Pulse 102   Temp 97.9 °F (36.6 °C) ([place] 12/13/2021    MOPERCENT 2.2 12/13/2021    EOPERCENT 0.0 12/13/2021    BAPERCENT 0.0 12/13/2021    NE 6.76 12/13/2021    LYMABS 0.29 (L) 12/13/2021    MOABSO 0.16 12/13/2021    EOABSO 0.00 12/13/2021    BAABSO 0.00 12/13/2021     Lab Results   Component [place] Oral, TID PRN  Albumin Human (ALBUMINAR) 25 % solution 100 mL, 100 mL, Intravenous, PRN  heparin sodium 1000 UNIT/ML injection 1,500 Units, 1.5 mL, Intracatheter, PRN Dialysis  maalox/diphenhydramine/sucralfate (MAGIC MOUTHWASH) suspension 10 mL, 10 mL, Or (FLEET) 7-19 GM/118ML enema 133 mL, 1 enema, Rectal, Once PRN  [Held by provider] bumetanide (BUMEX) 0.25 MG/ML injection 2 mg, 2 mg, Intravenous, TID  polyethylene glycol (PEG 3350) (MIRALAX) powder packet 17 g, 17 g, Oral, Daily  melatonin tab 3 mg, 3 mg

## 2021-12-22 NOTE — PLAN OF CARE
Assumed patient care at 0730 this AM. Patient A&O x4. SPO2 maintained on RA. Receiving Robitussin Q4 hours for non-productive cough. Afib on tele.  Plan for HD catheter removal on 12/27- holding plavix, cangrelor gtt infusing per order, still receiving Eliq effectiveness of antiarrhythmic and heart rate control medications as ordered  - Initiate emergency measures for life threatening arrhythmias  - Monitor electrolytes and administer replacement therapy as ordered  Outcome: Progressing     Problem: RESPIRATO Verbalizes/displays adequate comfort level or patient's stated pain goal  Description: INTERVENTIONS:  - Encourage pt to monitor pain and request assistance  - Assess pain using appropriate pain scale  - Administer analgesics based on type and severity of

## 2021-12-23 NOTE — PLAN OF CARE
Received patient at 0730. Alert and Oriented x4. Tele Rhythm Afib rate 70s to 100. O2 saturation 96%  On room air. Breath sounds diminished. Bed is locked and in low position. Call light and personal items within reach.  Pt voiding with no issue per urinal monitoring, monitor vital signs, obtain 12 lead EKG if indicated  - Evaluate effectiveness of antiarrhythmic and heart rate control medications as ordered  - Initiate emergency measures for life threatening arrhythmias  - Monitor electrolytes and administe appropriate  Outcome: Progressing     Problem: PAIN - ADULT  Goal: Verbalizes/displays adequate comfort level or patient's stated pain goal  Description: INTERVENTIONS:  - Encourage pt to monitor pain and request assistance  - Assess pain using appropriate

## 2021-12-23 NOTE — CM/SW NOTE
SW spoke with spouse, Donaldo Thurman about REJI choice. They are agreeable to [facility]. Reserved in [phone]. Updates sent. Anticipate discharge next week.      HOMERO Valdez, [facility]   / Discharge Planner  [id number]

## 2021-12-23 NOTE — PROGRESS NOTES
[facility] Hospitalist Progress Note     Oscar Nilaytavia Patient Status:  Inpatient    1955 MRN [id number]   [facility] 2NE-A Attending Neto Antonio MD   [facility] Day # 48 PCP Benny Smith DO     Chief Complaint: Patient p insulin detemir  20 Units Subcutaneous Daily   • dilTIAZem  180 mg Oral Daily   • atorvastatin  40 mg Oral Nightly   • metoprolol tartrate  50 mg Oral 2x Daily(Beta Blocker)   • amiodarone  200 mg Oral Daily   • predniSONE  20 mg Oral Daily with breakfast edema/respiratory insufficiency & external sedation (received diazepam shortly prior to event)   - off pressors now, cont HD for fluid removal -HD per renal  -s/pTEE/CV and LHC, impella, and stenting to LAD and stent to RPL- impella now removed   - dc'ed a liquids    HTN   -off verapmil, on bb      Severe Obesity BMI > 35  - op follow up pending resolution of acute issues    Agitation-likely residual urine  Resolved and alert and oriented x4      Quality:  · DVT Prophylaxis: scd  · CODE status: FULL    DISPO

## 2021-12-23 NOTE — PHYSICAL THERAPY NOTE
PHYSICAL THERAPY TREATMENT NOTE - INPATIENT    Room Number: 4501/9150-K     Session: 2/7  Number of Visits to Meet Established Goals: 7     Re-eval and added more visits: 7 as of 12/21/2021    History related to current admission: Patient is a 78year old LEG/ANKL TENDON,SINGLE Left 12/22/2014    Procedure: REPAIR  PERONEUS LONGUS TENDON;  Surgeon: Chanel Duran DPM;  Location: [address] [place] [address]   • OTHER SURGICAL HISTORY  2009    amputation R forefoot   • PART [facility] OTHR TARSAL/METATARSAL  11/9/09 Therapy Provided: Chart reviewed and RN approved PT session. PT with proper PPE donned to include mask, gloves, and goggles. Pt without mask donned during session.  Suprisingly during this session he was agreeable with therapy without further excuses and wa Discharge Recommendations: Sub-acute rehabilitation     PLAN  PT Treatment Plan: Bed mobility; Energy conservation; Endurance; Patient education; Family education;Range of motion;Strengthening;Balance training;Transfer training  Rehab Potential : Chrystal Hy Madeleine Brunner

## 2021-12-23 NOTE — SLP NOTE
SPEECH DAILY NOTE - INPATIENT    ASSESSMENT & PLAN   ASSESSMENT  Pt seen for dysphagia tx to assess tolerance with recommended diet, ensure proper utilization of aspiration precautions and provide pt/family education.   Patient alert and up in bed, [facility] [id number]-SLP  Pager [phone]    Prior to entering room, SLP donned appropriate PPE for Patient level of isolation including gloves, surgical mask. Patient was not masked due to nature of visit.

## 2021-12-23 NOTE — PROGRESS NOTES
[facility] Cardiology Progress Note        Mayela Horta Patient Status:  Inpatient    1955 MRN [id number]   [facility] 2NE-A Attending Norberto Guo MD   Hosp Day # 48 PCP Lucy Navarro, DO     Subjective: (122 kg)  10/12/21 : (!) 330 lb (149.7 kg)  05/14/21 : 267 lb (121.1 kg)      Physical Exam:         12/23/21  0456 12/23/21  0500 12/23/21  0819 12/23/21  1240   BP: 112/74  115/67 122/73   BP Location: Left arm  Left arm    Pulse: 86 97 94 92   Resp: 18 Echo:      Cardiac Cath:              Impression:    1. CAD ---> impella-assisted stent LAD, RCA, 11/28/21. Now on IV cangrelor awaiting Tunneled catheter removal, 12/27/21. Currently on cangrelor bridge  2. cardiac arrest/Cardiomyopathy .   Latest

## 2021-12-23 NOTE — PLAN OF CARE
Assumed care of the patient at [address]. Patient alert and oriented x4. Adequate saturation on RA with diminished. No N/V/D. Denies any pain at the moment. Cangrelor gtt at 28.8cc/hr. bed alarm is on. Bed at the lowest position.        Problem: Patient/Family G changes in respiratory status  - Assess for changes in mentation and behavior  - Position to facilitate oxygenation and minimize respiratory effort  - Oxygen supplementation based on oxygen saturation or ABGs  - Provide Smoking Cessation handout, if applic Consider cultural and social influences on pain and pain management  - Manage/alleviate anxiety  - Utilize distraction and/or relaxation techniques  - Monitor for opioid side effects  - Notify MD/LIP if interventions unsuccessful or patient reports new katherine

## 2021-12-23 NOTE — PROGRESS NOTES
[facility]    Nephrology Progress Note    Harry Gregg Attending:  Angel Benton MD       SUBJECTIVE:     Feels well. Waiting to work with physical therapy. No urinary complaints.     PHYSICAL EXAM:     Vital Signs: /67 (BP Location: L[place] 12/13/2021    NEPERCENT 93.1 12/13/2021    LYPERCENT 4.0 12/13/2021    MOPERCENT 2.2 12/13/2021    EOPERCENT 0.0 12/13/2021    BAPERCENT 0.0 12/13/2021    NE 6.76 12/13/2021    LYMABS 0.29 (L) 12/13/2021    MOABSO 0.16 12/13/2021    EOABSO 0.00 12/13/2021 mg, Oral, TID PRN  Albumin Human (ALBUMINAR) 25 % solution 100 mL, 100 mL, Intravenous, PRN  heparin sodium 1000 UNIT/ML injection 1,500 Units, 1.5 mL, Intracatheter, PRN Dialysis  maalox/diphenhydramine/sucralfate (MAGIC MOUTHWASH) suspension 10 mL, 10 mL GM/118ML enema 133 mL, 1 enema, Rectal, Once PRN  [Held by provider] bumetanide (BUMEX) 0.25 MG/ML injection 2 mg, 2 mg, Intravenous, TID  polyethylene glycol (PEG 3350) (MIRALAX) powder packet 17 g, 17 g, Oral, Daily  melatonin tab 3 mg, 3 mg, Oral, Night AM

## 2021-12-24 NOTE — PROGRESS NOTES
[facility] Hospitalist Progress Note     Mayela Horta Patient Status:  Inpatient    1955 MRN [id number]   [facility] 2NE-A Attending Shellie Corral MD   [address] Day # 46 PCP Lucy Navarro DO     Chief Complaint: Patient p Aspart Pen  5 Units Subcutaneous See Admin Instructions (RX holding)   • insulin detemir  20 Units Subcutaneous Daily   • dilTIAZem  180 mg Oral Daily   • atorvastatin  40 mg Oral Nightly   • metoprolol tartrate  50 mg Oral 2x Daily(Beta Blocker)   • amiod multifactorial due to strain from respiratory failure, worsening pulmonary edema/respiratory insufficiency & external sedation (received diazepam shortly prior to event)   - off pressors now, cont HD for fluid removal -HD per renal  -s/pTEE/CV and LHC, imp and diet recommendations now is with advance diet-more regular and thin liquids    HTN   -off verapmil, on bb      Severe Obesity BMI > 35  - op follow up pending resolution of acute issues    Agitation-likely residual urine  Resolved and alert and oriente

## 2021-12-24 NOTE — PLAN OF CARE
Patient Aox4. Afib on tele, rates controlled. Pt on room air, denies shortness of breath. Pt denies any pain. Continent of B&B, utilizes urinal/bedpan with assistance.      Pt received last dose of Eliquis overnight, will be held starting 12/24 pending perm Progressing  Goal: Absence of cardiac arrhythmias or at baseline  Description: INTERVENTIONS:  - Continuous cardiac monitoring, monitor vital signs, obtain 12 lead EKG if indicated  - Evaluate effectiveness of antiarrhythmic and heart rate control medicati Electrolytes maintained within normal limits  Description: INTERVENTIONS:  - Monitor labs and rhythm and assess patient for signs and symptoms of electrolyte imbalances  - Administer electrolyte replacement as ordered  - Monitor response to electrolyte rep RN  Outcome: Progressing

## 2021-12-24 NOTE — PHYSICAL THERAPY NOTE
PHYSICAL THERAPY TREATMENT NOTE - INPATIENT    Room Number: 4762/9501-M     Session: 10/14     Number of Visits to Meet Established Goals:  (14 total)    Presenting Problem: Hypoglycemia, acute respiratory failure, cardiac arrest  Co-Morbidities : CHF, Static Sitting: Not tested  Dynamic Sitting: Not tested           Static Standing: Not tested  Dynamic Standing: Not tested    ACTIVITY TOLE Session: Up in chair;Needs met;Call light within reach;RN aware of session/findings; All patient questions and concerns addressed; Alarm set; Discussed recommendations with /    Therapist PPE: Mask, gloves and goggles were worn.   Karen

## 2021-12-24 NOTE — PLAN OF CARE
Received patient at 0730. Alert and Oriented x4. Tele Rhythm Afib, rates in 90s. O2 saturation 94% On room air. Breath sounds diminished. Bed is locked and in low position. Call light and personal items within reach.  No C/O chest pain or shortness of harper

## 2021-12-24 NOTE — PROGRESS NOTES
[facility] Cardiology Progress Note        Chas Christian Patient Status:  Inpatient    1955 MRN [id number]   [facility] 2NE-A Attending Luba Abrams MD   [facility] Day # 46 PCP Addy Sanders DO     Subjective: kg)  10/12/21 : (!) 330 lb (149.7 kg)  05/14/21 : 267 lb (121.1 kg)      Physical Exam:         12/23/21  1532 12/23/21  1921 12/23/21  2348 12/24/21  0335   BP: 112/70 97/59 149/72 114/58   BP Location: Left arm Left arm Left arm Left arm   Pulse: 101 88 110-120/min    EKG, 12/22/2021,         Echo:      Cardiac Cath:              Impression:    1. CAD ---> impella-assisted stent LAD, RCA, 11/28/21. Now on IV cangrelor awaiting Tunneled catheter removal, 12/27/21.   Currently on cangrelor bridge  2. cardiac

## 2021-12-24 NOTE — PROGRESS NOTES
[facility]    Nephrology Progress Note    Noel Collet Attending:  Darren Rodríguez MD       SUBJECTIVE:     Feels well.       PHYSICAL EXAM:     Vital Signs: /84 (BP Location: Left arm)   Pulse 97   Temp 98 °F (36.7 °C) (Oral)   Resp 18   Ht MOPERCENT 2.2 12/13/2021    EOPERCENT 0.0 12/13/2021    BAPERCENT 0.0 12/13/2021    NE 6.76 12/13/2021    LYMABS 0.29 (L) 12/13/2021    MOABSO 0.16 12/13/2021    EOABSO 0.00 12/13/2021    BAABSO 0.00 12/13/2021     Lab Results   Component Value Date    MAL injection 1,500 Units, 1.5 mL, Intracatheter, PRN Dialysis  maalox/diphenhydramine/sucralfate (MAGIC MOUTHWASH) suspension 10 mL, 10 mL, Oral, TID AC and HS  [Held by provider] verapamil (CALAN) tab 80 mg, 80 mg, Per NG Tube, Q8H [facility]  norepinephrine (LEVOP Intravenous, TID  polyethylene glycol (PEG 3350) (MIRALAX) powder packet 17 g, 17 g, Oral, Daily  melatonin tab 3 mg, 3 mg, Oral, Nightly  Vitamin D3 (Cholecalciferol) (VITAMIN D3) tab 1,000 Units, 1,000 Units, Oral, Daily        ASSESSMENT/PLAN:   79 yo M

## 2021-12-25 NOTE — PROGRESS NOTES
[facility] Cardiology Progress Note        Cheral Room Patient Status:  Inpatient    1955 MRN [id number]   [facility] 2NE-A Attending Yesy Harris MD   [facility] Day # 46 PCP Amaya Urias DO     Subjective: 3 Encounters:  12/23/21 : 268 lb 15.4 oz (122 kg)  10/12/21 : (!) 330 lb (149.7 kg)  05/14/21 : 267 lb (121.1 kg)      Physical Exam:         12/25/21  0418 12/25/21  0547 12/25/21  0548 12/25/21  0550   BP: 123/64      BP Location: Left arm      Pulse: 85 Cath:              Impression:    1. CAD ---> impella-assisted stent LAD, RCA, 11/28/21. Now on IV cangrelor awaiting Tunneled catheter removal, 12/27/21. Currently on cangrelor bridge  2. cardiac arrest/Cardiomyopathy . Latest LVEF = 55%  3.  Atrial fib/

## 2021-12-25 NOTE — PLAN OF CARE
Pt is A/Ox4. On room air. Pt was refusing continuous pulse oximetry. O2 sats 94% RA. Pt's respirations loud/?labored, may be baseline for pt. Pt denied feeling SOB, although stating he just feels \"off\". Thought he could be very tired. Repositioned pt.  Maria Fernanda Campoverde Problem: RESPIRATORY - ADULT  Goal: Achieves optimal ventilation and oxygenation  Description: INTERVENTIONS:  - Assess for changes in respiratory status  - Assess for changes in mentation and behavior  - Position to facilitate oxygenation and minimize r

## 2021-12-25 NOTE — PLAN OF CARE
Assumed patient care at 0730 this AM. Patient A&O x4. SPO2 maintained on Ra, no c/o SOB. Placed on 3L overnight for desaturation per nightshift RN. Receiving Robitussin Q4 for cough. Afib on tele, HR controlled.  Cangrelor gtt infusing per orders- plavix an emergency measures for life threatening arrhythmias  - Monitor electrolytes and administer replacement therapy as ordered  Outcome: Progressing     Problem: RESPIRATORY - ADULT  Goal: Achieves optimal ventilation and oxygenation  Description: INTERVENTIONS

## 2021-12-25 NOTE — PROGRESS NOTES
[facility]    Nephrology Progress Note    Janie Hanson Attending:  Steffany Delgado MD       SUBJECTIVE:     Feels well.   No edema, no sob  Pt states good intake     PHYSICAL EXAM:     Vital Signs: /73 (BP Location: Left arm)   Pulse 100   T 12/13/2021    LYPERCENT 4.0 12/13/2021    MOPERCENT 2.2 12/13/2021    EOPERCENT 0.0 12/13/2021    BAPERCENT 0.0 12/13/2021    NE 6.76 12/13/2021    LYMABS 0.29 (L) 12/13/2021    MOABSO 0.16 12/13/2021    EOABSO 0.00 12/13/2021    BAABSO 0.00 12/13/2021 Intravenous, PRN  heparin sodium 1000 UNIT/ML injection 1,500 Units, 1.5 mL, Intracatheter, PRN Dialysis  maalox/diphenhydramine/sucralfate (MAGIC MOUTHWASH) suspension 10 mL, 10 mL, Oral, TID AC and HS  [Held by provider] verapamil (CALAN) tab 80 mg, 80 m (BUMEX) 0.25 MG/ML injection 2 mg, 2 mg, Intravenous, TID  polyethylene glycol (PEG 3350) (MIRALAX) powder packet 17 g, 17 g, Oral, Daily  melatonin tab 3 mg, 3 mg, Oral, Nightly  Vitamin D3 (Cholecalciferol) (VITAMIN D3) tab 1,000 Units, 1,000 Units, Oral

## 2021-12-25 NOTE — PROGRESS NOTES
[facility] Hospitalist Progress Note     Mayela Lynch Patient Status:  Inpatient    1955 MRN [id number]   [facility] 2NE-A Attending Susana Aparicio MD   [address] Day # 46 PCP Yamil Subramanian DO     Chief Complaint: Patient p Epic.    Medications:   • Insulin Aspart Pen  5 Units Subcutaneous See Admin Instructions (RX holding)   • insulin detemir  20 Units Subcutaneous Daily   • dilTIAZem  180 mg Oral Daily   • atorvastatin  40 mg Oral Nightly   • metoprolol tartrate  50 mg Ora & external sedation (received diazepam shortly prior to event)   - off pressors now, cont HD for fluid removal -HD per renal  -s/pTEE/CV and LHC, impella, and stenting to LAD and stent to RPL- impella now removed   - dc'ed a-line  Repeat echo shows improve issues          Quality:  · DVT Prophylaxis: scd  · CODE status: FULL    DISPO: Advancing diet, remove PermCath 12/27/2021       Ran Costello MD   [facility]  [phone]  12/25/2021

## 2021-12-26 NOTE — PLAN OF CARE
Assumed care of patient 1930 resting in bed. AO x4. Afib on tele monitor. O2 sat adequate on room air. Denies chest pain and shortness of breath. Cangrelor gtt infusing per MAR. Currently declining to be turned, despite educating on the need to off load.  P therapy as ordered  Outcome: Progressing     Problem: RESPIRATORY - ADULT  Goal: Achieves optimal ventilation and oxygenation  Description: INTERVENTIONS:  - Assess for changes in respiratory status  - Assess for changes in mentation and behavior  - Positi

## 2021-12-26 NOTE — PROGRESS NOTES
[facility]    Nephrology Progress Note    Mary Lou Gum Attending:  Hudson Cat MD       SUBJECTIVE:     Feels well.   No edema, no sob  Pt states good intake     PHYSICAL EXAM:     Vital Signs: /63 (BP Location: Left arm)   Pulse 82   Te 12/13/2021    LYPERCENT 4.0 12/13/2021    MOPERCENT 2.2 12/13/2021    EOPERCENT 0.0 12/13/2021    BAPERCENT 0.0 12/13/2021    NE 6.76 12/13/2021    LYMABS 0.29 (L) 12/13/2021    MOABSO 0.16 12/13/2021    EOABSO 0.00 12/13/2021    BAABSO 0.00 12/13/2021 Dialysis  maalox/diphenhydramine/sucralfate (MAGIC MOUTHWASH) suspension 10 mL, 10 mL, Oral, TID AC and HS  [Held by provider] verapamil (CALAN) tab 80 mg, 80 mg, Per NG Tube, Q8H [facility]  norepinephrine (LEVOPHED) 4 mg/250 ml premix infusion, 0.5-30 mcg/min, (MIRALAX) powder packet 17 g, 17 g, Oral, Daily  melatonin tab 3 mg, 3 mg, Oral, Nightly  Vitamin D3 (Cholecalciferol) (VITAMIN D3) tab 1,000 Units, 1,000 Units, Oral, Daily        ASSESSMENT/PLAN:   77 yo M with CKD stage 3, albuminuria, DM c/b R forefoot

## 2021-12-26 NOTE — PROGRESS NOTES
[facility] Cardiology Progress Note        Brian Gibbs Patient Status:  Inpatient    1955 MRN [id number]   [facility] 2NE-A Attending Asha Beebe MD   [facility] Day # 48 PCP Beth Pleitez DO     Subjective: kg)  10/12/21 : (!) 330 lb (149.7 kg)  05/14/21 : 267 lb (121.1 kg)      Physical Exam:         12/25/21 2012 12/25/21  2142 12/25/21  2351 12/26/21  0547   BP: 116/67  114/75 122/63   BP Location: Left arm  Left arm Left arm   Pulse: 86 89 78 78   Resp: Echo:      Cardiac Cath:              Impression:    1. CAD ---> impella-assisted stent LAD, RCA, 11/28/21. Now on IV cangrelor awaiting Tunneled catheter removal, 12/27/21. Currently on cangrelor bridge  2. cardiac arrest/Cardiomyopathy .   Latest

## 2021-12-26 NOTE — PLAN OF CARE
Assumed care of patient at 0730. Patient alert and oriented x4. Oxygenation stable on room air. Tele: Atrial fibrillation. Cangrelor gtt maintained per order. Patient is continent of bowel and bladder. Patient did not note pain today.  Patient is chairfast. Problem: RESPIRATORY - ADULT  Goal: Achieves optimal ventilation and oxygenation  Description: INTERVENTIONS:  - Assess for changes in respiratory status  - Assess for changes in mentation and behavior  - Position to facilitate oxygenation and minimize r

## 2021-12-26 NOTE — PROGRESS NOTES
[facility] Hospitalist Progress Note     Carroll Lopez Patient Status:  Inpatient    1955 MRN [id number]   [facility] 2NE-A Attending Minor Severe, MD   [facility] Day # 48 PCP Dillon Kraus DO     Chief Complaint: Patient p Units Subcutaneous Daily   • dilTIAZem  180 mg Oral Daily   • atorvastatin  40 mg Oral Nightly   • metoprolol tartrate  50 mg Oral 2x Daily(Beta Blocker)   • amiodarone  200 mg Oral Daily   • predniSONE  20 mg Oral Daily with breakfast   • guaiFENesin  200 renal  -s/pTEE/CV and LHC, impella, and stenting to LAD and stent to RPL- impella now removed   - dc'ed a-line  Repeat echo shows improvement in EF to 55%-      Concern for sepsis   -resolved and completed all abx    Acute Respiratory Failure with Hypoxia Advancing diet, remove PermCath scheduled for 12/27/2021     Debbie Jordan MD  [facility]  Internal Medicine, Hospitalist  Answering service: [phone]

## 2021-12-27 ENCOUNTER — APPOINTMENT (OUTPATIENT)
Dept: INTERVENTIONAL RADIOLOGY/VASCULAR | Facility: HOSPITAL | Age: 66
DRG: 215 | End: 2021-12-27
Attending: INTERNAL MEDICINE
Payer: MEDICARE

## 2021-12-27 PROCEDURE — 0JPTXXZ REMOVAL OF TUNNELED VASCULAR ACCESS DEVICE FROM TRUNK SUBCUTANEOUS TISSUE AND FASCIA, EXTERNAL APPROACH: ICD-10-PCS | Performed by: RADIOLOGY

## 2021-12-27 RX ORDER — CLOPIDOGREL BISULFATE 75 MG/1
600 TABLET ORAL ONCE
Status: COMPLETED | OUTPATIENT
Start: 2021-12-27 | End: 2021-12-27

## 2021-12-27 RX ORDER — LIDOCAINE HYDROCHLORIDE 10 MG/ML
INJECTION, SOLUTION INFILTRATION; PERINEURAL
Status: COMPLETED
Start: 2021-12-27 | End: 2021-12-27

## 2021-12-27 RX ORDER — CLOPIDOGREL BISULFATE 75 MG/1
75 TABLET ORAL DAILY
Status: DISCONTINUED | OUTPATIENT
Start: 2021-12-28 | End: 2021-12-28

## 2021-12-27 NOTE — PROGRESS NOTES
[facility] [place] Hospitalist Progress Note     Leahrimelissa Juneo Patient Status:  Inpatient    1955 MRN [id number]   [facility] 2NE-A Attending Cheryl Bradley MD   [facility] Day # 47 PCP Thierry Ansari DO     Chief Complaint: Patient p dilTIAZem  180 mg Oral Daily   • atorvastatin  40 mg Oral Nightly   • metoprolol tartrate  50 mg Oral 2x Daily(Beta Blocker)   • amiodarone  200 mg Oral Daily   • predniSONE  20 mg Oral Daily with breakfast   • guaiFENesin  200 mg Oral Q4H While awake   • renal  -s/pTEE/CV and LHC, impella, and stenting to LAD and stent to RPL- impella now removed   - dc'ed a-line  Repeat echo shows improvement in EF to 55%-      Concern for sepsis   -resolved and completed all abx    Acute Respiratory Failure with Hypoxia FULL    Dispo: per cardiology and rehab being set    [facility] Hospitalist  [phone]

## 2021-12-27 NOTE — PROGRESS NOTES
[facility] Cardiology Progress Note - Tyronne Siemens Patient Status:  Inpatient    1955 MRN [id number]   [facility] 2NE-A Attending Beverly Rowan MD   [facility] Day # 47 PCP Iza Padilla DO     Subjective:  Feeling ulcer of other part of right foot with fat layer exposed ([address]     Chronic osteomyelitis of right foot with draining sinus ([address]     Diabetic ulcer of right foot associated with type 2 diabetes mellitus ([address]     Diabetic polyneuropathy associated with typ 294 lb 1.5 oz (133.4 kg)  11/24/21 0648 : 296 lb 8.3 oz (134.5 kg)  11/22/21 0400 : (!) 306 lb 3.5 oz (138.9 kg)  11/21/21 0500 : (!) 301 lb 9.4 oz (136.8 kg)  11/20/21 0500 : (!) 303 lb 9.2 oz (137.7 kg)  11/19/21 0400 : (!) 307 lb 1.6 oz (139.3 kg)  11/1 1.39* 1.36*   CA 8.1* 8.0* 8.0* 7.7* 7.9*   PHOS 2.8 2.6 2.5 2.7 2.4*   * 49* 79 77 87       Recent Labs   Lab 12/23/21  0617 12/24/21  0454 12/25/21  0554 12/26/21  0545 12/27/21  0530   ALB 2.2* 2.4* 2.5* 2.4* 2.3*       No results for input(s): T 1000 UNIT/ML injection 2,000 Units, 2,000 Units, Intravenous, PRN Dialysis  famotidine (PEPCID) tab 20 mg, 20 mg, Oral, Daily  Insulin Aspart Pen (NOVOLOG) 100 UNIT/ML flexpen 4-20 Units, 4-20 Units, Subcutaneous, TID CC and HS  Labetalol HCl (TRANDATE) in denies nausea, vomiting,   Genital/: no dysuria,   Musculoskeletal: no joint complaints upper or lower extremities  Neuro: no sensory or motor complaint  Psyche: no symptoms of depression or anxiety  Hematology: denies bruising or excessive bleeding  End

## 2021-12-27 NOTE — PHYSICAL THERAPY NOTE
Pt ZOLTAN earlier this AM for cath removal with sedation medications. Will f/u for further PT sessions at a later date.

## 2021-12-27 NOTE — CM/SW NOTE
Spoke with Dr Kun Lemons who said pt will be cleared to go to [address] (Tuesday). Beninese Redo at [facility].

## 2021-12-27 NOTE — CM/SW NOTE
Pt's HD cath was pulled today. Possible dc to [facility] today. Cardiology to see.

## 2021-12-27 NOTE — PROCEDURES
[facility]  Procedure Note    Mary Lou Gum Patient Status:  Inpatient    1955 MRN [id number]   [facility] 2NE-A Attending Jeanine Hewitt MD   [facility] Day # 47 PCP Jefferson Wylie DO     Procedure: Lucas Racer removal    Pre-Procedure Diag

## 2021-12-27 NOTE — PLAN OF CARE
Assumed care of patient from 0730 to 1145. Patient alert and oriented x4. Oxygenation stable on room air. Tele: Atrial Fibrillation. Cangrelor gtt maintained per order. Patient is continent of bowel and bladder. Patient does not note pain.  Patient is yao administer replacement therapy as ordered  Outcome: Progressing     Problem: RESPIRATORY - ADULT  Goal: Achieves optimal ventilation and oxygenation  Description: INTERVENTIONS:  - Assess for changes in respiratory status  - Assess for changes in mentation

## 2021-12-27 NOTE — DIETARY NOTE
[facility]    NUTRITION ASSESSMENT    Pt does not meet malnutrition criteria. NUTRITION INTERVENTION:     1. Meal and Snacks - monitor patient po intake. Encourage adequate po of appropriate diet.   2. Medical Food Supplements - RD added Nepro at br has been brought up - but family wanting to hold off presently. No n/v/d. Last BM 12/10. No new wt available. 12/8 - Pt for VFSS today, but unable to participate due to increased RR & mental status.  DHT remains in place with TF infusing at goal. No n/v/ at 45 mL/hr. Worsening Pulmonary Edema. HEATHER to CKD stage 3. Remains intubated and sedated. No N/V/D. Last BM 11/14. Propofol provided at 35.5 mL. Providing 937.2 kcals. 11/17 Pt is a 78 yr old M. PHM of DM2, CKD III, HF, Osteomyelitis.  Chart reviewed d/t SYSTEM REVIEW: WNL    NUTRITION RELATED PHYSICAL FINDINGS:     1. Body Fat/Muscle Mass: ERIKA     2.  Fluid Accumulation: generalized edema     NUTRITION PRESCRIPTION: 128.1 kg-282 lb (12/15)  Calories: 3819-1050 calories (15-20 calories per kg)   Protein: 13

## 2021-12-27 NOTE — PROGRESS NOTES
[facility]    Nephrology Progress Note    Clejeannine  Attending:  Asha Beebe MD       SUBJECTIVE:     Feels well.   No edema, no sob  Pt states good intake   PermCath removed today    PHYSICAL EXAM:     Vital Signs: /85 (BP Location: L NEPERCENT 69.2 12/27/2021    LYPERCENT 22.3 12/27/2021    MOPERCENT 6.2 12/27/2021    EOPERCENT 0.9 12/27/2021    BAPERCENT 0.2 12/27/2021    NE 4.03 12/27/2021    LYMABS 1.30 12/27/2021    MOABSO 0.36 12/27/2021    EOABSO 0.05 12/27/2021    BAABSO 0.01 Dialysis  maalox/diphenhydramine/sucralfate (MAGIC MOUTHWASH) suspension 10 mL, 10 mL, Oral, TID AC and HS  [Held by provider] verapamil (CALAN) tab 80 mg, 80 mg, Per NG Tube, Q8H [facility]  QUEtiapine (SEROQUEL) tab 50 mg, 50 mg, Oral, Nightly  metoprolol Tartr (Cholecalciferol) (VITAMIN D3) tab 1,000 Units, 1,000 Units, Oral, Daily        ASSESSMENT/PLAN:   79 yo M with CKD stage 3, albuminuria, DM c/b R forefoot amputation, HTN, HL, CHF presented with CHAVEZ.   Prolonged hospital course c/b PEA arrest, respiratory

## 2021-12-27 NOTE — PLAN OF CARE
Assumed care around 1930. A/Ox4. On RA with sats >90. Monitor shows a-fib. Voids in urinal, uses bedpan for stool. Last BM 12/26. No reports of pain. Plan for HD cath removal tmrw am. Safety precautions in place, call light within reach.      Problem: Too Assess for changes in respiratory status  - Assess for changes in mentation and behavior  - Position to facilitate oxygenation and minimize respiratory effort  - Oxygen supplementation based on oxygen saturation or ABGs  - Provide Smoking Cessation handout

## 2021-12-28 VITALS
WEIGHT: 303.13 LBS | TEMPERATURE: 98 F | DIASTOLIC BLOOD PRESSURE: 47 MMHG | HEART RATE: 105 BPM | OXYGEN SATURATION: 96 % | SYSTOLIC BLOOD PRESSURE: 118 MMHG | BODY MASS INDEX: 37.69 KG/M2 | RESPIRATION RATE: 20 BRPM | HEIGHT: 75 IN

## 2021-12-28 RX ORDER — MELATONIN
Qty: 30 TABLET | Refills: 0 | Status: SHIPPED | COMMUNITY
Start: 2021-12-28

## 2021-12-28 RX ORDER — ACETAMINOPHEN 325 MG/1
650 TABLET ORAL EVERY 4 HOURS PRN
Refills: 0 | Status: SHIPPED | COMMUNITY
Start: 2021-12-28

## 2021-12-28 RX ORDER — CLOPIDOGREL BISULFATE 75 MG/1
75 TABLET ORAL DAILY
Qty: 30 TABLET | Refills: 11 | Status: SHIPPED | OUTPATIENT
Start: 2021-12-29

## 2021-12-28 RX ORDER — QUETIAPINE 50 MG/1
50 TABLET, FILM COATED ORAL NIGHTLY
Refills: 0 | Status: SHIPPED | COMMUNITY
Start: 2021-12-28

## 2021-12-28 RX ORDER — PREDNISONE 20 MG/1
20 TABLET ORAL
Qty: 5 TABLET | Refills: 0 | Status: SHIPPED | OUTPATIENT
Start: 2021-12-29 | End: 2021-12-28

## 2021-12-28 RX ORDER — CHOLECALCIFEROL (VITAMIN D3) 25 MCG
1000 TABLET ORAL DAILY
Qty: 60 TABLET | Refills: 0 | Status: SHIPPED | COMMUNITY
Start: 2021-12-28

## 2021-12-28 RX ORDER — SENNOSIDES 8.8 MG/5ML
10 LIQUID ORAL NIGHTLY PRN
Refills: 0 | Status: SHIPPED | COMMUNITY
Start: 2021-12-28

## 2021-12-28 RX ORDER — METOPROLOL TARTRATE 50 MG/1
50 TABLET, FILM COATED ORAL
Qty: 60 TABLET | Refills: 3 | Status: SHIPPED | OUTPATIENT
Start: 2021-12-28

## 2021-12-28 RX ORDER — DILTIAZEM HYDROCHLORIDE 180 MG/1
180 CAPSULE, COATED, EXTENDED RELEASE ORAL DAILY
Qty: 30 CAPSULE | Refills: 3 | Status: SHIPPED | OUTPATIENT
Start: 2021-12-29

## 2021-12-28 RX ORDER — AMIODARONE HYDROCHLORIDE 200 MG/1
200 TABLET ORAL DAILY
Qty: 30 TABLET | Refills: 3 | Status: SHIPPED | OUTPATIENT
Start: 2021-12-29

## 2021-12-28 RX ORDER — FAMOTIDINE 20 MG/1
20 TABLET ORAL DAILY
Refills: 0 | Status: SHIPPED | COMMUNITY
Start: 2021-12-28

## 2021-12-28 RX ORDER — PREDNISONE 10 MG/1
TABLET ORAL
Qty: 11 TABLET | Refills: 0 | Status: SHIPPED | OUTPATIENT
Start: 2021-12-29 | End: 2022-01-07

## 2021-12-28 RX ORDER — POLYETHYLENE GLYCOL 3350 17 G/17G
17 POWDER, FOR SOLUTION ORAL DAILY
Refills: 0 | Status: SHIPPED | COMMUNITY
Start: 2021-12-28

## 2021-12-28 RX ORDER — IPRATROPIUM BROMIDE AND ALBUTEROL SULFATE 2.5; .5 MG/3ML; MG/3ML
3 SOLUTION RESPIRATORY (INHALATION) EVERY 4 HOURS PRN
Refills: 0 | Status: SHIPPED | COMMUNITY
Start: 2021-12-28

## 2021-12-28 NOTE — PLAN OF CARE
Assumed care of pt at 2330. Alert and oriented. Afib on tele, rates controlled. Adequately saturated on room air. No complaints of pain. VSS. voiding to urinal.  Fall precautions in place, call light within reach. Discharge planning.     Problem: Amie Napoles Assess for changes in respiratory status  - Assess for changes in mentation and behavior  - Position to facilitate oxygenation and minimize respiratory effort  - Oxygen supplementation based on oxygen saturation or ABGs  - Provide Smoking Cessation handout

## 2021-12-28 NOTE — CM/SW NOTE
12/28/21 1100   Discharge disposition   Expected discharge disposition [address] [facility] [place] Provider Hali FREIRE   Discharge transportation [facility]     Pt cleared for discharge. Pt discharging to [facility].  BLS arranged for 2:30p

## 2021-12-28 NOTE — PROGRESS NOTES
[facility]    Nephrology Progress Note    Angelito Atkinson Attending:  Abdirashid Swann MD       SUBJECTIVE:     Tolerated tunnelled HD catheter removal.  No shortness of breath and denies urinary complaints.     PHYSICAL EXAM:     Vital Signs: /47 RDW 23.2 12/28/2021    NEPRELIM 3.13 12/28/2021    NEPERCENT 68.7 12/28/2021    LYPERCENT 22.2 12/28/2021    MOPERCENT 6.2 12/28/2021    EOPERCENT 0.9 12/28/2021    BAPERCENT 0.2 12/28/2021    NE 3.13 12/28/2021    LYMABS 1.01 12/28/2021    MOABSO 0.28 12/ sodium 1000 UNIT/ML injection 1,500 Units, 1.5 mL, Intracatheter, PRN Dialysis  maalox/diphenhydramine/sucralfate (MAGIC MOUTHWASH) suspension 10 mL, 10 mL, Oral, TID AC and HS  [Held by provider] verapamil (CALAN) tab 80 mg, 80 mg, Per NG Tube, Q8H [address]  Q g, Oral, Daily  melatonin tab 3 mg, 3 mg, Oral, Nightly  Vitamin D3 (Cholecalciferol) (VITAMIN D3) tab 1,000 Units, 1,000 Units, Oral, Daily        ASSESSMENT/PLAN:     77 yo M with CKD stage 3, albuminuria, DM c/b R forefoot amputation, HTN, HL, CHF prese

## 2021-12-28 NOTE — SLP NOTE
SPEECH DAILY NOTE - INPATIENT    ASSESSMENT & PLAN   ASSESSMENT  Pt seen for dysphagia tx to assess tolerance with recommended diet, ensure proper utilization of aspiration precautions and provide pt/family education.   Patient alert and up in bed feeding h mask. Patient was not masked due to nature of visit.

## 2021-12-28 NOTE — PROGRESS NOTES
[facility] Cardiology Progress Note - Kendra Rutledgeer Patient Status:  Inpatient    1955 MRN [id number]   [facility] 2NE-A Attending Vero Chen MD   [facility] Day # 54 PCP Lucy Navarro,      Subjective:  Patient draining sinus (HCC)     Diabetic ulcer of right foot associated with type 2 diabetes mellitus ([address]     Diabetic polyneuropathy associated with type 2 diabetes mellitus ([address]     Combined form of age-related cataract, left eye     Type II or unspecified t kg)  11/21/21 0500 : (!) 301 lb 9.4 oz (136.8 kg)  11/20/21 0500 : (!) 303 lb 9.2 oz (137.7 kg)  11/19/21 0400 : (!) 307 lb 1.6 oz (139.3 kg)  11/17/21 0400 : 298 lb 1 oz (135.2 kg)  11/16/21 0015 : 297 lb 2.9 oz (134.8 kg)  11/15/21 0548 : 298 lb 14.4 oz PHOS 2.6 2.5 2.7 2.4* 3.0   GLU 49* 79 77 87 137*       Recent Labs   Lab 12/24/21  0454 12/25/21  0554 12/26/21  0545 12/27/21  0530 12/28/21  0514   ALB 2.4* 2.5* 2.4* 2.3* 2.4*       No results for input(s): TROP in the last 168 hours.     Allergies: mg, 20 mg, Oral, Daily  Insulin Aspart Pen (NOVOLOG) 100 UNIT/ML flexpen 4-20 Units, 4-20 Units, Subcutaneous, TID CC and HS  Labetalol HCl (TRANDATE) injection 10 mg, 10 mg, Intravenous, Once  Albumin Human (ALBUMINAR) 25 % solution 100 mL, 100 mL, Saray Boothe extremities  Neuro: no sensory or motor complaint  Psyche: no symptoms of depression or anxiety  Hematology: denies bruising or excessive bleeding  Endocrine: no history of diabetes  Allergy: see above drug allergies    Nicki Severin, MD  12/28/2021  9:34

## 2021-12-28 NOTE — CARDIAC REHAB
Discussed future participation of Cardiac Rehab program with spouse via phone. Spouse will contact CR dept following rehab stay. Contact info provided along with program explanation.

## 2021-12-28 NOTE — DISCHARGE SUMMARY
[place] Patient Status:  Inpatient    1955 MRN [id number]   [facility] 2NE-A Attending Daniela Melgoza MD   [address] Day # 54 SHAR Brar DO     Date of Admission: 11/3/2021  Date of D has been stable since with no signs of active bleeding. Patient with aib/fl with rvr and now is on amiodarone, bb, diltaizem and eliquis. Patient did have dysphagia but passed h is most recent video swallow and is on more regular diet and thin liquids.  Susanna Brock UNIT/ML Sopn  Commonly known as: Hermann Zaldivar  What changed:   · how much to take  · when to take this      Inject 4-20 Units into the skin TID CC and HS.    Refills: 0        CONTINUE taking these medications      Instructions Prescription details   Dexcom G6 S [id number]  [phone]    In 1 week  on discharge from rehab    MD Avery Olivera [address]          Ari Lam MD  [address] [address] [phone]          Appointments       S/p Cardiopulmonary Arrest  Cardiogenic Shock -improved, off pressors  - Cardiac arrest 11/15/21 s/p ROSC following DCCV  - likely multifactorial due to strain from respiratory failure, worsening pulmonary edema/respiratory insufficiency & external planned for 12/27/2021--> completed     #Hypernatremia   --resolved-     Dysphagia  -Video swallow completed and diet recommendations now is with advance diet-more regular and thin liquids     HTN   -diltiazem and on bb     HLD  -atorvastatin   -fenofibrat

## 2021-12-28 NOTE — CM/SW NOTE
BLS on will call for possible discharge today to [facility]. PCS form completed and printed.

## 2021-12-28 NOTE — PLAN OF CARE
Explained discharge instructions including medications and follow-ups to the patient/wife/facility RN, verbalized understanding, IV removed, tele monitor discontinued, will be transported via ambulance at 1430.       Problem: Patient/Family Goals  Goal: Laurie Ramos control medications as ordered  - Initiate emergency measures for life threatening arrhythmias  - Monitor electrolytes and administer replacement therapy as ordered  12/28/2021 1315 by Jeremias Xie RN  Outcome: Adequate for Discharge  12/28/2021 1058 by replacement as ordered  - Monitor response to electrolyte replacements, including rhythm and repeat lab results as appropriate  - Fluid restriction as ordered  - Instruct patient on fluid and nutrition restrictions as appropriate  12/28/2021 1315 by Bo,

## 2021-12-28 NOTE — PLAN OF CARE
Assumed pt care at 0730. A&Ox4, forgetful, delayed speech. RA, denies chest pain/SOB, lung sounds clear, VSS, Afib on tele. Voids, briefed. Up with total lift. Edema to BLE. R foot amputation.  Pressure ulcer to sacrum covered with mepilex, BL leg wounds al and administer replacement therapy as ordered  Outcome: Progressing     Problem: RESPIRATORY - ADULT  Goal: Achieves optimal ventilation and oxygenation  Description: INTERVENTIONS:  - Assess for changes in respiratory status  - Assess for changes in menta

## 2022-01-01 ENCOUNTER — APPOINTMENT (OUTPATIENT)
Dept: GENERAL RADIOLOGY | Age: 67
DRG: 871 | End: 2022-01-01
Attending: EMERGENCY MEDICINE

## 2022-01-01 ENCOUNTER — APPOINTMENT (OUTPATIENT)
Dept: ULTRASOUND IMAGING | Age: 67
DRG: 871 | End: 2022-01-01
Attending: PODIATRIST

## 2022-01-01 ENCOUNTER — NURSE ONLY (OUTPATIENT)
Dept: ELECTROPHYSIOLOGY | Facility: HOSPITAL | Age: 67
End: 2022-01-01
Attending: NURSE PRACTITIONER
Payer: MEDICARE

## 2022-01-01 ENCOUNTER — APPOINTMENT (OUTPATIENT)
Dept: GENERAL RADIOLOGY | Facility: HOSPITAL | Age: 67
End: 2022-01-01
Attending: EMERGENCY MEDICINE
Payer: MEDICARE

## 2022-01-01 ENCOUNTER — APPOINTMENT (OUTPATIENT)
Dept: CARDIOLOGY | Age: 67
DRG: 871 | End: 2022-01-01
Attending: INTERNAL MEDICINE

## 2022-01-01 ENCOUNTER — APPOINTMENT (OUTPATIENT)
Dept: CT IMAGING | Facility: HOSPITAL | Age: 67
End: 2022-01-01
Attending: EMERGENCY MEDICINE
Payer: MEDICARE

## 2022-01-01 ENCOUNTER — HOSPITAL ENCOUNTER (INPATIENT)
Facility: HOSPITAL | Age: 67
LOS: 2 days | End: 2022-01-01
Attending: EMERGENCY MEDICINE | Admitting: INTERNAL MEDICINE
Payer: MEDICARE

## 2022-01-01 ENCOUNTER — CASE MANAGEMENT (OUTPATIENT)
Dept: CARE COORDINATION | Age: 67
End: 2022-01-01

## 2022-01-01 ENCOUNTER — APPOINTMENT (OUTPATIENT)
Dept: WOUND CARE | Age: 67
End: 2022-01-01
Attending: PODIATRIST

## 2022-01-01 ENCOUNTER — HOSPITAL ENCOUNTER (OUTPATIENT)
Dept: WOUND CARE | Age: 67
Discharge: STILL ADMITTED OR ADMINISTRATIVE | End: 2022-04-04
Attending: PODIATRIST

## 2022-01-01 ENCOUNTER — APPOINTMENT (OUTPATIENT)
Dept: GENERAL RADIOLOGY | Age: 67
DRG: 871 | End: 2022-01-01
Attending: INTERNAL MEDICINE

## 2022-01-01 ENCOUNTER — APPOINTMENT (OUTPATIENT)
Dept: GENERAL RADIOLOGY | Age: 67
DRG: 871 | End: 2022-01-01
Attending: PODIATRIST

## 2022-01-01 ENCOUNTER — APPOINTMENT (OUTPATIENT)
Dept: GENERAL RADIOLOGY | Facility: HOSPITAL | Age: 67
End: 2022-01-01
Attending: NURSE PRACTITIONER
Payer: MEDICARE

## 2022-01-01 ENCOUNTER — NURSE ONLY (OUTPATIENT)
Dept: ELECTROPHYSIOLOGY | Facility: HOSPITAL | Age: 67
DRG: 308 | End: 2022-01-01
Attending: NURSE PRACTITIONER
Payer: MEDICARE

## 2022-01-01 ENCOUNTER — APPOINTMENT (OUTPATIENT)
Dept: NUCLEAR MEDICINE | Age: 67
DRG: 871 | End: 2022-01-01
Attending: PODIATRIST

## 2022-01-01 ENCOUNTER — HOSPITAL ENCOUNTER (INPATIENT)
Age: 67
LOS: 10 days | Discharge: NURSING, REHAB OR LONG-TERM CARE | DRG: 871 | End: 2022-03-22
Attending: EMERGENCY MEDICINE

## 2022-01-01 VITALS
BODY MASS INDEX: 33.22 KG/M2 | OXYGEN SATURATION: 100 % | HEART RATE: 116 BPM | RESPIRATION RATE: 15 BRPM | SYSTOLIC BLOOD PRESSURE: 114 MMHG | HEIGHT: 75 IN | TEMPERATURE: 96.1 F | DIASTOLIC BLOOD PRESSURE: 92 MMHG | WEIGHT: 267.2 LBS

## 2022-01-01 VITALS
OXYGEN SATURATION: 57 % | DIASTOLIC BLOOD PRESSURE: 55 MMHG | WEIGHT: 255.94 LBS | SYSTOLIC BLOOD PRESSURE: 95 MMHG | TEMPERATURE: 97 F | BODY MASS INDEX: 32 KG/M2

## 2022-01-01 DIAGNOSIS — M86.9 OSTEOMYELITIS OF ANKLE AND FOOT (CMD): ICD-10-CM

## 2022-01-01 DIAGNOSIS — M86.9 OSTEOMYELITIS OF ANKLE AND FOOT (CMD): Primary | ICD-10-CM

## 2022-01-01 DIAGNOSIS — I24.0 LEFT VENTRICULAR APICAL THROMBUS WITHOUT MI (CMD): ICD-10-CM

## 2022-01-01 DIAGNOSIS — E87.2 RESPIRATORY ACIDOSIS: ICD-10-CM

## 2022-01-01 DIAGNOSIS — S22.009A CLOSED FRACTURE OF THORACIC VERTEBRA, UNSPECIFIED FRACTURE MORPHOLOGY, UNSPECIFIED THORACIC VERTEBRAL LEVEL, INITIAL ENCOUNTER (HCC): ICD-10-CM

## 2022-01-01 DIAGNOSIS — J81.0 ACUTE PULMONARY EDEMA (CMD): ICD-10-CM

## 2022-01-01 DIAGNOSIS — R05.9 COUGH: ICD-10-CM

## 2022-01-01 DIAGNOSIS — I48.91 ATRIAL FIBRILLATION, UNSPECIFIED TYPE (CMD): ICD-10-CM

## 2022-01-01 DIAGNOSIS — J18.9 PNEUMONIA DUE TO INFECTIOUS ORGANISM, UNSPECIFIED LATERALITY, UNSPECIFIED PART OF LUNG: ICD-10-CM

## 2022-01-01 DIAGNOSIS — E87.2 LACTIC ACIDEMIA: ICD-10-CM

## 2022-01-01 DIAGNOSIS — N30.00 ACUTE CYSTITIS WITHOUT HEMATURIA: ICD-10-CM

## 2022-01-01 DIAGNOSIS — L97.424 DIABETIC ULCER OF LEFT HEEL ASSOCIATED WITH DIABETES MELLITUS DUE TO UNDERLYING CONDITION, WITH NECROSIS OF BONE (CMD): ICD-10-CM

## 2022-01-01 DIAGNOSIS — I48.91 ATRIAL FIBRILLATION WITH RAPID VENTRICULAR RESPONSE (CMD): ICD-10-CM

## 2022-01-01 DIAGNOSIS — I73.9 PVD (PERIPHERAL VASCULAR DISEASE) (CMD): ICD-10-CM

## 2022-01-01 DIAGNOSIS — R26.9 GAIT ABNORMALITY: ICD-10-CM

## 2022-01-01 DIAGNOSIS — E08.621 DIABETIC ULCER OF LEFT HEEL ASSOCIATED WITH DIABETES MELLITUS DUE TO UNDERLYING CONDITION, WITH NECROSIS OF BONE (CMD): ICD-10-CM

## 2022-01-01 DIAGNOSIS — A41.9 SEPSIS, DUE TO UNSPECIFIED ORGANISM, UNSPECIFIED WHETHER ACUTE ORGAN DYSFUNCTION PRESENT (CMD): Primary | ICD-10-CM

## 2022-01-01 DIAGNOSIS — I46.9 CARDIAC ARREST (HCC): Primary | ICD-10-CM

## 2022-01-01 DIAGNOSIS — I50.9 ACUTE ON CHRONIC CONGESTIVE HEART FAILURE, UNSPECIFIED HEART FAILURE TYPE (HCC): ICD-10-CM

## 2022-01-01 DIAGNOSIS — E16.2 HYPOGLYCEMIA: ICD-10-CM

## 2022-01-01 LAB
A BAUMANNII DNA BLD POS QL NAA+NON-PROBE: NOT DETECTED
ACANTHOCYTES BLD QL SMEAR: NORMAL
ALBUMIN SERPL-MCNC: 1.1 G/DL (ref 3.4–5)
ALBUMIN SERPL-MCNC: 1.3 G/DL (ref 3.6–5.1)
ALBUMIN SERPL-MCNC: 1.5 G/DL (ref 3.4–5)
ALBUMIN SERPL-MCNC: 1.5 G/DL (ref 3.4–5)
ALBUMIN SERPL-MCNC: 1.7 G/DL (ref 3.6–5.1)
ALBUMIN/GLOB SERPL: 0.3 {RATIO} (ref 1–2)
ALBUMIN/GLOB SERPL: 0.3 {RATIO} (ref 1–2.4)
ALBUMIN/GLOB SERPL: 0.4 {RATIO} (ref 1–2.4)
ALBUMIN/GLOB SERPL: 0.5 {RATIO} (ref 1–2)
ALBUMIN/GLOB SERPL: 0.5 {RATIO} (ref 1–2)
ALP LIVER SERPL-CCNC: 101 U/L
ALP LIVER SERPL-CCNC: 75 U/L
ALP LIVER SERPL-CCNC: 96 U/L
ALP SERPL-CCNC: 78 UNITS/L (ref 45–117)
ALP SERPL-CCNC: 80 UNITS/L (ref 45–117)
ALT SERPL-CCNC: 11 UNITS/L
ALT SERPL-CCNC: 131 U/L
ALT SERPL-CCNC: 14 UNITS/L
ALT SERPL-CCNC: 164 U/L
ALT SERPL-CCNC: 68 U/L
ANION GAP SERPL CALC-SCNC: 10 MMOL/L (ref 10–20)
ANION GAP SERPL CALC-SCNC: 11 MMOL/L (ref 10–20)
ANION GAP SERPL CALC-SCNC: 12 MMOL/L (ref 0–18)
ANION GAP SERPL CALC-SCNC: 14 MMOL/L (ref 10–20)
ANION GAP SERPL CALC-SCNC: 5 MMOL/L (ref 0–18)
ANION GAP SERPL CALC-SCNC: 7 MMOL/L (ref 10–20)
ANION GAP SERPL CALC-SCNC: 8 MMOL/L (ref 0–18)
ANION GAP SERPL CALC-SCNC: 8 MMOL/L (ref 10–20)
ANION GAP SERPL CALC-SCNC: 9 MMOL/L (ref 10–20)
ANION GAP SERPL CALC-SCNC: 9 MMOL/L (ref 10–20)
APPEARANCE UR: ABNORMAL
APTT PPP: 51.2 SECONDS (ref 23.3–35.6)
ASR DISCLAIMER: NORMAL
AST SERPL-CCNC: 1439 U/L (ref 15–37)
AST SERPL-CCNC: 25 UNITS/L
AST SERPL-CCNC: 29 UNITS/L
AST SERPL-CCNC: 485 U/L (ref 15–37)
AST SERPL-CCNC: 656 U/L (ref 15–37)
ATRIAL RATE (BPM): 170
ATRIAL RATE: 64 BPM
ATRIAL RATE: 70 BPM
B FRAGILIS DNA BLD POS QL NAA+NON-PROBE: NOT DETECTED
BACTERIA #/AREA URNS HPF: ABNORMAL /HPF
BACTERIA BLD CULT: ABNORMAL
BACTERIA BLD CULT: ABNORMAL
BACTERIA BLD CULT: NORMAL
BACTERIA BLD CULT: NORMAL
BACTERIA UR CULT: ABNORMAL
BASE EXCESS BLDA CALC-SCNC: 11 MMOL/L (ref ?–2)
BASE EXCESS BLDA CALC-SCNC: 2.3 MMOL/L (ref ?–2)
BASE EXCESS BLDA CALC-SCNC: 7.3 MMOL/L (ref ?–2)
BASOPHILS # BLD AUTO: 0.05 X10(3) UL (ref 0–0.2)
BASOPHILS # BLD AUTO: 0.09 X10(3) UL (ref 0–0.2)
BASOPHILS # BLD: 0 K/MCL (ref 0–0.3)
BASOPHILS # BLD: 0.1 K/MCL (ref 0–0.3)
BASOPHILS NFR BLD AUTO: 0.2 %
BASOPHILS NFR BLD AUTO: 0.7 %
BASOPHILS NFR BLD: 0 %
BASOPHILS NFR BLD: 1 %
BILIRUB SERPL-MCNC: 0.5 MG/DL (ref 0.2–1)
BILIRUB SERPL-MCNC: 0.7 MG/DL (ref 0.1–2)
BILIRUB SERPL-MCNC: 0.8 MG/DL (ref 0.2–1)
BILIRUB SERPL-MCNC: 2.6 MG/DL (ref 0.1–2)
BILIRUB SERPL-MCNC: 2.7 MG/DL (ref 0.1–2)
BILIRUB UR QL STRIP.AUTO: NEGATIVE
BILIRUB UR QL STRIP: NEGATIVE
BODY TEMPERATURE: 93.9 F
BODY TEMPERATURE: 98.6 F
BODY TEMPERATURE: 98.6 F
BUN BLD-MCNC: 37 MG/DL (ref 7–18)
BUN BLD-MCNC: 38 MG/DL (ref 7–18)
BUN BLD-MCNC: 39 MG/DL (ref 7–18)
BUN BLD-MCNC: 40 MG/DL (ref 7–18)
BUN BLD-MCNC: 41 MG/DL (ref 7–18)
BUN SERPL-MCNC: 21 MG/DL (ref 6–20)
BUN SERPL-MCNC: 24 MG/DL (ref 6–20)
BUN SERPL-MCNC: 26 MG/DL (ref 6–20)
BUN SERPL-MCNC: 27 MG/DL (ref 6–20)
BUN SERPL-MCNC: 31 MG/DL (ref 6–20)
BUN SERPL-MCNC: 33 MG/DL (ref 6–20)
BUN SERPL-MCNC: 33 MG/DL (ref 6–20)
BUN SERPL-MCNC: 39 MG/DL (ref 6–20)
BUN SERPL-MCNC: 39 MG/DL (ref 6–20)
BUN/CREAT SERPL: 16 (ref 7–25)
BUN/CREAT SERPL: 16 (ref 7–25)
BUN/CREAT SERPL: 20 (ref 7–25)
BUN/CREAT SERPL: 21 (ref 7–25)
BUN/CREAT SERPL: 21 (ref 7–25)
BUN/CREAT SERPL: 22 (ref 7–25)
BUN/CREAT SERPL: 24 (ref 7–25)
BUN/CREAT SERPL: 25 (ref 7–25)
BUN/CREAT SERPL: 25 (ref 7–25)
BURR CELLS BLD QL SMEAR: ABNORMAL
BURR CELLS BLD QL SMEAR: ABNORMAL
BURR CELLS BLD QL SMEAR: NORMAL
BURR CELLS BLD QL SMEAR: NORMAL
C ALBICANS DNA BLD POS QL NAA+NON-PROBE: NOT DETECTED
C AURIS DNA BLD POS QL NAA+NON-PROBE: NOT DETECTED
C GATTII+NEOFOR DNA BLD POS QL NAA+N-PRB: NOT DETECTED
C GLABRATA DNA BLD POS QL NAA+NON-PROBE: NOT DETECTED
C KRUSEI DNA BLD POS QL NAA+NON-PROBE: NOT DETECTED
C PARAP DNA BLD POS QL NAA+NON-PROBE: NOT DETECTED
C TROPICLS DNA BLD POS QL NAA+NON-PROBE: NOT DETECTED
CA-I BLD-SCNC: 1.06 MMOL/L (ref 0.95–1.32)
CA-I BLD-SCNC: 1.16 MMOL/L (ref 0.95–1.32)
CALCIUM BLD-MCNC: 7.2 MG/DL (ref 8.5–10.1)
CALCIUM BLD-MCNC: 7.8 MG/DL (ref 8.5–10.1)
CALCIUM BLD-MCNC: 8.1 MG/DL (ref 8.5–10.1)
CALCIUM BLD-MCNC: 8.2 MG/DL (ref 8.5–10.1)
CALCIUM BLD-MCNC: 9.6 MG/DL (ref 8.5–10.1)
CALCIUM SERPL-MCNC: 8 MG/DL (ref 8.4–10.2)
CALCIUM SERPL-MCNC: 8.2 MG/DL (ref 8.4–10.2)
CALCIUM SERPL-MCNC: 8.3 MG/DL (ref 8.4–10.2)
CALCIUM SERPL-MCNC: 8.4 MG/DL (ref 8.4–10.2)
CALCIUM SERPL-MCNC: 8.6 MG/DL (ref 8.4–10.2)
CALCIUM SERPL-MCNC: 8.7 MG/DL (ref 8.4–10.2)
CALCIUM SERPL-MCNC: 9.2 MG/DL (ref 8.4–10.2)
CALCIUM SERPL-MCNC: 9.4 MG/DL (ref 8.4–10.2)
CALCIUM SERPL-MCNC: 9.9 MG/DL (ref 8.4–10.2)
CASE RPRT: NORMAL
CHLORIDE SERPL-SCNC: 100 MMOL/L (ref 98–112)
CHLORIDE SERPL-SCNC: 102 MMOL/L (ref 98–107)
CHLORIDE SERPL-SCNC: 103 MMOL/L (ref 98–112)
CHLORIDE SERPL-SCNC: 104 MMOL/L (ref 98–112)
CHLORIDE SERPL-SCNC: 105 MMOL/L (ref 98–107)
CHLORIDE SERPL-SCNC: 106 MMOL/L (ref 98–107)
CHLORIDE SERPL-SCNC: 107 MMOL/L (ref 98–107)
CHLORIDE SERPL-SCNC: 107 MMOL/L (ref 98–107)
CHLORIDE SERPL-SCNC: 108 MMOL/L (ref 98–107)
CLARITY UR REFRACT.AUTO: CLEAR
CLINICAL INFO: NORMAL
CO2 SERPL-SCNC: 25 MMOL/L (ref 21–32)
CO2 SERPL-SCNC: 26 MMOL/L (ref 21–32)
CO2 SERPL-SCNC: 27 MMOL/L (ref 21–32)
CO2 SERPL-SCNC: 28 MMOL/L (ref 21–32)
CO2 SERPL-SCNC: 29 MMOL/L (ref 21–32)
CO2 SERPL-SCNC: 29 MMOL/L (ref 21–32)
CO2 SERPL-SCNC: 30 MMOL/L (ref 21–32)
CO2 SERPL-SCNC: 32 MMOL/L (ref 21–32)
CO2 SERPL-SCNC: 33 MMOL/L (ref 21–32)
CO2 SERPL-SCNC: 35 MMOL/L (ref 21–32)
COHGB MFR BLD: 1.5 % SAT (ref 0–3)
COHGB MFR BLD: 1.7 % SAT (ref 0–3)
COHGB MFR BLD: 2 % SAT (ref 0–3)
COLOR UR AUTO: YELLOW
COLOR UR: YELLOW
CREAT BLD-MCNC: 1.7 MG/DL
CREAT BLD-MCNC: 1.88 MG/DL
CREAT BLD-MCNC: 1.9 MG/DL
CREAT BLD-MCNC: 1.95 MG/DL
CREAT BLD-MCNC: 2.18 MG/DL
CREAT SERPL-MCNC: 1.06 MG/DL (ref 0.67–1.17)
CREAT SERPL-MCNC: 1.14 MG/DL (ref 0.67–1.17)
CREAT SERPL-MCNC: 1.16 MG/DL (ref 0.67–1.17)
CREAT SERPL-MCNC: 1.17 MG/DL (ref 0.67–1.17)
CREAT SERPL-MCNC: 1.3 MG/DL (ref 0.67–1.17)
CREAT SERPL-MCNC: 1.58 MG/DL (ref 0.67–1.17)
CREAT SERPL-MCNC: 1.85 MG/DL (ref 0.67–1.17)
CREAT SERPL-MCNC: 1.89 MG/DL (ref 0.67–1.17)
CREAT SERPL-MCNC: 2.03 MG/DL (ref 0.67–1.17)
DEPRECATED RDW RBC: 64.4 FL (ref 39–50)
DEPRECATED RDW RBC: 65.1 FL (ref 39–50)
DEPRECATED RDW RBC: 65.1 FL (ref 39–50)
DEPRECATED RDW RBC: 65.6 FL (ref 39–50)
DEPRECATED RDW RBC: 65.9 FL (ref 39–50)
DEPRECATED RDW RBC: 66.3 FL (ref 39–50)
DEPRECATED RDW RBC: 66.6 FL (ref 39–50)
E CLOAC COMP DNA BLD POS NAA+NON-PROBE: NOT DETECTED
E COLI DNA BLD POS QL NAA+NON-PROBE: NOT DETECTED
E FAECALIS DNA BLD POS QL NAA+NON-PROBE: NOT DETECTED
E FAECIUM DNA BLD POS QL NAA+NON-PROBE: NOT DETECTED
ENTEROBACTERALES DNA BLD POS NAA+N-PRB: NOT DETECTED
EOSINOPHIL # BLD AUTO: 0 X10(3) UL (ref 0–0.7)
EOSINOPHIL # BLD AUTO: 0.07 X10(3) UL (ref 0–0.7)
EOSINOPHIL # BLD: 0 K/MCL (ref 0–0.5)
EOSINOPHIL # BLD: 0.1 K/MCL (ref 0–0.5)
EOSINOPHIL # BLD: 0.2 K/MCL (ref 0–0.5)
EOSINOPHIL NFR BLD AUTO: 0 %
EOSINOPHIL NFR BLD AUTO: 0.5 %
EOSINOPHIL NFR BLD: 0 %
EOSINOPHIL NFR BLD: 1 %
EOSINOPHIL NFR BLD: 2 %
ERYTHROCYTE [DISTWIDTH] IN BLOOD BY AUTOMATED COUNT: 28.3 %
ERYTHROCYTE [DISTWIDTH] IN BLOOD: 22.6 % (ref 11–15)
ERYTHROCYTE [DISTWIDTH] IN BLOOD: 22.7 % (ref 11–15)
ERYTHROCYTE [DISTWIDTH] IN BLOOD: 22.8 % (ref 11–15)
ERYTHROCYTE [DISTWIDTH] IN BLOOD: 22.9 % (ref 11–15)
ERYTHROCYTE [DISTWIDTH] IN BLOOD: 23.1 % (ref 11–15)
ERYTHROCYTE [DISTWIDTH] IN BLOOD: 23.2 % (ref 11–15)
ERYTHROCYTE [DISTWIDTH] IN BLOOD: 23.5 % (ref 11–15)
EST. AVERAGE GLUCOSE BLD GHB EST-MCNC: 120 MG/DL (ref 68–126)
FASTING DURATION TIME PATIENT: ABNORMAL H
FIO2: 100 %
FIO2: 50 %
FIO2: 60 %
FLUAV RNA RESP QL NAA+PROBE: NOT DETECTED
FLUBV RNA RESP QL NAA+PROBE: NOT DETECTED
GFR SERPLBLD BASED ON 1.73 SQ M-ARVRAT: 33 ML/MIN
GFR SERPLBLD BASED ON 1.73 SQ M-ARVRAT: 36 ML/MIN
GFR SERPLBLD BASED ON 1.73 SQ M-ARVRAT: 37 ML/MIN
GFR SERPLBLD BASED ON 1.73 SQ M-ARVRAT: 45 ML/MIN
GFR SERPLBLD BASED ON 1.73 SQ M-ARVRAT: 56 ML/MIN
GFR SERPLBLD BASED ON 1.73 SQ M-ARVRAT: 64 ML/MIN
GFR SERPLBLD BASED ON 1.73 SQ M-ARVRAT: 65 ML/MIN
GFR SERPLBLD BASED ON 1.73 SQ M-ARVRAT: 66 ML/MIN
GFR SERPLBLD BASED ON 1.73 SQ M-ARVRAT: 72 ML/MIN
GLOBULIN PLAS-MCNC: 3.2 G/DL (ref 2.8–4.4)
GLOBULIN PLAS-MCNC: 3.3 G/DL (ref 2.8–4.4)
GLOBULIN PLAS-MCNC: 3.5 G/DL (ref 2.8–4.4)
GLOBULIN SER-MCNC: 3.8 G/DL (ref 2–4)
GLOBULIN SER-MCNC: 4.7 G/DL (ref 2–4)
GLUCOSE BLD-MCNC: 122 MG/DL (ref 70–99)
GLUCOSE BLD-MCNC: 125 MG/DL (ref 70–99)
GLUCOSE BLD-MCNC: 129 MG/DL (ref 70–99)
GLUCOSE BLD-MCNC: 131 MG/DL (ref 70–99)
GLUCOSE BLD-MCNC: 159 MG/DL (ref 70–99)
GLUCOSE BLD-MCNC: 168 MG/DL (ref 70–99)
GLUCOSE BLD-MCNC: 171 MG/DL (ref 70–99)
GLUCOSE BLD-MCNC: 179 MG/DL (ref 70–99)
GLUCOSE BLD-MCNC: 180 MG/DL (ref 70–99)
GLUCOSE BLD-MCNC: 182 MG/DL (ref 70–99)
GLUCOSE BLD-MCNC: 191 MG/DL (ref 70–99)
GLUCOSE BLD-MCNC: 194 MG/DL (ref 70–99)
GLUCOSE BLDC GLUCOMTR-MCNC: 101 MG/DL (ref 70–99)
GLUCOSE BLDC GLUCOMTR-MCNC: 105 MG/DL (ref 70–99)
GLUCOSE BLDC GLUCOMTR-MCNC: 105 MG/DL (ref 70–99)
GLUCOSE BLDC GLUCOMTR-MCNC: 107 MG/DL (ref 70–99)
GLUCOSE BLDC GLUCOMTR-MCNC: 107 MG/DL (ref 70–99)
GLUCOSE BLDC GLUCOMTR-MCNC: 109 MG/DL (ref 70–99)
GLUCOSE BLDC GLUCOMTR-MCNC: 115 MG/DL (ref 70–99)
GLUCOSE BLDC GLUCOMTR-MCNC: 124 MG/DL (ref 70–99)
GLUCOSE BLDC GLUCOMTR-MCNC: 130 MG/DL (ref 70–99)
GLUCOSE BLDC GLUCOMTR-MCNC: 132 MG/DL (ref 70–99)
GLUCOSE BLDC GLUCOMTR-MCNC: 133 MG/DL (ref 70–99)
GLUCOSE BLDC GLUCOMTR-MCNC: 136 MG/DL (ref 70–99)
GLUCOSE BLDC GLUCOMTR-MCNC: 137 MG/DL (ref 70–99)
GLUCOSE BLDC GLUCOMTR-MCNC: 139 MG/DL (ref 70–99)
GLUCOSE BLDC GLUCOMTR-MCNC: 148 MG/DL (ref 70–99)
GLUCOSE BLDC GLUCOMTR-MCNC: 161 MG/DL (ref 70–99)
GLUCOSE BLDC GLUCOMTR-MCNC: 168 MG/DL (ref 70–99)
GLUCOSE BLDC GLUCOMTR-MCNC: 171 MG/DL (ref 70–99)
GLUCOSE BLDC GLUCOMTR-MCNC: 173 MG/DL (ref 70–99)
GLUCOSE BLDC GLUCOMTR-MCNC: 176 MG/DL (ref 70–99)
GLUCOSE BLDC GLUCOMTR-MCNC: 204 MG/DL (ref 70–99)
GLUCOSE BLDC GLUCOMTR-MCNC: 226 MG/DL (ref 70–99)
GLUCOSE BLDC GLUCOMTR-MCNC: 35 MG/DL (ref 70–99)
GLUCOSE BLDC GLUCOMTR-MCNC: 53 MG/DL (ref 70–99)
GLUCOSE BLDC GLUCOMTR-MCNC: 67 MG/DL (ref 70–99)
GLUCOSE BLDC GLUCOMTR-MCNC: 74 MG/DL (ref 70–99)
GLUCOSE BLDC GLUCOMTR-MCNC: 77 MG/DL (ref 70–99)
GLUCOSE BLDC GLUCOMTR-MCNC: 78 MG/DL (ref 70–99)
GLUCOSE BLDC GLUCOMTR-MCNC: 80 MG/DL (ref 70–99)
GLUCOSE BLDC GLUCOMTR-MCNC: 81 MG/DL (ref 70–99)
GLUCOSE BLDC GLUCOMTR-MCNC: 86 MG/DL (ref 70–99)
GLUCOSE BLDC GLUCOMTR-MCNC: 86 MG/DL (ref 70–99)
GLUCOSE BLDC GLUCOMTR-MCNC: 88 MG/DL (ref 70–99)
GLUCOSE BLDC GLUCOMTR-MCNC: 89 MG/DL (ref 70–99)
GLUCOSE BLDC GLUCOMTR-MCNC: 90 MG/DL (ref 70–99)
GLUCOSE BLDC GLUCOMTR-MCNC: 92 MG/DL (ref 70–99)
GLUCOSE BLDC GLUCOMTR-MCNC: 92 MG/DL (ref 70–99)
GLUCOSE BLDC GLUCOMTR-MCNC: 93 MG/DL (ref 70–99)
GLUCOSE BLDC GLUCOMTR-MCNC: 95 MG/DL (ref 70–99)
GLUCOSE SERPL-MCNC: 110 MG/DL (ref 70–99)
GLUCOSE SERPL-MCNC: 122 MG/DL (ref 70–99)
GLUCOSE SERPL-MCNC: 153 MG/DL (ref 70–99)
GLUCOSE SERPL-MCNC: 158 MG/DL (ref 70–99)
GLUCOSE SERPL-MCNC: 42 MG/DL (ref 70–99)
GLUCOSE SERPL-MCNC: 84 MG/DL (ref 70–99)
GLUCOSE SERPL-MCNC: 85 MG/DL (ref 70–99)
GLUCOSE SERPL-MCNC: 95 MG/DL (ref 70–99)
GLUCOSE SERPL-MCNC: 96 MG/DL (ref 70–99)
GLUCOSE UR STRIP-MCNC: NEGATIVE MG/DL
GLUCOSE UR STRIP.AUTO-MCNC: NEGATIVE MG/DL
GP B STREP DNA CSF QL NAA+NON-PROBE: NOT DETECTED
GRAM STN SPEC: ABNORMAL
GRAM STN SPEC: ABNORMAL
HAEM INFLU DNA BLD POS QL NAA+NON-PROBE: NOT DETECTED
HBA1C MFR BLD: 5.8 % (ref 4.5–5.6)
HBA1C MFR BLD: 5.8 % (ref ?–5.7)
HCO3 BLDA-SCNC: 26.7 MEQ/L (ref 21–27)
HCO3 BLDA-SCNC: 30.6 MEQ/L (ref 21–27)
HCO3 BLDA-SCNC: 33.5 MEQ/L (ref 21–27)
HCT VFR BLD AUTO: 25.8 %
HCT VFR BLD AUTO: 28.8 %
HCT VFR BLD AUTO: 29.1 %
HCT VFR BLD CALC: 30.5 % (ref 39–51)
HCT VFR BLD CALC: 30.8 % (ref 39–51)
HCT VFR BLD CALC: 30.9 % (ref 39–51)
HCT VFR BLD CALC: 31.3 % (ref 39–51)
HCT VFR BLD CALC: 31.8 % (ref 39–51)
HCT VFR BLD CALC: 31.8 % (ref 39–51)
HCT VFR BLD CALC: 40.2 % (ref 39–51)
HGB BLD-MCNC: 11.9 G/DL (ref 13–17)
HGB BLD-MCNC: 7.8 G/DL
HGB BLD-MCNC: 8.3 G/DL
HGB BLD-MCNC: 8.3 G/DL
HGB BLD-MCNC: 8.5 G/DL
HGB BLD-MCNC: 8.6 G/DL
HGB BLD-MCNC: 9 G/DL (ref 13–17)
HGB BLD-MCNC: 9.1 G/DL (ref 13–17)
HGB BLD-MCNC: 9.2 G/DL (ref 13–17)
HGB BLD-MCNC: 9.3 G/DL
HGB BLD-MCNC: 9.3 G/DL (ref 13–17)
HGB BLD-MCNC: 9.4 G/DL (ref 13–17)
HGB BLD-MCNC: 9.6 G/DL (ref 13–17)
HGB UR QL STRIP: ABNORMAL
HYALINE CASTS #/AREA URNS LPF: ABNORMAL /LPF
IMM GRANULOCYTES # BLD AUTO: 0.2 K/MCL (ref 0–0.2)
IMM GRANULOCYTES # BLD AUTO: 0.21 X10(3) UL (ref 0–1)
IMM GRANULOCYTES # BLD AUTO: 0.45 X10(3) UL (ref 0–1)
IMM GRANULOCYTES # BLD: 2 %
IMM GRANULOCYTES NFR BLD: 0.8 %
IMM GRANULOCYTES NFR BLD: 3.5 %
INR BLD: 5.4 (ref 0.8–1.2)
INR BLD: 7.42 (ref 0.8–1.2)
INR BLD: 7.57 (ref 0.8–1.2)
K OXYTOCA DNA BLD POS QL NAA+NON-PROBE: NOT DETECTED
KETONES UR STRIP-MCNC: NEGATIVE MG/DL
KETONES UR STRIP.AUTO-MCNC: NEGATIVE MG/DL
KLEBSIELLA SP DNA BLD POS QL NAA+NON-PRB: NOT DETECTED
KLEBSIELLA SP DNA BLD POS QL NAA+NON-PRB: NOT DETECTED
L MONOCYTOG DNA BLD POS QL NAA+NON-PROBE: NOT DETECTED
LACTATE BLD-SCNC: 3.7 MMOL/L (ref 0.5–2)
LACTATE BLD-SCNC: 4.8 MMOL/L (ref 0.5–2)
LACTATE BLD-SCNC: 8.5 MMOL/L (ref 0.5–2)
LACTATE BLDV-SCNC: 1.8 MMOL/L (ref 0–2)
LACTATE BLDV-SCNC: 1.9 MMOL/L (ref 0–2)
LACTATE BLDV-SCNC: 3.8 MMOL/L (ref 0–2)
LACTATE BLDV-SCNC: 4.2 MMOL/L
LACTATE BLDV-SCNC: 4.5 MMOL/L (ref 0–2)
LACTATE SERPL-SCNC: 10.7 MMOL/L (ref 0.4–2)
LACTATE SERPL-SCNC: 4.8 MMOL/L (ref 0.4–2)
LACTATE SERPL-SCNC: 5.8 MMOL/L (ref 0.4–2)
LACTATE SERPL-SCNC: 6.1 MMOL/L (ref 0.4–2)
LEUKOCYTE ESTERASE UR QL STRIP: ABNORMAL
LG PLATELETS BLD QL SMEAR: PRESENT
LYMPHOCYTES # BLD AUTO: 0.7 X10(3) UL (ref 1–4)
LYMPHOCYTES # BLD AUTO: 6.36 X10(3) UL (ref 1–4)
LYMPHOCYTES # BLD: 1 K/MCL (ref 1–4)
LYMPHOCYTES # BLD: 1.2 K/MCL (ref 1–4)
LYMPHOCYTES # BLD: 1.3 K/MCL (ref 1–4)
LYMPHOCYTES # BLD: 1.4 K/MCL (ref 1–4)
LYMPHOCYTES # BLD: 1.5 K/MCL (ref 1–4)
LYMPHOCYTES # BLD: 1.5 K/MCL (ref 1–4)
LYMPHOCYTES # BLD: 1.8 K/MCL (ref 1–4)
LYMPHOCYTES NFR BLD AUTO: 2.7 %
LYMPHOCYTES NFR BLD AUTO: 49.5 %
LYMPHOCYTES NFR BLD: 10 %
LYMPHOCYTES NFR BLD: 14 %
LYMPHOCYTES NFR BLD: 15 %
LYMPHOCYTES NFR BLD: 16 %
LYMPHOCYTES NFR BLD: 17 %
MAGNESIUM SERPL-MCNC: 1.5 MG/DL (ref 1.6–2.6)
MAGNESIUM SERPL-MCNC: 2.1 MG/DL (ref 1.6–2.6)
MCH RBC QN AUTO: 23.2 PG (ref 26–34)
MCH RBC QN AUTO: 23.3 PG (ref 26–34)
MCH RBC QN AUTO: 23.3 PG (ref 26–34)
MCH RBC QN AUTO: 23.5 PG (ref 26–34)
MCH RBC QN AUTO: 23.5 PG (ref 26–34)
MCH RBC QN AUTO: 23.7 PG (ref 26–34)
MCH RBC QN AUTO: 23.8 PG (ref 26–34)
MCH RBC QN AUTO: 24.1 PG (ref 26–34)
MCH RBC QN AUTO: 24.1 PG (ref 26–34)
MCH RBC QN AUTO: 24.5 PG (ref 26–34)
MCHC RBC AUTO-ENTMCNC: 28.5 G/DL (ref 31–37)
MCHC RBC AUTO-ENTMCNC: 29.1 G/DL (ref 32–36.5)
MCHC RBC AUTO-ENTMCNC: 29.2 G/DL (ref 32–36.5)
MCHC RBC AUTO-ENTMCNC: 29.5 G/DL (ref 31–37)
MCHC RBC AUTO-ENTMCNC: 29.5 G/DL (ref 32–36.5)
MCHC RBC AUTO-ENTMCNC: 29.6 G/DL (ref 32–36.5)
MCHC RBC AUTO-ENTMCNC: 29.9 G/DL (ref 32–36.5)
MCHC RBC AUTO-ENTMCNC: 30.2 G/DL (ref 31–37)
MCHC RBC AUTO-ENTMCNC: 30.2 G/DL (ref 32–36.5)
MCHC RBC AUTO-ENTMCNC: 30.4 G/DL (ref 32–36.5)
MCV RBC AUTO: 77.9 FL (ref 78–100)
MCV RBC AUTO: 78.2 FL (ref 78–100)
MCV RBC AUTO: 79 FL (ref 78–100)
MCV RBC AUTO: 79.2 FL (ref 78–100)
MCV RBC AUTO: 79.3 FL (ref 78–100)
MCV RBC AUTO: 79.5 FL (ref 78–100)
MCV RBC AUTO: 79.8 FL (ref 78–100)
MCV RBC AUTO: 81.1 FL
MCV RBC AUTO: 81.6 FL
MCV RBC AUTO: 84.3 FL
METHGB MFR BLD: 0.4 % SAT (ref 0.4–1.5)
METHGB MFR BLD: 0.9 % SAT (ref 0.4–1.5)
METHGB MFR BLD: 1.4 % SAT (ref 0.4–1.5)
MICROCYTES BLD QL SMEAR: NORMAL
MONOCYTES # BLD AUTO: 0.68 X10(3) UL (ref 0.1–1)
MONOCYTES # BLD AUTO: 0.72 X10(3) UL (ref 0.1–1)
MONOCYTES # BLD: 0.2 K/MCL (ref 0.3–0.9)
MONOCYTES # BLD: 0.3 K/MCL (ref 0.3–0.9)
MONOCYTES # BLD: 0.4 K/MCL (ref 0.3–0.9)
MONOCYTES # BLD: 0.6 K/MCL (ref 0.3–0.9)
MONOCYTES # BLD: 0.7 K/MCL (ref 0.3–0.9)
MONOCYTES # BLD: 0.8 K/MCL (ref 0.3–0.9)
MONOCYTES # BLD: 0.9 K/MCL (ref 0.3–0.9)
MONOCYTES NFR BLD AUTO: 2.8 %
MONOCYTES NFR BLD AUTO: 5.3 %
MONOCYTES NFR BLD: 3 %
MONOCYTES NFR BLD: 4 %
MONOCYTES NFR BLD: 4 %
MONOCYTES NFR BLD: 6 %
MONOCYTES NFR BLD: 7 %
MONOCYTES NFR BLD: 9 %
MONOCYTES NFR BLD: 9 %
N MEN DNA BLD POS QL NAA+NON-PROBE: NOT DETECTED
NEUTROPHILS # BLD AUTO: 23.84 X10 (3) UL (ref 1.5–7.7)
NEUTROPHILS # BLD AUTO: 23.84 X10(3) UL (ref 1.5–7.7)
NEUTROPHILS # BLD AUTO: 5.21 X10 (3) UL (ref 1.5–7.7)
NEUTROPHILS # BLD AUTO: 5.21 X10(3) UL (ref 1.5–7.7)
NEUTROPHILS # BLD: 5.9 K/MCL (ref 1.8–7.7)
NEUTROPHILS # BLD: 6.2 K/MCL (ref 1.8–7.7)
NEUTROPHILS # BLD: 6.3 K/MCL (ref 1.8–7.7)
NEUTROPHILS # BLD: 7.3 K/MCL (ref 1.8–7.7)
NEUTROPHILS # BLD: 7.5 K/MCL (ref 1.8–7.7)
NEUTROPHILS # BLD: 8 K/MCL (ref 1.8–7.7)
NEUTROPHILS # BLD: 8.1 K/MCL (ref 1.8–7.7)
NEUTROPHILS NFR BLD AUTO: 40.5 %
NEUTROPHILS NFR BLD AUTO: 93.5 %
NEUTROPHILS NFR BLD: 71 %
NEUTROPHILS NFR BLD: 72 %
NEUTROPHILS NFR BLD: 79 %
NEUTS BAND NFR BLD: 10 % (ref 0–10)
NEUTS BAND NFR BLD: 2 % (ref 0–10)
NEUTS BAND NFR BLD: 4 % (ref 0–10)
NEUTS BAND NFR BLD: 8 % (ref 0–10)
NEUTS SEG NFR BLD: 66 %
NEUTS SEG NFR BLD: 68 %
NEUTS SEG NFR BLD: 74 %
NEUTS SEG NFR BLD: 77 %
NITRITE UR QL STRIP.AUTO: NEGATIVE
NITRITE UR QL STRIP: NEGATIVE
NRBC BLD MANUAL-RTO: 0 /100 WBC
NT-PROBNP SERPL-MCNC: ABNORMAL PG/ML (ref ?–125)
OSMOLALITY SERPL CALC.SUM OF ELEC: 305 MOSM/KG (ref 275–295)
OSMOLALITY SERPL CALC.SUM OF ELEC: 309 MOSM/KG (ref 275–295)
OSMOLALITY SERPL CALC.SUM OF ELEC: 312 MOSM/KG (ref 275–295)
OSMOLALITY SERPL CALC.SUM OF ELEC: 313 MOSM/KG (ref 275–295)
OSMOLALITY SERPL CALC.SUM OF ELEC: 316 MOSM/KG (ref 275–295)
OVALOCYTES BLD QL SMEAR: ABNORMAL
OVALOCYTES BLD QL SMEAR: ABNORMAL
OVALOCYTES BLD QL SMEAR: NORMAL
OXYHGB MFR BLDA: 96.4 % (ref 92–100)
OXYHGB MFR BLDA: 96.5 % (ref 92–100)
OXYHGB MFR BLDA: 97.3 % (ref 92–100)
P AERUGINOSA DNA BLD POS NAA+NON-PROBE: NOT DETECTED
P AXIS: 47 DEGREES
P AXIS: 65 DEGREES
P-R INTERVAL: 208 MS
P-R INTERVAL: 260 MS
P/F RATIO: 175 MMHG
P/F RATIO: 183 MMHG
PATH REPORT.FINAL DX SPEC: NORMAL
PATH REPORT.GROSS SPEC: NORMAL
PCO2 BLDA: 46 MM HG (ref 35–45)
PCO2 BLDA: 48 MM HG (ref 35–45)
PCO2 BLDA: 67 MM HG (ref 35–45)
PEEP: 5 CM H2O
PH BLDA: 7.26 [PH] (ref 7.35–7.45)
PH BLDA: 7.45 [PH] (ref 7.35–7.45)
PH BLDA: 7.48 [PH] (ref 7.35–7.45)
PH UR STRIP.AUTO: 6.5 [PH] (ref 5–8)
PH UR STRIP: 5 [PH] (ref 5–7)
PHOSPHATE SERPL-MCNC: 3.6 MG/DL (ref 2.5–4.9)
PLAT MORPH BLD: NORMAL
PLATELET # BLD AUTO: 254 10(3)UL (ref 150–450)
PLATELET # BLD AUTO: 273 10(3)UL (ref 150–450)
PLATELET # BLD AUTO: 280 10(3)UL (ref 150–450)
PLATELET # BLD AUTO: 363 K/MCL (ref 140–450)
PLATELET # BLD AUTO: 389 K/MCL (ref 140–450)
PLATELET # BLD AUTO: 396 K/MCL (ref 140–450)
PLATELET # BLD AUTO: 409 K/MCL (ref 140–450)
PLATELET # BLD AUTO: 427 K/MCL (ref 140–450)
PLATELET # BLD AUTO: 440 K/MCL (ref 140–450)
PLATELET # BLD AUTO: 530 K/MCL (ref 140–450)
PLATELET MORPHOLOGY: NORMAL
PO2 BLDA: 106 MM HG (ref 80–100)
PO2 BLDA: 183 MM HG (ref 80–100)
PO2 BLDA: 90 MM HG (ref 80–100)
POLYCHROMASIA BLD QL SMEAR: ABNORMAL
POLYCHROMASIA BLD QL SMEAR: ABNORMAL
POLYCHROMASIA BLD QL SMEAR: NORMAL
POTASSIUM BLD-SCNC: 3.3 MMOL/L (ref 3.6–5.1)
POTASSIUM BLD-SCNC: 3.5 MMOL/L (ref 3.6–5.1)
POTASSIUM SERPL-SCNC: 3 MMOL/L (ref 3.4–5.1)
POTASSIUM SERPL-SCNC: 3.1 MMOL/L (ref 3.5–5.1)
POTASSIUM SERPL-SCNC: 3.1 MMOL/L (ref 3.5–5.1)
POTASSIUM SERPL-SCNC: 3.4 MMOL/L (ref 3.5–5.1)
POTASSIUM SERPL-SCNC: 3.5 MMOL/L (ref 3.4–5.1)
POTASSIUM SERPL-SCNC: 3.5 MMOL/L (ref 3.5–5.1)
POTASSIUM SERPL-SCNC: 3.6 MMOL/L (ref 3.4–5.1)
POTASSIUM SERPL-SCNC: 3.7 MMOL/L (ref 3.4–5.1)
POTASSIUM SERPL-SCNC: 4 MMOL/L (ref 3.4–5.1)
POTASSIUM SERPL-SCNC: 4.2 MMOL/L (ref 3.4–5.1)
POTASSIUM SERPL-SCNC: 4.5 MMOL/L (ref 3.4–5.1)
POTASSIUM SERPL-SCNC: 5 MMOL/L (ref 3.4–5.1)
POTASSIUM SERPL-SCNC: 5.1 MMOL/L (ref 3.4–5.1)
POTASSIUM SERPL-SCNC: 5.8 MMOL/L (ref 3.5–5.1)
PROCALCITONIN SERPL IA-MCNC: 15.87 NG/ML
PROCALCITONIN SERPL-MCNC: 0.56 NG/ML (ref ?–0.16)
PROT SERPL-MCNC: 4.6 G/DL (ref 6.4–8.2)
PROT SERPL-MCNC: 4.7 G/DL (ref 6.4–8.2)
PROT SERPL-MCNC: 4.8 G/DL (ref 6.4–8.2)
PROT SERPL-MCNC: 5.1 G/DL (ref 6.4–8.2)
PROT SERPL-MCNC: 6.4 G/DL (ref 6.4–8.2)
PROT UR STRIP-MCNC: 30 MG/DL
PROTEUS SP DNA BLD POS QL NAA+NON-PROBE: NOT DETECTED
PROTHROMBIN TIME: 49.8 SECONDS (ref 11.6–14.8)
PROTHROMBIN TIME: 64 SECONDS (ref 11.6–14.8)
PROTHROMBIN TIME: 65 SECONDS (ref 11.6–14.8)
Q-T INTERVAL: 510 MS
Q-T INTERVAL: 520 MS
QRS DURATION: 142 MS
QRS DURATION: 224 MS
QRS-INTERVAL (MSEC): 154
QT-INTERVAL (MSEC): 388
QTC CALCULATION (BEZET): 536 MS
QTC CALCULATION (BEZET): 550 MS
QTC: 586
R AXIS (DEGREES): -59
R AXIS: -46 DEGREES
R AXIS: -73 DEGREES
RAINBOW EXTRA TUBES HOLD SPECIMEN: NORMAL
RBC # BLD AUTO: 3.18 X10(6)UL
RBC # BLD AUTO: 3.45 X10(6)UL
RBC # BLD AUTO: 3.53 X10(6)UL
RBC # BLD: 3.86 MIL/MCL (ref 4.5–5.9)
RBC # BLD: 3.89 MIL/MCL (ref 4.5–5.9)
RBC # BLD: 3.92 MIL/MCL (ref 4.5–5.9)
RBC # BLD: 3.95 MIL/MCL (ref 4.5–5.9)
RBC # BLD: 4 MIL/MCL (ref 4.5–5.9)
RBC # BLD: 4.08 MIL/MCL (ref 4.5–5.9)
RBC # BLD: 5.07 MIL/MCL (ref 4.5–5.9)
RBC #/AREA URNS HPF: ABNORMAL /HPF
REPORT TEXT: NORMAL
RSV AG NPH QL IA.RAPID: NOT DETECTED
S EPIDERMIDIS DNA BLD POS QL NAA+NON-PRB: NOT DETECTED
S LUGDUNENSIS DNA BLD POS QL NAA+NON-PRB: NOT DETECTED
S MALTOPHILIA DNA BLD POS QL NAA+NON-PRB: NOT DETECTED
S MARCESCENS DNA BLD POS NAA+NON-PROBE: NOT DETECTED
S PNEUM DNA BLD POS QL NAA+NON-PROBE: NOT DETECTED
S PYO DNA BLD POS QL NAA+NON-PROBE: NOT DETECTED
SALMONELLA DNA BLD POS QL NAA+NON-PROBE: NOT DETECTED
SARS-COV-2 RNA RESP QL NAA+PROBE: NOT DETECTED
SARS-COV-2 RNA RESP QL NAA+PROBE: NOT DETECTED
SCHISTOCYTES BLD QL SMEAR: ABNORMAL
SCHISTOCYTES BLD QL SMEAR: NORMAL
SCHISTOCYTES BLD QL SMEAR: NORMAL
SERVICE CMNT-IMP: NORMAL
SERVICE CMNT-IMP: NORMAL
SODIUM BLD-SCNC: 142 MMOL/L (ref 135–145)
SODIUM BLD-SCNC: 144 MMOL/L (ref 135–145)
SODIUM SERPL-SCNC: 137 MMOL/L (ref 135–145)
SODIUM SERPL-SCNC: 138 MMOL/L (ref 135–145)
SODIUM SERPL-SCNC: 140 MMOL/L (ref 135–145)
SODIUM SERPL-SCNC: 140 MMOL/L (ref 135–145)
SODIUM SERPL-SCNC: 140 MMOL/L (ref 136–145)
SODIUM SERPL-SCNC: 143 MMOL/L (ref 135–145)
SODIUM SERPL-SCNC: 144 MMOL/L (ref 135–145)
SODIUM SERPL-SCNC: 144 MMOL/L (ref 135–145)
SODIUM SERPL-SCNC: 144 MMOL/L (ref 136–145)
SODIUM SERPL-SCNC: 147 MMOL/L (ref 136–145)
SP GR UR STRIP.AUTO: >=1.03 (ref 1–1.03)
SP GR UR STRIP: 1.02 (ref 1–1.03)
SQUAMOUS #/AREA URNS HPF: ABNORMAL /HPF
STAPHYLOCOCCUS AUREUS: NOT DETECTED
STAPHYLOCOCCUS SPECIES: NOT DETECTED
STREPTOCOCCUS DNA BLD POS NAA+NON-PROBE: NOT DETECTED
T AXIS (DEGREES): 105
T AXIS: 125 DEGREES
T AXIS: 56 DEGREES
TIDAL VOLUME: 500 ML
TROPONIN I HIGH SENSITIVITY: 20 NG/L
TROPONIN I HIGH SENSITIVITY: 59 NG/L
TROPONIN I SERPL DL<=0.01 NG/ML-MCNC: 15 NG/L
UROBILINOGEN UR STRIP-MCNC: 0.2 MG/DL
UROBILINOGEN UR STRIP.AUTO-MCNC: 0.2 MG/DL
VARIANT LYMPHS NFR BLD: 1 % (ref 0–5)
VARIANT LYMPHS NFR BLD: 4 % (ref 0–5)
VENT RATE: 20 /MIN
VENTRICULAR RATE EKG/MIN (BPM): 137
VENTRICULAR RATE: 64 BPM
VENTRICULAR RATE: 70 BPM
WBC # BLD AUTO: 12.9 X10(3) UL (ref 4–11)
WBC # BLD AUTO: 17.1 X10(3) UL (ref 4–11)
WBC # BLD AUTO: 25.5 X10(3) UL (ref 4–11)
WBC # BLD: 10.2 K/MCL (ref 4.2–11)
WBC # BLD: 10.3 K/MCL (ref 4.2–11)
WBC # BLD: 10.8 K/MCL (ref 4.2–11)
WBC # BLD: 7.5 K/MCL (ref 4.2–11)
WBC # BLD: 8.1 K/MCL (ref 4.2–11)
WBC # BLD: 8.6 K/MCL (ref 4.2–11)
WBC # BLD: 9.3 K/MCL (ref 4.2–11)
WBC #/AREA URNS AUTO: >50 /HPF
WBC #/AREA URNS HPF: >100 /HPF
WBC MORPH BLD: NORMAL
WBC MORPH BLD: NORMAL
WBC TOXIC VACUOLES BLD QL SMEAR: PRESENT
WBC TOXIC VACUOLES BLD QL SMEAR: PRESENT
YEAST UR QL: PRESENT /HPF
YEAST URNS QL MICRO: PRESENT

## 2022-01-01 PROCEDURE — 10002801 HB RX 250 W/O HCPCS

## 2022-01-01 PROCEDURE — 10004651 HB RX, NO CHARGE ITEM: Performed by: HOSPITALIST

## 2022-01-01 PROCEDURE — 10002807 HB RX 258: Performed by: INTERNAL MEDICINE

## 2022-01-01 PROCEDURE — 85025 COMPLETE CBC W/AUTO DIFF WBC: CPT | Performed by: HOSPITALIST

## 2022-01-01 PROCEDURE — 85027 COMPLETE CBC AUTOMATED: CPT | Performed by: HOSPITALIST

## 2022-01-01 PROCEDURE — 36415 COLL VENOUS BLD VENIPUNCTURE: CPT | Performed by: HOSPITALIST

## 2022-01-01 PROCEDURE — 80048 BASIC METABOLIC PNL TOTAL CA: CPT | Performed by: HOSPITALIST

## 2022-01-01 PROCEDURE — C1751 CATH, INF, PER/CENT/MIDLINE: HCPCS

## 2022-01-01 PROCEDURE — 10002800 HB RX 250 W HCPCS: Performed by: HOSPITALIST

## 2022-01-01 PROCEDURE — 96365 THER/PROPH/DIAG IV INF INIT: CPT

## 2022-01-01 PROCEDURE — 13003289 HB OXYGEN THERAPY DAILY

## 2022-01-01 PROCEDURE — 10002803 HB RX 637: Performed by: HOSPITALIST

## 2022-01-01 PROCEDURE — 84132 ASSAY OF SERUM POTASSIUM: CPT | Performed by: HOSPITALIST

## 2022-01-01 PROCEDURE — 10002803 HB RX 637: Performed by: INTERNAL MEDICINE

## 2022-01-01 PROCEDURE — 10004281 HB COUNTER-STAFF TIME PER 15 MIN

## 2022-01-01 PROCEDURE — 87088 URINE BACTERIA CULTURE: CPT | Performed by: EMERGENCY MEDICINE

## 2022-01-01 PROCEDURE — 10003585 HB ROOM CHARGE INTERMEDIATE CARE

## 2022-01-01 PROCEDURE — 10002807 HB RX 258: Performed by: HOSPITALIST

## 2022-01-01 PROCEDURE — C9803 HOPD COVID-19 SPEC COLLECT: HCPCS

## 2022-01-01 PROCEDURE — 13001086 HB INCENTIVE SPIROMETER W INSTRUCT

## 2022-01-01 PROCEDURE — 94668 MNPJ CHEST WALL SBSQ: CPT

## 2022-01-01 PROCEDURE — 97110 THERAPEUTIC EXERCISES: CPT

## 2022-01-01 PROCEDURE — 80053 COMPREHEN METABOLIC PANEL: CPT | Performed by: EMERGENCY MEDICINE

## 2022-01-01 PROCEDURE — 88311 DECALCIFY TISSUE: CPT | Performed by: PODIATRIST

## 2022-01-01 PROCEDURE — 10002016 HB COUNTER INCENTIVE SPIROMETRY

## 2022-01-01 PROCEDURE — 10002800 HB RX 250 W HCPCS: Performed by: INTERNAL MEDICINE

## 2022-01-01 PROCEDURE — 87154 CUL TYP ID BLD PTHGN 6+ TRGT: CPT | Performed by: EMERGENCY MEDICINE

## 2022-01-01 PROCEDURE — G1004 CDSM NDSC: HCPCS

## 2022-01-01 PROCEDURE — 84484 ASSAY OF TROPONIN QUANT: CPT | Performed by: EMERGENCY MEDICINE

## 2022-01-01 PROCEDURE — 83605 ASSAY OF LACTIC ACID: CPT

## 2022-01-01 PROCEDURE — 99232 SBSQ HOSP IP/OBS MODERATE 35: CPT | Performed by: NURSE PRACTITIONER

## 2022-01-01 PROCEDURE — 97535 SELF CARE MNGMENT TRAINING: CPT

## 2022-01-01 PROCEDURE — 03HY32Z INSERTION OF MONITORING DEVICE INTO UPPER ARTERY, PERCUTANEOUS APPROACH: ICD-10-PCS | Performed by: HOSPITALIST

## 2022-01-01 PROCEDURE — 10004651 HB RX, NO CHARGE ITEM: Performed by: INTERNAL MEDICINE

## 2022-01-01 PROCEDURE — 78315 BONE IMAGING 3 PHASE: CPT

## 2022-01-01 PROCEDURE — 85027 COMPLETE CBC AUTOMATED: CPT | Performed by: EMERGENCY MEDICINE

## 2022-01-01 PROCEDURE — 36573 INSJ PICC RS&I 5 YR+: CPT

## 2022-01-01 PROCEDURE — 73630 X-RAY EXAM OF FOOT: CPT

## 2022-01-01 PROCEDURE — 36415 COLL VENOUS BLD VENIPUNCTURE: CPT

## 2022-01-01 PROCEDURE — 10002801 HB RX 250 W/O HCPCS: Performed by: EMERGENCY MEDICINE

## 2022-01-01 PROCEDURE — 71045 X-RAY EXAM CHEST 1 VIEW: CPT

## 2022-01-01 PROCEDURE — 93926 LOWER EXTREMITY STUDY: CPT

## 2022-01-01 PROCEDURE — 87077 CULTURE AEROBIC IDENTIFY: CPT | Performed by: EMERGENCY MEDICINE

## 2022-01-01 PROCEDURE — 82962 GLUCOSE BLOOD TEST: CPT

## 2022-01-01 PROCEDURE — 74177 CT ABD & PELVIS W/CONTRAST: CPT | Performed by: EMERGENCY MEDICINE

## 2022-01-01 PROCEDURE — 99291 CRITICAL CARE FIRST HOUR: CPT | Performed by: OTHER

## 2022-01-01 PROCEDURE — 97530 THERAPEUTIC ACTIVITIES: CPT

## 2022-01-01 PROCEDURE — 71045 X-RAY EXAM CHEST 1 VIEW: CPT | Performed by: EMERGENCY MEDICINE

## 2022-01-01 PROCEDURE — 5A1945Z RESPIRATORY VENTILATION, 24-96 CONSECUTIVE HOURS: ICD-10-PCS | Performed by: EMERGENCY MEDICINE

## 2022-01-01 PROCEDURE — 81001 URINALYSIS AUTO W/SCOPE: CPT | Performed by: EMERGENCY MEDICINE

## 2022-01-01 PROCEDURE — 83036 HEMOGLOBIN GLYCOSYLATED A1C: CPT | Performed by: HOSPITALIST

## 2022-01-01 PROCEDURE — 10006150 HB RX 343: Performed by: PODIATRIST

## 2022-01-01 PROCEDURE — 80053 COMPREHEN METABOLIC PANEL: CPT | Performed by: HOSPITALIST

## 2022-01-01 PROCEDURE — 83605 ASSAY OF LACTIC ACID: CPT | Performed by: HOSPITALIST

## 2022-01-01 PROCEDURE — 10002800 HB RX 250 W HCPCS: Performed by: EMERGENCY MEDICINE

## 2022-01-01 PROCEDURE — 87040 BLOOD CULTURE FOR BACTERIA: CPT | Performed by: HOSPITALIST

## 2022-01-01 PROCEDURE — 93005 ELECTROCARDIOGRAM TRACING: CPT | Performed by: EMERGENCY MEDICINE

## 2022-01-01 PROCEDURE — 71275 CT ANGIOGRAPHY CHEST: CPT | Performed by: EMERGENCY MEDICINE

## 2022-01-01 PROCEDURE — 94667 MNPJ CHEST WALL 1ST: CPT

## 2022-01-01 PROCEDURE — 99291 CRITICAL CARE FIRST HOUR: CPT | Performed by: NURSE PRACTITIONER

## 2022-01-01 PROCEDURE — 97166 OT EVAL MOD COMPLEX 45 MIN: CPT

## 2022-01-01 PROCEDURE — 99285 EMERGENCY DEPT VISIT HI MDM: CPT

## 2022-01-01 PROCEDURE — 0241U COVID/FLU/RSV PANEL: CPT | Performed by: EMERGENCY MEDICINE

## 2022-01-01 PROCEDURE — 0BH17EZ INSERTION OF ENDOTRACHEAL AIRWAY INTO TRACHEA, VIA NATURAL OR ARTIFICIAL OPENING: ICD-10-PCS | Performed by: EMERGENCY MEDICINE

## 2022-01-01 PROCEDURE — 99233 SBSQ HOSP IP/OBS HIGH 50: CPT | Performed by: OTHER

## 2022-01-01 PROCEDURE — 97162 PT EVAL MOD COMPLEX 30 MIN: CPT

## 2022-01-01 PROCEDURE — 93306 TTE W/DOPPLER COMPLETE: CPT

## 2022-01-01 PROCEDURE — 96375 TX/PRO/DX INJ NEW DRUG ADDON: CPT

## 2022-01-01 PROCEDURE — 71045 X-RAY EXAM CHEST 1 VIEW: CPT | Performed by: NURSE PRACTITIONER

## 2022-01-01 PROCEDURE — 84145 PROCALCITONIN (PCT): CPT | Performed by: HOSPITALIST

## 2022-01-01 PROCEDURE — 92526 ORAL FUNCTION THERAPY: CPT

## 2022-01-01 PROCEDURE — 10002807 HB RX 258: Performed by: EMERGENCY MEDICINE

## 2022-01-01 PROCEDURE — 99223 1ST HOSP IP/OBS HIGH 75: CPT | Performed by: NURSE PRACTITIONER

## 2022-01-01 PROCEDURE — 92610 EVALUATE SWALLOWING FUNCTION: CPT

## 2022-01-01 PROCEDURE — A9503 TC99M MEDRONATE: HCPCS | Performed by: PODIATRIST

## 2022-01-01 PROCEDURE — 95718 EEG PHYS/QHP 2-12 HR W/VEEG: CPT | Performed by: OTHER

## 2022-01-01 PROCEDURE — 99231 SBSQ HOSP IP/OBS SF/LOW 25: CPT | Performed by: PHYSICIAN ASSISTANT

## 2022-01-01 PROCEDURE — 11044 DBRDMT BONE 1ST 20 SQ CM/<: CPT

## 2022-01-01 PROCEDURE — 10002805 HB CONTRAST AGENT: Performed by: HOSPITALIST

## 2022-01-01 PROCEDURE — 10002801 HB RX 250 W/O HCPCS: Performed by: HOSPITALIST

## 2022-01-01 PROCEDURE — 36620 INSERTION CATHETER ARTERY: CPT | Performed by: NURSE PRACTITIONER

## 2022-01-01 PROCEDURE — 70450 CT HEAD/BRAIN W/O DYE: CPT | Performed by: EMERGENCY MEDICINE

## 2022-01-01 PROCEDURE — 10006023 HB SUPPLY 272

## 2022-01-01 PROCEDURE — 95822 EEG COMA OR SLEEP ONLY: CPT | Performed by: OTHER

## 2022-01-01 RX ORDER — ONDANSETRON 2 MG/ML
4 INJECTION INTRAMUSCULAR; INTRAVENOUS EVERY 6 HOURS PRN
Status: ACTIVE | OUTPATIENT
Start: 2022-01-01 | End: 2022-01-01

## 2022-01-01 RX ORDER — CHLORHEXIDINE GLUCONATE 0.12 MG/ML
15 RINSE ORAL
Status: DISCONTINUED | OUTPATIENT
Start: 2022-01-01 | End: 2022-01-01

## 2022-01-01 RX ORDER — LORAZEPAM 2 MG/ML
INJECTION INTRAMUSCULAR
Status: DISCONTINUED
Start: 2022-01-01 | End: 2022-01-01 | Stop reason: WASHOUT

## 2022-01-01 RX ORDER — NICOTINE POLACRILEX 4 MG
30 LOZENGE BUCCAL PRN
Status: DISCONTINUED | OUTPATIENT
Start: 2022-01-01 | End: 2022-01-01 | Stop reason: HOSPADM

## 2022-01-01 RX ORDER — FENOFIBRATE 54 MG/1
54 TABLET ORAL DAILY
Status: DISCONTINUED | OUTPATIENT
Start: 2022-01-01 | End: 2022-01-01 | Stop reason: HOSPADM

## 2022-01-01 RX ORDER — ALBUTEROL SULFATE 90 UG/1
2 AEROSOL, METERED RESPIRATORY (INHALATION) EVERY 4 HOURS PRN
COMMUNITY

## 2022-01-01 RX ORDER — SODIUM PHOSPHATE, DIBASIC AND SODIUM PHOSPHATE, MONOBASIC 7; 19 G/133ML; G/133ML
1 ENEMA RECTAL ONCE AS NEEDED
Status: DISCONTINUED | OUTPATIENT
Start: 2022-01-01 | End: 2022-01-01

## 2022-01-01 RX ORDER — POLYETHYLENE GLYCOL 3350 17 G/17G
17 POWDER, FOR SOLUTION ORAL DAILY
Status: DISCONTINUED | OUTPATIENT
Start: 2022-01-01 | End: 2022-01-01 | Stop reason: HOSPADM

## 2022-01-01 RX ORDER — POLYETHYLENE GLYCOL 3350 17 G/17G
17 POWDER, FOR SOLUTION ORAL 2 TIMES DAILY
COMMUNITY

## 2022-01-01 RX ORDER — ATORVASTATIN CALCIUM 40 MG/1
40 TABLET, FILM COATED ORAL DAILY
COMMUNITY

## 2022-01-01 RX ORDER — NYSTATIN 100000 [USP'U]/G
POWDER TOPICAL EVERY 12 HOURS SCHEDULED
Qty: 30 G | Refills: 0 | Status: SHIPPED
Start: 2022-01-01

## 2022-01-01 RX ORDER — MIDAZOLAM HYDROCHLORIDE 1 MG/ML
2 INJECTION INTRAMUSCULAR; INTRAVENOUS ONCE
Status: COMPLETED | OUTPATIENT
Start: 2022-01-01 | End: 2022-01-01

## 2022-01-01 RX ORDER — IOHEXOL 350 MG/ML
100 INJECTION, SOLUTION INTRAVENOUS
Status: COMPLETED | OUTPATIENT
Start: 2022-01-01 | End: 2022-01-01

## 2022-01-01 RX ORDER — DEXTROSE MONOHYDRATE 25 G/50ML
50 INJECTION, SOLUTION INTRAVENOUS
Status: DISCONTINUED | OUTPATIENT
Start: 2022-01-01 | End: 2022-01-01

## 2022-01-01 RX ORDER — LANOLIN ALCOHOL/MO/W.PET/CERES
3 CREAM (GRAM) TOPICAL NIGHTLY PRN
COMMUNITY

## 2022-01-01 RX ORDER — POTASSIUM CHLORIDE 14.9 MG/ML
20 INJECTION INTRAVENOUS ONCE
Status: COMPLETED | OUTPATIENT
Start: 2022-01-01 | End: 2022-01-01

## 2022-01-01 RX ORDER — ACETAMINOPHEN 325 MG/1
650 TABLET ORAL EVERY 4 HOURS PRN
Status: DISCONTINUED | OUTPATIENT
Start: 2022-01-01 | End: 2022-01-01 | Stop reason: HOSPADM

## 2022-01-01 RX ORDER — ACETAMINOPHEN 160 MG/5ML
650 SOLUTION ORAL EVERY 4 HOURS PRN
Status: DISCONTINUED | OUTPATIENT
Start: 2022-01-01 | End: 2022-01-01

## 2022-01-01 RX ORDER — DEXMEDETOMIDINE HYDROCHLORIDE 4 UG/ML
INJECTION, SOLUTION INTRAVENOUS CONTINUOUS
Status: DISCONTINUED | OUTPATIENT
Start: 2022-01-01 | End: 2022-01-01

## 2022-01-01 RX ORDER — ACETAMINOPHEN 325 MG/1
650 TABLET ORAL EVERY 4 HOURS PRN
COMMUNITY

## 2022-01-01 RX ORDER — SCOLOPAMINE TRANSDERMAL SYSTEM 1 MG/1
1 PATCH, EXTENDED RELEASE TRANSDERMAL
Status: DISCONTINUED | OUTPATIENT
Start: 2022-01-01 | End: 2022-01-01

## 2022-01-01 RX ORDER — LISINOPRIL 40 MG/1
40 TABLET ORAL DAILY
Status: ON HOLD | COMMUNITY
End: 2022-01-01

## 2022-01-01 RX ORDER — SODIUM CHLORIDE, SODIUM LACTATE, POTASSIUM CHLORIDE, CALCIUM CHLORIDE 600; 310; 30; 20 MG/100ML; MG/100ML; MG/100ML; MG/100ML
INJECTION, SOLUTION INTRAVENOUS CONTINUOUS
Status: DISCONTINUED | OUTPATIENT
Start: 2022-01-01 | End: 2022-01-01

## 2022-01-01 RX ORDER — POTASSIUM CHLORIDE 20 MEQ/1
40 TABLET, EXTENDED RELEASE ORAL ONCE
Status: COMPLETED | OUTPATIENT
Start: 2022-01-01 | End: 2022-01-01

## 2022-01-01 RX ORDER — NYSTATIN 100000 [USP'U]/G
POWDER TOPICAL EVERY 12 HOURS SCHEDULED
Status: DISCONTINUED | OUTPATIENT
Start: 2022-01-01 | End: 2022-01-01 | Stop reason: HOSPADM

## 2022-01-01 RX ORDER — TC 99M MEDRONATE 20 MG/10ML
24 INJECTION, POWDER, LYOPHILIZED, FOR SOLUTION INTRAVENOUS ONCE
Status: COMPLETED | OUTPATIENT
Start: 2022-01-01 | End: 2022-01-01

## 2022-01-01 RX ORDER — FUROSEMIDE 10 MG/ML
20 INJECTION INTRAMUSCULAR; INTRAVENOUS
Status: DISCONTINUED | OUTPATIENT
Start: 2022-01-01 | End: 2022-01-01 | Stop reason: HOSPADM

## 2022-01-01 RX ORDER — FUROSEMIDE 40 MG/1
40 TABLET ORAL DAILY
Status: ON HOLD | COMMUNITY
End: 2022-01-01

## 2022-01-01 RX ORDER — DEXTROSE MONOHYDRATE 25 G/50ML
25 INJECTION, SOLUTION INTRAVENOUS PRN
Status: DISCONTINUED | OUTPATIENT
Start: 2022-01-01 | End: 2022-01-01 | Stop reason: HOSPADM

## 2022-01-01 RX ORDER — CLOPIDOGREL BISULFATE 75 MG/1
75 TABLET ORAL DAILY
Status: DISCONTINUED | OUTPATIENT
Start: 2022-01-01 | End: 2022-01-01 | Stop reason: HOSPADM

## 2022-01-01 RX ORDER — ALBUMIN, HUMAN INJ 5% 5 %
250 SOLUTION INTRAVENOUS ONCE
Status: COMPLETED | OUTPATIENT
Start: 2022-01-01 | End: 2022-01-01

## 2022-01-01 RX ORDER — MAGNESIUM HYDROXIDE/ALUMINUM HYDROXICE/SIMETHICONE 120; 1200; 1200 MG/30ML; MG/30ML; MG/30ML
SUSPENSION ORAL
Status: ON HOLD | COMMUNITY
End: 2022-01-01

## 2022-01-01 RX ORDER — 0.9 % SODIUM CHLORIDE 0.9 %
20 VIAL (ML) INJECTION PRN
Status: DISCONTINUED | OUTPATIENT
Start: 2022-01-01 | End: 2022-01-01 | Stop reason: HOSPADM

## 2022-01-01 RX ORDER — ATORVASTATIN CALCIUM 40 MG/1
40 TABLET, FILM COATED ORAL DAILY
Status: DISCONTINUED | OUTPATIENT
Start: 2022-01-01 | End: 2022-01-01 | Stop reason: HOSPADM

## 2022-01-01 RX ORDER — SENNOSIDES 8.8 MG/5ML
10 LIQUID ORAL NIGHTLY PRN
Status: DISCONTINUED | OUTPATIENT
Start: 2022-01-01 | End: 2022-01-01

## 2022-01-01 RX ORDER — QUETIAPINE FUMARATE 25 MG/1
50 TABLET, FILM COATED ORAL AT BEDTIME
Status: DISCONTINUED | OUTPATIENT
Start: 2022-01-01 | End: 2022-01-01 | Stop reason: HOSPADM

## 2022-01-01 RX ORDER — MIDAZOLAM HYDROCHLORIDE 1 MG/ML
INJECTION INTRAMUSCULAR; INTRAVENOUS
Status: COMPLETED
Start: 2022-01-01 | End: 2022-01-01

## 2022-01-01 RX ORDER — ALBUTEROL SULFATE 2.5 MG/3ML
2.5 SOLUTION RESPIRATORY (INHALATION)
Status: DISCONTINUED | OUTPATIENT
Start: 2022-01-01 | End: 2022-01-01 | Stop reason: HOSPADM

## 2022-01-01 RX ORDER — METOPROLOL TARTRATE 50 MG/1
TABLET, FILM COATED ORAL
COMMUNITY
Start: 2022-01-01

## 2022-01-01 RX ORDER — TORSEMIDE 10 MG/1
10 TABLET ORAL DAILY
Status: ON HOLD | COMMUNITY
End: 2022-01-01 | Stop reason: SDUPTHER

## 2022-01-01 RX ORDER — 0.9 % SODIUM CHLORIDE 0.9 %
10 VIAL (ML) INJECTION EVERY 12 HOURS SCHEDULED
Status: DISCONTINUED | OUTPATIENT
Start: 2022-01-01 | End: 2022-01-01 | Stop reason: HOSPADM

## 2022-01-01 RX ORDER — 0.9 % SODIUM CHLORIDE 0.9 %
10 VIAL (ML) INJECTION PRN
Status: DISCONTINUED | OUTPATIENT
Start: 2022-01-01 | End: 2022-01-01 | Stop reason: HOSPADM

## 2022-01-01 RX ORDER — CLOPIDOGREL BISULFATE 75 MG/1
75 TABLET ORAL DAILY
COMMUNITY

## 2022-01-01 RX ORDER — INSULIN DETEMIR 100 [IU]/ML
24 INJECTION, SOLUTION SUBCUTANEOUS DAILY
Status: ON HOLD | COMMUNITY
End: 2022-01-01 | Stop reason: SDUPTHER

## 2022-01-01 RX ORDER — IPRATROPIUM BROMIDE AND ALBUTEROL SULFATE 2.5; .5 MG/3ML; MG/3ML
3 SOLUTION RESPIRATORY (INHALATION)
Status: DISCONTINUED | OUTPATIENT
Start: 2022-01-01 | End: 2022-01-01

## 2022-01-01 RX ORDER — DEXTROSE MONOHYDRATE 25 G/50ML
25 INJECTION, SOLUTION INTRAVENOUS ONCE
Status: COMPLETED | OUTPATIENT
Start: 2022-01-01 | End: 2022-01-01

## 2022-01-01 RX ORDER — ACETAMINOPHEN 650 MG/1
650 SUPPOSITORY RECTAL EVERY 6 HOURS
Status: DISCONTINUED | OUTPATIENT
Start: 2022-01-01 | End: 2022-01-01

## 2022-01-01 RX ORDER — ALBUTEROL SULFATE 0.63 MG/3ML
0.63 SOLUTION RESPIRATORY (INHALATION) EVERY 6 HOURS PRN
Status: ON HOLD | COMMUNITY
End: 2022-01-01

## 2022-01-01 RX ORDER — BISACODYL 10 MG
10 SUPPOSITORY, RECTAL RECTAL
Status: DISCONTINUED | OUTPATIENT
Start: 2022-01-01 | End: 2022-01-01

## 2022-01-01 RX ORDER — NICOTINE POLACRILEX 4 MG
30 LOZENGE BUCCAL
Status: DISCONTINUED | OUTPATIENT
Start: 2022-01-01 | End: 2022-01-01

## 2022-01-01 RX ORDER — POTASSIUM CHLORIDE 750 MG/1
10 CAPSULE, EXTENDED RELEASE ORAL DAILY
COMMUNITY

## 2022-01-01 RX ORDER — DILTIAZEM HYDROCHLORIDE 180 MG/1
180 CAPSULE, EXTENDED RELEASE ORAL DAILY
Status: ON HOLD | COMMUNITY
End: 2022-01-01 | Stop reason: HOSPADM

## 2022-01-01 RX ORDER — INSULIN GLARGINE 100 [IU]/ML
8 INJECTION, SOLUTION SUBCUTANEOUS NIGHTLY
Status: DISCONTINUED | OUTPATIENT
Start: 2022-01-01 | End: 2022-01-01 | Stop reason: HOSPADM

## 2022-01-01 RX ORDER — ACETAMINOPHEN 325 MG/1
650 TABLET ORAL EVERY 4 HOURS PRN
Status: DISCONTINUED | OUTPATIENT
Start: 2022-01-01 | End: 2022-01-01

## 2022-01-01 RX ORDER — METOPROLOL TARTRATE 50 MG/1
50 TABLET, FILM COATED ORAL EVERY 8 HOURS SCHEDULED
Qty: 90 TABLET | Refills: 0 | Status: SHIPPED | OUTPATIENT
Start: 2022-01-01

## 2022-01-01 RX ORDER — MAGNESIUM SULFATE HEPTAHYDRATE 40 MG/ML
2 INJECTION, SOLUTION INTRAVENOUS ONCE
Status: COMPLETED | OUTPATIENT
Start: 2022-01-01 | End: 2022-01-01

## 2022-01-01 RX ORDER — CALCIUM CHLORIDE 100 MG/ML
INJECTION INTRAVENOUS; INTRAVENTRICULAR
Status: COMPLETED | OUTPATIENT
Start: 2022-01-01 | End: 2022-01-01

## 2022-01-01 RX ORDER — FUROSEMIDE 10 MG/ML
20 INJECTION INTRAMUSCULAR; INTRAVENOUS ONCE
Status: COMPLETED | OUTPATIENT
Start: 2022-01-01 | End: 2022-01-01

## 2022-01-01 RX ORDER — METOPROLOL TARTRATE 75 MG/1
75 TABLET, FILM COATED ORAL 2 TIMES DAILY
Status: ON HOLD | COMMUNITY
End: 2022-01-01 | Stop reason: HOSPADM

## 2022-01-01 RX ORDER — INSULIN DETEMIR 100 [IU]/ML
6 INJECTION, SOLUTION SUBCUTANEOUS DAILY
Qty: 10 ML | Refills: 12 | Status: SHIPPED | COMMUNITY
Start: 2022-01-01

## 2022-01-01 RX ORDER — ONDANSETRON 4 MG/1
4 TABLET, ORALLY DISINTEGRATING ORAL EVERY 6 HOURS PRN
COMMUNITY

## 2022-01-01 RX ORDER — NICOTINE POLACRILEX 4 MG
15 LOZENGE BUCCAL PRN
Status: DISCONTINUED | OUTPATIENT
Start: 2022-01-01 | End: 2022-01-01 | Stop reason: HOSPADM

## 2022-01-01 RX ORDER — TORSEMIDE 10 MG/1
20 TABLET ORAL DAILY
Qty: 30 TABLET | Refills: 0 | Status: SHIPPED | COMMUNITY
Start: 2022-01-01

## 2022-01-01 RX ORDER — NICOTINE POLACRILEX 4 MG
15 LOZENGE BUCCAL
Status: DISCONTINUED | OUTPATIENT
Start: 2022-01-01 | End: 2022-01-01

## 2022-01-01 RX ORDER — QUETIAPINE FUMARATE 50 MG/1
50 TABLET, FILM COATED ORAL AT BEDTIME
COMMUNITY
Start: 2021-01-01

## 2022-01-01 RX ORDER — MORPHINE SULFATE 2 MG/ML
2 INJECTION, SOLUTION INTRAMUSCULAR; INTRAVENOUS ONCE
Status: COMPLETED | OUTPATIENT
Start: 2022-01-01 | End: 2022-01-01

## 2022-01-01 RX ORDER — POLYETHYLENE GLYCOL 3350 17 G/17G
17 POWDER, FOR SOLUTION ORAL DAILY
Status: DISCONTINUED | OUTPATIENT
Start: 2022-01-01 | End: 2022-01-01 | Stop reason: SDUPTHER

## 2022-01-01 RX ORDER — FAMOTIDINE 20 MG/1
20 TABLET, FILM COATED ORAL DAILY
COMMUNITY

## 2022-01-01 RX ORDER — AMIODARONE HYDROCHLORIDE 200 MG/1
200 TABLET ORAL DAILY
COMMUNITY

## 2022-01-01 RX ORDER — POLYETHYLENE GLYCOL 3350 17 G/17G
17 POWDER, FOR SOLUTION ORAL DAILY PRN
Status: DISCONTINUED | OUTPATIENT
Start: 2022-01-01 | End: 2022-01-01

## 2022-01-01 RX ORDER — ACETAMINOPHEN 10 MG/ML
1000 INJECTION, SOLUTION INTRAVENOUS EVERY 6 HOURS PRN
Status: DISCONTINUED | OUTPATIENT
Start: 2022-01-01 | End: 2022-01-01

## 2022-01-01 RX ORDER — ALBUTEROL SULFATE 0.63 MG/3ML
0.63 SOLUTION RESPIRATORY (INHALATION) EVERY 6 HOURS PRN
Status: DISCONTINUED | OUTPATIENT
Start: 2022-01-01 | End: 2022-01-01

## 2022-01-01 RX ORDER — LANOLIN ALCOHOL/MO/W.PET/CERES
3 CREAM (GRAM) TOPICAL NIGHTLY PRN
Status: DISCONTINUED | OUTPATIENT
Start: 2022-01-01 | End: 2022-01-01 | Stop reason: HOSPADM

## 2022-01-01 RX ORDER — INSULIN GLARGINE 100 [IU]/ML
20 INJECTION, SOLUTION SUBCUTANEOUS
Status: ON HOLD | COMMUNITY
End: 2022-01-01

## 2022-01-01 RX ORDER — ACETAMINOPHEN 650 MG/1
650 SUPPOSITORY RECTAL EVERY 4 HOURS PRN
Status: DISCONTINUED | OUTPATIENT
Start: 2022-01-01 | End: 2022-01-01

## 2022-01-01 RX ORDER — AMIODARONE HYDROCHLORIDE 200 MG/1
200 TABLET ORAL DAILY
Status: DISCONTINUED | OUTPATIENT
Start: 2022-01-01 | End: 2022-01-01 | Stop reason: HOSPADM

## 2022-01-01 RX ORDER — DEXTROSE MONOHYDRATE 25 G/50ML
50 INJECTION, SOLUTION INTRAVENOUS PRN
Status: ON HOLD | COMMUNITY
End: 2022-01-01 | Stop reason: HOSPADM

## 2022-01-01 RX ORDER — DEXTROSE MONOHYDRATE 25 G/50ML
INJECTION, SOLUTION INTRAVENOUS
Status: DISPENSED
Start: 2022-01-01 | End: 2022-01-01

## 2022-01-01 RX ORDER — NITROFURANTOIN 25; 75 MG/1; MG/1
100 CAPSULE ORAL 2 TIMES DAILY
Status: ON HOLD | COMMUNITY
End: 2022-01-01 | Stop reason: HOSPADM

## 2022-01-01 RX ORDER — 0.9 % SODIUM CHLORIDE 0.9 %
2 VIAL (ML) INJECTION EVERY 12 HOURS SCHEDULED
Status: DISCONTINUED | OUTPATIENT
Start: 2022-01-01 | End: 2022-01-01 | Stop reason: HOSPADM

## 2022-01-01 RX ORDER — METOPROLOL TARTRATE 50 MG/1
37.5 TABLET, FILM COATED ORAL EVERY 8 HOURS SCHEDULED
Status: DISCONTINUED | OUTPATIENT
Start: 2022-01-01 | End: 2022-01-01

## 2022-01-01 RX ORDER — SENNOSIDES A AND B 8.6 MG/1
1 TABLET, FILM COATED ORAL DAILY
Status: DISCONTINUED | OUTPATIENT
Start: 2022-01-01 | End: 2022-01-01 | Stop reason: HOSPADM

## 2022-01-01 RX ORDER — SEMAGLUTIDE 1.34 MG/ML
1 INJECTION, SOLUTION SUBCUTANEOUS
COMMUNITY

## 2022-01-01 RX ORDER — FENOFIBRATE 145 MG/1
145 TABLET, COATED ORAL DAILY
COMMUNITY

## 2022-01-01 RX ORDER — TORSEMIDE 10 MG/1
20 TABLET ORAL DAILY
COMMUNITY
Start: 2022-01-01

## 2022-01-01 RX ORDER — METOPROLOL TARTRATE 50 MG/1
50 TABLET, FILM COATED ORAL EVERY 8 HOURS SCHEDULED
Status: DISCONTINUED | OUTPATIENT
Start: 2022-01-01 | End: 2022-01-01 | Stop reason: HOSPADM

## 2022-01-01 RX ORDER — DEXTROSE MONOHYDRATE 25 G/50ML
12.5 INJECTION, SOLUTION INTRAVENOUS PRN
Status: DISCONTINUED | OUTPATIENT
Start: 2022-01-01 | End: 2022-01-01 | Stop reason: HOSPADM

## 2022-01-01 RX ORDER — FAMOTIDINE 20 MG/1
20 TABLET, FILM COATED ORAL DAILY
Status: DISCONTINUED | OUTPATIENT
Start: 2022-01-01 | End: 2022-01-01 | Stop reason: HOSPADM

## 2022-01-01 RX ADMIN — METOPROLOL TARTRATE 37.5 MG: 50 TABLET, FILM COATED ORAL at 14:42

## 2022-01-01 RX ADMIN — POTASSIUM CHLORIDE 40 MEQ: 20 TABLET, EXTENDED RELEASE ORAL at 08:27

## 2022-01-01 RX ADMIN — DESMOPRESSIN ACETATE 40 MG: 0.2 TABLET ORAL at 09:57

## 2022-01-01 RX ADMIN — SODIUM CHLORIDE, PRESERVATIVE FREE 2 ML: 5 INJECTION INTRAVENOUS at 21:26

## 2022-01-01 RX ADMIN — SODIUM CHLORIDE, PRESERVATIVE FREE 2 ML: 5 INJECTION INTRAVENOUS at 09:15

## 2022-01-01 RX ADMIN — SODIUM CHLORIDE 3 G: 900 INJECTION INTRAVENOUS at 23:33

## 2022-01-01 RX ADMIN — FAMOTIDINE 20 MG: 20 TABLET ORAL at 09:34

## 2022-01-01 RX ADMIN — APIXABAN 5 MG: 5 TABLET, FILM COATED ORAL at 08:27

## 2022-01-01 RX ADMIN — PIPERACILLIN AND TAZOBACTAM 3.38 G: 3; .375 INJECTION, POWDER, LYOPHILIZED, FOR SOLUTION INTRAVENOUS at 22:21

## 2022-01-01 RX ADMIN — DESMOPRESSIN ACETATE 40 MG: 0.2 TABLET ORAL at 09:34

## 2022-01-01 RX ADMIN — SODIUM CHLORIDE, PRESERVATIVE FREE 2 ML: 5 INJECTION INTRAVENOUS at 09:37

## 2022-01-01 RX ADMIN — SODIUM CHLORIDE, PRESERVATIVE FREE 2 ML: 5 INJECTION INTRAVENOUS at 20:00

## 2022-01-01 RX ADMIN — FENOFIBRATE 54 MG: 54 TABLET ORAL at 09:34

## 2022-01-01 RX ADMIN — METOPROLOL TARTRATE 37.5 MG: 50 TABLET, FILM COATED ORAL at 14:02

## 2022-01-01 RX ADMIN — SODIUM CHLORIDE, PRESERVATIVE FREE 2 ML: 5 INJECTION INTRAVENOUS at 21:23

## 2022-01-01 RX ADMIN — POTASSIUM CHLORIDE 40 MEQ: 1500 TABLET, EXTENDED RELEASE ORAL at 09:36

## 2022-01-01 RX ADMIN — METOPROLOL TARTRATE 37.5 MG: 50 TABLET, FILM COATED ORAL at 22:12

## 2022-01-01 RX ADMIN — CLOPIDOGREL BISULFATE 75 MG: 75 TABLET, FILM COATED ORAL at 09:21

## 2022-01-01 RX ADMIN — FUROSEMIDE 20 MG: 10 INJECTION, SOLUTION INTRAMUSCULAR; INTRAVENOUS at 18:18

## 2022-01-01 RX ADMIN — METOPROLOL TARTRATE 37.5 MG: 50 TABLET, FILM COATED ORAL at 22:13

## 2022-01-01 RX ADMIN — FENOFIBRATE 54 MG: 54 TABLET ORAL at 10:49

## 2022-01-01 RX ADMIN — SODIUM CHLORIDE 3 G: 900 INJECTION INTRAVENOUS at 18:19

## 2022-01-01 RX ADMIN — SODIUM CHLORIDE, PRESERVATIVE FREE 2 ML: 5 INJECTION INTRAVENOUS at 08:57

## 2022-01-01 RX ADMIN — FENOFIBRATE 54 MG: 54 TABLET ORAL at 10:37

## 2022-01-01 RX ADMIN — Medication 5 MG/HR: at 18:20

## 2022-01-01 RX ADMIN — METOPROLOL TARTRATE 37.5 MG: 50 TABLET, FILM COATED ORAL at 05:58

## 2022-01-01 RX ADMIN — QUETIAPINE 50 MG: 25 TABLET, FILM COATED ORAL at 20:01

## 2022-01-01 RX ADMIN — PIPERACILLIN AND TAZOBACTAM 3.38 G: 3; .375 INJECTION, POWDER, LYOPHILIZED, FOR SOLUTION INTRAVENOUS at 21:29

## 2022-01-01 RX ADMIN — SODIUM CHLORIDE, PRESERVATIVE FREE 10 ML: 5 INJECTION INTRAVENOUS at 21:45

## 2022-01-01 RX ADMIN — PERFLUTREN 2 ML: 6.52 INJECTION, SUSPENSION INTRAVENOUS at 09:49

## 2022-01-01 RX ADMIN — SODIUM CHLORIDE, PRESERVATIVE FREE 10 ML: 5 INJECTION INTRAVENOUS at 21:15

## 2022-01-01 RX ADMIN — FUROSEMIDE 20 MG: 10 INJECTION, SOLUTION INTRAMUSCULAR; INTRAVENOUS at 09:57

## 2022-01-01 RX ADMIN — METOPROLOL TARTRATE 25 MG: 25 TABLET, FILM COATED ORAL at 04:16

## 2022-01-01 RX ADMIN — DESMOPRESSIN ACETATE 40 MG: 0.2 TABLET ORAL at 09:21

## 2022-01-01 RX ADMIN — SODIUM CHLORIDE, PRESERVATIVE FREE 10 ML: 5 INJECTION INTRAVENOUS at 21:18

## 2022-01-01 RX ADMIN — FAMOTIDINE 20 MG: 20 TABLET ORAL at 11:02

## 2022-01-01 RX ADMIN — DEXTROSE MONOHYDRATE 25 G: 25 INJECTION, SOLUTION INTRAVENOUS at 17:09

## 2022-01-01 RX ADMIN — CLOPIDOGREL BISULFATE 75 MG: 75 TABLET, FILM COATED ORAL at 09:57

## 2022-01-01 RX ADMIN — FENOFIBRATE 54 MG: 54 TABLET ORAL at 08:51

## 2022-01-01 RX ADMIN — ACETAMINOPHEN 650 MG: 325 TABLET ORAL at 12:45

## 2022-01-01 RX ADMIN — DESMOPRESSIN ACETATE 40 MG: 0.2 TABLET ORAL at 10:37

## 2022-01-01 RX ADMIN — INSULIN LISPRO 1 UNITS: 100 INJECTION, SOLUTION INTRAVENOUS; SUBCUTANEOUS at 18:44

## 2022-01-01 RX ADMIN — APIXABAN 2.5 MG: 2.5 TABLET, FILM COATED ORAL at 08:50

## 2022-01-01 RX ADMIN — SODIUM CHLORIDE, PRESERVATIVE FREE 2 ML: 5 INJECTION INTRAVENOUS at 11:07

## 2022-01-01 RX ADMIN — NYSTATIN: 100000 POWDER TOPICAL at 10:05

## 2022-01-01 RX ADMIN — APIXABAN 5 MG: 5 TABLET, FILM COATED ORAL at 22:12

## 2022-01-01 RX ADMIN — SODIUM CHLORIDE 3 G: 900 INJECTION INTRAVENOUS at 13:25

## 2022-01-01 RX ADMIN — METOPROLOL TARTRATE 37.5 MG: 50 TABLET, FILM COATED ORAL at 21:18

## 2022-01-01 RX ADMIN — FAMOTIDINE 20 MG: 20 TABLET ORAL at 08:27

## 2022-01-01 RX ADMIN — PIPERACILLIN AND TAZOBACTAM 3.38 G: 3; .375 INJECTION, POWDER, LYOPHILIZED, FOR SOLUTION INTRAVENOUS at 15:49

## 2022-01-01 RX ADMIN — POTASSIUM CHLORIDE 40 MEQ: 20 TABLET, EXTENDED RELEASE ORAL at 19:33

## 2022-01-01 RX ADMIN — FENOFIBRATE 54 MG: 54 TABLET ORAL at 09:57

## 2022-01-01 RX ADMIN — CLOPIDOGREL BISULFATE 75 MG: 75 TABLET, FILM COATED ORAL at 09:55

## 2022-01-01 RX ADMIN — SODIUM CHLORIDE 3 G: 900 INJECTION INTRAVENOUS at 05:48

## 2022-01-01 RX ADMIN — CLOPIDOGREL BISULFATE 75 MG: 75 TABLET, FILM COATED ORAL at 08:50

## 2022-01-01 RX ADMIN — PIPERACILLIN AND TAZOBACTAM 3.38 G: 3; .375 INJECTION, POWDER, LYOPHILIZED, FOR SOLUTION INTRAVENOUS at 23:00

## 2022-01-01 RX ADMIN — CLOPIDOGREL BISULFATE 75 MG: 75 TABLET, FILM COATED ORAL at 11:03

## 2022-01-01 RX ADMIN — FAMOTIDINE 20 MG: 20 TABLET ORAL at 09:55

## 2022-01-01 RX ADMIN — FUROSEMIDE 20 MG: 10 INJECTION, SOLUTION INTRAMUSCULAR; INTRAVENOUS at 09:34

## 2022-01-01 RX ADMIN — FUROSEMIDE 20 MG: 10 INJECTION, SOLUTION INTRAMUSCULAR; INTRAVENOUS at 17:15

## 2022-01-01 RX ADMIN — SODIUM CHLORIDE, PRESERVATIVE FREE 2 ML: 5 INJECTION INTRAVENOUS at 21:46

## 2022-01-01 RX ADMIN — SODIUM CHLORIDE, PRESERVATIVE FREE 2 ML: 5 INJECTION INTRAVENOUS at 21:15

## 2022-01-01 RX ADMIN — SODIUM CHLORIDE, PRESERVATIVE FREE 2 ML: 5 INJECTION INTRAVENOUS at 20:49

## 2022-01-01 RX ADMIN — FUROSEMIDE 20 MG: 10 INJECTION, SOLUTION INTRAMUSCULAR; INTRAVENOUS at 09:36

## 2022-01-01 RX ADMIN — INSULIN LISPRO 1 UNITS: 100 INJECTION, SOLUTION INTRAVENOUS; SUBCUTANEOUS at 18:00

## 2022-01-01 RX ADMIN — SODIUM CHLORIDE, PRESERVATIVE FREE 2 ML: 5 INJECTION INTRAVENOUS at 22:20

## 2022-01-01 RX ADMIN — POTASSIUM CHLORIDE 40 MEQ: 20 TABLET, EXTENDED RELEASE ORAL at 09:55

## 2022-01-01 RX ADMIN — SODIUM CHLORIDE, PRESERVATIVE FREE 2 ML: 5 INJECTION INTRAVENOUS at 10:11

## 2022-01-01 RX ADMIN — SODIUM CHLORIDE 3 G: 900 INJECTION INTRAVENOUS at 06:10

## 2022-01-01 RX ADMIN — FAMOTIDINE 20 MG: 20 TABLET ORAL at 09:20

## 2022-01-01 RX ADMIN — AMIODARONE HYDROCHLORIDE 200 MG: 200 TABLET ORAL at 09:55

## 2022-01-01 RX ADMIN — METOPROLOL TARTRATE 25 MG: 25 TABLET, FILM COATED ORAL at 12:11

## 2022-01-01 RX ADMIN — METOPROLOL TARTRATE 37.5 MG: 50 TABLET, FILM COATED ORAL at 14:00

## 2022-01-01 RX ADMIN — SODIUM CHLORIDE, PRESERVATIVE FREE 2 ML: 5 INJECTION INTRAVENOUS at 20:51

## 2022-01-01 RX ADMIN — CLOPIDOGREL BISULFATE 75 MG: 75 TABLET, FILM COATED ORAL at 09:34

## 2022-01-01 RX ADMIN — QUETIAPINE 50 MG: 25 TABLET, FILM COATED ORAL at 21:17

## 2022-01-01 RX ADMIN — METOPROLOL TARTRATE 25 MG: 25 TABLET, FILM COATED ORAL at 04:28

## 2022-01-01 RX ADMIN — AMIODARONE HYDROCHLORIDE 200 MG: 200 TABLET ORAL at 09:57

## 2022-01-01 RX ADMIN — METOPROLOL TARTRATE 25 MG: 25 TABLET, FILM COATED ORAL at 20:01

## 2022-01-01 RX ADMIN — SODIUM CHLORIDE 250 ML: 9 INJECTION, SOLUTION INTRAVENOUS at 01:00

## 2022-01-01 RX ADMIN — APIXABAN 5 MG: 5 TABLET, FILM COATED ORAL at 09:57

## 2022-01-01 RX ADMIN — DESMOPRESSIN ACETATE 40 MG: 0.2 TABLET ORAL at 11:03

## 2022-01-01 RX ADMIN — SODIUM CHLORIDE 3 G: 900 INJECTION INTRAVENOUS at 14:02

## 2022-01-01 RX ADMIN — FUROSEMIDE 20 MG: 10 INJECTION, SOLUTION INTRAMUSCULAR; INTRAVENOUS at 17:22

## 2022-01-01 RX ADMIN — AMIODARONE HYDROCHLORIDE 200 MG: 200 TABLET ORAL at 10:49

## 2022-01-01 RX ADMIN — METOPROLOL TARTRATE 25 MG: 25 TABLET, FILM COATED ORAL at 10:37

## 2022-01-01 RX ADMIN — NYSTATIN: 100000 POWDER TOPICAL at 18:01

## 2022-01-01 RX ADMIN — POTASSIUM CHLORIDE 20 MEQ: 14.9 INJECTION, SOLUTION INTRAVENOUS at 06:15

## 2022-01-01 RX ADMIN — APIXABAN 5 MG: 5 TABLET, FILM COATED ORAL at 21:17

## 2022-01-01 RX ADMIN — SODIUM CHLORIDE 3 G: 900 INJECTION INTRAVENOUS at 05:28

## 2022-01-01 RX ADMIN — APIXABAN 5 MG: 5 TABLET, FILM COATED ORAL at 09:34

## 2022-01-01 RX ADMIN — PIPERACILLIN SODIUM AND TAZOBACTAM SODIUM 4.5 G: 4; .5 INJECTION, POWDER, LYOPHILIZED, FOR SOLUTION INTRAVENOUS at 19:59

## 2022-01-01 RX ADMIN — INSULIN GLARGINE 8 UNITS: 100 INJECTION, SOLUTION SUBCUTANEOUS at 20:50

## 2022-01-01 RX ADMIN — APIXABAN 5 MG: 5 TABLET, FILM COATED ORAL at 21:21

## 2022-01-01 RX ADMIN — SODIUM CHLORIDE 3 G: 900 INJECTION INTRAVENOUS at 18:00

## 2022-01-01 RX ADMIN — QUETIAPINE 50 MG: 25 TABLET, FILM COATED ORAL at 21:20

## 2022-01-01 RX ADMIN — INSULIN GLARGINE 8 UNITS: 100 INJECTION, SOLUTION SUBCUTANEOUS at 21:21

## 2022-01-01 RX ADMIN — APIXABAN 5 MG: 5 TABLET, FILM COATED ORAL at 21:12

## 2022-01-01 RX ADMIN — INSULIN GLARGINE 8 UNITS: 100 INJECTION, SOLUTION SUBCUTANEOUS at 22:13

## 2022-01-01 RX ADMIN — FENOFIBRATE 54 MG: 54 TABLET ORAL at 11:03

## 2022-01-01 RX ADMIN — APIXABAN 5 MG: 5 TABLET, FILM COATED ORAL at 21:18

## 2022-01-01 RX ADMIN — ACETAMINOPHEN 650 MG: 325 TABLET ORAL at 16:17

## 2022-01-01 RX ADMIN — APIXABAN 5 MG: 5 TABLET, FILM COATED ORAL at 21:46

## 2022-01-01 RX ADMIN — AMIODARONE HYDROCHLORIDE 200 MG: 200 TABLET ORAL at 09:20

## 2022-01-01 RX ADMIN — SODIUM CHLORIDE, PRESERVATIVE FREE 10 ML: 5 INJECTION INTRAVENOUS at 10:11

## 2022-01-01 RX ADMIN — QUETIAPINE 50 MG: 25 TABLET, FILM COATED ORAL at 22:12

## 2022-01-01 RX ADMIN — NYSTATIN: 100000 POWDER TOPICAL at 09:36

## 2022-01-01 RX ADMIN — INSULIN GLARGINE 8 UNITS: 100 INJECTION, SOLUTION SUBCUTANEOUS at 21:18

## 2022-01-01 RX ADMIN — FENOFIBRATE 54 MG: 54 TABLET ORAL at 09:20

## 2022-01-01 RX ADMIN — AMIODARONE HYDROCHLORIDE 200 MG: 200 TABLET ORAL at 11:03

## 2022-01-01 RX ADMIN — PIPERACILLIN AND TAZOBACTAM 3.38 G: 3; .375 INJECTION, POWDER, LYOPHILIZED, FOR SOLUTION INTRAVENOUS at 05:03

## 2022-01-01 RX ADMIN — FUROSEMIDE 20 MG: 10 INJECTION, SOLUTION INTRAMUSCULAR; INTRAVENOUS at 17:58

## 2022-01-01 RX ADMIN — APIXABAN 5 MG: 5 TABLET, FILM COATED ORAL at 10:37

## 2022-01-01 RX ADMIN — SODIUM CHLORIDE, PRESERVATIVE FREE 2 ML: 5 INJECTION INTRAVENOUS at 10:47

## 2022-01-01 RX ADMIN — APIXABAN 2.5 MG: 2.5 TABLET, FILM COATED ORAL at 13:51

## 2022-01-01 RX ADMIN — SENNOSIDES 8.6 MG: 8.6 TABLET ORAL at 08:51

## 2022-01-01 RX ADMIN — APIXABAN 5 MG: 5 TABLET, FILM COATED ORAL at 10:49

## 2022-01-01 RX ADMIN — PIPERACILLIN AND TAZOBACTAM 3.38 G: 3; .375 INJECTION, POWDER, LYOPHILIZED, FOR SOLUTION INTRAVENOUS at 21:23

## 2022-01-01 RX ADMIN — FAMOTIDINE 20 MG: 20 TABLET ORAL at 10:49

## 2022-01-01 RX ADMIN — APIXABAN 5 MG: 5 TABLET, FILM COATED ORAL at 09:21

## 2022-01-01 RX ADMIN — METOPROLOL TARTRATE 37.5 MG: 50 TABLET, FILM COATED ORAL at 21:20

## 2022-01-01 RX ADMIN — DESMOPRESSIN ACETATE 40 MG: 0.2 TABLET ORAL at 09:55

## 2022-01-01 RX ADMIN — METOPROLOL TARTRATE 37.5 MG: 50 TABLET, FILM COATED ORAL at 06:47

## 2022-01-01 RX ADMIN — DESMOPRESSIN ACETATE 40 MG: 0.2 TABLET ORAL at 08:51

## 2022-01-01 RX ADMIN — FUROSEMIDE 20 MG: 10 INJECTION, SOLUTION INTRAMUSCULAR; INTRAVENOUS at 09:25

## 2022-01-01 RX ADMIN — FAMOTIDINE 20 MG: 20 TABLET ORAL at 09:57

## 2022-01-01 RX ADMIN — SENNOSIDES 8.6 MG: 8.6 TABLET ORAL at 09:21

## 2022-01-01 RX ADMIN — METOPROLOL TARTRATE 37.5 MG: 50 TABLET, FILM COATED ORAL at 21:16

## 2022-01-01 RX ADMIN — PIPERACILLIN AND TAZOBACTAM 3.38 G: 3; .375 INJECTION, POWDER, LYOPHILIZED, FOR SOLUTION INTRAVENOUS at 13:22

## 2022-01-01 RX ADMIN — METOPROLOL TARTRATE 37.5 MG: 50 TABLET, FILM COATED ORAL at 06:59

## 2022-01-01 RX ADMIN — DESMOPRESSIN ACETATE 40 MG: 0.2 TABLET ORAL at 08:27

## 2022-01-01 RX ADMIN — NYSTATIN: 100000 POWDER TOPICAL at 21:46

## 2022-01-01 RX ADMIN — DESMOPRESSIN ACETATE 40 MG: 0.2 TABLET ORAL at 09:36

## 2022-01-01 RX ADMIN — FAMOTIDINE 20 MG: 20 TABLET ORAL at 09:36

## 2022-01-01 RX ADMIN — SODIUM CHLORIDE 3 G: 900 INJECTION INTRAVENOUS at 17:17

## 2022-01-01 RX ADMIN — PIPERACILLIN AND TAZOBACTAM 3.38 G: 3; .375 INJECTION, POWDER, LYOPHILIZED, FOR SOLUTION INTRAVENOUS at 05:41

## 2022-01-01 RX ADMIN — SENNOSIDES 8.6 MG: 8.6 TABLET ORAL at 08:27

## 2022-01-01 RX ADMIN — PIPERACILLIN AND TAZOBACTAM 3.38 G: 3; .375 INJECTION, POWDER, LYOPHILIZED, FOR SOLUTION INTRAVENOUS at 05:23

## 2022-01-01 RX ADMIN — CLOPIDOGREL BISULFATE 75 MG: 75 TABLET, FILM COATED ORAL at 10:37

## 2022-01-01 RX ADMIN — SODIUM CHLORIDE, PRESERVATIVE FREE 2 ML: 5 INJECTION INTRAVENOUS at 09:25

## 2022-01-01 RX ADMIN — AMIODARONE HYDROCHLORIDE 200 MG: 200 TABLET ORAL at 08:51

## 2022-01-01 RX ADMIN — SODIUM CHLORIDE 3 G: 900 INJECTION INTRAVENOUS at 23:52

## 2022-01-01 RX ADMIN — QUETIAPINE 50 MG: 25 TABLET, FILM COATED ORAL at 21:18

## 2022-01-01 RX ADMIN — FUROSEMIDE 20 MG: 10 INJECTION, SOLUTION INTRAMUSCULAR; INTRAVENOUS at 18:04

## 2022-01-01 RX ADMIN — PIPERACILLIN AND TAZOBACTAM 3.38 G: 3; .375 INJECTION, POWDER, LYOPHILIZED, FOR SOLUTION INTRAVENOUS at 14:56

## 2022-01-01 RX ADMIN — FUROSEMIDE 20 MG: 10 INJECTION, SOLUTION INTRAMUSCULAR; INTRAVENOUS at 10:48

## 2022-01-01 RX ADMIN — AMIODARONE HYDROCHLORIDE 200 MG: 200 TABLET ORAL at 08:27

## 2022-01-01 RX ADMIN — INSULIN LISPRO 1 UNITS: 100 INJECTION, SOLUTION INTRAVENOUS; SUBCUTANEOUS at 13:19

## 2022-01-01 RX ADMIN — NYSTATIN: 100000 POWDER TOPICAL at 21:20

## 2022-01-01 RX ADMIN — METOPROLOL TARTRATE 37.5 MG: 50 TABLET, FILM COATED ORAL at 21:12

## 2022-01-01 RX ADMIN — SODIUM CHLORIDE 3 G: 900 INJECTION INTRAVENOUS at 00:16

## 2022-01-01 RX ADMIN — APIXABAN 2.5 MG: 2.5 TABLET, FILM COATED ORAL at 20:44

## 2022-01-01 RX ADMIN — FAMOTIDINE 20 MG: 20 TABLET ORAL at 08:51

## 2022-01-01 RX ADMIN — SODIUM CHLORIDE 3 G: 900 INJECTION INTRAVENOUS at 12:40

## 2022-01-01 RX ADMIN — CLOPIDOGREL BISULFATE 75 MG: 75 TABLET, FILM COATED ORAL at 09:36

## 2022-01-01 RX ADMIN — FUROSEMIDE 20 MG: 10 INJECTION, SOLUTION INTRAMUSCULAR; INTRAVENOUS at 10:49

## 2022-01-01 RX ADMIN — PIPERACILLIN AND TAZOBACTAM 3.38 G: 3; .375 INJECTION, POWDER, LYOPHILIZED, FOR SOLUTION INTRAVENOUS at 00:37

## 2022-01-01 RX ADMIN — PIPERACILLIN AND TAZOBACTAM 3.38 G: 3; .375 INJECTION, POWDER, LYOPHILIZED, FOR SOLUTION INTRAVENOUS at 05:56

## 2022-01-01 RX ADMIN — SODIUM CHLORIDE, PRESERVATIVE FREE 2 ML: 5 INJECTION INTRAVENOUS at 08:28

## 2022-01-01 RX ADMIN — FENOFIBRATE 54 MG: 54 TABLET ORAL at 09:36

## 2022-01-01 RX ADMIN — APIXABAN 5 MG: 5 TABLET, FILM COATED ORAL at 20:00

## 2022-01-01 RX ADMIN — QUETIAPINE 50 MG: 25 TABLET, FILM COATED ORAL at 20:50

## 2022-01-01 RX ADMIN — AMIODARONE HYDROCHLORIDE 200 MG: 200 TABLET ORAL at 10:37

## 2022-01-01 RX ADMIN — FUROSEMIDE 20 MG: 10 INJECTION, SOLUTION INTRAMUSCULAR; INTRAVENOUS at 17:39

## 2022-01-01 RX ADMIN — METOPROLOL TARTRATE 37.5 MG: 50 TABLET, FILM COATED ORAL at 14:49

## 2022-01-01 RX ADMIN — FAMOTIDINE 20 MG: 20 TABLET ORAL at 10:37

## 2022-01-01 RX ADMIN — ACETAMINOPHEN 650 MG: 325 TABLET ORAL at 21:31

## 2022-01-01 RX ADMIN — FUROSEMIDE 20 MG: 10 INJECTION, SOLUTION INTRAMUSCULAR; INTRAVENOUS at 08:27

## 2022-01-01 RX ADMIN — SODIUM CHLORIDE, POTASSIUM CHLORIDE, SODIUM LACTATE AND CALCIUM CHLORIDE 100 ML/HR: 600; 310; 30; 20 INJECTION, SOLUTION INTRAVENOUS at 05:00

## 2022-01-01 RX ADMIN — SENNOSIDES 8.6 MG: 8.6 TABLET ORAL at 09:36

## 2022-01-01 RX ADMIN — FENOFIBRATE 54 MG: 54 TABLET ORAL at 08:27

## 2022-01-01 RX ADMIN — CLOPIDOGREL BISULFATE 75 MG: 75 TABLET, FILM COATED ORAL at 08:27

## 2022-01-01 RX ADMIN — PIPERACILLIN AND TAZOBACTAM 3.38 G: 3; .375 INJECTION, POWDER, LYOPHILIZED, FOR SOLUTION INTRAVENOUS at 21:10

## 2022-01-01 RX ADMIN — METOPROLOL TARTRATE 37.5 MG: 50 TABLET, FILM COATED ORAL at 06:05

## 2022-01-01 RX ADMIN — APIXABAN 5 MG: 5 TABLET, FILM COATED ORAL at 20:51

## 2022-01-01 RX ADMIN — POTASSIUM CHLORIDE 40 MEQ: 1500 TABLET, EXTENDED RELEASE ORAL at 09:57

## 2022-01-01 RX ADMIN — PIPERACILLIN AND TAZOBACTAM 3.38 G: 3; .375 INJECTION, POWDER, LYOPHILIZED, FOR SOLUTION INTRAVENOUS at 05:16

## 2022-01-01 RX ADMIN — POTASSIUM CHLORIDE 40 MEQ: 1500 TABLET, EXTENDED RELEASE ORAL at 17:22

## 2022-01-01 RX ADMIN — INSULIN GLARGINE 8 UNITS: 100 INJECTION, SOLUTION SUBCUTANEOUS at 21:24

## 2022-01-01 RX ADMIN — SODIUM CHLORIDE 250 ML: 9 INJECTION, SOLUTION INTRAVENOUS at 22:10

## 2022-01-01 RX ADMIN — SODIUM CHLORIDE 3 G: 900 INJECTION INTRAVENOUS at 06:11

## 2022-01-01 RX ADMIN — QUETIAPINE 50 MG: 25 TABLET, FILM COATED ORAL at 21:12

## 2022-01-01 RX ADMIN — SODIUM CHLORIDE 3 G: 900 INJECTION INTRAVENOUS at 12:16

## 2022-01-01 RX ADMIN — SODIUM CHLORIDE, PRESERVATIVE FREE 10 ML: 5 INJECTION INTRAVENOUS at 09:36

## 2022-01-01 RX ADMIN — DESMOPRESSIN ACETATE 40 MG: 0.2 TABLET ORAL at 10:49

## 2022-01-01 RX ADMIN — METOPROLOL TARTRATE 37.5 MG: 50 TABLET, FILM COATED ORAL at 14:45

## 2022-01-01 RX ADMIN — SODIUM CHLORIDE, PRESERVATIVE FREE 10 ML: 5 INJECTION INTRAVENOUS at 08:27

## 2022-01-01 RX ADMIN — FENOFIBRATE 54 MG: 54 TABLET ORAL at 09:55

## 2022-01-01 RX ADMIN — SENNOSIDES 8.6 MG: 8.6 TABLET ORAL at 10:49

## 2022-01-01 RX ADMIN — APIXABAN 5 MG: 5 TABLET, FILM COATED ORAL at 09:55

## 2022-01-01 RX ADMIN — APIXABAN 5 MG: 5 TABLET, FILM COATED ORAL at 09:36

## 2022-01-01 RX ADMIN — INSULIN GLARGINE 8 UNITS: 100 INJECTION, SOLUTION SUBCUTANEOUS at 22:01

## 2022-01-01 RX ADMIN — SODIUM CHLORIDE, PRESERVATIVE FREE 2 ML: 5 INJECTION INTRAVENOUS at 23:20

## 2022-01-01 RX ADMIN — CLOPIDOGREL BISULFATE 75 MG: 75 TABLET, FILM COATED ORAL at 10:49

## 2022-01-01 RX ADMIN — TC 99M MEDRONATE 30.4 MILLICURIE: 20 INJECTION, POWDER, LYOPHILIZED, FOR SOLUTION INTRAVENOUS at 11:55

## 2022-01-01 RX ADMIN — PIPERACILLIN AND TAZOBACTAM 3.38 G: 3; .375 INJECTION, POWDER, LYOPHILIZED, FOR SOLUTION INTRAVENOUS at 06:01

## 2022-01-01 RX ADMIN — SODIUM CHLORIDE, PRESERVATIVE FREE 10 ML: 5 INJECTION INTRAVENOUS at 21:23

## 2022-01-01 RX ADMIN — SODIUM CHLORIDE, PRESERVATIVE FREE 10 ML: 5 INJECTION INTRAVENOUS at 22:12

## 2022-01-01 RX ADMIN — SODIUM CHLORIDE 3 G: 900 INJECTION INTRAVENOUS at 23:26

## 2022-01-01 RX ADMIN — SODIUM CHLORIDE, POTASSIUM CHLORIDE, SODIUM LACTATE AND CALCIUM CHLORIDE: 600; 310; 30; 20 INJECTION, SOLUTION INTRAVENOUS at 17:08

## 2022-01-01 RX ADMIN — PIPERACILLIN AND TAZOBACTAM 3.38 G: 3; .375 INJECTION, POWDER, LYOPHILIZED, FOR SOLUTION INTRAVENOUS at 13:51

## 2022-01-01 RX ADMIN — METOPROLOL TARTRATE 37.5 MG: 50 TABLET, FILM COATED ORAL at 14:34

## 2022-01-01 RX ADMIN — SODIUM CHLORIDE 3 G: 900 INJECTION INTRAVENOUS at 18:08

## 2022-01-01 RX ADMIN — QUETIAPINE 50 MG: 25 TABLET, FILM COATED ORAL at 21:46

## 2022-01-01 RX ADMIN — METOPROLOL TARTRATE 37.5 MG: 50 TABLET, FILM COATED ORAL at 05:41

## 2022-01-01 RX ADMIN — POTASSIUM CHLORIDE 20 MEQ: 14.9 INJECTION, SOLUTION INTRAVENOUS at 09:19

## 2022-01-01 RX ADMIN — SODIUM CHLORIDE, PRESERVATIVE FREE 2 ML: 5 INJECTION INTRAVENOUS at 22:10

## 2022-01-01 RX ADMIN — FUROSEMIDE 20 MG: 10 INJECTION, SOLUTION INTRAMUSCULAR; INTRAVENOUS at 19:24

## 2022-01-01 RX ADMIN — PIPERACILLIN AND TAZOBACTAM 3.38 G: 3; .375 INJECTION, POWDER, LYOPHILIZED, FOR SOLUTION INTRAVENOUS at 14:33

## 2022-01-01 RX ADMIN — AMIODARONE HYDROCHLORIDE 200 MG: 200 TABLET ORAL at 09:34

## 2022-01-01 RX ADMIN — DEXTROSE MONOHYDRATE 12.5 G: 25 INJECTION, SOLUTION INTRAVENOUS at 11:50

## 2022-01-01 RX ADMIN — AMIODARONE HYDROCHLORIDE 200 MG: 200 TABLET ORAL at 09:36

## 2022-01-01 ASSESSMENT — PATIENT HEALTH QUESTIONNAIRE - PHQ9
CLINICAL INTERPRETATION OF PHQ2 SCORE: NO FURTHER SCREENING NEEDED
SUM OF ALL RESPONSES TO PHQ9 QUESTIONS 1 AND 2: 1
IS PATIENT ABLE TO COMPLETE PHQ2 OR PHQ9: YES

## 2022-01-01 ASSESSMENT — COGNITIVE AND FUNCTIONAL STATUS - GENERAL
DO YOU HAVE DIFFICULTY DRESSING OR BATHING: YES
HELP NEEDED FOR PERSONAL GROOMING: A LITTLE
HELP NEEDED FOR BATHING: TOTAL
APPLIED_COGNITIVE_CONVERTED_SCORE: 62.21
DO YOU HAVE SERIOUS DIFFICULTY WALKING OR CLIMBING STAIRS: YES
HELP NEEDED DRESSING REGULAR LOWER BODY CLOTHING: TOTAL
HELP NEEDED FOR BATHING: A LOT
APPLIED_COGNITIVE_RAW_SCORE: 24
HELP NEEDED FOR TOILETING: TOTAL
HELP NEEDED FOR TOILETING: TOTAL
HELP NEEDED DRESSING REGULAR LOWER BODY CLOTHING: TOTAL
BASIC_MOBILITY_CONVERTED_SCORE: 25.80
HELP NEEDED DRESSING REGULAR UPPER BODY CLOTHING: A LOT
APPLIED_COGNITIVE_CONVERTED_SCORE: 62.21
HELP NEEDED DRESSING REGULAR UPPER BODY CLOTHING: A LOT
APPLIED_COGNITIVE_CONVERTED_SCORE: 62.21
BASIC_MOBILITY_RAW_SCORE: 8
DAILY_ACTIVITY_CONVERTED_SCORE: 29.04
BASIC_MOBILITY_RAW_SCORE: 8
HELP NEEDED FOR BATHING: TOTAL
HELP NEEDED DRESSING REGULAR LOWER BODY CLOTHING: TOTAL
BECAUSE OF A PHYSICAL, MENTAL, OR EMOTIONAL CONDITION, DO YOU HAVE DIFFICULTY DOING ERRANDS ALONE: NO
APPLIED_COGNITIVE_RAW_SCORE: 24
BASIC_MOBILITY_CONVERTED_SCORE: 22.61
DAILY_ACTIVITY_CONVERTED_SCORE: 30.60
DAILY_ACTIVITY_RAW_SCORE: 13
APPLIED_COGNITIVE_RAW_SCORE: 24
HELP NEEDED FOR TOILETING: TOTAL
HELP NEEDED FOR TOILETING: TOTAL
HELP NEEDED FOR PERSONAL GROOMING: A LITTLE
ARE YOU DEAF OR DO YOU HAVE SERIOUS DIFFICULTY  HEARING: NO
APPLIED_COGNITIVE_RAW_SCORE: 24
BECAUSE OF A PHYSICAL, MENTAL, OR EMOTIONAL CONDITION, DO YOU HAVE SERIOUS DIFFICULTY CONCENTRATING, REMEMBERING OR MAKING DECISIONS: NO
BASIC_MOBILITY_CONVERTED_SCORE: 22.61
DAILY_ACTIVITY_RAW_SCORE: 12
BECAUSE OF A PHYSICAL, MENTAL, OR EMOTIONAL CONDITION, DO YOU HAVE SERIOUS DIFFICULTY CONCENTRATING, REMEMBERING OR MAKING DECISIONS: NO
BASIC_MOBILITY_RAW_SCORE: 9
ARE YOU BLIND OR DO YOU HAVE SERIOUS DIFFICULTY SEEING, EVEN WHEN WEARING GLASSES: NO
DAILY_ACTIVITY_RAW_SCORE: 11
DO YOU HAVE DIFFICULTY DRESSING OR BATHING: YES
HELP NEEDED DRESSING REGULAR LOWER BODY CLOTHING: TOTAL
HELP NEEDED FOR BATHING: TOTAL
DAILY_ACTIVITY_CONVERTED_SCORE: 32.03
BECAUSE OF A PHYSICAL, MENTAL, OR EMOTIONAL CONDITION, DO YOU HAVE DIFFICULTY DOING ERRANDS ALONE: NO
HELP NEEDED DRESSING REGULAR UPPER BODY CLOTHING: A LOT
DO YOU HAVE SERIOUS DIFFICULTY WALKING OR CLIMBING STAIRS: YES
DAILY_ACTIVITY_CONVERTED_SCORE: 29.04
ARE YOU DEAF OR DO YOU HAVE SERIOUS DIFFICULTY  HEARING: NO
DAILY_ACTIVITY_RAW_SCORE: 11
ARE YOU BLIND OR DO YOU HAVE SERIOUS DIFFICULTY SEEING, EVEN WHEN WEARING GLASSES: NO
HELP NEEDED DRESSING REGULAR UPPER BODY CLOTHING: A LOT
APPLIED_COGNITIVE_CONVERTED_SCORE: 62.21

## 2022-01-01 ASSESSMENT — COLUMBIA-SUICIDE SEVERITY RATING SCALE - C-SSRS
IS THE PATIENT ABLE TO COMPLETE C-SSRS: YES
1. WITHIN THE PAST MONTH, HAVE YOU WISHED YOU WERE DEAD OR WISHED YOU COULD GO TO SLEEP AND NOT WAKE UP?: NO
2. HAVE YOU ACTUALLY HAD ANY THOUGHTS OF KILLING YOURSELF?: NO
6. HAVE YOU EVER DONE ANYTHING, STARTED TO DO ANYTHING, OR PREPARED TO DO ANYTHING TO END YOUR LIFE?: NO

## 2022-01-01 ASSESSMENT — PAIN SCALES - GENERAL
PAINLEVEL_OUTOF10: 0
PAINLEVEL_OUTOF10: 0
PAINLEVEL_OUTOF10: 4
PAINLEVEL_OUTOF10: 0
PAINLEVEL_OUTOF10: 4

## 2022-01-01 ASSESSMENT — ACTIVITIES OF DAILY LIVING (ADL)
BATHING: NEEDS ASSISTANCE
TOILETING: NEEDS ASSISTANCE
ADL_SHORT_OF_BREATH: YES
CHRONIC_PAIN_PRESENT: NO
ADL_BEFORE_ADMISSION: NEEDS/REQUIRES ASSISTANCE
CHRONIC_PAIN_PRESENT: NO
CONTINENCE: NEEDS ASSISTANCE
TRANSFERRING: NEEDS ASSISTANCE
ADL_BEFORE_ADMISSION: NEEDS/REQUIRES ASSISTANCE
FEEDING YOURSELF: NEEDS ASSISTANCE
ADL_SCORE: 6
PRIOR_ADL_BATHING: TOTAL ASSIST (TOTAL)
TRANSFERRING: NEEDS ASSISTANCE
TOILETING: NEEDS ASSISTANCE
MOBILITY_ASSIST_DEVICES: PROSTHESIS
DRESSING YOURSELF: NEEDS ASSISTANCE
HOME_MANAGEMENT_TIME_ENTRY: 25
PRIOR_ADL_TOILETING: MAXIMAL ASSIST (MAX)
FEEDING YOURSELF: NEEDS ASSISTANCE
BATHING: NEEDS ASSISTANCE
CONTINENCE: NEEDS ASSISTANCE
RECENT_DECLINE_ADL: YES, DECLINE IN BATHING/DRESSING/FEEDING, COLLABORATE WITH PROVIDER (T)
MOBILITY_ASSIST_DEVICES: FRONT-WHEELED WALKER
HOME_MANAGEMENT_TIME_ENTRY: 35
DRESSING YOURSELF: NEEDS ASSISTANCE
HOME_MANAGEMENT_TIME_ENTRY: 23
ADL_SCORE: 6
EATING: SET-UP

## 2022-01-01 ASSESSMENT — ENCOUNTER SYMPTOMS
AGITATION: 0
EYE DISCHARGE: 0
HEADACHES: 0
BRUISES/BLEEDS EASILY: 0
WHEEZING: 0
FOCAL WEAKNESS: 0
SHORTNESS OF BREATH: 1
FEVER: 0
HALLUCINATIONS: 0
DIZZINESS: 0
COUGH: 0
FATIGUE: 1
FEVER: 0
COUGH: 0
DIARRHEA: 0
CHILLS: 0
SENSORY CHANGE: 0
DIZZINESS: 0
VOMITING: 0
SPUTUM PRODUCTION: 0
NAUSEA: 0
SORE THROAT: 0
SPEECH DIFFICULTY: 0
HEADACHES: 0
WEAKNESS: 1
COUGH: 1
ABDOMINAL PAIN: 0
SHORTNESS OF BREATH: 0
FEVER: 0
BACK PAIN: 0
PAIN SEVERITY NOW: 0
LIGHT-HEADEDNESS: 0
BACK PAIN: 0
PAIN SEVERITY NOW: 0
CONFUSION: 0
CHILLS: 0
WEAKNESS: 1
VOMITING: 0
EYE ITCHING: 0
NAUSEA: 0
SPEECH CHANGE: 0
CHILLS: 0
WEAKNESS: 1
VOMITING: 0

## 2022-01-01 ASSESSMENT — LIFESTYLE VARIABLES
HOW OFTEN DO YOU HAVE A DRINK CONTAINING ALCOHOL: MONTHLY OR LESS
AUDIT-C TOTAL SCORE: 1
ALCOHOL_USE_STATUS: NO OR LOW RISK WITH VALIDATED TOOL
HOW OFTEN DO YOU HAVE 6 OR MORE DRINKS ON ONE OCCASION: NEVER
HOW MANY STANDARD DRINKS CONTAINING ALCOHOL DO YOU HAVE ON A TYPICAL DAY: 0,1 OR 2
HOW MANY STANDARD DRINKS CONTAINING ALCOHOL DO YOU HAVE ON A TYPICAL DAY: 0,1 OR 2
HOW OFTEN DO YOU HAVE 6 OR MORE DRINKS ON ONE OCCASION: NEVER

## 2022-01-01 NOTE — PROCEDURES
[facility]  Procedure Note    Patel Marie Patient Status:  Inpatient    1955 MRN [id number]   [facility] 2NE-A Attending No att. providers found   [facility] Day # 54 SHAR Lyles DO     Procedure:  Tunneled Dialysis catheter pl

## 2022-03-12 PROBLEM — A41.9 SEPSIS (CMD): Status: ACTIVE | Noted: 2022-01-01

## 2022-03-17 PROBLEM — M86.9 OSTEOMYELITIS OF ANKLE AND FOOT (CMD): Status: ACTIVE | Noted: 2022-01-01

## 2022-03-23 PROBLEM — J96.91 RESPIRATORY FAILURE WITH HYPOXIA, UNSPECIFIED CHRONICITY (HCC): Status: ACTIVE | Noted: 2022-01-01

## 2022-03-23 PROBLEM — R53.1 WEAKNESS: Status: ACTIVE | Noted: 2022-01-01

## 2022-03-23 PROBLEM — S91.302D: Status: ACTIVE | Noted: 2022-01-01

## 2022-03-23 PROBLEM — S31.809D: Status: ACTIVE | Noted: 2022-01-01

## 2022-03-23 PROBLEM — I73.9 PAD (PERIPHERAL ARTERY DISEASE) (HCC): Status: ACTIVE | Noted: 2022-01-01

## 2022-03-23 PROBLEM — I51.3 LV (LEFT VENTRICULAR) MURAL THROMBUS: Status: ACTIVE | Noted: 2022-01-01

## 2022-03-23 PROBLEM — I48.0 PAROXYSMAL ATRIAL FIBRILLATION (HCC): Status: ACTIVE | Noted: 2022-01-01

## 2022-04-04 PROBLEM — E08.621: Status: ACTIVE | Noted: 2022-01-01

## 2022-04-04 PROBLEM — L97.424: Status: ACTIVE | Noted: 2022-01-01

## 2022-04-09 PROBLEM — I46.9 CARDIAC ARREST (HCC): Status: ACTIVE | Noted: 2022-01-01

## 2022-04-10 PROBLEM — R56.9 SEIZURE (HCC): Status: ACTIVE | Noted: 2022-01-01

## 2022-04-10 PROBLEM — G93.1 ANOXIC ENCEPHALOPATHY (HCC): Status: ACTIVE | Noted: 2022-01-01

## 2022-04-10 NOTE — PROCEDURES
Arterial Line  Performed by: Darryl MIMS     General Information and Staff     Procedure Start: 8439  Patient Location:  ICU  Indication: continuous blood pressure monitoring and blood sampling needed    Site Identification: real time ultrasound guided, sterile technique used     Procedure Details     Catheter Size:  20 G  Catheter Length:  1 and 3/4 inchCatheter Type:  Arrow  Seldinger Technique?: Yes    Laterality:  Right  Site: Radial    Site Prep: chlorhexidine  Line Secured:  Tape and Tegaderm     Assessment: Sandro's test negative, Good flash, guidewire and catheter advanced without difficulty, pulsatile blood flow noted.     Events: patient tolerated procedure well with no complications

## 2022-04-10 NOTE — CONSULTS
[facility] Pharmacy Note: Antimicrobial Weight Based Dose Adjustment for: piperacillin/tazobactam (Jun Ayon)    Celina Burt is a 78year old patient who has been prescribed piperacillin/tazobactam (ZOSYN) 3.375 g x1 dose. Estimated Creatinine Clearance: 58.7 mL/min (A) (based on SCr of 1.46 mg/dL (H)). Wt Readings from Last 6 Encounters:  12/26/21 : (!) 137.5 kg (303 lb 1.6 oz)  05/14/21 : 121.1 kg (267 lb)  01/17/19 : 124.3 kg (274 lb)  12/16/17 : 132.4 kg (291 lb 14.4 oz)  03/21/17 : 136.1 kg (300 lb)  02/24/17 : 124.7 kg (275 lb)    There is no height or weight on file to calculate BMI. Based on this criteria and renal function, dose will be adjusted to piperacillin/tazobactam (ZOSYN) 4.5 g x1 dose.     Thank you,    Princess Mayen, PharmD  4/9/2022  7:54 PM

## 2022-04-10 NOTE — PROGRESS NOTES
04/09/22 2014   Clinical Encounter Type   Visited With Health care provider   Routine Visit Introduction   Continue Visiting No   Patient's Supportive Strategies/Resources It appears that this patient has no family here presently. The patient is in the cath lab.  remains available at pager #[phone] as needed for spiritual care and/or support.

## 2022-04-10 NOTE — PLAN OF CARE
Report received from ER nurse. Pt arrives vented/with no sedation. Pt having myoclonic jerks with stimulations by opening his eyes /mouth shoulders and arms in an upward motion. Pt with no response to pain. Both pupils rx to light and accomotions of +2 and very sluggish. NO gag/ cough, Vented on FIO2 60% RR 20, +5 PEEP,  with minimal secretions. Cardiac monitor SR BBB levophed gtt at 26 mcg/kg/ for a gaol of  SBP > 90 or Map > 65. OG to LIS with thick mucous clear secretions. Bettencourt with minimal u/o with low bladder temp readings. Skin cold and clammy  Acc check 130's. Taylor in placed. SZ pads in placed. Bilateral soft Restrains in placed. 0400 Pt having more jerking facial movements and now body movement. Spoke with Elizabeth GUERRA and orders for 2 mg of versed given with some results. Orders for EEG in am and to consult neuro APN to see pt in am.  0630  [facility] notified and spoke with Tylor Connor  Case number [id number]  Possible organ/tissue/eye donor. [facility] will call back with more details   0700  Paged Dr. Jennifer Shea for a new consult  Sz and jerking increasing orders for Propofol to stop the sz activity and one time order of Keppra 1500 gm IV x 1.   EEG for this morning and spoke with tech on call

## 2022-04-10 NOTE — ED QUICK NOTES
Summary of arrest:  - Estimated down time of 16 minutes prior to EMS arrival. CPR was initiated by nursing facility staff. - Patient received a total of 3 doses of epi prior to arrival in ED.  - Found to be in vfib and received 1 shock by EMS. - igel and IO in place on arrival with autopulse in progress. Patient received medications in ED as per eMAR. Intubated by ERMD. Femoral pulse noted by ERMD on pulse check as per code narrator. Care at this time endorsed to [facility].

## 2022-04-11 NOTE — PLAN OF CARE
Report received from previous RN. Pt remains on the vent FIO2 40%  +5 PEEP, RR 20 with minimal secretions but bloody oral secretions. Lungs DM. Non response to pain or withdrawal, no gag no cough no corneal on the left eye but right eye with some sluggish rx + 2. No jerking movements noted just eye lids fluttering at times. Cardiac monitor SR 1 RBBB on levo gtt to keep SBP > 90 or MAP > 65. Abdomen soft and round OG to LIS with clear green bile color. Skin cool and dry. Bladder temps at 36.9  with goal temp < than 37.8 goal. Bettencourt with minimal u/o. Acc q 6. 24 hrs EEG in progress. 2130 Received a call From Neurology and orders to increased propofol gtt and to continue with the Keppra doses a schedule . 0500 Increased eye movement, increased propofol gtt per orders.   Increased bloody oral secretions

## 2022-04-11 NOTE — PROGRESS NOTES
04/11/22 1402   Clinical Encounter Type   Visited With Family  ( introduced himself to wife and offered emotional support. She said she was okay and thanked him for stopping by.)   Chaplains are available via pager [phone] or consult.

## 2022-04-11 NOTE — PLAN OF CARE
Care withdrawn from patient around [phone] with wife at bedside. All infusions shut off, morphine gtt initiated per orders after 2mg bolus given per orders. Scopolamine patch in place. D/w neuro, versed push given d/t seizure activity. Extubated to room air. OG removed. Patient passed peacefully at 1440 with RN at bedside. No heart tones to auscultation, no corneal reflex, pupils fixed and dilated, no spontaneous respirations. Wife called and updated with TOD. All consults notified. [facility] notified promptly, candidate for tissue and eye.

## 2022-04-11 NOTE — PROGRESS NOTES
Video EEG prelim reports    Burst suppression pattern with generalized spike discharges. On Propofol. GSD increased in the evening hours. Clinical no seizure activity reported.    Advised to increase the dose of propofol  On Keppra 1500 mg Q 12 hr, got 2 dose at 8 pm.    D/w patient's nurse Rm Bautista

## 2022-04-11 NOTE — PLAN OF CARE
Received patient after report. Patient resting in bed on ventilator. Remains unresponsive. Propofol remains on. Levo titrated per protocol. Bettencourt in place. Bloody oral secretions noted. Plan to withdraw care at 1400 per family wishes. See orders.

## 2022-04-18 ENCOUNTER — CASE MANAGEMENT (OUTPATIENT)
Dept: CARE COORDINATION | Age: 67
End: 2022-04-18

## 2022-09-04 NOTE — PROGRESS NOTES
[facility] Cardiology Progress Note - Yue Santana Patient Status:  Inpatient    1955 MRN [id number]   [facility] 6NE-A Attending Catrachito Valdovinos, *   Hosp Day # 32 PCP Gwendolyn Chaudhari DO     Subjective:  A infection     Non-pressure chronic ulcer of other part of right foot with fat layer exposed ([address]     Chronic osteomyelitis of right foot with draining sinus ([address]     Diabetic ulcer of right foot associated with type 2 diabetes mellitus ([address]     Diabetic kg)  11/12/21 0600 : (!) 305 lb 3.2 oz (138.4 kg)  11/11/21 0555 : (!) 307 lb 1.6 oz (139.3 kg)  11/10/21 0757 : (!) 308 lb 12.8 oz (140.1 kg)  11/09/21 0515 : 289 lb 14.5 oz (131.5 kg)  11/09/21 0500 : 289 lb 14.4 oz (131.5 kg)  11/03/21 1731 : (!) 325 lb < > = values in this interval not displayed.        Recent Labs   Lab 11/25/21  0723 11/26/21  0411 11/27/21  0458 11/28/21  0430 11/29/21  0413   ALT  --   --  44  --   --    AST  --   --  49*  --   --    ALB 2.5* 2.3* 3.1*  3.1* 2.8* 3.2*       No resu Intravenous, Q30 Min PRN   Or  fentaNYL citrate (SUBLIMAZE) 0.05 MG/ML injection 50 mcg, 50 mcg, Intravenous, Q30 Min PRN  acetaminophen (TYLENOL) tab 650 mg, 650 mg, Oral, Q4H PRN   Or  acetaminophen (TYLENOL) 160 MG/5ML oral liquid 650 mg, 650 mg, Oral, nausea, vomiting,   Genital/: no dysuria,   Musculoskeletal: no joint complaints upper or lower extremities  Neuro: no sensory or motor complaint  Psyche: no symptoms of depression or anxiety  Hematology: denies bruising or excessive bleeding  Endocrine: No

## 2023-03-24 NOTE — PROGRESS NOTES
Subjective    Chief Complaint  This information was obtained from the patient  Patient is here for a follow up for wounds to the right foot.  He denies any pain     Allergies  NKDA    HPI  This information was obtained from the patient, caregiver  The blaine 11-6-19 RE-EVALUATION: Patient returns today with spouse, last seen 2 weeks ago when we scheduled his appointment out for a last recheck to be in 4 weeks. patient did do his business travel and last week called clinic to say the area re-curred.  Today he st 12-4-19 patient returns today, he did got to [facility] for the holiday and states he was walking about 3000 steps a day.  he has an appointment with jacinto and lena so we will take tcc off today, place a felted foam for protection and patient to come to wound c Constitutional Symptoms (General Health):  Fatigue, Fever, Marked Weight Change, Chills  Respiratory: Cough, Shortness of Breath, Wheezing  Cardiovascular (Central/Peripheral): Chest Pain, Dyspnea on Exertion, Intermittent Claudication, Lower extremity (leg Integumentary (Hair, Skin)  see wound documentation. Psychiatric:  Judgment and insight intact. Alert and oriented times 3. No evidence of depression, anxiety, or agitation. Calm, cooperative, and communicative. Appropriate interactions and affect. Loss of subcutaneous fat.../Loss of muscle...

## 2023-09-26 NOTE — PROGRESS NOTES
[facility] Tech Details  Patient Name: Fco Elena   Patient Number: [id number]  Patient YOB: 1955  Date: 3/6/2019  Physician / Manohar Gone: Lyly Martin, Octaviano [address] [id number] [address] [facility] Treatment Course Details  Treatment Course Number: 1  Indication: [place] Hatchet Flap Text: The defect edges were debeveled with a #15 scalpel blade. Given the location of the defect, shape of the defect and the proximity to free margins a hatchet flap was deemed most appropriate. Using a sterile surgical marker, an appropriate hatchet flap was drawn incorporating the defect and placing the expected incisions within the relaxed skin tension lines where possible. The area thus outlined was incised deep to adipose tissue with a #15 scalpel blade. The skin margins were undermined to an appropriate distance in all directions utilizing iris scissors. Following this, the designed flap was carried over into the primary defect and sutured into place.

## 2024-05-06 NOTE — PROGRESS NOTES
.Weekly Wound Education Note    Teaching Provided To: Patient  Training Topics: Dressing; Discharge instructions; Off-loading  Training Method: Explain/Verbal; Written  Training Response: Patient responds and understands           Skin prep, TCC. No

## 2024-11-23 NOTE — PLAN OF CARE
Assumed patient care at [address] [place] [address]. Patient resting in bed, VSS, vented and sedated, on CRRT. Amio gtt infusing. Impella removed, site C/D/I, patient's HOB raised with no issues. Patient tolerating CRRT, no pressors needed.  Slightly greater UO this night compare Risk Factors inc depressive sx, anxiety sx, hx of NSSI, auditory/visual hallucinations, paranoid ideation, housing instability, noncompliance with antidepressant medication. patient presently struggling to maintain care of self and presents as danger to self. collateral corroborates. patient presently needing higher level of care and meeting criteria for involuntary inpatient admission.

## (undated) DEVICE — LOWER EXTREMITY CDS-LF: Brand: MEDLINE INDUSTRIES, INC.

## (undated) DEVICE — GOWN,SIRUS,FABRIC-REINFORCED,LARGE: Brand: MEDLINE

## (undated) DEVICE — GAMMEX® PI HYBRID SIZE 6.5, STERILE POWDER-FREE SURGICAL GLOVE, POLYISOPRENE AND NEOPRENE BLEND: Brand: GAMMEX

## (undated) DEVICE — GAUZE SPONGES,12 PLY: Brand: CURITY

## (undated) DEVICE — SINGLE USE MEDICAL DEVICE FOR OPHTHALMIC SURGERY: Brand: SIL. COATED I/A 45 MIL 12/B

## (undated) DEVICE — Device: Brand: JELCO

## (undated) DEVICE — SOL  .9 1000ML BTL

## (undated) DEVICE — SUTURE VICRYL 1 OS-6

## (undated) DEVICE — CHLORAPREP 26ML APPLICATOR

## (undated) DEVICE — DRESSING IN STRIPPABLE ENVELOPE: Brand: DERMACEA

## (undated) DEVICE — DRESSING COBAN 1\" TAN

## (undated) DEVICE — FIBERWIRE 2.0 AR-7220

## (undated) DEVICE — STANDARD HYPODERMIC NEEDLE,POLYPROPYLENE HUB: Brand: MONOJECT

## (undated) DEVICE — STOCKING CMPR LG LNG THG LGTH

## (undated) DEVICE — NEEDLE CYSTOTOME W/25G CANNULA

## (undated) DEVICE — TAPE CLOTH ADHESIVE 3

## (undated) DEVICE — PREP BETADINE SOL 5% EYE

## (undated) DEVICE — KENDALL SCD EXPRESS SLEEVES, KNEE LENGTH, MEDIUM: Brand: KENDALL SCD

## (undated) DEVICE — CATARACT PATIENT CARE KIT

## (undated) DEVICE — 1200CC GUARDIAN II: Brand: GUARDIAN

## (undated) DEVICE — SUTURE ETHILON 3-0 PS-1

## (undated) DEVICE — SOL H2O 1000ML BTL

## (undated) DEVICE — CLEARCUT® SLIT KNIFE INTREPID MICRO-COAXIAL SYSTEM 2.4 SB: Brand: CLEARCUT®; INTREPID

## (undated) DEVICE — GLOVE SURG SENSICARE SZ 8

## (undated) DEVICE — UNDYED BRAIDED (POLYGLACTIN 910), SYNTHETIC ABSORBABLE SUTURE: Brand: COATED VICRYL

## (undated) DEVICE — CLEARCUT® SIDEPORT KNIFE DUAL BEVEL 1.0MM ANGLED: Brand: CLEARCUT®

## (undated) DEVICE — BLADE SAW KM33-11

## (undated) DEVICE — BSS BAG CENTURION

## (undated) DEVICE — REM POLYHESIVE ADULT PATIENT RETURN ELECTRODE: Brand: VALLEYLAB

## (undated) DEVICE — FAN SPRAY KIT: Brand: PULSAVAC®

## (undated) DEVICE — SUPER SPONGES,MEDIUM: Brand: KERLIX

## (undated) DEVICE — MINI-BLADE®: Brand: BEAVER®

## (undated) DEVICE — GAUZE PACKING IODOFORM 1/4X5

## (undated) DEVICE — GLOVE SURG TRIUMPH SZ 8

## (undated) DEVICE — SUTURE VICRYL 0 CP-2

## (undated) DEVICE — ABDOMINAL PAD: Brand: DERMACEA

## (undated) DEVICE — 1535-1 MICROPORE DISPENSER PK 1" X 10YDS, 12 RLS/BX: Brand: 3M™ MICROPORE™

## (undated) DEVICE — BANDAGE ROLL,100% COTTON, 6 PLY, LARGE: Brand: KERLIX

## (undated) DEVICE — SYRINGE 30ML LL TIP

## (undated) DEVICE — GOWN SURG AERO CHROME XXL

## (undated) DEVICE — GLOVE SURG SENSICARE SZ 7-1/2

## (undated) DEVICE — GLOVE ORTHO ALOETOUCH SZ 71/2

## (undated) DEVICE — GLOVE SURG TRIUMPH SZ 71/2

## (undated) DEVICE — GAUZE PACKING IODOFORM 1/2X5

## (undated) DEVICE — EYE PACK: Brand: MEDLINE INDUSTRIES, INC.

## (undated) DEVICE — CANNULA EYE IRRIGATING TIP 30G

## (undated) DEVICE — ADHESIVE MASTISOL 2/3CC VL

## (undated) DEVICE — SPECIMEN CONTAINER,POSITIVE SEAL INDICATOR, OR PACKAGED: Brand: PRECISION

## (undated) DEVICE — BETADINE OINTMENT 1 32 OZ PKT

## (undated) DEVICE — SUTURE VICRYL 0 CT-1

## (undated) DEVICE — DRAIN RELIAVAC W/DRN MED 1/8

## (undated) DEVICE — TOURNIQUET CUFF 24 DUAL STR

## (undated) DEVICE — #15 STERILE STAINLESS BLADE: Brand: STERILE STAINLESS BLADES

## (undated) DEVICE — SYRINGE 10ML LL TIP

## (undated) DEVICE — ACTIVE FMS W/ INTREPID* ULTRA SLEEVES, 0.9MM 30° ABS* INTREPID* BALANCED TIP: Brand: ALCON

## (undated) DEVICE — CANNULA ANTERIOR CHAMBER 25G

## (undated) DEVICE — UNFOLDER PLATINUM 1 SERIES CRTRDG 30/BOX: Brand: UNFOLDER PLATINUM 1 SERIES

## (undated) DEVICE — STERILE POLYISOPRENE POWDER-FREE SURGICAL GLOVES: Brand: PROTEXIS

## (undated) DEVICE — PROXIMATE SKIN STAPLERS (35 WIDE) CONTAINS 35 STAINLESS STEEL STAPLES (FIXED HEAD): Brand: PROXIMATE

## (undated) DEVICE — GLOVE SURG SENSICARE SZ 8-1/2

## (undated) DEVICE — PRECISION (9.0 X 0.51 X 18.5MM)

## (undated) DEVICE — GOWN,SURGICAL,AURORA,SLEEVE: Brand: MEDLINE

## (undated) DEVICE — NON-ADHERENT STRIPS,OIL EMULSION: Brand: CURITY

## (undated) DEVICE — SPLINT PRECUT ORTHOGLASS 4X15

## (undated) DEVICE — 3M™ STERI-STRIP™ REINFORCED ADHESIVE SKIN CLOSURES, R1547, 1/2 IN X 4 IN (12 MM X 100 MM), 6 STRIPS/ENVELOPE: Brand: 3M™ STERI-STRIP™

## (undated) NOTE — LETTER
[facility] [address], [address]  Consent for Procedure/Sedation    Date: 11/17/21    Time: 0730      1. I authorize the performance upon Ezekiel Jiménez the following:  Bronchoscopy and Bronchoalveolar Lavage     2.  I authorize : 1955       Medical Record #: [id number]

## (undated) NOTE — LETTER
Magaly Mullins [address] [facility] [address], [address] [facility]     PICC LINE INSERTION CONSENT     I agree to have a Peripherally Inserted Central Catheter (PICC) placed in my arm.    1. The PICC insertion procedure, care, maintenance, risks, benefits, and c goals; and potential problems that might occur during recuperation.  They also discussed reasonable alternatives to the PICC, including risks, benefits, and side effects related to the alternatives and risks related to not receiving this procedure      ____

## (undated) NOTE — IP AVS SNAPSHOT
[address]            (For Outpatient Use Only) Initial Admit Date: 11/3/2021   Inpt/Obs Admit Date: Inpt: 11/3/21 / Obs: N/A   Discharge Date:    Jerel Anthony:  [de-identified]   MRN: [de-identified]   CSN: [id number]   CEID: [id number] Insurance Type:    Subscriber Name:  Subscriber :    Subscriber ID:  Pt Rel to Subscriber:    Hospital Account Financial Class: Medicare    2021

## (undated) NOTE — IP AVS SNAPSHOT
[facility] [place] [address] [place], [address] ~ [phone]                Discharge Summary   5/12/2017    Emil Herron           Admission Information        Provider Department    5/12/2017 Inderjit Dunn DPM  3sw-A Atorvastatin Calcium 20 MG Tabs   Last time this was given:  20 mg on 5/14/2017  9:37 PM   Commonly known as:  LIPITOR        Take 1 tablet (20 mg total) by mouth daily.     Hubert Carlson                           BD INSULIN SYRINGE ULTRAFINE 30G X 1/2\" Where to Get Your Medications      These medications were sent to [place], [address] [facility]  [address] [phone], [phone]  [address], [address]     Phone:  [phone]    - insulin aspart 100 UNIT/ML Soln  - i Follow up with Micaela Barbosa MD On 7/13/2017.     Specialty:  ENDOCRINOLOGY    Contact information:    [id number]          Follow up with Gilma Ramirez MD. Schedule an appointment as soon as possible 1.2  (05/12/17)  4.78 (05/12/17)  1.17 (05/12/17)  0.43 (05/12/17)  0.10 (05/12/17)  0.08    (02/24/17)  59.0 (02/24/17)  29.0 (02/24/17)  7.1 (02/24/17)  1.9 (02/24/17)  0.8  (02/24/17)  3.83 (02/24/17)  1.88 (02/24/17)  0.46 (02/24/17)  0.12 (02/24/17) view more details from this visit by going to [email or web address]. If you've recently had a stay at the Hospital you can access your discharge instructions in [facility] by going to Visits < Admission Summaries.  If you've been to the Emergency Depar Atorvastatin Calcium 20 MG Oral Tab       Use: Lower cholesterol, protect your heart   Most common side effects: Dizziness, constipation, abnormal liver function   What to report to your healthcare team:  Dizziness, muscle aches, constipation           Sa

## (undated) NOTE — IP AVS SNAPSHOT
Patient Demographics     Address  [address]  [place] Age [address] Phone  [phone] [facility])  [phone] (Work)  [phone] [facility] E-mail Address  [email or web address]      Emergency Contact(s)     Name Relation Home Work Mobile    DAISY Guzman Your medication list      TAKE these medications       Instructions Authorizing Provider Morning Afternoon Evening As Needed   acetaminophen 325 MG Tabs  Commonly known as: TYLENOL      Take 2 tablets (650 mg total) by mouth every 4 (four) hours as ne Take 3 mL by nebulization every 4 (four) hours as needed.    Pieter Guerra MD         maalox/diphenhydramine/sucralfate Susp  Commonly known as: MAGIC MOUTHWASH  Next dose due: 12/28 evening      Take 10 mL by mouth 4 (four) times daily before meals and ni MG Tabs  apixaban 2.5 MG Tabs  clopidogrel 75 MG Tabs  dilTIAZem HCl ER Coated Beads 180 MG Cp24  metoprolol tartrate 50 MG Tabs  predniSONE 10 MG Tabs           [id number]-A - MAR ACTION REPORT  (last 24 hrs)    ** SITE UNKNOWN **     Order ID Medication N (LOPRESSOR) tab 50 mg 12/27/21 1846 Given      [id number] metoprolol tartrate (LOPRESSOR) tab 50 mg 12/28/21 0519 Given      [id number] predniSONE (DELTASONE) tab 20 mg 12/28/21 0929 Given            LEFT LOWER ABDOMEN     Order ID Medication Name Giblert Hoskins 12/27/21 2300 Resulting lab: [address] [facility])    Specimen Information    Type Source Collected On   Blood — 12/28/21 2885          Components    Component Value Reference Range Flag Lab   Glucose 137 70 - 99 mg/dL [facility] Microbiology Results (All)     Procedure Component Value Units Date/Time    Rapid SARS-CoV-2 by PCR [[id number]]  (Normal) Collected: 12/26/21 2145    Order Status: Completed Lab Status: Final result Updated: 12/26/21 2227    Specimen: Other from Nares WBCs seen      No organisms seen    AFB Culture and Smear [[id number]] Collected: 11/17/21 0486    Order Status: Sent Lab Status: In process Updated: 11/18/21 8862    Specimen: Other from BAL,RML     Narrative:       The following orders were created for pan Rapid SARS-CoV-2 by PCR Not Detected    Respiratory Flu Expanded Panel + COVID-19 [[id number]]  (Normal) Collected: 11/05/21 1557    Order Status: Completed Lab Status: Final result Updated: 11/05/21 1708    Specimen: Other from Nasopharyngeal swab 11/03/21 1421    Specimen: Other from Nares      Rapid SARS-CoV-2 by PCR Not Detected      Pending Labs     Order Current Status    Magnesium Collected (11/25/21 3150)    AFB Culture and Smear Preliminary result    AFB Culture(P) Preliminary result Insulin Aspart Pen 100 UNIT/ML Subcutaneous Solution Pen-injector, Inject 45 Units into the skin 3 (three) times daily before meals. , Disp: , Rfl:   insulin glargine 100 UNIT/ML Subcutaneous Solution, Inject 65 Units into the skin nightly., Disp: , Rfl: rhythm, no murmur detected  GI: Abdomen soft, nontender, nondistended, no organomegaly  Ext: Trace bilateral LE edema, RLE in brace  Skin: Skin warm and dry. No rashes or lesions.   Psych: AAO x 3, with appropriate affect  Neuro: no new focal neuro deficits on admit    # Possible PNA: clinically picture does not appear consistent to me with PNA. Though given abnl CXR +  given severity of illness with tachycardia to 130's, 02 needs will cover overnight with Abx for PNA- continue Zosyn.      # DM: BS low on admi Neuropathy    • Obesity, unspecified    • Type II or unspecified type diabetes mellitus without mention of complication, not stated as uncontrolled Dx at age 45s    Type 2 DM   • Visual impairment     glasses       Past Surgical History:   Procedure Rell Simmons Disp: , Rfl: , 11/3/2021 at 0900  Multiple Vitamin (TAB-A-ELEN) Oral Tab, Take 1 tablet by mouth daily. , Disp: , Rfl: , 11/3/2021 at 0900  atorvastatin 40 MG Oral Tab, Take 1 tablet (40 mg total) by mouth daily. , Disp: 90 tablet, Rfl: 0, 11/2/2021 at 2000 1000 UNIT/ML injection 2,000 Units, 2,000 Units, Intravenous, PRN Dialysis  heparin sodium 1000 UNIT/ML injection 2,000 Units, 2,000 Units, Intravenous, PRN Dialysis  famotidine (PEPCID) tab 20 mg, 20 mg, Oral, Daily  Insulin Aspart Pen (NOVOLOG) 100 UNIT/ Intravenous, TID  polyethylene glycol (PEG 3350) (MIRALAX) powder packet 17 g, 17 g, Oral, Daily  melatonin tab 3 mg, 3 mg, Oral, Nightly  Vitamin D3 (Cholecalciferol) (VITAMIN D3) tab 1,000 Units, 1,000 Units, Oral, Daily        Review of Systems:     Con by (CST): Tera Sequeira MD on 12/09/2021 at 1:26 PM     Finalized by (CST): Tera Sequeira MD on 12/09/2021 at 1:28 PM         Lab Review     Recent Labs   Lab 12/08/21  1252 12/08/21  1449 12/09/21  0439   * 147* 143   K 5.1 5.3* 4.5   * 11 Discharge Summary signed by Verónica Martinez MD at 12/28/2021  8:27 AM  Version 2 of 2    Author: Verónica Martinez MD Service: Hospitalist Author Type: Physician    Filed: 12/28/2021  8:27 AM Date of Service: 12/28/2021  8:01 AM Status: Addendum    : pressors but has since been extubated and pressors stopped. Patient also had stents placed to his LAD and RPL. Patient EF did improve during hospitalization to EF of 55 %. Patient had concerns for sepsis and completed a course of iv abx.  Patient was also o nightly. Refills: 0     senna 8.8 MG/5ML Syrp  Commonly known as: SENOKOT      Take 10 mL (17.6 mg total) by mouth nightly as needed (constipation). Refills: 0     Vitamin D3 25 MCG Tabs      Take 1 tablet (1,000 Units total) by mouth daily.    Quantity 100 MG Tabs  Commonly known as: LOPRESSOR  Ask about: Which instructions should I use? Take 100 mg by mouth 2 (two) times daily.    Refills: 0          Follow-up appointment:   Parrish Kimbrough MD  [address] [place] [phone] adjustments at rehab  -continue insulin sliding scale  -cont current dose of insulin, hypoglycemia is mostly in am, encouraged to eat a midnight snack      PermCath removal--> completed 12/27  -Patient no longer needs HD so PermCath can be removed-plan for fluctuating-titrating B B, added dilt-rate better control;led         HEATHER-resolved  -Now back at baseline  CKD3 w/ albuminuria   - baseline Cr ~1.5, was at baseline, increased to 2.2 following LHC  - likely multifactorial secondary to shock, possible ATN o old male with PMH sig for CKD3, DM2, HTN here with SOB. Patient was seen by cardiology, nephrology, pulmonary, ID, and neurology while admitted for medical care. Patient was seen by cardiology and pulmonary for cardiopulmonary arrest and HF.  Patient was se acetaminophen 325 MG Tabs  Commonly known as: TYLENOL      Take 2 tablets (650 mg total) by mouth every 4 (four) hours as needed. Refills: 0     famotidine 20 MG Tabs  Commonly known as: PEPCID      Take 1 tablet (20 mg total) by mouth daily.    Refills Empagliflozin 25 MG Tabs           ASK your doctor about these medications      Instructions Prescription details   aspirin EC 81 MG Tbec  Ask about: Which instructions should I use? Take 81 mg by mouth daily.    Refills: 0     atorvastatin 40 MG Tab required to wear a mask during their entire visit, only to be removed if asked to do so by your provider.   We are working to limit the number of people in our waiting rooms and ask that patients do not bring anyone with them to their appointment unless cli Respiratory Failure with Hypoxia   Now on room air  -completed abx for pneumonia-  -Tapering off steroids currently on prednisone p.o.--extubated 12/1, chest PT, flutter valve at bedside, pulm toileting   -Continues to have mouth breathing-oral care nystat Results (HF patients)    Chest X-Ray Results (HF patients only)    No exam resulted this encounter.       2D Echocardiogram Results (HF patients only)    CARD ECHO 2D W/O DOPPLER (NKW=[id number])    Result Date: 12/21/2021 ---------------------------------------------------------------------------- * Left ventricle: The cavity size was normal. Wall thickness was moderately increased. Systolic function was mildly reduced. The estimated ejection fraction was 50-55%.  Right chely this encounter.           Physical Therapy Notes (last 72 hours)      Physical Therapy Note signed by Trupti Purvis PT at 12/27/2021 10:42 AM  Version 1 of 1    Author: Trupti Purivs PT Service: Rehab Author Type: Physical Therapist    Filed: 12/27/20 infectious organism, unspecified part of lung    Cardiac arrest [facility])    Encephalopathy      Past Medical History  Past Medical History:   Diagnosis Date   • Cataract 2/21/2017    right eye   • H/O foot surgery In 2009    s/p partial right foot amputation ASSESSMENT   DYSPHAGIA ASSESSMENT  Test completed in conjunction with Radiologist.  Patient Positioned: Upright;Midline;RAMAN chair. Patient Viewed: Lateral.  Patient Alertness: Fully alert. Consistencies Presented: Thin liquids; Nectar thick liquids/ M Mod/Severe;Valleculae  Cleared/Reduced with: Liquid assist  Laryngeal Penetration: None  Tracheal Aspiration: None  Strategy(ies) Implemented (Hard Solid): Slow rate;Small bites and sips; Alterante consistencies  Effectiveness: Yes  Penetration Aspiration S recommendations with patient using images to describe patient's swallow function and support rationale for recommendations made. SLP outlined treatment plan moving forward.       INTERDISCIPLINARY COMMUNICATION  Reviewed results with Radiologist; agreement No                Discharge Recommendations  Discharge Recommendations/Plan: Undetermined    Treatment Plan  Treatment Plan/Recommendations: No further inpatient SLP service warranted              GOALS  Goal #1 The patient will tolerate regular consistenc Multidisciplinary Problems     Active Goals        Problem: Patient/Family Goals    Goal Priority Disciplines Outcome Interventions   Patient/Family Long Term Goal     Interdisciplinary Adequate for Discharge    Description: Patient's Long Term Goal: Sonia DFR Other (please specify) 400   Duration of Treatment 3 Hours    Dry weight (kg) or fluid removal (L) 0L    Access Site Central Line    Close line with Heparin?  Yes    Release to patient Immediate        12/10/21 9415

## (undated) NOTE — LETTER
[address] [facility] FOR BLOOD OR BLOOD COMPONENTS      In the course of your treatment, it may become necessary to administer a transfusion of blood or blood components.  This form provides basic information concerning this proc alternatives to you if it has not already been done. I,Suhail Guzman, have read/had read to me the above. I understand the matters bearing on the decision whether or not to authorize a transfusion of blood or blood components.  I have no questions which

## (undated) NOTE — IP AVS SNAPSHOT
[facility] [place] [address] [place], [address] ~ [phone]                Discharge Summary   2/21/2017    Delano Mcelroy           Admission Information        Provider Department    2/21/2017 Shantal Smith MD  Aleisha Haddad / Shelby Vizcarra Commonly known as:  JOSÉ ANTONIO CONTOUR NEXT TEST        Check blood sugars 4 times per day    Dione Salt     [    ]    [    ]    [    ]    [    ]       insulin aspart 100 UNIT/ML Soln   Commonly known as:  NOVOLOG        TAKE ACCORDING TO SLIDING SCALE- UP 2. Wear eye shield at night, except when you have to put drops in your eye, when you put a drop in, pull off the tape on your forehead and let the shield hang on your cheek. After the drop is in, tape the shield back to your forehead.  You doctor will remov Follow up with Delroy Perez MD.    Specialty:  OPHTHALMOLOGY    Why:  As needed    Contact information:    [place] [address] [place] [address] [phone]        Future Appointments        Provider Department    2/22/2017 12:30 PM Daquan Ramos harming yourself, contact [facility] at [phone]. - If you don’t have insurance, Jasen Enriquez has partnered with Patient [address] to help you get signed up for insurance coverage.   Patient Hato Candal

## (undated) NOTE — LETTER
[facility] [address], [address]  Consent for Procedure/Sedation    Date: 11/16/21    Time: 1057      1.  I authorize the performance upon Oscar Ruiz the following:cardiac catheterization, left ventricular cineangiography, bi ____________________________________________________    Signature of person authorized                                     Relationship to  to consent for patient: _________________________ patient: ___________________    Witness: _________________________

## (undated) NOTE — LETTER
[facility]  Ben Lopez 60 [address] [facility], [address] [place] [address]    Consent for Operation    Date: __________________    Time: _______________    1.  I authorize the performance upon Jolene Lundberg the following operation:    I&D L foot, 5th metatarsal resectio procedure has been videotaped, the surgeon will obtain the original videotape. The hospital will not be responsible for storage or maintenance of this tape.     6. For the purpose of advancing medical education, I consent to the admittance of observers to t STATEMENTS REQUIRING INSERTION OR COMPLETION WERE FILLED IN.     Signature of Patient:   ___________________________    When the patient is a minor or mentally incompetent to give consent:  Signature of person authorized to consent for patient: ____________ drugs/illegal medications). Failure to inform my anesthesiologist about these medicines may increase my risk of anesthetic complications. · If I am allergic to anything or have had a reaction to anesthesia before.     3. I understand how the anesthesia med I have discussed the procedure and information above with the patient (or patient’s representative) and answered their questions. The patient or their representative has agreed to have anesthesia services.     _______________________________________________

## (undated) NOTE — ED AVS SNAPSHOT
[facility] Emergency Department    [place]  [address] [address]    Phone:  [phone]    Fax:  [address]   MRN: [id number]    Department:  [facility] Emergency Department   Date of Visit:  2/24 Take 1 tablet by mouth 2 (two) times daily.          CONTINUE taking these medications     * BD INSULIN SYRINGE ULTRAFINE 30G X 1/2\" 1 ML Misc   Generic drug:  Insulin Syringe-Needle U-100       * BD INSULIN SYRINGE ULTRAFINE 30G X 1/2\" 0.3 ML Misc   Gene self-assessment the day after your visit. You may also receive a call from our patient liason soon after your visit. Also, some patients receive a detailed feedback survey mailed to them a week after the visit.   If you receive this, we would really apprec [facility] [address] ([address] [facility] [id number]68 Robyn Jensen 139 ([address]) [facility] [address] [facility]. ([address]) [facility], [facility]  [place] ([address] [facility]) [facility] ([address]) which includes the Dose Index   Registry. PATIENT STATED HISTORY:  Patient had witnessed seizure and  weakness. Patient states his sugar was low and has hypertension.

## (undated) NOTE — LETTER
[facility]  Ben Lopez 60 [address] [facility], [address] [place] [address]    Consent for Operation    Date: __________________    Time: _______________    1.  I authorize the performance upon Harry Gregg the following operation:    Procedure(s):  Debridement soft tis revealed by the pictures or by descriptive texts accompanying them. If the procedure has been videotaped, the surgeon will obtain the original videotape. The hospital will not be responsible for storage or maintenance of this tape.     6. For the purpose of THAT MY DOCTOR PROVIDED ME WITH THE ABOVE EXPLANATIONS, THAT ALL BLANKS OR STATEMENTS REQUIRING INSERTION OR COMPLETION WERE FILLED IN.     Signature of Patient:   ___________________________    When the patient is a minor or mentally incompetent to give co supplements, and pills I can buy without a prescription (including street drugs/illegal medications). Failure to inform my anesthesiologist about these medicines may increase my risk of anesthetic complications.   · If I am allergic to anything or have had Anesthesiologist Signature     Date   Time  I have discussed the procedure and information above with the patient (or patient’s representative) and answered their questions. The patient or their representative has agreed to have anesthesia services.     ___

## (undated) NOTE — LETTER
[facility]  Ben Lopez 61 [address] [facility], [address] [place] [address]    Consent for Operation    Date: __________________    Time: _______________    1.  I authorize the performance upon Maxime Ayoub the following operation:    * Open left fifth digit amputation, revealed by the pictures or by descriptive texts accompanying them. If the procedure has been videotaped, the surgeon will obtain the original videotape. The hospital will not be responsible for storage or maintenance of this tape.     6. For the purpose of THAT MY DOCTOR PROVIDED ME WITH THE ABOVE EXPLANATIONS, THAT ALL BLANKS OR STATEMENTS REQUIRING INSERTION OR COMPLETION WERE FILLED IN.     Signature of Patient:   ___________________________    When the patient is a minor or mentally incompetent to give co supplements, and pills I can buy without a prescription (including street drugs/illegal medications). Failure to inform my anesthesiologist about these medicines may increase my risk of anesthetic complications.   · If I am allergic to anything or have had Anesthesiologist Signature     Date   Time  I have discussed the procedure and information above with the patient (or patient’s representative) and answered their questions. The patient or their representative has agreed to have anesthesia services.     ___

## (undated) NOTE — ED AVS SNAPSHOT
[facility] Emergency Department    [place]  [address] [address]    Phone:  [phone]    Fax:  [address]   MRN: [id number]    Department:  [facility] Emergency Department   Date of Visit:  2/24 IF THERE IS ANY CHANGE OR WORSENING OF YOUR CONDITION, CALL YOUR PRIMARY CARE PHYSICIAN AT ONCE OR RETURN IMMEDIATELY TO THE EMERGENCY DEPARTMENT.     If you have been prescribed any medication(s), please fill your prescription right away and begin taking t

## (undated) NOTE — LETTER
Magaly Mullins [address] [address]  [place], [address] [facility]    Consent for Operation  Date: __________________                                Time: _______________    1.  I authorize the performance upon Clearence Jessy the following operation:  Procedure procedure has been videotaped, the surgeon will obtain the original videotape. The hospital will not be responsible for storage or maintenance of this tape.   7. For the purpose of advancing medical education, I consent to the admittance of observers to the STATEMENTS REQUIRING INSERTION OR COMPLETION WERE FILLED IN.     Signature of Patient:   ___________________________    When the patient is a minor or mentally incompetent to give consent:  Signature of person authorized to consent for patient: ____________ supplements, and pills I can buy without a prescription (including street drugs/illegal medications). Failure to inform my anesthesiologist about these medicines may increase my risk of anesthetic complications. iv.  If I am allergic to anything or have ha Anesthesiologist Signature     Date   Time  I have discussed the procedure and information above with the patient (or patient’s representative) and answered their questions. The patient or their representative has agreed to have anesthesia services.     ___

## (undated) NOTE — LETTER
[facility] [address], [address]  Consent for Procedure/Sedation    Date: November 11,2021    Time: 1700      1.  I authorize the performance upon Ruth Nicole the following:  Transesophageal Echocardiogram and Cardioversion Brian Gibbs        : 1955       Medical Record #: [id number]

## (undated) NOTE — IP AVS SNAPSHOT
[facility] [place] [address] [place], [address] ~ [phone]                Discharge Summary   3/21/2017    Hannah Cárdenas           Admission Information        Provider Department    3/21/2017 MD Clayton Barroso Monday / Sriram Rodriguez Commonly known as:  JOSÉ ANTONIO CONTOUR NEXT TEST        Check blood sugars 4 times per day    Marii Rae     [    ]    [    ]    [    ]    [    ]       insulin aspart 100 UNIT/ML Soln   Commonly known as:  NOVOLOG        TAKE ACCORDING TO SLIDING SCALE- UP 2. Wear eye shield at night, except when you have to put drops in your eye, when you put a drop in, pull off the tape on your forehead and let the shield hang on your cheek. After the drop is in, tape the shield back to your forehead.  You doctor will remov · Sometimes after surgery; you don't feel like eating normally  · If you don't drink water; you could get dehydrated    Alcoholic beverages should be avoided for:  · 24 hours after Anesthesia/Sedation    Call the doctor for:  · Elevated Temperature  · Blee 0.10 (02/08/17)  1.02      Metabolic Lab Results  (Last result in the past 90 days)    HgbA1C Glucose BUN Creatinine Calcium Alkaline Phosph AST    (02/08/17)  9.6 (H) (02/24/17)  108 (H) (02/24/17)  26 (H) (02/24/17)  1.72 (H) (02/24/17)  9.1 (02/24/17) discharge instructions in [employer] by going to Visits < Admission Summaries. If you've been to the Emergency Department or your doctor's office, you can view your past visit information in [employer] by going to Visits < Visit Summaries. Voicebase questions?

## (undated) NOTE — LETTER
[facility] Testing Department  Phone: [phone]  Right Fax: [phone]  [facility] 20 By:  Alize Whiting RN Date: 2/8/17    Patient Name: [facility]  Surgery Date: 2/21/2017    CSN: [id number]  Medical Record: [id number]

## (undated) NOTE — LETTER
[facility] [address], [address]  Consent for Procedure/Sedation    Date: 11/17/21    Time: 9644      1. I authorize the performance upon Ruth Nicole the following:  Temporary Dialysis Catheter Placement     2.  I authorize  : 1955       Medical Record #: [id number]

## (undated) NOTE — IP AVS SNAPSHOT
Patient Demographics     Address Phone E-mail Address    [address] [facility] [phone] (Home)  [phone] (Mobile) [email or web address]      Emergency Contact(s)     Name Relation Home Work [place] [facility] [phone] [facility]           Follow up with Ivory Tomlinson MD On 7/13/2017.     Specialty:  ENDOCRINOLOGY    Contact information:    [place]  [address] JULIAN Amanda March Last time this was given:  6 Units on 5/13/2017 11:11 AM   Commonly known as:  David Pedersen   Notes to Patient:  PLEASE FOLLOW ABOVE INSTRUCTIONS FOR INSULIN DOSE        20 to 30 units three times daily with meals    Ivory Tomlinson [id number] furosemide (LASIX) tab 40 mg 05/15/17 0916 Given      [id number] lisinopril (PRINIVIL,ZESTRIL) tab 40 mg 05/15/17 0916 Given            LEFT LOWER ABDOMEN     Order ID Medication Name Action Time Action Reason Comments    [id number] insulin aspart Specimen Information:  Blood from Blood,peripheral      Blood Culture Result No Growth 2 Days     Blood Culture FREQ X 2 [[id number]] Collected:  05/12/17 1137    Order Status:  Completed Lab Status:  Preliminary result Updated:  05/14/17 1200    Specimen OTHER SURGICAL HISTORY  2009    Comment amputation R forefoot    PART REMV OTHR TARSAL/METATARSAL  11/9/09    Comment Performed by Urszula Dubon at [facility] [address] FOOT/TOE TENDON  11/9/09    Comment Performed by Merlyn Bowles BD INSULIN SYRINGE ULTRAFINE 30G X 1/2\" 0.3 ML Does not apply Misc USE FIVE TIMES DAILY Disp: 200 each Rfl: 2   Atorvastatin Calcium 20 MG Oral Tab Take 1 tablet (20 mg total) by mouth daily.  Disp: 30 tablet Rfl: 12   Multiple Vitamins Oral Tab Take 1 tab insufficiency, neuropathy admitted for surgical procedure on infected left foot.      Left foot infection, concern for osteomyelitis, septic arthritis  -patient with elevated ESR and CRP  -culture with staph aureus  -Podiatry and ID on the case  -patient st 5/12/2017  2:07 PM             DMg hospitalist H+P    PCP;Tirso Rojas DO  CC admitted at request of Podiatry    HPI 56 yo male with hx of DM2, HTN, HL, hx of right foot surgery, renal insufficiency, neuropathy admitted for surgical procedure on infected Comment Procedure: EXOSTECTOMY FOOT/TOE;   Surgeon: Rebeca Villanueva DPM;  Location: [address] [place] [address]     Family History   Problem Relation Age of Onset   • [other] [OTHER] Father    • Heart Disorder Father      CAD   • Diabetes Neg    • Thyroid Leobardo Cipro mupirocin (BACTROBAN) 2 % Apply Externally Ointment Apply to AA BID X up to 2 weeks prn flares Disp: 30 g Rfl: 3   insulin glargine (LANTUS) 100 UNIT/ML Subcutaneous Solution Take 60 Units at bedtime Disp:  Rfl:    FREESTYLE LANCETS Does not apply Misc USE Per patient lantus 80 units nightly  Because he needs to be NPO after midnight today for possible podiatry procedure will decrease long acting insulin down to 40 Units for now  Sliding scale insulin ordered  Consult Endocrinology (patient was lost to follo He is aware that we have more endocrinologists now with a PA so he will start back up with [facility] endocrinology. T2DM was diagnosed around age 44s. Unclear on exact dates. He has no FH of DM. DM is complicated as above.    He takes vials of insulin:  Lantus Glucose Blood (JOSÉ ANTONIO CONTOUR NEXT TEST) In Vitro Strip Check blood sugars 4 times per day Disp: 400 strip Rfl: 1 Taking   mupirocin (BACTROBAN) 2 % Apply Externally Ointment Apply to AA BID X up to 2 weeks prn flares Disp: 30 g Rfl: 3 Taking   insulin glar Location: [address] [place] [address]     Family History   Problem Relation Age of Onset   • [other] [OTHER] Father    • Heart Disorder Father      CAD   • Diabetes Neg    • Thyroid Disorder Neg       reports that he has never smoked.  He has never used smokel General: no apparent distress, appears stated age  Eyes: no proptosis, lid lag, or stare  ENT: moist mucous membranes  Neck:  no thyromegaly   Lymph: no anterior cervical or supraclavicular lymphadenopathy  CV: regular rate and rhythm, nl S1/S2, no LE thelma 3. Obesity - will need to work on diet/lifestyle changes/activity upon DC    I will continue to follow closely while the patient is in the hospital. Please feel free to contact me with any questions or concerns.      Juanito Jacques MD  Endocrinology, Diabetes, Type 2 DM   • Hyperlipidemia LDL goal < 100    • Hypertension    • H/O foot surgery In 2009     s/p partial right foot amputation in 2008   • Mild renal insufficiency      Creatinine 1.6-2.1   • Neuropathy (HCC)    • Visual impairment      glasses   • Ca Fall Risk: High fall risk    WEIGHT BEARING RESTRICTION  Weight Bearing Restriction: R lower extremity;L lower extremity        R Lower Extremity:  (WBAT with R AFO)  L Lower Extremity: Non-Weight Bearing    PAIN ASSESSMENT  Rating: Unable to rate (mild) PT Approx Degree of Impairment Score: 54.16%   Standardized Score (AM-PAC Scale): 40.78   CMS Modifier (G-Code): CK    FUNCTIONAL ABILITY STATUS  Gait Assessment   Gait Assistance: Dependent assistance (per FIM; actually MOD)  Distance (ft): 20  Assistive foot/ankle 5/13/17. In this PT evaluation, the patient presents with the following impairments: NWB L LE, decreased awareness of safety precautions, decreased functional strength, impaired balance, and gait abnormality.   These impairments manifest themse OCCUPATIONAL THERAPY EVALUATION - INPATIENT     Room Number: 384/384-A  Evaluation Date: 5/14/2017  Type of Evaluation: Initial  Presenting Problem: s/p irrigation and debridement of 5th metatarsal , left foot resections, bone biopsy and tibials ante Mikala South DPM;  Location: [address] [place] [address]  SKIN/TISSUE Right 12/22/2014    Comment Procedure: EXOSTECTOMY FOOT/TOE;   Surgeon: Ang Wynne DPM;  Location: [address] [place] [address] TARSAL/METATARSAL Right 12/22/20 During this evaluation, pt was not available for education.      RANGE OF MOTION AND STRENGTH ASSESSMENT  Upper extremity ROM is within functional limits     Upper extremity strength is within functional limits     COORDINATION  [employer]    [facility] which [facility] acknowledged at this time however appeared to have no insight in to deficits. Patient End of Session: Up in chair;Needs met;Call light within reach;RN aware of session/findings; All patient questions and concerns addressed    ASSESSMENT Patient will transfer form stand to sit:  with supervision, with good judgement/safety and while maintaining weight bearing status  Patient will transfer to toilet:  with supervision, with good judgement/safety and while maintaining weight bearing status

## (undated) NOTE — LETTER
[facility] [address], [address]  Consent for Procedure/Sedation    Date: November 11,2021    Time: 1700      1.  I authorize the performance upon Hannah Cárdenas the following:cardiac catheterization, left ventricular cineangiogr ____________________________________________________    Signature of person authorized                                     Relationship to  to consent for patient: _________________________ patient: ___________________    Witness: _________________________

## (undated) NOTE — LETTER
[facility] [address], [address]  Consent for Procedure/Sedation    Date: 12/27/21    Time:       1. I authorize the performance upon Donaldo Pena the following:  Permanent Dialysis Cathter Removal      2.  I authorize Dr. Mitch Mckeon 6/6/1955       Medical Record #: [id number]

## (undated) NOTE — LETTER
[facility] [address], [address]  Consent for Procedure/Sedation    Date: 11/18/2021    Time: 1246      1. I authorize the performance upon Vernavjot Martinezr the following:  Impella Removal      2.  I authorize Dr. Angeles Kennedy (and Mono Malik Medical Record #: [id number]

## (undated) NOTE — LETTER
[facility] [address], [address]  Consent for Procedure/Sedation    Date: 12/3/21    Time:       1. I authorize the performance upon Delano Mcelroy the following:  Permanent Dialysis Cathter Insertion     2.  I authorize Dr. Bela Richards 6/6/1955       Medical Record #: [id number]

## (undated) NOTE — LETTER
Magaly Mullins [address] [facility] [address], [address] [facility]     PICC LINE INSERTION CONSENT     I agree to have a Peripherally Inserted Central Catheter (PICC) placed in my arm.    1. The PICC insertion procedure, care, maintenance, risks, benefits, and c goals; and potential problems that might occur during recuperation.  They also discussed reasonable alternatives to the PICC, including risks, benefits, and side effects related to the alternatives and risks related to not receiving this procedure      ____